# Patient Record
Sex: FEMALE | Race: WHITE | NOT HISPANIC OR LATINO | Employment: FULL TIME | URBAN - METROPOLITAN AREA
[De-identification: names, ages, dates, MRNs, and addresses within clinical notes are randomized per-mention and may not be internally consistent; named-entity substitution may affect disease eponyms.]

---

## 2017-01-10 ENCOUNTER — TRANSFERRED RECORDS (OUTPATIENT)
Dept: HEALTH INFORMATION MANAGEMENT | Facility: CLINIC | Age: 62
End: 2017-01-10

## 2017-01-10 LAB
CHOLEST SERPL-MCNC: 208 MG/DL (ref 0–199)
HDLC SERPL-MCNC: 39 MG/DL
LDLC SERPL CALC-MCNC: 108 MG/DL (ref 0–130)
TRIGL SERPL-MCNC: 377 MG/DL (ref 4–149)

## 2017-01-12 ENCOUNTER — TRANSFERRED RECORDS (OUTPATIENT)
Dept: HEALTH INFORMATION MANAGEMENT | Facility: CLINIC | Age: 62
End: 2017-01-12

## 2017-03-03 ENCOUNTER — TELEPHONE (OUTPATIENT)
Dept: FAMILY MEDICINE | Facility: CLINIC | Age: 62
End: 2017-03-03

## 2017-03-03 NOTE — TELEPHONE ENCOUNTER
..Reason for Call:  FYI    Detailed comments: pt had been seeing you but  due to insurance changes, was being seen elsewhere but now has bcbs again  and plans on coming in some time in April; very excited - has missed you;   *no need for call back    Phone Number Patient can be reached at: Home number on file [phone] (home)    Best Time: n/a    Can we leave a detailed message on this number? Not Applicable/but you may if nec    Call taken on 3/3/2017 at 1:30 PM by Padmini Crandall

## 2017-05-08 ENCOUNTER — OFFICE VISIT (OUTPATIENT)
Dept: FAMILY MEDICINE | Facility: CLINIC | Age: 62
End: 2017-05-08
Payer: COMMERCIAL

## 2017-05-08 VITALS
TEMPERATURE: 97.9 F | HEART RATE: 82 BPM | DIASTOLIC BLOOD PRESSURE: 73 MMHG | SYSTOLIC BLOOD PRESSURE: 152 MMHG | OXYGEN SATURATION: 98 % | WEIGHT: 192 LBS | HEIGHT: 64 IN | BODY MASS INDEX: 32.78 KG/M2

## 2017-05-08 DIAGNOSIS — B02.29 POST HERPETIC NEURALGIA: ICD-10-CM

## 2017-05-08 DIAGNOSIS — Z79.4 TYPE 2 DIABETES MELLITUS WITH DIABETIC NEPHROPATHY, WITH LONG-TERM CURRENT USE OF INSULIN (H): ICD-10-CM

## 2017-05-08 DIAGNOSIS — Z11.59 NEED FOR HEPATITIS C SCREENING TEST: ICD-10-CM

## 2017-05-08 DIAGNOSIS — F33.1 MAJOR DEPRESSIVE DISORDER, RECURRENT EPISODE, MODERATE (H): ICD-10-CM

## 2017-05-08 DIAGNOSIS — I10 HYPERTENSION GOAL BP (BLOOD PRESSURE) < 130/80: ICD-10-CM

## 2017-05-08 DIAGNOSIS — E78.5 HYPERLIPIDEMIA LDL GOAL <100: Primary | ICD-10-CM

## 2017-05-08 DIAGNOSIS — Z12.31 VISIT FOR SCREENING MAMMOGRAM: ICD-10-CM

## 2017-05-08 DIAGNOSIS — K22.70 BARRETT'S ESOPHAGUS WITHOUT DYSPLASIA: ICD-10-CM

## 2017-05-08 DIAGNOSIS — E11.21 TYPE 2 DIABETES MELLITUS WITH DIABETIC NEPHROPATHY, WITH LONG-TERM CURRENT USE OF INSULIN (H): ICD-10-CM

## 2017-05-08 LAB
ALBUMIN SERPL-MCNC: 3.7 G/DL (ref 3.4–5)
ALP SERPL-CCNC: 98 U/L (ref 40–150)
ALT SERPL W P-5'-P-CCNC: 37 U/L (ref 0–50)
ANION GAP SERPL CALCULATED.3IONS-SCNC: 11 MMOL/L (ref 3–14)
AST SERPL W P-5'-P-CCNC: 19 U/L (ref 0–45)
BILIRUB SERPL-MCNC: 0.4 MG/DL (ref 0.2–1.3)
BUN SERPL-MCNC: 13 MG/DL (ref 7–30)
CALCIUM SERPL-MCNC: 9.3 MG/DL (ref 8.5–10.1)
CHLORIDE SERPL-SCNC: 104 MMOL/L (ref 94–109)
CHOLEST SERPL-MCNC: 248 MG/DL
CO2 SERPL-SCNC: 24 MMOL/L (ref 20–32)
CREAT SERPL-MCNC: 0.57 MG/DL (ref 0.52–1.04)
GFR SERPL CREATININE-BSD FRML MDRD: ABNORMAL ML/MIN/1.7M2
GLUCOSE SERPL-MCNC: 275 MG/DL (ref 70–99)
HBA1C MFR BLD: 11.2 % (ref 4.3–6)
HDLC SERPL-MCNC: 45 MG/DL
LDLC SERPL CALC-MCNC: 136 MG/DL
NONHDLC SERPL-MCNC: 203 MG/DL
POTASSIUM SERPL-SCNC: 3.9 MMOL/L (ref 3.4–5.3)
PROT SERPL-MCNC: 7.1 G/DL (ref 6.8–8.8)
SODIUM SERPL-SCNC: 139 MMOL/L (ref 133–144)
TRIGL SERPL-MCNC: 336 MG/DL
TSH SERPL DL<=0.005 MIU/L-ACNC: 1.02 MU/L (ref 0.4–4)

## 2017-05-08 PROCEDURE — 83036 HEMOGLOBIN GLYCOSYLATED A1C: CPT | Performed by: PHYSICIAN ASSISTANT

## 2017-05-08 PROCEDURE — 36415 COLL VENOUS BLD VENIPUNCTURE: CPT | Performed by: PHYSICIAN ASSISTANT

## 2017-05-08 PROCEDURE — 80061 LIPID PANEL: CPT | Performed by: PHYSICIAN ASSISTANT

## 2017-05-08 PROCEDURE — 99207 C FOOT EXAM  NO CHARGE: CPT | Performed by: PHYSICIAN ASSISTANT

## 2017-05-08 PROCEDURE — 84443 ASSAY THYROID STIM HORMONE: CPT | Performed by: PHYSICIAN ASSISTANT

## 2017-05-08 PROCEDURE — 86803 HEPATITIS C AB TEST: CPT | Performed by: PHYSICIAN ASSISTANT

## 2017-05-08 PROCEDURE — 80053 COMPREHEN METABOLIC PANEL: CPT | Performed by: PHYSICIAN ASSISTANT

## 2017-05-08 PROCEDURE — 99214 OFFICE O/P EST MOD 30 MIN: CPT | Performed by: PHYSICIAN ASSISTANT

## 2017-05-08 RX ORDER — GABAPENTIN 100 MG/1
100 CAPSULE ORAL
COMMUNITY
Start: 2017-01-12 | End: 2017-05-08

## 2017-05-08 RX ORDER — METOPROLOL SUCCINATE 50 MG/1
50 TABLET, EXTENDED RELEASE ORAL DAILY
Qty: 90 TABLET | Refills: 1 | Status: SHIPPED | OUTPATIENT
Start: 2017-05-08 | End: 2017-09-17

## 2017-05-08 RX ORDER — CITALOPRAM HYDROBROMIDE 20 MG/1
20 TABLET ORAL DAILY
Qty: 90 TABLET | Refills: 1 | Status: SHIPPED | OUTPATIENT
Start: 2017-05-08 | End: 2017-09-22

## 2017-05-08 RX ORDER — INSULIN LISPRO 200 [IU]/ML
INJECTION, SOLUTION SUBCUTANEOUS
Refills: 4 | COMMUNITY
Start: 2017-03-23 | End: 2017-05-08

## 2017-05-08 RX ORDER — METOPROLOL SUCCINATE 50 MG/1
50 TABLET, EXTENDED RELEASE ORAL
COMMUNITY
Start: 2016-04-12 | End: 2017-05-08

## 2017-05-08 RX ORDER — INSULIN LISPRO 200 [IU]/ML
30 INJECTION, SOLUTION SUBCUTANEOUS
Qty: 12 ML | Refills: 0 | Status: SHIPPED | OUTPATIENT
Start: 2017-05-08 | End: 2017-06-19

## 2017-05-08 RX ORDER — LOSARTAN POTASSIUM AND HYDROCHLOROTHIAZIDE 12.5; 1 MG/1; MG/1
TABLET ORAL
COMMUNITY
Start: 2016-02-25 | End: 2017-05-08

## 2017-05-08 RX ORDER — METFORMIN HCL 500 MG
2000 TABLET, EXTENDED RELEASE 24 HR ORAL
COMMUNITY
Start: 2016-09-06 | End: 2017-05-08

## 2017-05-08 RX ORDER — CITALOPRAM HYDROBROMIDE 20 MG/1
20 TABLET ORAL
COMMUNITY
Start: 2017-01-10 | End: 2017-05-08

## 2017-05-08 RX ORDER — OMEPRAZOLE 40 MG/1
40 CAPSULE, DELAYED RELEASE ORAL DAILY
Qty: 30 CAPSULE | Refills: 1 | COMMUNITY
Start: 2017-05-08 | End: 2017-12-29

## 2017-05-08 RX ORDER — LOSARTAN POTASSIUM AND HYDROCHLOROTHIAZIDE 12.5; 1 MG/1; MG/1
1 TABLET ORAL DAILY
Qty: 90 TABLET | Refills: 1 | Status: SHIPPED | OUTPATIENT
Start: 2017-05-08 | End: 2017-12-29

## 2017-05-08 RX ORDER — AMLODIPINE BESYLATE 10 MG/1
10 TABLET ORAL DAILY
Qty: 90 TABLET | Refills: 1 | Status: SHIPPED | OUTPATIENT
Start: 2017-05-08 | End: 2017-12-29

## 2017-05-08 RX ORDER — GABAPENTIN 100 MG/1
200 CAPSULE ORAL 3 TIMES DAILY
Qty: 510 CAPSULE | Refills: 1 | Status: SHIPPED | OUTPATIENT
Start: 2017-05-08 | End: 2017-09-25 | Stop reason: DRUGHIGH

## 2017-05-08 RX ORDER — METFORMIN HCL 500 MG
2000 TABLET, EXTENDED RELEASE 24 HR ORAL
Qty: 360 TABLET | Refills: 1 | Status: SHIPPED | OUTPATIENT
Start: 2017-05-08 | End: 2017-12-29

## 2017-05-08 RX ORDER — SUCRALFATE ORAL 1 G/10ML
1000 SUSPENSION ORAL
COMMUNITY
Start: 2016-09-08 | End: 2017-09-22

## 2017-05-08 RX ORDER — OMEPRAZOLE 40 MG/1
40 CAPSULE, DELAYED RELEASE ORAL
COMMUNITY
Start: 2017-01-10 | End: 2017-05-08

## 2017-05-08 RX ORDER — ATORVASTATIN CALCIUM 40 MG/1
40 TABLET, FILM COATED ORAL DAILY
Qty: 90 TABLET | Refills: 1 | Status: SHIPPED | OUTPATIENT
Start: 2017-05-08 | End: 2017-12-29

## 2017-05-08 RX ORDER — ATORVASTATIN CALCIUM 40 MG/1
TABLET, FILM COATED ORAL
COMMUNITY
Start: 2016-10-18 | End: 2017-05-08

## 2017-05-08 RX ORDER — AMLODIPINE BESYLATE 10 MG/1
10 TABLET ORAL
COMMUNITY
Start: 2016-06-14 | End: 2017-05-08

## 2017-05-08 NOTE — MR AVS SNAPSHOT
After Visit Summary   5/8/2017    Ellen Hill    MRN: [id number]           Patient Information     Date Of Birth          1955        Visit Information        Provider Department      5/8/2017 3:40 PM Jenniffer Mackay PA-C [facility]        Today's Diagnoses     Hyperlipidemia LDL goal <100    -  1    Visit for screening mammogram        Need for hepatitis C screening test        Screening for diabetic peripheral neuropathy        Post herpetic neuralgia        Type 2 diabetes mellitus with diabetic nephropathy, with long-term current use of insulin (H)        Major depressive disorder, recurrent episode, moderate (H)        Hypertension goal BP (blood pressure) < 130/80           Follow-ups after your visit        Additional Services     DIABETES EDUCATOR REFERRAL       DIABETES SELF MANAGEMENT TRAINING (DSMT)      Your provider has referred you to Diabetes Education: FMG: Diabetes Education - All [facility] [phone]   [email or web address]    Type of training and number of hours: Previous Diagnosis: Follow-up DSMT - 2 hours.    Medicare covers: 10 hours of initial DSMT in 12 month period from the time of first visit, plus 2 hours of follow-up DSMT annually, and additional hours as requested for insulin training.    Diabetes Type: Type 2 - On Insulin             Diabetes Co-Morbidities: hypertension               A1C Goal:  <7.0       A1C is: Lab Results       Component                Value               Date                       A1C                      11.2                05/08/2017              If an urgent visit is needed or A1C is above 12, Care Team to call the Diabetes Education Team at [phone] or send an In Basket message to the Diabetes Education Pool (P DIAB ED-PATIENT CARE).    Diabetes Education Topics: Other: insulin management    Special Educational Needs Requiring Individual DSMT: None        MEDICAL NUTRITION THERAPY (MNT) for Diabetes    Medical Nutrition Therapy with a Registered Dietitian can be provided in coordination with Diabetes Self-Management Training to assist in achieving optimal diabetes management.    MNT Type and Hours: Previous diagnosis: Annual follow-up MNT - 2 hours                       Medicare will cover: 3 hours initial MNT in 12 month period after first visit, plus 2 hours of follow-up MNT annually    Please be aware that coverage of these services is subject to the terms and limitations of your health insurance plan.  Call member services at your health plan to determine Diabetes Self-Management Training benefits and ask which blood glucose monitor brands are covered by your plan.      Please bring the following with you to your appointment:    (1)  List of current medications   (2)  List of Blood Glucose Monitor brands that are covered by your insurance plan  (3)  Blood Glucose Monitor and log book  (4)   Food records for the 3 days prior to your visit    The Certified Diabetes Educator may make diabetes medication adjustments per the CDE Protocol and Collaborative Practice Agreement.                  Your next 10 appointments already scheduled     May 31, 2017  1:20 PM CDT   Telephone Visit with Jenniffer Mackay PA-C   [facility] ([facility]    [address] [place] [id number] [phone]           Note: this is not an onsite visit; there is no need to come to the facility.              Future tests that were ordered for you today     Open Future Orders        Priority Expected Expires Ordered    MA SCREENING DIGITAL BILAT - Future  (s+30) Routine  5/8/2018 5/8/2017            Who to contact     If you have questions or need follow up information about today's clinic visit or your schedule please contact [facility] directly at [phone].  Normal or non-critical lab and imaging  "results will be communicated to you by MyChart, letter or phone within 4 business days after the clinic has received the results. If you do not hear from us within 7 days, please contact the clinic through [employer] or phone. If you have a critical or abnormal lab result, we will notify you by phone as soon as possible.  Submit refill requests through [employer] or call your pharmacy and they will forward the refill request to us. Please allow 3 business days for your refill to be completed.          Additional Information About Your Visit        BoxTone Information     BoxTone lets you send messages to your doctor, view your test results, renew your prescriptions, schedule appointments and more. To sign up, go to [email or web address] . Click on \"Log in\" on the left side of the screen, which will take you to the Welcome page. Then click on \"Sign up Now\" on the right side of the page.     You will be asked to enter the access code listed below, as well as some personal information. Please follow the directions to create your username and password.     Your access code is: [email or web address]  Expires: 2017  4:37 PM     Your access code will  in 90 days. If you need help or a new code, please call your [place] clinic or [phone].        Care EveryWhere ID     This is your Care EveryWhere ID. This could be used by other organizations to access your [place] medical records  [id number]        Your Vitals Were     Pulse Temperature Height Pulse Oximetry Breastfeeding? BMI (Body Mass Index)    82 97.9  F (36.6  C) (Oral) 5' 4\" (1.626 m) 98% No 32.96 kg/m2       Blood Pressure from Last 3 Encounters:   17 152/73   16 162/80   02/11/15 130/76    Weight from Last 3 Encounters:   17 192 lb (87.1 kg)   16 193 lb (87.5 kg)   02/11/15 196 lb (88.9 kg)              We Performed the Following     Albumin Random Urine Quantitative     Comprehensive metabolic panel     DIABETES EDUCATOR REFERRAL     " FOOT EXAM  NO CHARGE [95187.114]     HEMOGLOBIN A1C     Hepatitis C Screen Reflex to HCV RNA Quant and Genotype     Lipid panel reflex to direct LDL     TSH WITH FREE T4 REFLEX          Today's Medication Changes          These changes are accurate as of: 5/8/17  4:37 PM.  If you have any questions, ask your nurse or doctor.               These medicines have changed or have updated prescriptions.        Dose/Directions    amLODIPine 10 MG tablet   Commonly known as:  NORVASC   This may have changed:  when to take this   Used for:  Hypertension goal BP (blood pressure) < 130/80   Changed by:  Jenniffer Mackay PA-C        Dose:  10 mg   Take 1 tablet (10 mg) by mouth daily   Quantity:  90 tablet   Refills:  1       atorvastatin 40 MG tablet   Commonly known as:  LIPITOR   This may have changed:  See the new instructions.   Used for:  Type 2 diabetes mellitus with diabetic nephropathy, with long-term current use of insulin (H)   Changed by:  Jenniffer Mackay PA-C        Dose:  40 mg   Take 1 tablet (40 mg) by mouth daily   Quantity:  90 tablet   Refills:  1       citalopram 20 MG tablet   Commonly known as:  celeXA   This may have changed:  when to take this   Used for:  Major depressive disorder, recurrent episode, moderate (H)   Changed by:  Jenniffer Mackay PA-C        Dose:  20 mg   Take 1 tablet (20 mg) by mouth daily   Quantity:  90 tablet   Refills:  1       gabapentin 100 MG capsule   Commonly known as:  NEURONTIN   This may have changed:    - how much to take  - when to take this   Used for:  Post herpetic neuralgia   Changed by:  Jenniffer Mackay PA-C        Dose:  200 mg   Take 2 capsules (200 mg) by mouth 3 times daily   Quantity:  510 capsule   Refills:  1       HUMALOG KWIKPEN 200 UNIT/ML soln   This may have changed:  See the new instructions.   Used for:  Type 2 diabetes mellitus with diabetic nephropathy, with long-term current use of insulin (H)   Generic drug:  Insulin Lispro    Changed by:  Jenniffer Mackay PA-C        Dose:  30 Units   Inject 30 Units Subcutaneous 3 times daily (before meals)   Quantity:  12 mL   Refills:  0       insulin glargine 100 UNIT/ML injection   Commonly known as:  LANTUS   This may have changed:    - how much to take  - when to take this   Used for:  Type 2 diabetes mellitus with diabetic nephropathy, with long-term current use of insulin (H)   Changed by:  Jenniffer Mackay PA-C        Dose:  45 Units   Inject 45 Units Subcutaneous 2 times daily   Quantity:  12 mL   Refills:  0       losartan-hydrochlorothiazide 100-12.5 MG per tablet   Commonly known as:  HYZAAR   This may have changed:    - how much to take  - when to take this   Used for:  Type 2 diabetes mellitus with diabetic nephropathy, with long-term current use of insulin (H)   Changed by:  Jenniffer Mackay PA-C        Dose:  1 tablet   Take 1 tablet by mouth daily   Quantity:  90 tablet   Refills:  1       metFORMIN 500 MG 24 hr tablet   Commonly known as:  GLUCOPHAGE-XR   This may have changed:  when to take this   Used for:  Type 2 diabetes mellitus with diabetic nephropathy, with long-term current use of insulin (H)   Changed by:  Jenniffer Mackay PA-C        Dose:  2000 mg   Take 4 tablets (2,000 mg) by mouth daily (with breakfast)   Quantity:  360 tablet   Refills:  1       metoprolol 50 MG 24 hr tablet   Commonly known as:  TOPROL-XL   This may have changed:  when to take this   Used for:  Hypertension goal BP (blood pressure) < 130/80   Changed by:  Jenniffer Mackay PA-C        Dose:  50 mg   Take 1 tablet (50 mg) by mouth daily   Quantity:  90 tablet   Refills:  1       omeprazole 40 MG capsule   Commonly known as:  priLOSEC   This may have changed:  when to take this   Changed by:  Jenniffer Mackay PA-C        Dose:  40 mg   Take 1 capsule (40 mg) by mouth daily   Quantity:  30 capsule   Refills:  1            Where to get your medicines      These medications were  sent to [facility]/pharmacy #[id number] - [facility], [place] - [address]  [address], [facility] [address]     Phone:  [phone]     amLODIPine 10 MG tablet    atorvastatin 40 MG tablet    citalopram 20 MG tablet    gabapentin 100 MG capsule    HUMALOG KWIKPEN 200 UNIT/ML soln    insulin glargine 100 UNIT/ML injection    losartan-hydrochlorothiazide 100-12.5 MG per tablet    metFORMIN 500 MG 24 hr tablet    metoprolol 50 MG 24 hr tablet                Primary Care Provider    M[facility] Other Clinic                Thank you!     Thank you for choosing [facility]  for your care. Our goal is always to provide you with excellent care. Hearing back from our patients is one way we can continue to improve our services. Please take a few minutes to complete the written survey that you may receive in the mail after your visit with us. Thank you!             Your Updated Medication List - Protect others around you: Learn how to safely use, store and throw away your medicines at www.disposemymeds.org.          This list is accurate as of: 5/8/17  4:37 PM.  Always use your most recent med list.                   Brand Name Dispense Instructions for use    amLODIPine 10 MG tablet    NORVASC    90 tablet    Take 1 tablet (10 mg) by mouth daily       atorvastatin 40 MG tablet    LIPITOR    90 tablet    Take 1 tablet (40 mg) by mouth daily       citalopram 20 MG tablet    celeXA    90 tablet    Take 1 tablet (20 mg) by mouth daily       gabapentin 100 MG capsule    NEURONTIN    510 capsule    Take 2 capsules (200 mg) by mouth 3 times daily       HUMALOG KWIKPEN 200 UNIT/ML soln   Generic drug:  Insulin Lispro     12 mL    Inject 30 Units Subcutaneous 3 times daily (before meals)       insulin glargine 100 UNIT/ML injection    LANTUS    12 mL    Inject 45 Units Subcutaneous 2 times daily       losartan-hydrochlorothiazide 100-12.5 MG per tablet    HYZAAR    90 tablet    Take 1 tablet by mouth daily        metFORMIN 500 MG 24 hr tablet    GLUCOPHAGE-XR    360 tablet    Take 4 tablets (2,000 mg) by mouth daily (with breakfast)       metoprolol 50 MG 24 hr tablet    TOPROL-XL    90 tablet    Take 1 tablet (50 mg) by mouth daily       omeprazole 40 MG capsule    priLOSEC    30 capsule    Take 1 capsule (40 mg) by mouth daily       oxyCODONE-acetaminophen 5-325 MG per tablet    PERCOCET     Reported on 5/8/2017       sucralfate 1 GM/10ML suspension    CARAFATE     Take 1,000 mg by mouth

## 2017-05-08 NOTE — NURSING NOTE
"Chief Complaint   Patient presents with     Hypertension     Diabetes     Lipids       Initial /73  Pulse 82  Temp 97.9  F (36.6  C) (Oral)  Ht 5' 4\" (1.626 m)  Wt 192 lb (87.1 kg)  SpO2 98%  Breastfeeding? No  BMI 32.96 kg/m2 Estimated body mass index is 32.96 kg/(m^2) as calculated from the following:    Height as of this encounter: 5' 4\" (1.626 m).    Weight as of this encounter: 192 lb (87.1 kg).  Medication Reconciliation: complete   Thi New CMA      "

## 2017-05-08 NOTE — PROGRESS NOTES
SUBJECTIVE:                                                    Ellen Hill is a 61 year old female who presents to clinic today for the following health issues:        Diabetes Follow-up      Patient is checking blood sugars: not at all    Diabetic concerns: None and blood sugar frequently over 200 - 280     Symptoms of hypoglycemia (low blood sugar): none     Paresthesias (numbness or burning in feet) or sores: No     Date of last diabetic eye exam: 5/2016    Has been taking Lantus 40 units BID  Humalog taking it 30 BID instead of TID  She admits, she is not consistent with taking her meds.     Has been eating a lot of carbs (spagetti)     Hyperlipidemia Follow-Up      Rate your low fat/cholesterol diet?: fair    Taking statin?  Yes, no muscle aches from statin    Other lipid medications/supplements?:  Fish oil/Omega      Hypertension Follow-up      Outpatient blood pressures are not being checked.    Low Salt Diet: no added salt    Has not been taking her medications.            Has not been seen by a medical provider for over 9 months, just seeing a pharmacist for her meds.     Had an EGD and found to have barretts esophagus.   Started on gabapentin for nerve pain (right rib pain/post herpetic neuralgia).  This has helped calm down that pain.           Problem list and histories reviewed & adjusted, as indicated.  Additional history: as documented    Patient Active Problem List   Diagnosis     Post herpetic neuralgia     Hypertriglyceridemia     Hyperlipidemia LDL goal <100     TYPE 2 DIABETES, HBA1C GOAL < 7%     Hypertension goal BP (blood pressure) < 130/80     Moderate major depression (H)     Vulvar pruritus     Adenomatous polyps     Alcoholic (H)     Advanced directives, counseling/discussion     Obesity, Class I, BMI 30-34.9     Microalbuminuria     Leblanc's esophagus without dysplasia     History reviewed. No pertinent surgical history.    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, 2013     Family History   Problem Relation Age of Onset     Alcohol/Drug Mother      HEART DISEASE Mother      Alcohol/Drug Father      DIABETES Brother          Current Outpatient Prescriptions   Medication Sig Dispense Refill     sucralfate (CARAFATE) 1 GM/10ML suspension Take 1,000 mg by mouth       gabapentin (NEURONTIN) 100 MG capsule Take 2 capsules (200 mg) by mouth 3 times daily 510 capsule 1     omeprazole (PRILOSEC) 40 MG capsule Take 1 capsule (40 mg) by mouth daily 30 capsule 1     atorvastatin (LIPITOR) 40 MG tablet Take 1 tablet (40 mg) by mouth daily 90 tablet 1     metFORMIN (GLUCOPHAGE-XR) 500 MG 24 hr tablet Take 4 tablets (2,000 mg) by mouth daily (with breakfast) 360 tablet 1     citalopram (CELEXA) 20 MG tablet Take 1 tablet (20 mg) by mouth daily 90 tablet 1     metoprolol (TOPROL-XL) 50 MG 24 hr tablet Take 1 tablet (50 mg) by mouth daily 90 tablet 1     amLODIPine (NORVASC) 10 MG tablet Take 1 tablet (10 mg) by mouth daily 90 tablet 1     losartan-hydrochlorothiazide (HYZAAR) 100-12.5 MG per tablet Take 1 tablet by mouth daily 90 tablet 1     HUMALOG KWIKPEN 200 UNIT/ML soln Inject 30 Units Subcutaneous 3 times daily (before meals) 12 mL 0     insulin glargine (LANTUS) 100 UNIT/ML injection Inject 45 Units Subcutaneous 2 times daily 15 mL 0     oxyCODONE-acetaminophen (PERCOCET) 5-325 MG per tablet Reported on 5/8/2017       BP Readings from Last 3 Encounters:   05/08/17 152/73   06/26/16 162/80   02/11/15 130/76    Wt Readings from Last 3 Encounters:   05/08/17 192 lb (87.1 kg)   06/26/16 193 lb (87.5 kg)   02/11/15 196 lb (88.9 kg)                    Reviewed and updated as needed this visit by clinical staff       Reviewed and updated as needed this  "visit by Provider         ROS:  Constitutional, HEENT, cardiovascular, pulmonary, GI, , musculoskeletal, neuro, skin, endocrine and psych systems are negative, except as otherwise noted.    OBJECTIVE:                                                    /73  Pulse 82  Temp 97.9  F (36.6  C) (Oral)  Ht 5' 4\" (1.626 m)  Wt 192 lb (87.1 kg)  SpO2 98%  Breastfeeding? No  BMI 32.96 kg/m2  Body mass index is 32.96 kg/(m^2).  GENERAL: healthy, alert and no distress  NECK: no adenopathy, no asymmetry, masses, or scars and thyroid normal to palpation  RESP: lungs clear to auscultation - no rales, rhonchi or wheezes  CV: regular rate and rhythm, normal S1 S2, no S3 or S4, no murmur, click or rub, no peripheral edema and peripheral pulses strong  ABDOMEN: soft, nontender, no hepatosplenomegaly, no masses and bowel sounds normal  MS: no gross musculoskeletal defects noted, no edema  Diabetic foot exam: normal DP and PT pulses, no trophic changes or ulcerative lesions and normal sensory exam    Diagnostic Test Results:  Results for orders placed or performed in visit on 05/08/17   Hepatitis C Screen Reflex to HCV RNA Quant and Genotype   Result Value Ref Range    Hepatitis C Antibody  NR     Nonreactive   Assay performance characteristics have not been established for newborns,   infants, and children     HEMOGLOBIN A1C   Result Value Ref Range    Hemoglobin A1C 11.2 (H) 4.3 - 6.0 %   Lipid panel reflex to direct LDL   Result Value Ref Range    Cholesterol 248 (H) <200 mg/dL    Triglycerides 336 (H) <150 mg/dL    HDL Cholesterol 45 (L) >49 mg/dL    LDL Cholesterol Calculated 136 (H) <100 mg/dL    Non HDL Cholesterol 203 (H) <130 mg/dL   TSH WITH FREE T4 REFLEX   Result Value Ref Range    TSH 1.02 0.40 - 4.00 mU/L   Comprehensive metabolic panel   Result Value Ref Range    Sodium 139 133 - 144 mmol/L    Potassium 3.9 3.4 - 5.3 mmol/L    Chloride 104 94 - 109 mmol/L    Carbon Dioxide 24 20 - 32 mmol/L    Anion Gap 11 " 3 - 14 mmol/L    Glucose 275 (H) 70 - 99 mg/dL    Urea Nitrogen 13 7 - 30 mg/dL    Creatinine 0.57 0.52 - 1.04 mg/dL    GFR Estimate >90  Non  GFR Calc   >60 mL/min/1.7m2    GFR Estimate If Black >90   GFR Calc   >60 mL/min/1.7m2    Calcium 9.3 8.5 - 10.1 mg/dL    Bilirubin Total 0.4 0.2 - 1.3 mg/dL    Albumin 3.7 3.4 - 5.0 g/dL    Protein Total 7.1 6.8 - 8.8 g/dL    Alkaline Phosphatase 98 40 - 150 U/L    ALT 37 0 - 50 U/L    AST 19 0 - 45 U/L        ASSESSMENT/PLAN:                                                          1. Type 2 diabetes mellitus with diabetic nephropathy, with long-term current use of insulin (H)  Diabetes not controlled.  Will increase humalog to TID dosing (to cover meds).  Increase lantus to 45 units BID as well.   F/u with diabetes ed for insulin adjustment, may need a more concentrated insulin at some point.  Monitor for compliance issues.   - atorvastatin (LIPITOR) 40 MG tablet; Take 1 tablet (40 mg) by mouth daily  Dispense: 90 tablet; Refill: 1  - metFORMIN (GLUCOPHAGE-XR) 500 MG 24 hr tablet; Take 4 tablets (2,000 mg) by mouth daily (with breakfast)  Dispense: 360 tablet; Refill: 1  - losartan-hydrochlorothiazide (HYZAAR) 100-12.5 MG per tablet; Take 1 tablet by mouth daily  Dispense: 90 tablet; Refill: 1  - HUMALOG KWIKPEN 200 UNIT/ML soln; Inject 30 Units Subcutaneous 3 times daily (before meals)  Dispense: 12 mL; Refill: 0  - DIABETES EDUCATOR REFERRAL    2. Hyperlipidemia LDL goal <100  Will check labs, on a statin.   - Lipid panel reflex to direct LDL  - TSH WITH FREE T4 REFLEX  - Comprehensive metabolic panel  - Albumin Random Urine Quantitative; Future    3. Post herpetic neuralgia  Continue with this as is.   - gabapentin (NEURONTIN) 100 MG capsule; Take 2 capsules (200 mg) by mouth 3 times daily  Dispense: 510 capsule; Refill: 1    4. Major depressive disorder, recurrent episode, moderate (H)  Will leave as is for now  - citalopram (CELEXA) 20  MG tablet; Take 1 tablet (20 mg) by mouth daily  Dispense: 90 tablet; Refill: 1    5. Hypertension goal BP (blood pressure) < 130/80  Will leave as is for now.   - metoprolol (TOPROL-XL) 50 MG 24 hr tablet; Take 1 tablet (50 mg) by mouth daily  Dispense: 90 tablet; Refill: 1  - amLODIPine (NORVASC) 10 MG tablet; Take 1 tablet (10 mg) by mouth daily  Dispense: 90 tablet; Refill: 1    6. Leblanc's esophagus without dysplasia  Continue with high dose PPI until she has alternative recommendations from GI (has a repeat EGD scheduled soon)  - omeprazole (PRILOSEC) 40 MG capsule; Take 1 capsule (40 mg) by mouth daily  Dispense: 30 capsule; Refill: 1    7. Visit for screening mammogram  due  - MA SCREENING DIGITAL BILAT - Future  (s+30); Future    8. Need for hepatitis C screening test  Due.   - Hepatitis C Screen Reflex to HCV RNA Quant and Genotype    FUTURE APPOINTMENTS:       - Follow-up visit in 1 month for a recheck and with diabetes ed.       Jenniffer Mackay PA-C  [facility]

## 2017-05-09 ENCOUNTER — TELEPHONE (OUTPATIENT)
Dept: FAMILY MEDICINE | Facility: CLINIC | Age: 62
End: 2017-05-09

## 2017-05-09 DIAGNOSIS — Z79.4 TYPE 2 DIABETES MELLITUS WITH DIABETIC NEPHROPATHY, WITH LONG-TERM CURRENT USE OF INSULIN (H): ICD-10-CM

## 2017-05-09 DIAGNOSIS — E11.21 TYPE 2 DIABETES MELLITUS WITH DIABETIC NEPHROPATHY, WITH LONG-TERM CURRENT USE OF INSULIN (H): ICD-10-CM

## 2017-05-09 LAB — HCV AB SERPL QL IA: NORMAL

## 2017-05-09 ASSESSMENT — PATIENT HEALTH QUESTIONNAIRE - PHQ9: SUM OF ALL RESPONSES TO PHQ QUESTIONS 1-9: 1

## 2017-05-09 NOTE — TELEPHONE ENCOUNTER
[facility] is faxing a request for a new rx for insulin glargine (LANTUS) 100 UNIT/ML injection.    Pharmacy states the only carry in 15ml form.  Rx is is to dispense at 12ml.    Please contact pharmacy or send new Rx        [facility]

## 2017-05-10 PROBLEM — K22.70 BARRETT'S ESOPHAGUS WITHOUT DYSPLASIA: Status: ACTIVE | Noted: 2017-05-10

## 2017-05-10 RX ORDER — INSULIN GLARGINE 100 [IU]/ML
45 INJECTION, SOLUTION SUBCUTANEOUS 2 TIMES DAILY
Qty: 15 ML | Refills: 1 | Status: SHIPPED | OUTPATIENT
Start: 2017-05-10 | End: 2017-10-04

## 2017-05-10 NOTE — TELEPHONE ENCOUNTER
Pt returned called, was given message below.  Pt stated that she has a card for the Lantus as well that would drop the price to $25 but will try the new Rx to compare pricing.    Pt will call back with any further questions.    Maranda Jones MA  4:04 PM 5/10/2017

## 2017-05-10 NOTE — TELEPHONE ENCOUNTER
Patient has been on this medication in the past. MA to start the PA process.    Kandy Knutson RN, [facility]

## 2017-05-10 NOTE — TELEPHONE ENCOUNTER
I called [facility] pharmacy and they stated the formulary is Basaglar otherwise a PA will need to be done. Jenniffer do you want to try formulary or PA. Diane, CMA

## 2017-05-10 NOTE — TELEPHONE ENCOUNTER
This writer attempted to contact Ellen on 05/10/17    Reason for call formulary change/coverage and left message to return call.    When patient calls back, please contact 2nd floor [facility]. routine priority.      Diane, CMA

## 2017-05-10 NOTE — TELEPHONE ENCOUNTER
Now pharmacy is faxing a PA request for this medication:    Plan does not cover insulin glargine (LANTUS) 100 UNIT/ML injection.  Please call [phone] to initiate Prior Auth or change med.      ID# [id number]    Not covered- use rashawn Velasquez tresibanon Jesica L[facility]

## 2017-06-14 DIAGNOSIS — E11.9 TYPE 2 DIABETES, HBA1C GOAL < 7% (H): Primary | ICD-10-CM

## 2017-06-14 RX ORDER — FLURBIPROFEN SODIUM 0.3 MG/ML
SOLUTION/ DROPS OPHTHALMIC
Qty: 180 EACH | Refills: 1 | Status: SHIPPED | OUTPATIENT
Start: 2017-06-14 | End: 2018-08-14

## 2017-06-14 NOTE — TELEPHONE ENCOUNTER
[facility], [facility], [place], [phone] called regarding Rx for Insulin [place].  Patient is out of needles and is at the Pharmacy waiting.      Thank you,    Evi Springer

## 2017-06-14 NOTE — TELEPHONE ENCOUNTER
Pharmacy states that there was no size on the pen needles.  In the past the patient has received the mini pen needles.  Per refill protocol RN gave verbal permission to refill pen needles with mini pen needles.    Claudia Florian RN

## 2017-06-14 NOTE — TELEPHONE ENCOUNTER
Prescription approved per [facility] Refill Protocol.    Kandy Knutson RN, [facility]

## 2017-06-19 ENCOUNTER — TELEPHONE (OUTPATIENT)
Dept: FAMILY MEDICINE | Facility: CLINIC | Age: 62
End: 2017-06-19

## 2017-06-19 DIAGNOSIS — Z79.4 TYPE 2 DIABETES MELLITUS WITH DIABETIC NEPHROPATHY, WITH LONG-TERM CURRENT USE OF INSULIN (H): Primary | ICD-10-CM

## 2017-06-19 DIAGNOSIS — E11.21 TYPE 2 DIABETES MELLITUS WITH DIABETIC NEPHROPATHY, WITH LONG-TERM CURRENT USE OF INSULIN (H): Primary | ICD-10-CM

## 2017-06-19 NOTE — TELEPHONE ENCOUNTER
They likely want novolog instead then, which converts unit for unit as humalog.  I'll send that to her pharmacy.  She needs to schedule a f/u visit with Diabetes ed soon for insulin adjustment so we can get her blood sugars into a safer range.     Jenniffer Mackay PA-C

## 2017-06-19 NOTE — TELEPHONE ENCOUNTER
Reason for Call:  Other call back    Detailed comments: patient states her new insurance, [employer] does not cover HUMALOG KWIKPEN 200 UNIT/ML soln. Patient would like to know what her options are. She's no longer with [facility]. Please call and advise. Thanks.     Patient uses [id number] - [place], [place] - [address]    Phone Number Patient can be reached at: Home number on file [phone] (home)    Best Time: Anytime     Can we leave a detailed message on this number? YES    Call taken on 6/19/2017 at 11:52 AM by Abel Tee

## 2017-07-16 ENCOUNTER — OFFICE VISIT (OUTPATIENT)
Dept: URGENT CARE | Facility: URGENT CARE | Age: 62
End: 2017-07-16
Payer: COMMERCIAL

## 2017-07-16 VITALS
TEMPERATURE: 98.1 F | OXYGEN SATURATION: 94 % | WEIGHT: 190.6 LBS | DIASTOLIC BLOOD PRESSURE: 90 MMHG | HEART RATE: 95 BPM | BODY MASS INDEX: 32.72 KG/M2 | SYSTOLIC BLOOD PRESSURE: 186 MMHG

## 2017-07-16 DIAGNOSIS — N30.01 ACUTE CYSTITIS WITH HEMATURIA: ICD-10-CM

## 2017-07-16 DIAGNOSIS — R30.0 DYSURIA: Primary | ICD-10-CM

## 2017-07-16 DIAGNOSIS — R82.90 NONSPECIFIC FINDING ON EXAMINATION OF URINE: ICD-10-CM

## 2017-07-16 LAB
ALBUMIN UR-MCNC: >=300 MG/DL
APPEARANCE UR: ABNORMAL
BACTERIA #/AREA URNS HPF: ABNORMAL /HPF
BILIRUB UR QL STRIP: ABNORMAL
COLOR UR AUTO: YELLOW
GLUCOSE UR STRIP-MCNC: 500 MG/DL
HGB UR QL STRIP: ABNORMAL
KETONES UR STRIP-MCNC: NEGATIVE MG/DL
LEUKOCYTE ESTERASE UR QL STRIP: NEGATIVE
NITRATE UR QL: NEGATIVE
NON-SQ EPI CELLS #/AREA URNS LPF: ABNORMAL /LPF
PH UR STRIP: 5.5 PH (ref 5–7)
RBC #/AREA URNS AUTO: >100 /HPF (ref 0–2)
SP GR UR STRIP: >1.03 (ref 1–1.03)
URN SPEC COLLECT METH UR: ABNORMAL
URNS CMNT MICRO: ABNORMAL
UROBILINOGEN UR STRIP-ACNC: 1 EU/DL (ref 0.2–1)
WBC #/AREA URNS AUTO: ABNORMAL /HPF (ref 0–2)

## 2017-07-16 PROCEDURE — 99213 OFFICE O/P EST LOW 20 MIN: CPT | Performed by: NURSE PRACTITIONER

## 2017-07-16 PROCEDURE — 81001 URINALYSIS AUTO W/SCOPE: CPT | Performed by: NURSE PRACTITIONER

## 2017-07-16 PROCEDURE — 87086 URINE CULTURE/COLONY COUNT: CPT | Performed by: NURSE PRACTITIONER

## 2017-07-16 RX ORDER — SULFAMETHOXAZOLE/TRIMETHOPRIM 800-160 MG
1 TABLET ORAL 2 TIMES DAILY
Qty: 10 TABLET | Refills: 0 | Status: SHIPPED | OUTPATIENT
Start: 2017-07-16 | End: 2017-07-21

## 2017-07-16 ASSESSMENT — PAIN SCALES - GENERAL: PAINLEVEL: SEVERE PAIN (7)

## 2017-07-16 NOTE — LETTER
[facility]  [address]  [place] [address]         July 18, 2017    Ellen Hill  [facility] [address]  [place] [address]              Dear Ellen,    The results of your recent tests were abnormal and likely contaminated. Please complete antibiotic as directed and follow-up with your primary care provider for a repeat urine test. Enclosed is a copy of the results.  It was a pleasure to see you at your last visit.      Results for orders placed or performed in visit on 07/16/17   *UA reflex to Microscopic and Culture ([place] and [facility] (except [place] and [place])   Result Value Ref Range    Color Urine Yellow     Appearance Urine Cloudy     Glucose Urine 500 (A) NEG mg/dL    Bilirubin Urine (A) NEG     Small  This is an unconfirmed screening test result. A positive result may be false.      Ketones Urine Negative NEG mg/dL    Specific Gravity Urine >1.030 1.003 - 1.035    Blood Urine Large (A) NEG    pH Urine 5.5 5.0 - 7.0 pH    Protein Albumin Urine >=300 (A) NEG mg/dL    Urobilinogen Urine 1.0 0.2 - 1.0 EU/dL    Nitrite Urine Negative NEG    Leukocyte Esterase Urine Negative NEG    Source Midstream Urine    Urine Microscopic   Result Value Ref Range    WBC Urine 10-25 (A) 0 - 2 /HPF    RBC Urine >100 (A) 0 - 2 /HPF    Squamous Epithelial /LPF Urine Few FEW /LPF    Bacteria Urine Moderate (A) NEG /HPF    Comment Urine Unconcentrated    Urine Culture Aerobic Bacterial   Result Value Ref Range    Specimen Description Midstream Urine     Culture Micro       50,000 to 100,000 colonies/mL mixed urogenital eriberto Susceptibility testing not   routinely done      Micro Report Status FINAL 07/17/2017          If you have any questions or concerns, please call myself or my nurse at [phone].      Sincerely,      Lori Wylie, LADONNA/ama

## 2017-07-16 NOTE — MR AVS SNAPSHOT
After Visit Summary   7/16/2017    Ellen Hill    MRN: [id number]           Patient Information     Date Of Birth          1955        Visit Information        Provider Department      7/16/2017 9:45 AM Lori Wylie NP [facility]        Today's Diagnoses     Dysuria    -  1    Nonspecific finding on examination of urine        Acute cystitis with hematuria          Care Instructions      Understanding Urinary Tract Infections (UTIs)  Most UTIs are caused by bacteria, although they may also be caused by viruses or fungi. Bacteria from the bowel are the most common source of infection. The infection may start because of any of the following:    Sexual activity. During sex, bacteria can travel from the penis, vagina, or rectum into the urethra.     Bacteria on the skin outside the rectum may travel into the urethra. This is more common in women since the rectum and urethra are closer to each other than in men. Wiping from front to back after using the toilet and keeping the area clean can help prevent germs from getting to the urethra.    Blockage of urine flow through the urinary tract. If urine sits too long, germs may start to grow out of control.      Parts of the urinary tract  The infection can occur in any part of the urinary tract.    The kidneys collect and store urine.    The ureters carry urine from the kidneys to the bladder.    The bladder holds urine until you are ready to let it out.    The urethra carries urine from the bladder out of the body. It is shorter in women, so bacteria can move through it more easily. The urethra is longer in men, so a UTI is less likely to reach the bladder or kidneys in men.  Date Last Reviewed: 1/1/2017 2000-2017 [employer]. [address] [place] [address], [place] [address]. All rights reserved. This information is not intended as a substitute for professional medical care. Always follow your healthcare professional's  "instructions.                Follow-ups after your visit        Who to contact     If you have questions or need follow up information about today's clinic visit or your schedule please contact [facility] [place] directly at [phone].  Normal or non-critical lab and imaging results will be communicated to you by MyChart, letter or phone within 4 business days after the clinic has received the results. If you do not hear from us within 7 days, please contact the clinic through MyChart or phone. If you have a critical or abnormal lab result, we will notify you by phone as soon as possible.  Submit refill requests through [employer] or call your pharmacy and they will forward the refill request to us. Please allow 3 business days for your refill to be completed.          Additional Information About Your Visit        [employer] Information     [employer] lets you send messages to your doctor, view your test results, renew your prescriptions, schedule appointments and more. To sign up, go to [email or web address] . Click on \"Log in\" on the left side of the screen, which will take you to the Welcome page. Then click on \"Sign up Now\" on the right side of the page.     You will be asked to enter the access code listed below, as well as some personal information. Please follow the directions to create your username and password.     Your access code is: [email or web address]  Expires: 2017  4:37 PM     Your access code will  in 90 days. If you need help or a new code, please call your [place] clinic or [phone].        Care EveryWhere ID     This is your Care EveryWhere ID. This could be used by other organizations to access your [facility] medical records  [id number]        Your Vitals Were     Pulse Temperature Pulse Oximetry BMI (Body Mass Index)          95 98.1  F (36.7  C) (Oral) 94% 32.72 kg/m2         Blood Pressure from Last 3 Encounters:   17 186/90   17 152/73   16 162/80    " Weight from Last 3 Encounters:   07/16/17 190 lb 9.6 oz (86.5 kg)   05/08/17 192 lb (87.1 kg)   06/26/16 193 lb (87.5 kg)              We Performed the Following     *UA reflex to Microscopic and Culture ([place] and [facility] (except [place] and [place])     Urine Culture Aerobic Bacterial     Urine Microscopic          Today's Medication Changes          These changes are accurate as of: 7/16/17 10:30 AM.  If you have any questions, ask your nurse or doctor.               Start taking these medicines.        Dose/Directions    sulfamethoxazole-trimethoprim 800-160 MG per tablet   Commonly known as:  BACTRIM DS/SEPTRA DS   Used for:  Acute cystitis with hematuria   Started by:  Lori Wylie NP        Dose:  1 tablet   Take 1 tablet by mouth 2 times daily for 5 days   Quantity:  10 tablet   Refills:  0            Where to get your medicines      These medications were sent to [id number] - [facility], [place] - [address]  [address] [address]     Phone:  [phone]     sulfamethoxazole-trimethoprim 800-160 MG per tablet                Primary Care Provider    Md Other Clinic                Equal Access to Services     CATHY WALL AH: Hadii francesca garcía hadasho Soyamil, waaxda luqadaha, qaybta kaalmada adeegyada, ethel velazco . So [facility] [phone].    ATENCIÓN: Si habla español, tiene a dyson disposición servicios gratuitos de asistencia lingüística. Llame al [phone].    We comply with applicable federal civil rights laws and [place] laws. We do not discriminate on the basis of race, color, national origin, age, disability sex, sexual orientation or gender identity.            Thank you!     Thank you for choosing [facility]  for your care. Our goal is always to provide you with excellent care. Hearing back from our patients is one way we can continue to improve our services. Please take a few minutes to complete the written  survey that you may receive in the mail after your visit with us. Thank you!             Your Updated Medication List - Protect others around you: Learn how to safely use, store and throw away your medicines at www.disposemymeds.org.          This list is accurate as of: 7/16/17 10:30 AM.  Always use your most recent med list.                   Brand Name Dispense Instructions for use Diagnosis    amLODIPine 10 MG tablet    NORVASC    90 tablet    Take 1 tablet (10 mg) by mouth daily    Hypertension goal BP (blood pressure) < 130/80       atorvastatin 40 MG tablet    LIPITOR    90 tablet    Take 1 tablet (40 mg) by mouth daily    Type 2 diabetes mellitus with diabetic nephropathy, with long-term current use of insulin (H)       B-D U/F 31G X 5 MM   Generic drug:  insulin pen needle     180 each    USE AS DIRECTED THREE TIMES DAILY BEFORE MEALS    Type 2 diabetes, HbA1c goal < 7% (H)       citalopram 20 MG tablet    celeXA    90 tablet    Take 1 tablet (20 mg) by mouth daily    Major depressive disorder, recurrent episode, moderate (H)       gabapentin 100 MG capsule    NEURONTIN    510 capsule    Take 2 capsules (200 mg) by mouth 3 times daily    Post herpetic neuralgia       insulin aspart 100 UNIT/ML injection    NovoLOG FLEXPEN    15 mL    30 units before breakfast, 30 units before lunch, 30 units before dinner    Type 2 diabetes mellitus with diabetic nephropathy, with long-term current use of insulin (H)       insulin glargine 100 UNIT/ML injection     15 mL    Inject 45 Units Subcutaneous 2 times daily    Type 2 diabetes mellitus with diabetic nephropathy, with long-term current use of insulin (H)       losartan-hydrochlorothiazide 100-12.5 MG per tablet    HYZAAR    90 tablet    Take 1 tablet by mouth daily    Type 2 diabetes mellitus with diabetic nephropathy, with long-term current use of insulin (H)       metFORMIN 500 MG 24 hr tablet    GLUCOPHAGE-XR    360 tablet    Take 4 tablets (2,000 mg) by mouth  daily (with breakfast)    Type 2 diabetes mellitus with diabetic nephropathy, with long-term current use of insulin (H)       metoprolol 50 MG 24 hr tablet    TOPROL-XL    90 tablet    Take 1 tablet (50 mg) by mouth daily    Hypertension goal BP (blood pressure) < 130/80       omeprazole 40 MG capsule    priLOSEC    30 capsule    Take 1 capsule (40 mg) by mouth daily    Leblanc's esophagus without dysplasia       oxyCODONE-acetaminophen 5-325 MG per tablet    PERCOCET     Reported on 5/8/2017        sucralfate 1 GM/10ML suspension    CARAFATE     Take 1,000 mg by mouth        sulfamethoxazole-trimethoprim 800-160 MG per tablet    BACTRIM DS/SEPTRA DS    10 tablet    Take 1 tablet by mouth 2 times daily for 5 days    Acute cystitis with hematuria

## 2017-07-16 NOTE — NURSING NOTE
"Chief Complaint   Patient presents with     UTI     vaginal bleeding, dark urine, fatigue       Initial /90 (BP Location: Left arm, Patient Position: Chair, Cuff Size: Adult Regular)  Pulse 95  Temp 98.1  F (36.7  C) (Oral)  Wt 190 lb 9.6 oz (86.5 kg)  SpO2 94%  BMI 32.72 kg/m2 Estimated body mass index is 32.72 kg/(m^2) as calculated from the following:    Height as of 5/8/17: 5' 4\" (1.626 m).    Weight as of this encounter: 190 lb 9.6 oz (86.5 kg).  Medication Reconciliation: complete         Maribeth Kendall    "

## 2017-07-16 NOTE — PROGRESS NOTES
"  SUBJECTIVE:                                                    Ellen Hill is a 61 year old female who presents to clinic today for the following health issues:      URINARY TRACT SYMPTOMS      Duration: 1 day    Description  frequency     Intensity:  7/10    Accompanying signs and symptoms:  Fever/chills: no   Flank pain no   Nausea and vomiting: no   Vaginal symptoms: abnormal vaginal bleeding, pain, dark urine  Abdominal/Pelvic Pain: YES    History  History of frequent UTI's: YES, 3  History of kidney stones: no   Sexually Active: no   Possibility of pregnancy: No    Precipitating or alleviating factors: urinating    Therapies tried and outcome: none             Allergies   Allergen Reactions     Victoza Hives     Alfredo Nordisk product     Enalapril Cough     Lisinopril Cough     Wellbutrin [Bupropion Hcl] Hives     hives       Past Medical History:   Diagnosis Date     Advanced directives, counseling/discussion 3/28/2013    Patient does not have an Advance/Health Care Directive (HCD), given \"What is Advance Care Planning?\" flyer.  Nola Lopez March 28, 2013      Hyperlipidemia LDL goal <100 10/27/2010     Hypertension goal BP (blood pressure) < 130/80 12/21/2010     Type 2 diabetes, HbA1c goal < 7% (H) 10/31/2010         Current Outpatient Prescriptions on File Prior to Visit:  insulin aspart (NOVOLOG FLEXPEN) 100 UNIT/ML injection 30 units before breakfast, 30 units before lunch, 30 units before dinner   B-D U/F insulin pen needle USE AS DIRECTED THREE TIMES DAILY BEFORE MEALS   insulin glargine (BASAGLAR KWIKPEN) 100 UNIT/ML injection Inject 45 Units Subcutaneous 2 times daily   sucralfate (CARAFATE) 1 GM/10ML suspension Take 1,000 mg by mouth   gabapentin (NEURONTIN) 100 MG capsule Take 2 capsules (200 mg) by mouth 3 times daily   omeprazole (PRILOSEC) 40 MG capsule Take 1 capsule (40 mg) by mouth daily   atorvastatin (LIPITOR) 40 MG tablet Take 1 tablet (40 mg) by mouth daily   metFORMIN " (GLUCOPHAGE-XR) 500 MG 24 hr tablet Take 4 tablets (2,000 mg) by mouth daily (with breakfast)   citalopram (CELEXA) 20 MG tablet Take 1 tablet (20 mg) by mouth daily   metoprolol (TOPROL-XL) 50 MG 24 hr tablet Take 1 tablet (50 mg) by mouth daily   amLODIPine (NORVASC) 10 MG tablet Take 1 tablet (10 mg) by mouth daily   losartan-hydrochlorothiazide (HYZAAR) 100-12.5 MG per tablet Take 1 tablet by mouth daily   oxyCODONE-acetaminophen (PERCOCET) 5-325 MG per tablet Reported on 5/8/2017     No current facility-administered medications on file prior to visit.     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  NAD.   Head: Normocephalic, atraumatic.  Eyes: Conjunctiva clear, non icteric.   ABD: soft, no tenderness to palpation , no rigidity, guarding or rebound . No CVAT  Neuro: Alert and oriented - normal speech.     ASSESSMENT:  Lower, urinary tract infection. Benign exam.      ICD-10-CM    1. Dysuria R30.0 *UA reflex to Microscopic and Culture ([place] and [facility] (except [place] and [place])     Urine Microscopic   2. Nonspecific finding on examination of urine R82.90 Urine Culture Aerobic Bacterial   3. Acute cystitis with hematuria N30.01 sulfamethoxazole-trimethoprim (BACTRIM DS/SEPTRA DS) 800-160 MG per tablet       PLAN:   Vitor is diabetic type 2, and she reports that she has been taking a lot of sugar since yesterday. I requested  to check her blood sugar but she declined. He urine is has high glucose. I have requested her to be seen in ER to control her sugar. Ellen has declined my advise.   As per ordered above.  Drink plenty of fluids.  Prevention and treatment of UTI's discussed. Follow up with primary care physician if not improving.  Advised about symptoms which might herald more serious problems.    Lori Wylie  [facility]-BC  Family Nurse Practitoner

## 2017-07-16 NOTE — PATIENT INSTRUCTIONS
Understanding Urinary Tract Infections (UTIs)  Most UTIs are caused by bacteria, although they may also be caused by viruses or fungi. Bacteria from the bowel are the most common source of infection. The infection may start because of any of the following:    Sexual activity. During sex, bacteria can travel from the penis, vagina, or rectum into the urethra.     Bacteria on the skin outside the rectum may travel into the urethra. This is more common in women since the rectum and urethra are closer to each other than in men. Wiping from front to back after using the toilet and keeping the area clean can help prevent germs from getting to the urethra.    Blockage of urine flow through the urinary tract. If urine sits too long, germs may start to grow out of control.      Parts of the urinary tract  The infection can occur in any part of the urinary tract.    The kidneys collect and store urine.    The ureters carry urine from the kidneys to the bladder.    The bladder holds urine until you are ready to let it out.    The urethra carries urine from the bladder out of the body. It is shorter in women, so bacteria can move through it more easily. The urethra is longer in men, so a UTI is less likely to reach the bladder or kidneys in men.  Date Last Reviewed: 1/1/2017 2000-2017 [employer]. [address] [place] [address], [place] [address]. All rights reserved. This information is not intended as a substitute for professional medical care. Always follow your healthcare professional's instructions.

## 2017-07-17 LAB
BACTERIA SPEC CULT: NORMAL
MICRO REPORT STATUS: NORMAL
SPECIMEN SOURCE: NORMAL

## 2017-08-01 ENCOUNTER — OFFICE VISIT (OUTPATIENT)
Dept: FAMILY MEDICINE | Facility: CLINIC | Age: 62
End: 2017-08-01
Payer: COMMERCIAL

## 2017-08-01 ENCOUNTER — TELEPHONE (OUTPATIENT)
Dept: FAMILY MEDICINE | Facility: CLINIC | Age: 62
End: 2017-08-01

## 2017-08-01 VITALS
SYSTOLIC BLOOD PRESSURE: 160 MMHG | TEMPERATURE: 97.6 F | RESPIRATION RATE: 16 BRPM | DIASTOLIC BLOOD PRESSURE: 80 MMHG | OXYGEN SATURATION: 98 % | HEART RATE: 82 BPM

## 2017-08-01 DIAGNOSIS — Z12.31 VISIT FOR SCREENING MAMMOGRAM: ICD-10-CM

## 2017-08-01 DIAGNOSIS — Z12.4 SCREENING FOR MALIGNANT NEOPLASM OF CERVIX: ICD-10-CM

## 2017-08-01 DIAGNOSIS — I10 HYPERTENSION GOAL BP (BLOOD PRESSURE) < 130/80: ICD-10-CM

## 2017-08-01 DIAGNOSIS — E11.21 TYPE 2 DIABETES MELLITUS WITH DIABETIC NEPHROPATHY, WITH LONG-TERM CURRENT USE OF INSULIN (H): ICD-10-CM

## 2017-08-01 DIAGNOSIS — Z79.4 TYPE 2 DIABETES MELLITUS WITH DIABETIC NEPHROPATHY, WITH LONG-TERM CURRENT USE OF INSULIN (H): ICD-10-CM

## 2017-08-01 DIAGNOSIS — S29.012A RHOMBOID MUSCLE STRAIN, INITIAL ENCOUNTER: Primary | ICD-10-CM

## 2017-08-01 PROCEDURE — 99214 OFFICE O/P EST MOD 30 MIN: CPT | Performed by: PHYSICIAN ASSISTANT

## 2017-08-01 RX ORDER — METHOCARBAMOL 750 MG/1
750 TABLET, FILM COATED ORAL 3 TIMES DAILY PRN
Qty: 45 TABLET | Refills: 1 | Status: SHIPPED | OUTPATIENT
Start: 2017-08-01 | End: 2017-09-22

## 2017-08-01 RX ORDER — HYDROCODONE BITARTRATE AND ACETAMINOPHEN 5; 325 MG/1; MG/1
1-2 TABLET ORAL EVERY 8 HOURS PRN
Qty: 30 TABLET | Refills: 0 | Status: SHIPPED | OUTPATIENT
Start: 2017-08-01 | End: 2017-09-18

## 2017-08-01 ASSESSMENT — PAIN SCALES - GENERAL: PAINLEVEL: SEVERE PAIN (7)

## 2017-08-01 NOTE — MR AVS SNAPSHOT
After Visit Summary   8/1/2017    Ellen Hill    MRN: [id number]           Patient Information     Date Of Birth          1955        Visit Information        Provider Department      8/1/2017 4:40 PM Henny Anderson PA-C [facility]        Today's Diagnoses     Rhomboid muscle strain, initial encounter    -  1    Visit for screening mammogram        Screening for malignant neoplasm of cervix        Type 2 diabetes mellitus with diabetic nephropathy, with long-term current use of insulin (H)          Care Instructions    Robaxin 750 mg three times a day as needed  Hydrocodone 1-2 tablets every 8 hours as needed   Apply heating pad    Please make appointment as soon as possible with endocrinology to address diabetes            Follow-ups after your visit        Additional Services     ENDOCRINOLOGY ADULT REFERRAL       Your provider has referred you to: [facility]: [facility] [phone]   [email or web address]      Please be aware that coverage of these services is subject to the terms and limitations of your health insurance plan.  Call member services at your health plan with any benefit or coverage questions.      Please bring the following to your appointment:    >>   Any x-rays, CTs or MRIs which have been performed.  Contact the facility where they were done to arrange for  prior to your scheduled appointment.    >>   List of current medications   >>   This referral request   >>   Any documents/labs given to you for this referral                  Your next 10 appointments already scheduled     Aug 07, 2017  9:20 AM CDT   [facility]    [address] [place] [id number] [phone]           Bring a current list of meds and any records pertaining to this  "visit. For Physicals, please bring immunization records and any forms needing to be filled out. Please arrive 10 minutes early to complete paperwork.              Future tests that were ordered for you today     Open Future Orders        Priority Expected Expires Ordered    MA SCREENING DIGITAL BILAT - Future  (s+30) Routine  2018            Who to contact     If you have questions or need follow up information about today's clinic visit or your schedule please contact [facility] directly at [phone].  Normal or non-critical lab and imaging results will be communicated to you by MyChart, letter or phone within 4 business days after the clinic has received the results. If you do not hear from us within 7 days, please contact the clinic through [employer] or phone. If you have a critical or abnormal lab result, we will notify you by phone as soon as possible.  Submit refill requests through [employer] or call your pharmacy and they will forward the refill request to us. Please allow 3 business days for your refill to be completed.          Additional Information About Your Visit        My[employer] Information     [employer] lets you send messages to your doctor, view your test results, renew your prescriptions, schedule appointments and more. To sign up, go to [email or web address] . Click on \"Log in\" on the left side of the screen, which will take you to the Welcome page. Then click on \"Sign up Now\" on the right side of the page.     You will be asked to enter the access code listed below, as well as some personal information. Please follow the directions to create your username and password.     Your access code is: [email or web address]  Expires: 2017  4:37 PM     Your access code will  in 90 days. If you need help or a new code, please call your [facility] or [phone].        Care EveryWhere ID     This is your Care EveryWhere ID. This could be used by other organizations to " access your [place] medical records  [id number]        Your Vitals Were     Pulse Temperature Respirations Pulse Oximetry          82 97.6  F (36.4  C) (Oral) 16 98%         Blood Pressure from Last 3 Encounters:   08/01/17 160/80   07/16/17 186/90   05/08/17 152/73    Weight from Last 3 Encounters:   07/16/17 86.5 kg (190 lb 9.6 oz)   05/08/17 87.1 kg (192 lb)   06/26/16 87.5 kg (193 lb)              We Performed the Following     DEPRESSION ACTION PLAN (DAP)     ENDOCRINOLOGY ADULT REFERRAL          Today's Medication Changes          These changes are accurate as of: 8/1/17  5:11 PM.  If you have any questions, ask your nurse or doctor.               Start taking these medicines.        Dose/Directions    HYDROcodone-acetaminophen 5-325 MG per tablet   Commonly known as:  NORCO   Used for:  Rhomboid muscle strain, initial encounter   Started by:  Henny Anderson PA-C        Dose:  1-2 tablet   Take 1-2 tablets by mouth every 8 hours as needed   Quantity:  30 tablet   Refills:  0       methocarbamol 750 MG tablet   Commonly known as:  ROBAXIN   Used for:  Rhomboid muscle strain, initial encounter   Started by:  Henny Anderson PA-C        Dose:  750 mg   Take 1 tablet (750 mg) by mouth 3 times daily as needed for muscle spasms   Quantity:  45 tablet   Refills:  1            Where to get your medicines      These medications were sent to [facility]/pharmacy #[id number] - [place], [place] - [address]  [address] [address]     Phone:  [phone]     methocarbamol 750 MG tablet         Some of these will need a paper prescription and others can be bought over the counter.  Ask your nurse if you have questions.     Bring a paper prescription for each of these medications     HYDROcodone-acetaminophen 5-325 MG per tablet                Primary Care Provider Office Phone # Fax #    Jenniffer Mackay PA-C [phone]       [place] [address]  [address]  [facility] [address]        Equal Access to Services     ANTONIO WALL : Hadii francesca ku hadkevino Soomaali, waaxda luqadaha, qaybta kaalmada ademakicolbydinorah, waxay idiin haychasaan maria binghammaiawerner gleason. So [facility] [phone].    ATENCIÓN: Si habla español, tiene a dyson disposición servicios gratuitos de asistencia lingüística. Llame al [phone].    We comply with applicable federal civil rights laws and [place] laws. We do not discriminate on the basis of race, color, national origin, age, disability sex, sexual orientation or gender identity.            Thank you!     Thank you for choosing [facility]  for your care. Our goal is always to provide you with excellent care. Hearing back from our patients is one way we can continue to improve our services. Please take a few minutes to complete the written survey that you may receive in the mail after your visit with us. Thank you!             Your Updated Medication List - Protect others around you: Learn how to safely use, store and throw away your medicines at www.disposemymeds.org.          This list is accurate as of: 8/1/17  5:11 PM.  Always use your most recent med list.                   Brand Name Dispense Instructions for use Diagnosis    amLODIPine 10 MG tablet    NORVASC    90 tablet    Take 1 tablet (10 mg) by mouth daily    Hypertension goal BP (blood pressure) < 130/80       atorvastatin 40 MG tablet    LIPITOR    90 tablet    Take 1 tablet (40 mg) by mouth daily    Type 2 diabetes mellitus with diabetic nephropathy, with long-term current use of insulin (H)       B-D U/F 31G X 5 MM   Generic drug:  insulin pen needle     180 each    USE AS DIRECTED THREE TIMES DAILY BEFORE MEALS    Type 2 diabetes, HbA1c goal < 7% (H)       citalopram 20 MG tablet    celeXA    90 tablet    Take 1 tablet (20 mg) by mouth daily    Major depressive disorder, recurrent episode, moderate (H)       gabapentin 100 MG capsule    NEURONTIN    510 capsule     Take 2 capsules (200 mg) by mouth 3 times daily    Post herpetic neuralgia       HYDROcodone-acetaminophen 5-325 MG per tablet    NORCO    30 tablet    Take 1-2 tablets by mouth every 8 hours as needed    Rhomboid muscle strain, initial encounter       insulin aspart 100 UNIT/ML injection    NovoLOG FLEXPEN    15 mL    30 units before breakfast, 30 units before lunch, 30 units before dinner    Type 2 diabetes mellitus with diabetic nephropathy, with long-term current use of insulin (H)       insulin glargine 100 UNIT/ML injection     15 mL    Inject 45 Units Subcutaneous 2 times daily    Type 2 diabetes mellitus with diabetic nephropathy, with long-term current use of insulin (H)       losartan-hydrochlorothiazide 100-12.5 MG per tablet    HYZAAR    90 tablet    Take 1 tablet by mouth daily    Type 2 diabetes mellitus with diabetic nephropathy, with long-term current use of insulin (H)       metFORMIN 500 MG 24 hr tablet    GLUCOPHAGE-XR    360 tablet    Take 4 tablets (2,000 mg) by mouth daily (with breakfast)    Type 2 diabetes mellitus with diabetic nephropathy, with long-term current use of insulin (H)       methocarbamol 750 MG tablet    ROBAXIN    45 tablet    Take 1 tablet (750 mg) by mouth 3 times daily as needed for muscle spasms    Rhomboid muscle strain, initial encounter       metoprolol 50 MG 24 hr tablet    TOPROL-XL    90 tablet    Take 1 tablet (50 mg) by mouth daily    Hypertension goal BP (blood pressure) < 130/80       omeprazole 40 MG capsule    priLOSEC    30 capsule    Take 1 capsule (40 mg) by mouth daily    Leblanc's esophagus without dysplasia       oxyCODONE-acetaminophen 5-325 MG per tablet    PERCOCET     Reported on 5/8/2017        sucralfate 1 GM/10ML suspension    CARAFATE     Take 1,000 mg by mouth

## 2017-08-01 NOTE — TELEPHONE ENCOUNTER
Called patient and complaining severe pain under right breast that goes under arm to back, no sores, painful to touch. Has been going on for a week.  DX with shingles a month ago and placed on gabapentin, not helping. Pain is 10/10, patient very uncomfortable, trying ice, NSAID but no help. Patient did have an appointment 8/7 and scheduled patient tonight to be seen.   Claudia Mahoney RN.

## 2017-08-01 NOTE — TELEPHONE ENCOUNTER
Reason for call:  Patient reporting a symptom    Symptom or request: Shoulder pain that she does not know what to do for it and wants to discuss. Pt does have an appointment with DEMETRIUS Mackay on Monday the 7th/    Duration (how long have symptoms been present):     Have you been treated for this before?     Additional comments:     Phone Number patient can be reached at:  Home number on file [phone] (home)    Best Time:  any    Can we leave a detailed message on this number:  YES    Call taken on 8/1/2017 at 3:58 PM by Vanessa High

## 2017-08-01 NOTE — PATIENT INSTRUCTIONS
Robaxin 750 mg three times a day as needed  Hydrocodone 1-2 tablets every 8 hours as needed   Apply heating pad    Please make appointment as soon as possible with endocrinology to address diabetes

## 2017-08-01 NOTE — PROGRESS NOTES
"  SUBJECTIVE:                                                    Ellen Hill is a 61 year old female who presents to clinic today for the following health issues:    Musculoskeletal problem/pain      Duration: 1 month intermittently    Description  Location: left shoulder blade    Intensity:  Severe. Patient feels that there is \" a rolling ball\" in the shoulder blade region that I very sore. She gets up at work and rubs her back on a door frame to relieve the pain.     Accompanying signs and symptoms: none    History  Previous similar problem: no   Previous evaluation:  none    Precipitating or alleviating factors:  Trauma or overuse: no   Aggravating factors include: direct pressure on the spot is unbearable    Therapies tried and outcome: NSAID -         Problem list and histories reviewed & adjusted, as indicated.  Additional history: as documented    Patient Active Problem List   Diagnosis     Post herpetic neuralgia     Hypertriglyceridemia     Hyperlipidemia LDL goal <100     TYPE 2 DIABETES, HBA1C GOAL < 7%     Hypertension goal BP (blood pressure) < 130/80     Moderate major depression (H)     Vulvar pruritus     Adenomatous polyps     Alcoholic (H)     Advanced directives, counseling/discussion     Obesity, Class I, BMI 30-34.9     Microalbuminuria     Leblanc's esophagus without dysplasia     Type 2 diabetes mellitus with diabetic nephropathy, with long-term current use of insulin (H)     History reviewed. No pertinent surgical history.    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, 2013     Family History   Problem Relation Age of Onset     Alcohol/Drug Mother      HEART DISEASE Mother      Alcohol/Drug Father      DIABETES Brother          Current " Outpatient Prescriptions   Medication Sig Dispense Refill     methocarbamol (ROBAXIN) 750 MG tablet Take 1 tablet (750 mg) by mouth 3 times daily as needed for muscle spasms 45 tablet 1     HYDROcodone-acetaminophen (NORCO) 5-325 MG per tablet Take 1-2 tablets by mouth every 8 hours as needed 30 tablet 0     insulin aspart (NOVOLOG FLEXPEN) 100 UNIT/ML injection 30 units before breakfast, 30 units before lunch, 30 units before dinner 15 mL 1     B-D U/F insulin pen needle USE AS DIRECTED THREE TIMES DAILY BEFORE MEALS 180 each 1     insulin glargine (BASAGLAR KWIKPEN) 100 UNIT/ML injection Inject 45 Units Subcutaneous 2 times daily 15 mL 1     sucralfate (CARAFATE) 1 GM/10ML suspension Take 1,000 mg by mouth       gabapentin (NEURONTIN) 100 MG capsule Take 2 capsules (200 mg) by mouth 3 times daily 510 capsule 1     omeprazole (PRILOSEC) 40 MG capsule Take 1 capsule (40 mg) by mouth daily 30 capsule 1     atorvastatin (LIPITOR) 40 MG tablet Take 1 tablet (40 mg) by mouth daily 90 tablet 1     metFORMIN (GLUCOPHAGE-XR) 500 MG 24 hr tablet Take 4 tablets (2,000 mg) by mouth daily (with breakfast) 360 tablet 1     citalopram (CELEXA) 20 MG tablet Take 1 tablet (20 mg) by mouth daily 90 tablet 1     metoprolol (TOPROL-XL) 50 MG 24 hr tablet Take 1 tablet (50 mg) by mouth daily 90 tablet 1     amLODIPine (NORVASC) 10 MG tablet Take 1 tablet (10 mg) by mouth daily 90 tablet 1     losartan-hydrochlorothiazide (HYZAAR) 100-12.5 MG per tablet Take 1 tablet by mouth daily 90 tablet 1     oxyCODONE-acetaminophen (PERCOCET) 5-325 MG per tablet Reported on 5/8/2017       Allergies   Allergen Reactions     Victoza Hives     Alfredo Nordisk product     Enalapril Cough     Lisinopril Cough     Wellbutrin [Bupropion Hcl] Hives     hives         Reviewed and updated as needed this visit by clinical staffTobacco  Allergies  Meds  Med Hx  Surg Hx  Fam Hx  Soc Hx      Reviewed and updated as needed this visit by Provider  Jose          ROS:  Constitutional, HEENT, cardiovascular, pulmonary, gi and gu systems are negative, except as otherwise noted.      OBJECTIVE:   /80  Pulse 82  Temp 97.6  F (36.4  C) (Oral)  Resp 16  SpO2 98%  There is no height or weight on file to calculate BMI.  GENERAL: healthy, alert and no distress  NECK: no adenopathy, no asymmetry, masses, or scars and thyroid normal to palpation  RESP: lungs clear to auscultation - no rales, rhonchi or wheezes  CV: regular rate and rhythm, normal S1 S2, no S3 or S4, no murmur, click or rub, no peripheral edema and peripheral pulses strong  ABDOMEN: soft, nontender, no hepatosplenomegaly, no masses and bowel sounds normal  MS: LUE exam shows normal strength and muscle mass, no deformities, no erythema, induration, or nodules, no evidence of joint effusion, ROM of all joints is normal and no evidence of joint instability  Severe tenderness of the rhomboid region on the left   Diagnostic Test Results:  none     ASSESSMENT/PLAN:       ICD-10-CM    1. Rhomboid muscle strain, initial encounter S29.012A methocarbamol (ROBAXIN) 750 MG tablet     HYDROcodone-acetaminophen (NORCO) 5-325 MG per tablet   2. Visit for screening mammogram Z12.31 MA SCREENING DIGITAL BILAT - Future  (s+30)   3. Hypertension goal BP (blood pressure) < 130/80 I10    4. Type 2 diabetes mellitus with diabetic nephropathy, with long-term current use of insulin (H) E11.21 ENDOCRINOLOGY ADULT REFERRAL    Z79.4    5. Screening for malignant neoplasm of cervix Z12.4      1. Robaxin 750 mg three times a day as needed  Hydrocodone 1-2 tablets every 8 hours as needed   Apply heating pad  2. Patient reports that she does not take her BP medications regularly. She has not taken them today.  Discussed the risks of untreated HTN   Patient reported that's he will start taking medications daily.   Follow up in 2 weeks    3. Patient reports that she can't take insulin because she does not want to do it. She also does  not take Metformin at all.  Patient reports that for the last year she is very noncompliant and her previous primary care provider could not get her A1C under control. Patient wants to know if there are other options to take daily insulin.   Patient was referred today to see endocrinology as soon as possible.   For now, patient will continue insulin until she sees endocrinology.  Patient declined lab work today.     Henny Anderson PA-C  [facility]

## 2017-08-01 NOTE — NURSING NOTE
"Chief Complaint   Patient presents with     Pain     nerve       Initial /80 (BP Location: Left arm, Patient Position: Left side, Cuff Size: Adult Regular)  Pulse 82  Temp 97.6  F (36.4  C) (Oral)  Resp 16  SpO2 98% Estimated body mass index is 32.72 kg/(m^2) as calculated from the following:    Height as of 5/8/17: 1.626 m (5' 4\").    Weight as of 7/16/17: 86.5 kg (190 lb 9.6 oz).  Medication Reconciliation: complete     Will Beka DAS      "

## 2017-08-04 ENCOUNTER — TRANSFERRED RECORDS (OUTPATIENT)
Dept: HEALTH INFORMATION MANAGEMENT | Facility: CLINIC | Age: 62
End: 2017-08-04

## 2017-08-04 LAB — PAP-ABSTRACT: NORMAL

## 2017-08-12 ENCOUNTER — RADIANT APPOINTMENT (OUTPATIENT)
Dept: GENERAL RADIOLOGY | Facility: CLINIC | Age: 62
End: 2017-08-12
Payer: COMMERCIAL

## 2017-08-12 ENCOUNTER — OFFICE VISIT (OUTPATIENT)
Dept: URGENT CARE | Facility: URGENT CARE | Age: 62
End: 2017-08-12
Payer: COMMERCIAL

## 2017-08-12 VITALS
OXYGEN SATURATION: 96 % | WEIGHT: 186 LBS | SYSTOLIC BLOOD PRESSURE: 182 MMHG | TEMPERATURE: 98.3 F | DIASTOLIC BLOOD PRESSURE: 83 MMHG | BODY MASS INDEX: 31.93 KG/M2 | HEART RATE: 94 BPM

## 2017-08-12 DIAGNOSIS — M54.6 CHRONIC LEFT-SIDED THORACIC BACK PAIN: ICD-10-CM

## 2017-08-12 DIAGNOSIS — M54.6 CHRONIC LEFT-SIDED THORACIC BACK PAIN: Primary | ICD-10-CM

## 2017-08-12 DIAGNOSIS — G89.29 CHRONIC LEFT-SIDED THORACIC BACK PAIN: Primary | ICD-10-CM

## 2017-08-12 DIAGNOSIS — G89.29 CHRONIC LEFT-SIDED THORACIC BACK PAIN: ICD-10-CM

## 2017-08-12 PROCEDURE — 99214 OFFICE O/P EST MOD 30 MIN: CPT | Performed by: PHYSICIAN ASSISTANT

## 2017-08-12 PROCEDURE — 72072 X-RAY EXAM THORAC SPINE 3VWS: CPT

## 2017-08-12 RX ORDER — GABAPENTIN 300 MG/1
CAPSULE ORAL
Qty: 90 CAPSULE | Refills: 0 | Status: SHIPPED | OUTPATIENT
Start: 2017-08-12 | End: 2017-09-22 | Stop reason: DRUGHIGH

## 2017-08-12 ASSESSMENT — PAIN SCALES - GENERAL: PAINLEVEL: WORST PAIN (10)

## 2017-08-12 NOTE — MR AVS SNAPSHOT
After Visit Summary   8/12/2017    Ellen Hill    MRN: [id number]           Patient Information     Date Of Birth          1955        Visit Information        Provider Department      8/12/2017 11:10 AM Chino Berman PA [facility]        Today's Diagnoses     Chronic left-sided thoracic back pain    -  1      Care Instructions       Pinched Nerve in the Neck  A pinched nerve in the neck (cervical radiculopathy) is caused when the nerve that goes from the spinal cord to the arm is irritated or has pressure on it. This may be caused by a bulging spinal disk. A spinal disk is the cushion between each spinal bone. Or it may be caused by a narrowing of the spinal joint because of arthritis.    A pinched nerve can cause numbness, tingling, deep aching, or electrical shooting pain from the side of the neck all the way down to the fingers on one side.  A pinched nerve may begin after a sudden turning or bending force (such as in a car accident) or after a simple awkward movement. In either case, muscle spasm is commonly present and adds to the pain.  Home care  Follow these guidelines when caring for yourself at home:    Rest and relax the muscles. Use a comfortable pillow that supports your head and keeps your spine in a natural (neutral) position. Your head shouldn t be tilted forward or backward. A rolled-up towel may help for a custom fit. When standing or sitting, keep your neck in line with your body. Keep your head up and shoulders down. Stay away from activities that require you to move your neck a lot.    You can use heat and massage to help ease the pain. Take a hot shower or bath, or use a heating pad. You can also use a cold pack for relief. You can make a cold pack by wrapping a plastic bag of crushed or cubed ice in a thin towel. Try both heat and cold, and use the method that feels best. Do this for 20 minutes several times a day.    You may use  acetaminophen or ibuprofen to control pain, unless another pain medicine was prescribed. If you have chronic liver or kidney disease, talk with your health care provider before using these medicines. Also talk with your provider if you ve had a stomach ulcer or GI bleeding.    Reduce stress. Stress can make it longer for your pain to go away.    Do any exercises or stretches that were given to you as part of your discharge plan.    Wear a soft collar, if prescribed.    You may need surgery for a more serious injury.  Follow-up care  Follow up with your health care provider, or as advised, if you don t start to get better after 1 week. You may need more tests. Tell your provider about any fever, chills, or weight loss.  If X-rays were taken, a radiologist will look at them. You will be told of any new findings that may affect your care.  When to seek medical advice  Call your health care provider right away if any of these occur:    Pain becomes worse even after taking prescribed pain medicine    Weakness in the arm    Numbness in the arm gets worse    Trouble breathing or swallowing     Date Last Reviewed: 2/17/2015 2000-2017 [employer]. [address] [place] [address]. All rights reserved. This information is not intended as a substitute for professional medical care. Always follow your healthcare professional's instructions.                Follow-ups after your visit        Additional Services     [facility] PT, HAND, AND CHIROPRACTIC REFERRAL       **This order will print in the [facility] Scheduling Office**    Physical Therapy, Hand Therapy and Chiropractic Care are available through:    *[facility]  *[facility]  *[facility]    Call one number to schedule at any of the above locations: [phone].    Your provider has referred you to: Physical Therapy at [facility] or [facility]    Indication/Reason for Referral: thoracic back  Onset of Illness: 2  "month  Therapy Orders: Evaluate and Treat  Special Programs: None  Special Request: None    Carrington Shultz      Additional Comments for the Therapist or Chiropractor: no    Please be aware that coverage of these services is subject to the terms and limitations of your health insurance plan.  Call member services at your health plan with any benefit or coverage questions.      Please bring the following to your appointment:    *Your personal calendar for scheduling future appointments  *Comfortable clothing                  Follow-up notes from your care team     Return if symptoms worsen or fail to improve.      Who to contact     If you have questions or need follow up information about today's clinic visit or your schedule please contact [facility] directly at [phone].  Normal or non-critical lab and imaging results will be communicated to you by MyChart, letter or phone within 4 business days after the clinic has received the results. If you do not hear from us within 7 days, please contact the clinic through MyChart or phone. If you have a critical or abnormal lab result, we will notify you by phone as soon as possible.  Submit refill requests through [employer] or call your pharmacy and they will forward the refill request to us. Please allow 3 business days for your refill to be completed.          Additional Information About Your Visit        MyChart Information     [facility] lets you send messages to your doctor, view your test results, renew your prescriptions, schedule appointments and more. To sign up, go to [email or web address] . Click on \"Log in\" on the left side of the screen, which will take you to the Welcome page. Then click on \"Sign up Now\" on the right side of the page.     You will be asked to enter the access code listed below, as well as some personal information. Please follow the directions to create your username and password.     Your access code is: " [email or web address]  Expires: 2017  1:41 PM     Your access code will  in 90 days. If you need help or a new code, please call your [facility] clinic or [phone].        Care EveryWhere ID     This is your Care EveryWhere ID. This could be used by other organizations to access your [facility] medical records  [id number]        Your Vitals Were     Pulse Temperature Pulse Oximetry Breastfeeding? BMI (Body Mass Index)       94 98.3  F (36.8  C) (Oral) 96% No 31.93 kg/m2        Blood Pressure from Last 3 Encounters:   17 182/83   17 160/80   17 186/90    Weight from Last 3 Encounters:   17 186 lb (84.4 kg)   17 190 lb 9.6 oz (86.5 kg)   17 192 lb (87.1 kg)              We Performed the Following     [facility] PT, HAND, AND CHIROPRACTIC REFERRAL          Today's Medication Changes          These changes are accurate as of: 17 12:06 PM.  If you have any questions, ask your nurse or doctor.               These medicines have changed or have updated prescriptions.        Dose/Directions    * gabapentin 100 MG capsule   Commonly known as:  NEURONTIN   This may have changed:  Another medication with the same name was added. Make sure you understand how and when to take each.   Used for:  Post herpetic neuralgia   Changed by:  Jenniffer Mackay PA-C        Dose:  200 mg   Take 2 capsules (200 mg) by mouth 3 times daily   Quantity:  510 capsule   Refills:  1       * gabapentin 300 MG capsule   Commonly known as:  NEURONTIN   This may have changed:  You were already taking a medication with the same name, and this prescription was added. Make sure you understand how and when to take each.   Used for:  Chronic left-sided thoracic back pain   Changed by:  Chino Berman PA        Take 1 tablet (300 mg) every night for 1-3 days, then 2  Tablet at bedtime  for 1-3 days, then 1 tablet in the am and two at bedtime   Quantity:  90 capsule   Refills:  0       * Notice:  This list  has 2 medication(s) that are the same as other medications prescribed for you. Read the directions carefully, and ask your doctor or other care provider to review them with you.         Where to get your medicines      These medications were sent to [id number] - [place], [place] - [address]  [address], [place] [address]     Phone:  [phone]     gabapentin 300 MG capsule                Primary Care Provider Office Phone # Fax #    Jenniffer Mackay PA-C [phone]       [address]  [place] [address]        Equal Access to Services     [facility]: Hadii aad ku hadasho Soomaali, waaxda luqadaha, qaybta kaalmada adeegyada, waxay celia velazco . So [facility] [phone].    ATENCIÓN: Si habla español, tiene a dyson disposición servicios gratuitos de asistencia lingüística. [facility] [phone].    We comply with applicable federal civil rights laws and [place] laws. We do not discriminate on the basis of race, color, national origin, age, disability sex, sexual orientation or gender identity.            Thank you!     Thank you for choosing [facility]  for your care. Our goal is always to provide you with excellent care. Hearing back from our patients is one way we can continue to improve our services. Please take a few minutes to complete the written survey that you may receive in the mail after your visit with us. Thank you!             Your Updated Medication List - Protect others around you: Learn how to safely use, store and throw away your medicines at www.disposemymeds.org.          This list is accurate as of: 8/12/17 12:06 PM.  Always use your most recent med list.                   Brand Name Dispense Instructions for use Diagnosis    amLODIPine 10 MG tablet    NORVASC    90 tablet    Take 1 tablet (10 mg) by mouth daily    Hypertension goal BP (blood pressure) < 130/80       atorvastatin 40 MG tablet     LIPITOR    90 tablet    Take 1 tablet (40 mg) by mouth daily    Type 2 diabetes mellitus with diabetic nephropathy, with long-term current use of insulin (H)       B-D U/F 31G X 5 MM   Generic drug:  insulin pen needle     180 each    USE AS DIRECTED THREE TIMES DAILY BEFORE MEALS    Type 2 diabetes, HbA1c goal < 7% (H)       citalopram 20 MG tablet    celeXA    90 tablet    Take 1 tablet (20 mg) by mouth daily    Major depressive disorder, recurrent episode, moderate (H)       * gabapentin 100 MG capsule    NEURONTIN    510 capsule    Take 2 capsules (200 mg) by mouth 3 times daily    Post herpetic neuralgia       * gabapentin 300 MG capsule    NEURONTIN    90 capsule    Take 1 tablet (300 mg) every night for 1-3 days, then 2  Tablet at bedtime  for 1-3 days, then 1 tablet in the am and two at bedtime    Chronic left-sided thoracic back pain       HYDROcodone-acetaminophen 5-325 MG per tablet    NORCO    30 tablet    Take 1-2 tablets by mouth every 8 hours as needed    Rhomboid muscle strain, initial encounter       insulin aspart 100 UNIT/ML injection    NovoLOG FLEXPEN    15 mL    30 units before breakfast, 30 units before lunch, 30 units before dinner    Type 2 diabetes mellitus with diabetic nephropathy, with long-term current use of insulin (H)       insulin glargine 100 UNIT/ML injection     15 mL    Inject 45 Units Subcutaneous 2 times daily    Type 2 diabetes mellitus with diabetic nephropathy, with long-term current use of insulin (H)       losartan-hydrochlorothiazide 100-12.5 MG per tablet    HYZAAR    90 tablet    Take 1 tablet by mouth daily    Type 2 diabetes mellitus with diabetic nephropathy, with long-term current use of insulin (H)       metFORMIN 500 MG 24 hr tablet    GLUCOPHAGE-XR    360 tablet    Take 4 tablets (2,000 mg) by mouth daily (with breakfast)    Type 2 diabetes mellitus with diabetic nephropathy, with long-term current use of insulin (H)       methocarbamol 750 MG tablet    ROBAXIN     45 tablet    Take 1 tablet (750 mg) by mouth 3 times daily as needed for muscle spasms    Rhomboid muscle strain, initial encounter       metoprolol 50 MG 24 hr tablet    TOPROL-XL    90 tablet    Take 1 tablet (50 mg) by mouth daily    Hypertension goal BP (blood pressure) < 130/80       omeprazole 40 MG capsule    priLOSEC    30 capsule    Take 1 capsule (40 mg) by mouth daily    Leblanc's esophagus without dysplasia       oxyCODONE-acetaminophen 5-325 MG per tablet    PERCOCET     Reported on 5/8/2017        sucralfate 1 GM/10ML suspension    CARAFATE     Take 1,000 mg by mouth        * Notice:  This list has 2 medication(s) that are the same as other medications prescribed for you. Read the directions carefully, and ask your doctor or other care provider to review them with you.

## 2017-08-12 NOTE — NURSING NOTE
"Chief Complaint   Patient presents with     Musculoskeletal Problem     Pt c/o left shoulder pain x 2 months.        Initial /83 (BP Location: Left arm, Patient Position: Chair, Cuff Size: Adult Regular)  Pulse 94  Temp 98.3  F (36.8  C) (Oral)  Wt 186 lb (84.4 kg)  SpO2 96%  Breastfeeding? No  BMI 31.93 kg/m2 Estimated body mass index is 31.93 kg/(m^2) as calculated from the following:    Height as of 5/8/17: 5' 4\" (1.626 m).    Weight as of this encounter: 186 lb (84.4 kg).  Medication Reconciliation: complete     Nichole Hill CMA (AAMA)      "

## 2017-08-12 NOTE — PROGRESS NOTES
"  SUBJECTIVE:                                                    Ellen Hill is a 61 year old female who presents to clinic today for the following health issues:       Musculoskeletal problem/pain and numbness      Duration: 2 months. Pt saw Mil[facility] 08/01/2017.    Description  Location: left shoulder blade    Intensity:  10/10    Accompanying signs and symptoms: none    History  Previous similar problem: YES  Previous evaluation:  none    Precipitating or alleviating factors:  Trauma or overuse: no   Aggravating factors include:     Therapies tried and outcome: rest/inactivity, robaxin, hydrocodone- no relief.  Does have some left over.        rec'd 30 vicodin 8/1, last opiate rx after that in January per . There is some tingling in the region.  Is not taking gabapentin anymore.  Had MRI in january which was normal.      Has restarted her bp meds.      Allergies   Allergen Reactions     Victoza Hives     Alfredo Nordisk product     Enalapril Cough     Lisinopril Cough     Wellbutrin [Bupropion Hcl] Hives     hives       Past Medical History:   Diagnosis Date     Advanced directives, counseling/discussion 3/28/2013    Patient does not have an Advance/Health Care Directive (HCD), given \"What is Advance Care Planning?\" flyer.  Nola Lopez March 28, 2013      Hyperlipidemia LDL goal <100 10/27/2010     Hypertension goal BP (blood pressure) < 130/80 12/21/2010     Type 2 diabetes, HbA1c goal < 7% (H) 10/31/2010         Current Outpatient Prescriptions:      gabapentin (NEURONTIN) 300 MG capsule, Take 1 tablet (300 mg) every night for 1-3 days, then 2  Tablet at bedtime  for 1-3 days, then 1 tablet in the am and two at bedtime, Disp: 90 capsule, Rfl: 0     methocarbamol (ROBAXIN) 750 MG tablet, Take 1 tablet (750 mg) by mouth 3 times daily as needed for muscle spasms, Disp: 45 tablet, Rfl: 1     HYDROcodone-acetaminophen (NORCO) 5-325 MG per tablet, Take 1-2 tablets by mouth every 8 hours as needed, Disp: 30 " tablet, Rfl: 0     insulin aspart (NOVOLOG FLEXPEN) 100 UNIT/ML injection, 30 units before breakfast, 30 units before lunch, 30 units before dinner, Disp: 15 mL, Rfl: 1     B-D U/F insulin pen needle, USE AS DIRECTED THREE TIMES DAILY BEFORE MEALS, Disp: 180 each, Rfl: 1     insulin glargine (BASAGLAR KWIKPEN) 100 UNIT/ML injection, Inject 45 Units Subcutaneous 2 times daily, Disp: 15 mL, Rfl: 1     sucralfate (CARAFATE) 1 GM/10ML suspension, Take 1,000 mg by mouth, Disp: , Rfl:      gabapentin (NEURONTIN) 100 MG capsule, Take 2 capsules (200 mg) by mouth 3 times daily, Disp: 510 capsule, Rfl: 1     omeprazole (PRILOSEC) 40 MG capsule, Take 1 capsule (40 mg) by mouth daily, Disp: 30 capsule, Rfl: 1     atorvastatin (LIPITOR) 40 MG tablet, Take 1 tablet (40 mg) by mouth daily, Disp: 90 tablet, Rfl: 1     metFORMIN (GLUCOPHAGE-XR) 500 MG 24 hr tablet, Take 4 tablets (2,000 mg) by mouth daily (with breakfast), Disp: 360 tablet, Rfl: 1     citalopram (CELEXA) 20 MG tablet, Take 1 tablet (20 mg) by mouth daily, Disp: 90 tablet, Rfl: 1     metoprolol (TOPROL-XL) 50 MG 24 hr tablet, Take 1 tablet (50 mg) by mouth daily, Disp: 90 tablet, Rfl: 1     amLODIPine (NORVASC) 10 MG tablet, Take 1 tablet (10 mg) by mouth daily, Disp: 90 tablet, Rfl: 1     losartan-hydrochlorothiazide (HYZAAR) 100-12.5 MG per tablet, Take 1 tablet by mouth daily, Disp: 90 tablet, Rfl: 1     oxyCODONE-acetaminophen (PERCOCET) 5-325 MG per tablet, Reported on 5/8/2017, Disp: , Rfl:     No past surgical history on file.    REVIEW OF SYSTEMS  General: negative for fever  Musculoskeletal: as above  Neurologic: negative for ataxia,  one sided weakness,       PHYSICAL EXAM::  Vitals: /83 (BP Location: Left arm, Patient Position: Chair, Cuff Size: Adult Regular)  Pulse 94  Temp 98.3  F (36.8  C) (Oral)  Wt 186 lb (84.4 kg)  SpO2 96%  Breastfeeding? No  BMI 31.93 kg/m2  BMI= Body mass index is 31.93 kg/(m^2).  Constitutional: healthy, alert and  no acute distress   NECK::  No midline tenderness to palpation,  strength intact and equal b/l upper extremities, MALCOLM  LUE MALCOLM, mid thoracic left back very sensitive to even light tough, no lesions or masses  GAIT: intact  NEURO:Cranial nerves intact grossly  PSYCH: mentation appears normal and affect normal/bright    XR mild dextroscoliosis    Impression: back pain, possibly radicular vs atypical shingles? Did have normal MRI earlier this year and patient reluctant to get another one.      ICD-10-CM    1. Chronic left-sided thoracic back pain M54.6 XR Thoracic Spine 3 Views    G89.29 [facility] PT, HAND, AND CHIROPRACTIC REFERRAL     gabapentin (NEURONTIN) 300 MG capsule     CANCELED: MR Thoracic Spine w/o & w Contrast         Plan: As ordered above. Instructions for neck care and return precautions provided.        Delta Berman PA-C

## 2017-08-12 NOTE — PATIENT INSTRUCTIONS
Pinched Nerve in the Neck  A pinched nerve in the neck (cervical radiculopathy) is caused when the nerve that goes from the spinal cord to the arm is irritated or has pressure on it. This may be caused by a bulging spinal disk. A spinal disk is the cushion between each spinal bone. Or it may be caused by a narrowing of the spinal joint because of arthritis.    A pinched nerve can cause numbness, tingling, deep aching, or electrical shooting pain from the side of the neck all the way down to the fingers on one side.  A pinched nerve may begin after a sudden turning or bending force (such as in a car accident) or after a simple awkward movement. In either case, muscle spasm is commonly present and adds to the pain.  Home care  Follow these guidelines when caring for yourself at home:    Rest and relax the muscles. Use a comfortable pillow that supports your head and keeps your spine in a natural (neutral) position. Your head shouldn t be tilted forward or backward. A rolled-up towel may help for a custom fit. When standing or sitting, keep your neck in line with your body. Keep your head up and shoulders down. Stay away from activities that require you to move your neck a lot.    You can use heat and massage to help ease the pain. Take a hot shower or bath, or use a heating pad. You can also use a cold pack for relief. You can make a cold pack by wrapping a plastic bag of crushed or cubed ice in a thin towel. Try both heat and cold, and use the method that feels best. Do this for 20 minutes several times a day.    You may use acetaminophen or ibuprofen to control pain, unless another pain medicine was prescribed. If you have chronic liver or kidney disease, talk with your health care provider before using these medicines. Also talk with your provider if you ve had a stomach ulcer or GI bleeding.    Reduce stress. Stress can make it longer for your pain to go away.    Do any exercises or stretches that were given to  you as part of your discharge plan.    Wear a soft collar, if prescribed.    You may need surgery for a more serious injury.  Follow-up care  Follow up with your health care provider, or as advised, if you don t start to get better after 1 week. You may need more tests. Tell your provider about any fever, chills, or weight loss.  If X-rays were taken, a radiologist will look at them. You will be told of any new findings that may affect your care.  When to seek medical advice  Call your health care provider right away if any of these occur:    Pain becomes worse even after taking prescribed pain medicine    Weakness in the arm    Numbness in the arm gets worse    Trouble breathing or swallowing     Date Last Reviewed: 2/17/2015 2000-2017 [employer]. [address] [place] [address], [place] [address]. All rights reserved. This information is not intended as a substitute for professional medical care. Always follow your healthcare professional's instructions.

## 2017-08-15 ENCOUNTER — TELEPHONE (OUTPATIENT)
Dept: FAMILY MEDICINE | Facility: CLINIC | Age: 62
End: 2017-08-15

## 2017-08-15 DIAGNOSIS — M54.6 THORACIC BACK PAIN, UNSPECIFIED BACK PAIN LATERALITY, UNSPECIFIED CHRONICITY: Primary | ICD-10-CM

## 2017-08-15 NOTE — TELEPHONE ENCOUNTER
Reason for Call: Request for an order or referral:    Order or referral being requested: Nerve doctor    Date needed: as soon as possible    Has the patient been seen by the PCP for this problem? YES    Additional comments: Patient saw [facility] and was given medication to treat muscle pain, came back to see Urgent Care and was told the pain is actually Nerve pain. The pt is still in a lot of pain and the medications are not helping. Requesting a referral to see a nerve doctor    Phone number Patient can be reached at:  Home number on file [phone] (home)    Best Time:  Anytime    Can we leave a detailed message on this number?  YES    Call taken on 8/15/2017 at 12:09 PM by Paulette Chaudhry

## 2017-08-22 NOTE — TELEPHONE ENCOUNTER
This writer attempted to contact Ellen on 08/22/17      Reason for call determine dose of Gabapentin and left message to return call.      When patient calls back, please contact clinic RN team. If no one available, document that pt called and route to care team. route - high priority.          Mehnaz Goodwin RN

## 2017-08-22 NOTE — TELEPHONE ENCOUNTER
Please ask patient what dose of the gabapentin she is currently taking. This med can take a little time to kick in. (it's bene ten days since I wrote the rx) Also, we can likely continue to incr it.  Has she started p/t?  I have incl referral to neuro.  Depending on how that goes, may also refer her to medical back specialist and/or pain management.      Delta Berman PA-C

## 2017-08-23 NOTE — TELEPHONE ENCOUNTER
This writer attempted to contact Ellen on 08/23/17      Reason for call provider message below and detailed message left on voice mail per provider documentation. [facility] clinic number provided for any additional questions/concerns.        When patient calls back, please contact clinic RN team. If no one available, document that pt called and route to care team. routine priority.      Nereida Ramirez RN

## 2017-08-25 NOTE — TELEPHONE ENCOUNTER
This writer attempted to contact Ellen on 08/25/17      Reason for call provider message below and left message to return call.      When patient calls back, please contact clinic RN team. If no one available, document that pt called and route to care team. routine priority.          Nereida Ramirez RN

## 2017-08-29 NOTE — TELEPHONE ENCOUNTER
Attempted to contact patient 3 times without success. Patient never returned call. Triage closing encounter.     Nereida Ramirez RN

## 2017-09-07 ENCOUNTER — TELEPHONE (OUTPATIENT)
Dept: FAMILY MEDICINE | Facility: CLINIC | Age: 62
End: 2017-09-07

## 2017-09-07 DIAGNOSIS — M79.18 RHOMBOID MUSCLE PAIN: Primary | ICD-10-CM

## 2017-09-07 RX ORDER — LIDOCAINE 50 MG/G
PATCH TOPICAL
Qty: 30 PATCH | Refills: 0 | Status: SHIPPED | OUTPATIENT
Start: 2017-09-07 | End: 2017-09-22

## 2017-09-07 NOTE — TELEPHONE ENCOUNTER
Reason for Call:  Medication or medication refill:    Do you use a [facility]?  Name of the pharmacy and phone number for the current request:  [id number] - [place], [place] - [address]    Name of the medication requested: patient came in on 8/1/17 for pain and the medication that only works is the lidocaine patches and asking if she could get another prescription?    Other request:     Can we leave a detailed message on this number? YES    Phone number patient can be reached at: Home number on file [phone] (home)    Best Time: any      Call taken on 9/7/2017 at 3:33 PM by Vanessa High

## 2017-09-08 NOTE — TELEPHONE ENCOUNTER
This writer attempted to contact Ellen on 09/08/17      Reason for call medication refill and left message to return call.      If patient calls back:   Patient contacted by 2nd floor [facility] Team (MA/TC). Inform patient that someone from the team will contact them, document that pt called and route to care team. .        Yanet Villavicencio CMA

## 2017-09-12 ENCOUNTER — TELEPHONE (OUTPATIENT)
Dept: NEUROLOGY | Facility: CLINIC | Age: 62
End: 2017-09-12

## 2017-09-12 NOTE — TELEPHONE ENCOUNTER
Reason for Call:  Same Day Appointment, Requested Provider:  Ezequiel    PCP: Jenniffer Mackay    Reason for visit: Patient is in severe pain - has an appointment set for 9/29, but is hoping to get in sooner. Patient is new to Dr Nieves    Duration of symptoms: All the pain on left shoulder, below shoulder bone - pain in so severe, numbness and stinging - pressure - sensitive to touch - trouble sleeping - itching - tired and hot    Medications are not helping - being treated with shingles medication - had shingles in 2009    Have you been treated for this in the past? Yes        Can we leave a detailed message on this number? YES    Phone number patient can be reached at: Home number on file [phone] (home)    Best Time: anytime    Call taken on 9/12/2017 at 4:02 PM by Paulette Chaudhry

## 2017-09-13 NOTE — TELEPHONE ENCOUNTER
Patient was scheduled for 9/22/2017 at 1pm to see Dr. Nieves, patient was told to arrive by 1240pm for check in  Zayda Jones MA

## 2017-09-17 ENCOUNTER — NURSE TRIAGE (OUTPATIENT)
Dept: NURSING | Facility: CLINIC | Age: 62
End: 2017-09-17

## 2017-09-17 ENCOUNTER — OFFICE VISIT (OUTPATIENT)
Dept: URGENT CARE | Facility: URGENT CARE | Age: 62
End: 2017-09-17
Payer: COMMERCIAL

## 2017-09-17 VITALS
OXYGEN SATURATION: 96 % | HEART RATE: 83 BPM | BODY MASS INDEX: 32.24 KG/M2 | DIASTOLIC BLOOD PRESSURE: 90 MMHG | WEIGHT: 187.8 LBS | SYSTOLIC BLOOD PRESSURE: 200 MMHG | TEMPERATURE: 98.5 F

## 2017-09-17 DIAGNOSIS — M54.6 CHRONIC LEFT-SIDED THORACIC BACK PAIN: Primary | ICD-10-CM

## 2017-09-17 DIAGNOSIS — I10 HYPERTENSION, UNCONTROLLED: ICD-10-CM

## 2017-09-17 DIAGNOSIS — G89.29 CHRONIC LEFT-SIDED THORACIC BACK PAIN: Primary | ICD-10-CM

## 2017-09-17 PROCEDURE — 99214 OFFICE O/P EST MOD 30 MIN: CPT | Performed by: FAMILY MEDICINE

## 2017-09-17 RX ORDER — METOPROLOL SUCCINATE 50 MG/1
100 TABLET, EXTENDED RELEASE ORAL DAILY
Qty: 90 TABLET | Refills: 1 | Status: SHIPPED | OUTPATIENT
Start: 2017-09-17 | End: 2017-12-29

## 2017-09-17 NOTE — TELEPHONE ENCOUNTER
On going for years. Neuro consult soon. Senses a bulge in her back. Pain moves to her hip.  Benita Solis RN-[facility]

## 2017-09-17 NOTE — PATIENT INSTRUCTIONS
Understanding Chronic Pain  Chronic means ongoing. Pain is called  chronic  when it lasts over a long period of time, at least 3 months. This includes pain that you feel regularly, even if it comes and goes. Chronic pain may be due to continuing injury or disease, or it may be due to problems with the body s pain-control system. An example of this is fibromyalgia. Read below to learn more.     Chronic stimulus  Chronic pain may be from chronic stimulation of the pain system. This means the cause of pain is not cured. The cause may be an untreated injury or health problem. Examples of these are joint degeneration (arthritis) and back injury. Other common causes are nervous system damage (neuropathic pain) and headaches. With this type of pain, both the pain and the condition that is causing it must be treated.  Chronic pain syndrome  Sometimes, no cause can be found for a person's chronic pain. Some people with chronic pain develop chronic pain syndrome. This includes anxiety and depression, anger, and changed lifestyle in addition to the pain. It is important to seek treatment for these pain-associated problems even when the chronic pain itself can't be cured.  Date Last Reviewed: 6/11/2015 2000-2017 [employer]. [address] [place], [place] [id number]. All rights reserved. This information is not intended as a substitute for professional medical care. Always follow your healthcare professional's instructions.        Uncontrolled High Blood Pressure (Established)    Your blood pressure was unusually high today. This can occur if you ve missed doses of your blood pressure medicine. Or it can happen if you are taking other medicines. These include some asthma inhalers, decongestants, diet pills, and street drugs like cocaine and amphetamine.  Other causes include:    Weight gain    More salt in your diet    Smoking    Caffeine  Your blood pressure can also rise if you are emotionally upset or in  intense pain. It may go back to normal after a period of rest.  A blood pressure reading is made up of 2 numbers. There is a top number over a bottom number. The top number is the systolic pressure. The bottom number is the diastolic pressure. A normal blood pressure is a systolic pressure of less than 120 over a diastolic pressure of less than 80. High blood pressure (hypertension) is when the top number is 140 or higher. Or it is when the bottom number is 90 or higher. You will see your blood pressure readings written together. For example, a person with a systolic pressure of 118 and a diastolic pressure of 78 will have 118/78 written in the medical record. To be high blood pressure, the numbers must be higher when tested over a period of time. The blood pressures between normal and hypertension are called prehypertension. Prehypertension is a warning sign. The information gives you a chance to make lifestyle changes (weight loss, more exercise) that can keep your blood pressure from going higher.  Home care  It s important to take steps to lower your blood pressure. If you are taking blood pressure medicine, the guidelines below may help you need less or no medicines in the future.    Begin a weight-loss program if you are overweight.    Cut back on the amount of salt in your diet:    Avoid high-salt foods like olives, pickles, smoked meats, and salted potato chips.    Don t add salt to your food at the table.    Use only small amounts of salt when cooking.    Begin an exercise program. Talk with your health care provider about what exercise program is best for you. It doesn t have to be difficult. Even brisk walking for 20 minutes 3 times a week is a good form of exercise.    Avoid medicines that stimulates the heart. This includes many over-the-counter cold and sinus decongestant pills and sprays, as well as diet pills. Check the warnings about hypertension on the label. Before purchasing any over-the-counter  medicines or supplements, always ask the pharmacist about the product's potential interaction with your high blood pressure and your medicines.    Stimulants such as amphetamine or cocaine could be lethal for someone with hypertension. Never take these.    Limit how much caffeine you drink. Or switch to noncaffeinated beverages.    Stop smoking. If you are a long-time smoker, this can be hard. Enroll in a stop-smoking program to make it more likely that you will succeed. Talk with your provider about ways to quit.    Learn how to handle stress better. This is an important part of any program to lower blood pressure. Learn ways to relax. These include meditation, yoga, and biofeedback.    If medicines were prescribed, take them exactly as directed. Missing doses may cause your blood pressure to get out of control.    If you miss a dose or doses of your medicines, check with your healthcare provider or pharmacist about what to do.    Consider buying an automatic blood pressure machine. Your provider may recommend a certain type. You can get one of these at most pharmacies. Measure your blood pressure twice a day, in the morning, and in the late afternoon. Keep a written record of your home blood pressure readings and take the record to your medical appointments.  Here are some additional guidelines on home blood pressure monitoring from the American [employer] Association.    Don't smoke or drink coffee for 30 minutes    Go to the bathroom before the test.    Relax for 5 minutes before taking the measurement.    Sit correctly. Be sure your back is supported. Don't sit on a couch or soft chair. Uncross your feet and place them flat on the floor. Place your arm on a solid, flat surface like a table with the upper arm at heart level. Make certain the middle of the cuff is directly above the eye of the elbow. Check the monitor's instruction manual for an illustration.    Take multiple readings. When you measure, take 2 or 3  readings one minute apart and record all of the results.    Take your blood pressure at the same time every day, or as your healthcare provider recommends.    Record the date, time, and blood pressure reading.    Take the record with you to your next appointment. If your blood pressure monitor has a built-in memory, simply take the monitor with you to your next appointment.    Call your provider if you have several high readings. Don't be frightened by a single high reading, but if you get several high readings, check in with your healthcare provider.    Note: When blood pressure reaches a systolic (top number) of 180 or higher or a diastolic (bottom number) of 110 or higher, emergency medical treatment is required. Call your healthcare provider immediately.  Follow-up care  Regular visits to your own healthcare provider for blood pressure and medicine checks are an important part of your care. Make a follow-up appointment as directed. Bring the record of your home blood pressure readings to the appointment.  When to seek medical advice  Call your healthcare provider right away if any of these occur:    Blood pressure reaches a systolic (top number) of 180 or higher or diastolic (bottom number) of 110 or higher, emergency medical treatment is required.    Chest, arm, shoulder, neck, or upper back pain    Shortness of breath    Severe headache    Throbbing or rushing sound in the ears    Nosebleed    Extreme drowsiness, confusion, or fainting    Dizziness or dizziness with spinning sensation (vertigo)    Weakness in an arm or leg or on one side of the face    Trouble speaking or seeing   Date Last Reviewed: 1/1/2017 2000-2017 [employer]. [address] [place] [address], [place] [id number]. All rights reserved. This information is not intended as a substitute for professional medical care. Always follow your healthcare professional's instructions.        Metoprolol tablets  What is this medicine?  METOPROLOL  (me TOE proe lole) is a beta-blocker. Beta-blockers reduce the workload on the heart and help it to beat more regularly. This medicine is used to treat high blood pressure and to prevent chest pain. It is also used to after a heart attack and to prevent an additional heart attack from occurring.  How should I use this medicine?  Take this medicine by mouth with a drink of water. Follow the directions on the prescription label. Take this medicine immediately after meals. Take your doses at regular intervals. Do not take more medicine than directed. Do not stop taking this medicine suddenly. This could lead to serious heart-related effects.  Talk to your pediatrician regarding the use of this medicine in children. Special care may be needed.  What side effects may I notice from receiving this medicine?  Side effects that you should report to your doctor or health care professional as soon as possible:    allergic reactions like skin rash, itching or hives    cold or numb hands or feet    depression    difficulty breathing    faint    fever with sore throat    irregular heartbeat, chest pain    rapid weight gain    swollen legs or ankles  Side effects that usually do not require medical attention (report to your doctor or health care professional if they continue or are bothersome):    anxiety or nervousness    change in sex drive or performance    dry skin    headache    nightmares or trouble sleeping    short term memory loss    stomach upset or diarrhea    unusually tired  What may interact with this medicine?  This medicine may interact with the following medications:    certain medicines for blood pressure, heart disease, irregular heart beat    certain medicines for depression like monoamine oxidase (MAO) inhibitors, fluoxetine, or paroxetine    clonidine    dobutamine    epinephrine    isoproterenol    reserpine  What if I miss a dose?  If you miss a dose, take it as soon as you can. If it is almost time for  your next dose, take only that dose. Do not take double or extra doses.  Where should I keep my medicine?  Keep out of the reach of children.  Store at room temperature between 15 and 30 degrees C (59 and 86 degrees F). Throw away any unused medicine after the expiration date.  What should I tell my health care provider before I take this medicine?  They need to know if you have any of these conditions:    diabetes    heart or vessel disease like slow heart rate, worsening heart failure, heart block, sick sinus syndrome or Raynaud's disease    kidney disease    liver disease    lung or breathing disease, like asthma or emphysema    pheochromocytoma    thyroid disease    an unusual or allergic reaction to metoprolol, other beta-blockers, medicines, foods, dyes, or preservatives    pregnant or trying to get pregnant    breast-feeding  What should I watch for while using this medicine?  Visit your doctor or health care professional for regular check ups. Contact your doctor right away if your symptoms worsen. Check your blood pressure and pulse rate regularly. Ask your health care professional what your blood pressure and pulse rate should be, and when you should contact them.  You may get drowsy or dizzy. Do not drive, use machinery, or do anything that needs mental alertness until you know how this medicine affects you. Do not sit or stand up quickly, especially if you are an older patient. This reduces the risk of dizzy or fainting spells. Contact your doctor if these symptoms continue. Alcohol may interfere with the effect of this medicine. Avoid alcoholic drinks.  NOTE:This sheet is a summary. It may not cover all possible information. If you have questions about this medicine, talk to your doctor, pharmacist, or health care provider. Copyright  2017 [employer]

## 2017-09-17 NOTE — NURSING NOTE
"Chief Complaint   Patient presents with     Pain     Patient complains of pain in left shoulder, hip and back       Initial BP (!) 210/88 (BP Location: Left arm, Patient Position: Chair, Cuff Size: Adult Regular)  Pulse 83  Temp 98.5  F (36.9  C) (Oral)  Wt 187 lb 12.8 oz (85.2 kg)  SpO2 96%  BMI 32.24 kg/m2 Estimated body mass index is 32.24 kg/(m^2) as calculated from the following:    Height as of 5/8/17: 5' 4\" (1.626 m).    Weight as of this encounter: 187 lb 12.8 oz (85.2 kg).  Medication Reconciliation: complete       Maribeth Kendall    "

## 2017-09-17 NOTE — MR AVS SNAPSHOT
After Visit Summary   9/17/2017    Ellen Hill    MRN: [id number]           Patient Information     Date Of Birth          1955        Visit Information        Provider Department      9/17/2017 10:30 AM Abner Galarza MD [facility]        Today's Diagnoses     Chronic left-sided thoracic back pain    -  1    Hypertension, uncontrolled        Hypertension goal BP (blood pressure) < 130/80          Care Instructions      Understanding Chronic Pain  Chronic means ongoing. Pain is called  chronic  when it lasts over a long period of time, at least 3 months. This includes pain that you feel regularly, even if it comes and goes. Chronic pain may be due to continuing injury or disease, or it may be due to problems with the body s pain-control system. An example of this is fibromyalgia. Read below to learn more.     Chronic stimulus  Chronic pain may be from chronic stimulation of the pain system. This means the cause of pain is not cured. The cause may be an untreated injury or health problem. Examples of these are joint degeneration (arthritis) and back injury. Other common causes are nervous system damage (neuropathic pain) and headaches. With this type of pain, both the pain and the condition that is causing it must be treated.  Chronic pain syndrome  Sometimes, no cause can be found for a person's chronic pain. Some people with chronic pain develop chronic pain syndrome. This includes anxiety and depression, anger, and changed lifestyle in addition to the pain. It is important to seek treatment for these pain-associated problems even when the chronic pain itself can't be cured.  Date Last Reviewed: 6/11/2015 2000-2017 [employer]. [address] [place] [address], [place] [address]. All rights reserved. This information is not intended as a substitute for professional medical care. Always follow your healthcare professional's instructions.        Uncontrolled High  Blood Pressure (Established)    Your blood pressure was unusually high today. This can occur if you ve missed doses of your blood pressure medicine. Or it can happen if you are taking other medicines. These include some asthma inhalers, decongestants, diet pills, and street drugs like cocaine and amphetamine.  Other causes include:    Weight gain    More salt in your diet    Smoking    Caffeine  Your blood pressure can also rise if you are emotionally upset or in intense pain. It may go back to normal after a period of rest.  A blood pressure reading is made up of 2 numbers. There is a top number over a bottom number. The top number is the systolic pressure. The bottom number is the diastolic pressure. A normal blood pressure is a systolic pressure of less than 120 over a diastolic pressure of less than 80. High blood pressure (hypertension) is when the top number is 140 or higher. Or it is when the bottom number is 90 or higher. You will see your blood pressure readings written together. For example, a person with a systolic pressure of 118 and a diastolic pressure of 78 will have 118/78 written in the medical record. To be high blood pressure, the numbers must be higher when tested over a period of time. The blood pressures between normal and hypertension are called prehypertension. Prehypertension is a warning sign. The information gives you a chance to make lifestyle changes (weight loss, more exercise) that can keep your blood pressure from going higher.  Home care  It s important to take steps to lower your blood pressure. If you are taking blood pressure medicine, the guidelines below may help you need less or no medicines in the future.    Begin a weight-loss program if you are overweight.    Cut back on the amount of salt in your diet:    Avoid high-salt foods like olives, pickles, smoked meats, and salted potato chips.    Don t add salt to your food at the table.    Use only small amounts of salt when  cooking.    Begin an exercise program. Talk with your health care provider about what exercise program is best for you. It doesn t have to be difficult. Even brisk walking for 20 minutes 3 times a week is a good form of exercise.    Avoid medicines that stimulates the heart. This includes many over-the-counter cold and sinus decongestant pills and sprays, as well as diet pills. Check the warnings about hypertension on the label. Before purchasing any over-the-counter medicines or supplements, always ask the pharmacist about the product's potential interaction with your high blood pressure and your medicines.    Stimulants such as amphetamine or cocaine could be lethal for someone with hypertension. Never take these.    Limit how much caffeine you drink. Or switch to noncaffeinated beverages.    Stop smoking. If you are a long-time smoker, this can be hard. Enroll in a stop-smoking program to make it more likely that you will succeed. Talk with your provider about ways to quit.    Learn how to handle stress better. This is an important part of any program to lower blood pressure. Learn ways to relax. These include meditation, yoga, and biofeedback.    If medicines were prescribed, take them exactly as directed. Missing doses may cause your blood pressure to get out of control.    If you miss a dose or doses of your medicines, check with your healthcare provider or pharmacist about what to do.    Consider buying an automatic blood pressure machine. Your provider may recommend a certain type. You can get one of these at most pharmacies. Measure your blood pressure twice a day, in the morning, and in the late afternoon. Keep a written record of your home blood pressure readings and take the record to your medical appointments.  Here are some additional guidelines on home blood pressure monitoring from the American Heart Association.    Don't smoke or drink coffee for 30 minutes    Go to the bathroom before the  test.    Relax for 5 minutes before taking the measurement.    Sit correctly. Be sure your back is supported. Don't sit on a couch or soft chair. Uncross your feet and place them flat on the floor. Place your arm on a solid, flat surface like a table with the upper arm at heart level. Make certain the middle of the cuff is directly above the eye of the elbow. Check the monitor's instruction manual for an illustration.    Take multiple readings. When you measure, take 2 or 3 readings one minute apart and record all of the results.    Take your blood pressure at the same time every day, or as your healthcare provider recommends.    Record the date, time, and blood pressure reading.    Take the record with you to your next appointment. If your blood pressure monitor has a built-in memory, simply take the monitor with you to your next appointment.    Call your provider if you have several high readings. Don't be frightened by a single high reading, but if you get several high readings, check in with your healthcare provider.    Note: When blood pressure reaches a systolic (top number) of 180 or higher or a diastolic (bottom number) of 110 or higher, emergency medical treatment is required. Call your healthcare provider immediately.  Follow-up care  Regular visits to your own healthcare provider for blood pressure and medicine checks are an important part of your care. Make a follow-up appointment as directed. Bring the record of your home blood pressure readings to the appointment.  When to seek medical advice  Call your healthcare provider right away if any of these occur:    Blood pressure reaches a systolic (top number) of 180 or higher or diastolic (bottom number) of 110 or higher, emergency medical treatment is required.    Chest, arm, shoulder, neck, or upper back pain    Shortness of breath    Severe headache    Throbbing or rushing sound in the ears    Nosebleed    Extreme drowsiness, confusion, or  fainting    Dizziness or dizziness with spinning sensation (vertigo)    Weakness in an arm or leg or on one side of the face    Trouble speaking or seeing   Date Last Reviewed: 1/1/2017 2000-2017 [employer]. [address] [place] [address], [place] [address]. All rights reserved. This information is not intended as a substitute for professional medical care. Always follow your healthcare professional's instructions.        Metoprolol tablets  What is this medicine?  METOPROLOL (me TOE proe lole) is a beta-blocker. Beta-blockers reduce the workload on the heart and help it to beat more regularly. This medicine is used to treat high blood pressure and to prevent chest pain. It is also used to after a heart attack and to prevent an additional heart attack from occurring.  How should I use this medicine?  Take this medicine by mouth with a drink of water. Follow the directions on the prescription label. Take this medicine immediately after meals. Take your doses at regular intervals. Do not take more medicine than directed. Do not stop taking this medicine suddenly. This could lead to serious heart-related effects.  Talk to your pediatrician regarding the use of this medicine in children. Special care may be needed.  What side effects may I notice from receiving this medicine?  Side effects that you should report to your doctor or health care professional as soon as possible:    allergic reactions like skin rash, itching or hives    cold or numb hands or feet    depression    difficulty breathing    faint    fever with sore throat    irregular heartbeat, chest pain    rapid weight gain    swollen legs or ankles  Side effects that usually do not require medical attention (report to your doctor or health care professional if they continue or are bothersome):    anxiety or nervousness    change in sex drive or performance    dry skin    headache    nightmares or trouble sleeping    short term memory  loss    stomach upset or diarrhea    unusually tired  What may interact with this medicine?  This medicine may interact with the following medications:    certain medicines for blood pressure, heart disease, irregular heart beat    certain medicines for depression like monoamine oxidase (MAO) inhibitors, fluoxetine, or paroxetine    clonidine    dobutamine    epinephrine    isoproterenol    reserpine  What if I miss a dose?  If you miss a dose, take it as soon as you can. If it is almost time for your next dose, take only that dose. Do not take double or extra doses.  Where should I keep my medicine?  Keep out of the reach of children.  Store at room temperature between 15 and 30 degrees C (59 and 86 degrees F). Throw away any unused medicine after the expiration date.  What should I tell my health care provider before I take this medicine?  They need to know if you have any of these conditions:    diabetes    heart or vessel disease like slow heart rate, worsening heart failure, heart block, sick sinus syndrome or Raynaud's disease    kidney disease    liver disease    lung or breathing disease, like asthma or emphysema    pheochromocytoma    thyroid disease    an unusual or allergic reaction to metoprolol, other beta-blockers, medicines, foods, dyes, or preservatives    pregnant or trying to get pregnant    breast-feeding  What should I watch for while using this medicine?  Visit your doctor or health care professional for regular check ups. Contact your doctor right away if your symptoms worsen. Check your blood pressure and pulse rate regularly. Ask your health care professional what your blood pressure and pulse rate should be, and when you should contact them.  You may get drowsy or dizzy. Do not drive, use machinery, or do anything that needs mental alertness until you know how this medicine affects you. Do not sit or stand up quickly, especially if you are an older patient. This reduces the risk of dizzy or  "fainting spells. Contact your doctor if these symptoms continue. Alcohol may interfere with the effect of this medicine. Avoid alcoholic drinks.  NOTE:This sheet is a summary. It may not cover all possible information. If you have questions about this medicine, talk to your doctor, pharmacist, or health care provider. Copyright  2017 [employer]                Follow-ups after your visit        Your next 10 appointments already scheduled     Sep 22, 2017  1:00 PM CDT   [facility]ieves MD   [facility]    [address] [id number] [phone]            Sep 29, 2017  3:20 PM CDT   New Visit with Lisa Nieves MD   [facility]    [address] [id number] [phone]              Who to contact     If you have questions or need follow up information about today's clinic visit or your schedule please contact [facility] directly at [phone].  Normal or non-critical lab and imaging results will be communicated to you by [employer], letter or phone within 4 business days after the clinic has received the results. If you do not hear from us within 7 days, please contact the clinic through [employer] or phone. If you have a critical or abnormal lab result, we will notify you by phone as soon as possible.  Submit refill requests through [employer] or call your pharmacy and they will forward the refill request to us. Please allow 3 business days for your refill to be completed.          Additional Information About Your Visit        [employer] Information     [employer] lets you send messages to your doctor, view your test results, renew your prescriptions, schedule appointments and more. To sign up, go to [email or web address] . Click on \"Log in\" on the left side of the screen, which will take you to the Welcome page. Then click " "on \"Sign up Now\" on the right side of the page.     You will be asked to enter the access code listed below, as well as some personal information. Please follow the directions to create your username and password.     Your access code is: [email or web address]  Expires: 2017  1:41 PM     Your access code will  in 90 days. If you need help or a new code, please call your [place] clinic or [phone].        Care EveryWhere ID     This is your Care EveryWhere ID. This could be used by other organizations to access your [place] medical records  [id number]        Your Vitals Were     Pulse Temperature Pulse Oximetry BMI (Body Mass Index)          83 98.5  F (36.9  C) (Oral) 96% 32.24 kg/m2         Blood Pressure from Last 3 Encounters:   17 200/90   17 182/83   17 160/80    Weight from Last 3 Encounters:   17 187 lb 12.8 oz (85.2 kg)   17 186 lb (84.4 kg)   17 190 lb 9.6 oz (86.5 kg)              Today, you had the following     No orders found for display         Today's Medication Changes          These changes are accurate as of: 17 10:58 AM.  If you have any questions, ask your nurse or doctor.               These medicines have changed or have updated prescriptions.        Dose/Directions    metoprolol 50 MG 24 hr tablet   Commonly known as:  TOPROL-XL   This may have changed:  how much to take   Used for:  Hypertension goal BP (blood pressure) < 130/80   Changed by:  Abner Galarza MD        Dose:  100 mg   Take 2 tablets (100 mg) by mouth daily   Quantity:  90 tablet   Refills:  1            Where to get your medicines      These medications were sent to [facility]/pharmacy #[id number] - [place] RUDOLPH - [address] [facility]  [address] [place] [address]     Phone:  [phone]     metoprolol 50 MG 24 hr tablet                Primary Care Provider Office Phone # Fax #    Jenniffer Mackay PA-C [phone]       [address] " [place] [address]        Equal Access to Services     [facility] : Hadii francesca garcía federico Hadley, waaxda luqadaha, qaybta kaalmada kira, ethel gleason. So [facility] [phone].    ATENCIÓN: Si habla español, tiene a dyson disposición servicios gratuitos de asistencia lingüística. Zaidaame al [phone].    We comply with applicable federal civil rights laws and [place] laws. We do not discriminate on the basis of race, color, national origin, age, disability sex, sexual orientation or gender identity.            Thank you!     Thank you for choosing [facility]  for your care. Our goal is always to provide you with excellent care. Hearing back from our patients is one way we can continue to improve our services. Please take a few minutes to complete the written survey that you may receive in the mail after your visit with us. Thank you!             Your Updated Medication List - Protect others around you: Learn how to safely use, store and throw away your medicines at www.disposemymeds.org.          This list is accurate as of: 9/17/17 10:58 AM.  Always use your most recent med list.                   Brand Name Dispense Instructions for use Diagnosis    amLODIPine 10 MG tablet    NORVASC    90 tablet    Take 1 tablet (10 mg) by mouth daily    Hypertension goal BP (blood pressure) < 130/80       atorvastatin 40 MG tablet    LIPITOR    90 tablet    Take 1 tablet (40 mg) by mouth daily    Type 2 diabetes mellitus with diabetic nephropathy, with long-term current use of insulin (H)       B-D U/F 31G X 5 MM   Generic drug:  insulin pen needle     180 each    USE AS DIRECTED THREE TIMES DAILY BEFORE MEALS    Type 2 diabetes, HbA1c goal < 7% (H)       citalopram 20 MG tablet    celeXA    90 tablet    Take 1 tablet (20 mg) by mouth daily    Major depressive disorder, recurrent episode, moderate (H)       * gabapentin 100 MG capsule    NEURONTIN    510 capsule    Take 2 capsules (200  mg) by mouth 3 times daily    Post herpetic neuralgia       * gabapentin 300 MG capsule    NEURONTIN    90 capsule    Take 1 tablet (300 mg) every night for 1-3 days, then 2  Tablet at bedtime  for 1-3 days, then 1 tablet in the am and two at bedtime    Chronic left-sided thoracic back pain       HYDROcodone-acetaminophen 5-325 MG per tablet    NORCO    30 tablet    Take 1-2 tablets by mouth every 8 hours as needed    Rhomboid muscle strain, initial encounter       insulin aspart 100 UNIT/ML injection    NovoLOG FLEXPEN    15 mL    30 units before breakfast, 30 units before lunch, 30 units before dinner    Type 2 diabetes mellitus with diabetic nephropathy, with long-term current use of insulin (H)       insulin glargine 100 UNIT/ML injection     15 mL    Inject 45 Units Subcutaneous 2 times daily    Type 2 diabetes mellitus with diabetic nephropathy, with long-term current use of insulin (H)       lidocaine 5 % Patch    LIDODERM    30 patch    Apply up to 3 patches to painful area at once for up to 12 h within a 24 h period.  Remove after 12 hours.    Rhomboid muscle pain       losartan-hydrochlorothiazide 100-12.5 MG per tablet    HYZAAR    90 tablet    Take 1 tablet by mouth daily    Type 2 diabetes mellitus with diabetic nephropathy, with long-term current use of insulin (H)       metFORMIN 500 MG 24 hr tablet    GLUCOPHAGE-XR    360 tablet    Take 4 tablets (2,000 mg) by mouth daily (with breakfast)    Type 2 diabetes mellitus with diabetic nephropathy, with long-term current use of insulin (H)       methocarbamol 750 MG tablet    ROBAXIN    45 tablet    Take 1 tablet (750 mg) by mouth 3 times daily as needed for muscle spasms    Rhomboid muscle strain, initial encounter       metoprolol 50 MG 24 hr tablet    TOPROL-XL    90 tablet    Take 2 tablets (100 mg) by mouth daily    Hypertension goal BP (blood pressure) < 130/80       omeprazole 40 MG capsule    priLOSEC    30 capsule    Take 1 capsule (40 mg) by  mouth daily    Leblanc's esophagus without dysplasia       oxyCODONE-acetaminophen 5-325 MG per tablet    PERCOCET     Reported on 5/8/2017        sucralfate 1 GM/10ML suspension    CARAFATE     Take 1,000 mg by mouth        * Notice:  This list has 2 medication(s) that are the same as other medications prescribed for you. Read the directions carefully, and ask your doctor or other care provider to review them with you.

## 2017-09-17 NOTE — TELEPHONE ENCOUNTER
Reason for Disposition    [1] SEVERE back pain (e.g., excruciating, unable to do any normal activities) AND [2] not improved 2 hours after pain medicine    Additional Information    Negative: Passed out (i.e., lost consciousness, collapsed and was not responding)    Negative: Shock suspected (e.g., cold/pale/clammy skin, too weak to stand, low BP, rapid pulse)    Negative: Sounds like a life-threatening emergency to the triager    Negative: Major injury to the back (e.g., MVA, fall > 10 feet or 3 meters, penetrating injury, etc.)    Negative: Followed a tailbone injury    Negative: [1] Pain in the upper back over the ribs (rib cage) AND [2] radiates (travels, goes) into chest    Negative: [1] Pain in the upper back over the ribs (rib cage) AND [2] worsened by coughing (or clearly increases with breathing)    Negative: Back pain during pregnancy    Negative: [1] SEVERE back pain (e.g., excruciating) AND [2] sudden onset AND [3] age > 60     Pain at 8 out of 10.    Negative: [1] Unable to urinate (or only a few drops) > 4 hours AND     [2] bladder feels very full (e.g., palpable bladder or strong urge to urinate)    Negative: [1] Urinary or bowel incontinence (i.e., loss of bladder or bowel control) AND [2] new onset    Negative: Numbness in groin or rectal area (i.e., loss of sensation)    Negative: [1] SEVERE abdominal pain AND [2] present > 1 hour    Negative: [1] Abdominal pain AND [2] age > 60    Negative: Weakness of a leg or foot (e.g., unable to bear weight, dragging foot)    Negative: Unable to walk    Negative: Patient sounds very sick or weak to the triager    Protocols used: BACK PAIN-ADULT-

## 2017-09-17 NOTE — PROGRESS NOTES
SUBJECTIVE:   Ellen Hill is a 61 year old female who presents to clinic today for the following health issues:      Pain      Duration: 4 months    Description (location/character/radiation): left shoulder, back, and hip    Intensity:  moderate    Accompanying signs and symptoms: pain    History (similar episodes/previous evaluation): yes    Precipitating or alleviating factors: None    Therapies tried and outcome: robaxin, norco, gabapentin     MRI thoracic spine and CT abdomen/pelvis earlier this year was unremarkable     Has an appointment with neurologist coming week          Problem list and histories reviewed & adjusted, as indicated.  Additional history: as documented    Patient Active Problem List   Diagnosis     Post herpetic neuralgia     Hypertriglyceridemia     Hyperlipidemia LDL goal <100     TYPE 2 DIABETES, HBA1C GOAL < 7%     Hypertension goal BP (blood pressure) < 130/80     Moderate major depression (H)     Vulvar pruritus     Adenomatous polyps     Alcoholic (H)     Advanced directives, counseling/discussion     Obesity, Class I, BMI 30-34.9     Microalbuminuria     Leblanc's esophagus without dysplasia     Type 2 diabetes mellitus with diabetic nephropathy, with long-term current use of insulin (H)     No past surgical history on file.    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  Remove after 12 hours. 30 patch 0     gabapentin (NEURONTIN) 300 MG capsule Take 1 tablet (300 mg) every night for 1-3 days, then 2  Tablet at bedtime  for 1-3 days, then 1 tablet in the am and two at bedtime 90 capsule 0     methocarbamol (ROBAXIN) 750 MG tablet Take 1 tablet (750 mg) by mouth 3 times daily as needed for muscle spasms 45 tablet 1     HYDROcodone-acetaminophen (NORCO) 5-325 MG per tablet Take 1-2 tablets by mouth every 8 hours as needed 30 tablet 0     insulin aspart (NOVOLOG FLEXPEN) 100 UNIT/ML injection 30 units before breakfast, 30 units before lunch, 30 units before dinner 15 mL 1     B-D U/F insulin pen needle USE AS DIRECTED THREE TIMES DAILY BEFORE MEALS 180 each 1     insulin glargine (BASAGLAR KWIKPEN) 100 UNIT/ML injection Inject 45 Units Subcutaneous 2 times daily 15 mL 1     sucralfate (CARAFATE) 1 GM/10ML suspension Take 1,000 mg by mouth       gabapentin (NEURONTIN) 100 MG capsule Take 2 capsules (200 mg) by mouth 3 times daily 510 capsule 1     omeprazole (PRILOSEC) 40 MG capsule Take 1 capsule (40 mg) by mouth daily 30 capsule 1     atorvastatin (LIPITOR) 40 MG tablet Take 1 tablet (40 mg) by mouth daily 90 tablet 1     metFORMIN (GLUCOPHAGE-XR) 500 MG 24 hr tablet Take 4 tablets (2,000 mg) by mouth daily (with breakfast) 360 tablet 1     citalopram (CELEXA) 20 MG tablet Take 1 tablet (20 mg) by mouth daily 90 tablet 1     amLODIPine (NORVASC) 10 MG tablet Take 1 tablet (10 mg) by mouth daily 90 tablet 1     losartan-hydrochlorothiazide (HYZAAR) 100-12.5 MG per tablet Take 1 tablet by mouth daily 90 tablet 1     [DISCONTINUED] metoprolol (TOPROL-XL) 50 MG 24 hr tablet Take 1 tablet (50 mg) by mouth daily 90 tablet 1     oxyCODONE-acetaminophen (PERCOCET) 5-325 MG per tablet Reported on 5/8/2017       Allergies   Allergen Reactions     Victoza Hives     Alfredo Nordisk product     Enalapril Cough      Lisinopril Cough     Wellbutrin [Bupropion Hcl] Hives     hives     Recent Labs   Lab Test  05/08/17   1541  02/11/15   1342  08/19/14   1831   02/04/14   0846  03/18/13   1036  07/12/12   1000   A1C  11.2*  10.5*  8.6*   < >  9.5*  10.0*  10.4*   LDL  136*   --    --    --   Cannot estimate LDL when triglyceride exceeds 400 mg/dL  85  Cannot estimate LDL when triglyceride exceeds 400 mg/dL  61  Cannot estimate LDL when triglyceride exceeds 400 mg/dL  54   HDL  45*   --    --    --   35*  38*  36*   TRIG  336*   --    --    --   409*  929*  514*   ALT  37   --   43   --   44  112*  102*   CR  0.57  0.51*  0.68   --   0.54  0.58  0.60   GFRESTIMATED  >90  Non  GFR Calc    >90  Non  GFR Calc    89   --   >90  >90  >90   GFRESTBLACK  >90   GFR Calc    >90   GFR Calc    >90   GFR Calc     --   >90  >90  >90   POTASSIUM  3.9  3.7  3.3*   --   3.7  3.8  4.0   TSH  1.02   --    --    --    --    --   1.35    < > = values in this interval not displayed.      BP Readings from Last 3 Encounters:   09/17/17 200/90   08/12/17 182/83   08/01/17 160/80    Wt Readings from Last 3 Encounters:   09/17/17 187 lb 12.8 oz (85.2 kg)   08/12/17 186 lb (84.4 kg)   07/16/17 190 lb 9.6 oz (86.5 kg)                  Labs reviewed in EPIC          Reviewed and updated as needed this visit by clinical staff     Reviewed and updated as needed this visit by Provider         ROS:   ROS: 10 point ROS neg other than the symptoms noted above in the HPI.      OBJECTIVE:   /90  Pulse 83  Temp 98.5  F (36.9  C) (Oral)  Wt 187 lb 12.8 oz (85.2 kg)  SpO2 96%  BMI 32.24 kg/m2  Body mass index is 32.24 kg/(m^2).  GENERAL: alert, no distress and obese  NECK: no adenopathy, no asymmetry, masses, or scars and thyroid normal to palpation  HEENT: normal exam   RESP: lungs clear to auscultation - no rales, rhonchi or wheezes  CV: regular rate and rhythm, normal S1 S2,  no S3 or S4, no murmur, click or rub, no peripheral edema and peripheral pulses strong  ABDOMEN: soft, nontender, no hepatosplenomegaly, no masses and bowel sounds normal  MS: tender left shoulder girdle area, no blistering, SLR negative, reflexes 2+, no pedal edema  NEURO: Normal strength and tone, mentation intact and speech normal        ASSESSMENT/PLAN:         ICD-10-CM    1. Chronic left-sided thoracic back pain M54.6     G89.29    2. Hypertension, uncontrolled I10 metoprolol (TOPROL-XL) 50 MG 24 hr tablet         61 year old female presents with left thoracic/shoulder girdle pain, ongoing for several months. Imaging/lab work up has been unremarkable. Offered ER transfer in view of significantly elevated blood pressure which she refused. Suggested to increase metoprolol 100 mg daily. Other medications reviewed and no changes made. I don't think she needs further evaluation in terms of back pain in UC today. Suggested to follow up with neurologist as scheduled. Patient voiced understanding and all questions answered.      MEDICATIONS:   Orders Placed This Encounter   Medications     metoprolol (TOPROL-XL) 50 MG 24 hr tablet     Sig: Take 2 tablets (100 mg) by mouth daily     Dispense:  90 tablet     Refill:  1     Patient Instructions       Understanding Chronic Pain  Chronic means ongoing. Pain is called  chronic  when it lasts over a long period of time, at least 3 months. This includes pain that you feel regularly, even if it comes and goes. Chronic pain may be due to continuing injury or disease, or it may be due to problems with the body s pain-control system. An example of this is fibromyalgia. Read below to learn more.     Chronic stimulus  Chronic pain may be from chronic stimulation of the pain system. This means the cause of pain is not cured. The cause may be an untreated injury or health problem. Examples of these are joint degeneration (arthritis) and back injury. Other common causes are nervous  system damage (neuropathic pain) and headaches. With this type of pain, both the pain and the condition that is causing it must be treated.  Chronic pain syndrome  Sometimes, no cause can be found for a person's chronic pain. Some people with chronic pain develop chronic pain syndrome. This includes anxiety and depression, anger, and changed lifestyle in addition to the pain. It is important to seek treatment for these pain-associated problems even when the chronic pain itself can't be cured.  Date Last Reviewed: 6/11/2015 2000-2017 [employer]. [address] [place] [address], [place] [address]. All rights reserved. This information is not intended as a substitute for professional medical care. Always follow your healthcare professional's instructions.        Uncontrolled High Blood Pressure (Established)    Your blood pressure was unusually high today. This can occur if you ve missed doses of your blood pressure medicine. Or it can happen if you are taking other medicines. These include some asthma inhalers, decongestants, diet pills, and street drugs like cocaine and amphetamine.  Other causes include:    Weight gain    More salt in your diet    Smoking    Caffeine  Your blood pressure can also rise if you are emotionally upset or in intense pain. It may go back to normal after a period of rest.  A blood pressure reading is made up of 2 numbers. There is a top number over a bottom number. The top number is the systolic pressure. The bottom number is the diastolic pressure. A normal blood pressure is a systolic pressure of less than 120 over a diastolic pressure of less than 80. High blood pressure (hypertension) is when the top number is 140 or higher. Or it is when the bottom number is 90 or higher. You will see your blood pressure readings written together. For example, a person with a systolic pressure of 118 and a diastolic pressure of 78 will have 118/78 written in the medical record. To be high  blood pressure, the numbers must be higher when tested over a period of time. The blood pressures between normal and hypertension are called prehypertension. Prehypertension is a warning sign. The information gives you a chance to make lifestyle changes (weight loss, more exercise) that can keep your blood pressure from going higher.  Home care  It s important to take steps to lower your blood pressure. If you are taking blood pressure medicine, the guidelines below may help you need less or no medicines in the future.    Begin a weight-loss program if you are overweight.    Cut back on the amount of salt in your diet:    Avoid high-salt foods like olives, pickles, smoked meats, and salted potato chips.    Don t add salt to your food at the table.    Use only small amounts of salt when cooking.    Begin an exercise program. Talk with your health care provider about what exercise program is best for you. It doesn t have to be difficult. Even brisk walking for 20 minutes 3 times a week is a good form of exercise.    Avoid medicines that stimulates the heart. This includes many over-the-counter cold and sinus decongestant pills and sprays, as well as diet pills. Check the warnings about hypertension on the label. Before purchasing any over-the-counter medicines or supplements, always ask the pharmacist about the product's potential interaction with your high blood pressure and your medicines.    Stimulants such as amphetamine or cocaine could be lethal for someone with hypertension. Never take these.    Limit how much caffeine you drink. Or switch to noncaffeinated beverages.    Stop smoking. If you are a long-time smoker, this can be hard. Enroll in a stop-smoking program to make it more likely that you will succeed. Talk with your provider about ways to quit.    Learn how to handle stress better. This is an important part of any program to lower blood pressure. Learn ways to relax. These include meditation, yoga, and  biofeedback.    If medicines were prescribed, take them exactly as directed. Missing doses may cause your blood pressure to get out of control.    If you miss a dose or doses of your medicines, check with your healthcare provider or pharmacist about what to do.    Consider buying an automatic blood pressure machine. Your provider may recommend a certain type. You can get one of these at most pharmacies. Measure your blood pressure twice a day, in the morning, and in the late afternoon. Keep a written record of your home blood pressure readings and take the record to your medical appointments.  Here are some additional guidelines on home blood pressure monitoring from the American [employer] Association.    Don't smoke or drink coffee for 30 minutes    Go to the bathroom before the test.    Relax for 5 minutes before taking the measurement.    Sit correctly. Be sure your back is supported. Don't sit on a couch or soft chair. Uncross your feet and place them flat on the floor. Place your arm on a solid, flat surface like a table with the upper arm at heart level. Make certain the middle of the cuff is directly above the eye of the elbow. Check the monitor's instruction manual for an illustration.    Take multiple readings. When you measure, take 2 or 3 readings one minute apart and record all of the results.    Take your blood pressure at the same time every day, or as your healthcare provider recommends.    Record the date, time, and blood pressure reading.    Take the record with you to your next appointment. If your blood pressure monitor has a built-in memory, simply take the monitor with you to your next appointment.    Call your provider if you have several high readings. Don't be frightened by a single high reading, but if you get several high readings, check in with your healthcare provider.    Note: When blood pressure reaches a systolic (top number) of 180 or higher or a diastolic (bottom number) of 110 or  higher, emergency medical treatment is required. Call your healthcare provider immediately.  Follow-up care  Regular visits to your own healthcare provider for blood pressure and medicine checks are an important part of your care. Make a follow-up appointment as directed. Bring the record of your home blood pressure readings to the appointment.  When to seek medical advice  Call your healthcare provider right away if any of these occur:    Blood pressure reaches a systolic (top number) of 180 or higher or diastolic (bottom number) of 110 or higher, emergency medical treatment is required.    Chest, arm, shoulder, neck, or upper back pain    Shortness of breath    Severe headache    Throbbing or rushing sound in the ears    Nosebleed    Extreme drowsiness, confusion, or fainting    Dizziness or dizziness with spinning sensation (vertigo)    Weakness in an arm or leg or on one side of the face    Trouble speaking or seeing   Date Last Reviewed: 1/1/2017 2000-2017 [employer]. [address] [place] [address]. All rights reserved. This information is not intended as a substitute for professional medical care. Always follow your healthcare professional's instructions.        Metoprolol tablets  What is this medicine?  METOPROLOL (me TOE proe lole) is a beta-blocker. Beta-blockers reduce the workload on the heart and help it to beat more regularly. This medicine is used to treat high blood pressure and to prevent chest pain. It is also used to after a heart attack and to prevent an additional heart attack from occurring.  How should I use this medicine?  Take this medicine by mouth with a drink of water. Follow the directions on the prescription label. Take this medicine immediately after meals. Take your doses at regular intervals. Do not take more medicine than directed. Do not stop taking this medicine suddenly. This could lead to serious heart-related effects.  Talk to your pediatrician  regarding the use of this medicine in children. Special care may be needed.  What side effects may I notice from receiving this medicine?  Side effects that you should report to your doctor or health care professional as soon as possible:    allergic reactions like skin rash, itching or hives    cold or numb hands or feet    depression    difficulty breathing    faint    fever with sore throat    irregular heartbeat, chest pain    rapid weight gain    swollen legs or ankles  Side effects that usually do not require medical attention (report to your doctor or health care professional if they continue or are bothersome):    anxiety or nervousness    change in sex drive or performance    dry skin    headache    nightmares or trouble sleeping    short term memory loss    stomach upset or diarrhea    unusually tired  What may interact with this medicine?  This medicine may interact with the following medications:    certain medicines for blood pressure, heart disease, irregular heart beat    certain medicines for depression like monoamine oxidase (MAO) inhibitors, fluoxetine, or paroxetine    clonidine    dobutamine    epinephrine    isoproterenol    reserpine  What if I miss a dose?  If you miss a dose, take it as soon as you can. If it is almost time for your next dose, take only that dose. Do not take double or extra doses.  Where should I keep my medicine?  Keep out of the reach of children.  Store at room temperature between 15 and 30 degrees C (59 and 86 degrees F). Throw away any unused medicine after the expiration date.  What should I tell my health care provider before I take this medicine?  They need to know if you have any of these conditions:    diabetes    heart or vessel disease like slow heart rate, worsening heart failure, heart block, sick sinus syndrome or Raynaud's disease    kidney disease    liver disease    lung or breathing disease, like asthma or emphysema    pheochromocytoma    thyroid  disease    an unusual or allergic reaction to metoprolol, other beta-blockers, medicines, foods, dyes, or preservatives    pregnant or trying to get pregnant    breast-feeding  What should I watch for while using this medicine?  Visit your doctor or health care professional for regular check ups. Contact your doctor right away if your symptoms worsen. Check your blood pressure and pulse rate regularly. Ask your health care professional what your blood pressure and pulse rate should be, and when you should contact them.  You may get drowsy or dizzy. Do not drive, use machinery, or do anything that needs mental alertness until you know how this medicine affects you. Do not sit or stand up quickly, especially if you are an older patient. This reduces the risk of dizzy or fainting spells. Contact your doctor if these symptoms continue. Alcohol may interfere with the effect of this medicine. Avoid alcoholic drinks.  NOTE:This sheet is a summary. It may not cover all possible information. If you have questions about this medicine, talk to your doctor, pharmacist, or health care provider. Copyright  2017 [employer]            Abner Galarza MD  [facility]

## 2017-09-18 ENCOUNTER — TELEPHONE (OUTPATIENT)
Dept: FAMILY MEDICINE | Facility: CLINIC | Age: 62
End: 2017-09-18

## 2017-09-18 DIAGNOSIS — S29.012A RHOMBOID MUSCLE STRAIN, INITIAL ENCOUNTER: ICD-10-CM

## 2017-09-18 NOTE — TELEPHONE ENCOUNTER
Reason for call:  Medication   If this is a refill request, has the caller requested the refill from the pharmacy already? N/A  Will the patient be using a [facility]? N/A  Name of the pharmacy and phone number for the current request: [facility]    Name of the medication requested: HYDROcodone-acetaminophen (NORCO) 5-325 MG per tablet    Other request: Pain is moving, bulge in back. Seeing Dr. Nieves on Friday. Would like enough to get through until she sees him. Can you contact the patient to discuss. In so much pain she is missing work. Please call with update or information.      Phone number to reach patient:  Home number on file [phone] (home)    Best Time:  ASAP    Can we leave a detailed message on this number?  YES

## 2017-09-18 NOTE — TELEPHONE ENCOUNTER
"Per chart review, pt saw UC provider yesterday for back pain reported below.  No further medications given - see UC notes.    Returned call to patient -   Pt states she had to miss work today due to L upper back pain. Pt feels there is a bulge in shoulder blade area and it feels numb (and painful).  Pt states she just put a lidocaine patch below this area but not directly on because it is too painful.  Reports she took 1 vicodin last night and has 1 tablet left.   Pt also confirms taking gabapentin 300 mg in the AM and 600 mg in PM as directed which she states is not helping.    When asked about Robaxin, yes she is still taking this. She took 2 last night (however Rx directions are 1 tab TID PRN) and \"was planning to take one this morning\".  Pt is requesting refill of Norco to last until she sees Neuro on Friday.    Pt also asks if she should take a \"nerve pain medication from [facility]?\" -    Nurse advised I am not aware of natural nerve pain supplements, would not recommend this. Would recommend moist heat appliations x 20 mins, light stretching, possible massage therapy if can tolerate.    Pt verbalized understanding. Pt states she is using heat, ice and stretching.  Has not tried any massage because it is too painful to touch.    Routing message to Milvia PAYTON to review and advise.    Sandeep Cassidy RN            "

## 2017-09-19 RX ORDER — HYDROCODONE BITARTRATE AND ACETAMINOPHEN 5; 325 MG/1; MG/1
1-2 TABLET ORAL EVERY 8 HOURS PRN
Qty: 10 TABLET | Refills: 0 | Status: SHIPPED | OUTPATIENT
Start: 2017-09-19 | End: 2017-09-22

## 2017-09-20 NOTE — TELEPHONE ENCOUNTER
Holding unsigned Rx until Milvia's return on Thursday for signature.  Ruth Ann Jarrell MA/  [facility] [employer]

## 2017-09-21 NOTE — TELEPHONE ENCOUNTER
Bringing signed Rx for the [place] to our Pharmacy  By 1:00 pm today.  Ruth Ann Jarrell MA/  [employer]

## 2017-09-22 ENCOUNTER — OFFICE VISIT (OUTPATIENT)
Dept: FAMILY MEDICINE | Facility: CLINIC | Age: 62
End: 2017-09-22
Payer: COMMERCIAL

## 2017-09-22 ENCOUNTER — RADIANT APPOINTMENT (OUTPATIENT)
Dept: GENERAL RADIOLOGY | Facility: CLINIC | Age: 62
End: 2017-09-22
Attending: FAMILY MEDICINE
Payer: COMMERCIAL

## 2017-09-22 ENCOUNTER — OFFICE VISIT (OUTPATIENT)
Dept: NEUROLOGY | Facility: CLINIC | Age: 62
End: 2017-09-22
Payer: COMMERCIAL

## 2017-09-22 VITALS
DIASTOLIC BLOOD PRESSURE: 62 MMHG | WEIGHT: 188 LBS | TEMPERATURE: 97.7 F | OXYGEN SATURATION: 97 % | BODY MASS INDEX: 32.1 KG/M2 | SYSTOLIC BLOOD PRESSURE: 160 MMHG | HEART RATE: 76 BPM | HEIGHT: 64 IN

## 2017-09-22 VITALS
DIASTOLIC BLOOD PRESSURE: 80 MMHG | HEIGHT: 64 IN | WEIGHT: 187.6 LBS | BODY MASS INDEX: 32.03 KG/M2 | HEART RATE: 83 BPM | SYSTOLIC BLOOD PRESSURE: 192 MMHG

## 2017-09-22 DIAGNOSIS — R10.9 LEFT FLANK PAIN: ICD-10-CM

## 2017-09-22 DIAGNOSIS — G89.29 CHRONIC LEFT SHOULDER PAIN: Primary | ICD-10-CM

## 2017-09-22 DIAGNOSIS — R10.9 LEFT FLANK PAIN: Primary | ICD-10-CM

## 2017-09-22 DIAGNOSIS — R52 PAIN: ICD-10-CM

## 2017-09-22 DIAGNOSIS — M25.512 CHRONIC LEFT SHOULDER PAIN: Primary | ICD-10-CM

## 2017-09-22 PROBLEM — R80.9 TYPE 2 DIABETES MELLITUS WITH MICROALBUMINURIA, WITH LONG-TERM CURRENT USE OF INSULIN (H): Status: ACTIVE | Noted: 2017-06-19

## 2017-09-22 PROBLEM — E11.29 TYPE 2 DIABETES MELLITUS WITH MICROALBUMINURIA, WITH LONG-TERM CURRENT USE OF INSULIN (H): Status: ACTIVE | Noted: 2017-06-19

## 2017-09-22 LAB
ALBUMIN UR-MCNC: 30 MG/DL
APPEARANCE UR: CLEAR
BACTERIA #/AREA URNS HPF: ABNORMAL /HPF
BILIRUB UR QL STRIP: NEGATIVE
COLOR UR AUTO: YELLOW
CREAT UR-MCNC: 52 MG/DL
GLUCOSE UR STRIP-MCNC: >=1000 MG/DL
HGB UR QL STRIP: ABNORMAL
KETONES UR STRIP-MCNC: NEGATIVE MG/DL
LEUKOCYTE ESTERASE UR QL STRIP: ABNORMAL
MICROALBUMIN UR-MCNC: 491 MG/L
MICROALBUMIN/CREAT UR: 944.23 MG/G CR (ref 0–25)
MUCOUS THREADS #/AREA URNS LPF: PRESENT /LPF
NITRATE UR QL: NEGATIVE
NON-SQ EPI CELLS #/AREA URNS LPF: ABNORMAL /LPF
PH UR STRIP: 5.5 PH (ref 5–7)
RBC #/AREA URNS AUTO: ABNORMAL /HPF
SOURCE: ABNORMAL
SP GR UR STRIP: 1.01 (ref 1–1.03)
UROBILINOGEN UR STRIP-ACNC: 0.2 EU/DL (ref 0.2–1)
WBC #/AREA URNS AUTO: ABNORMAL /HPF
YEAST #/AREA URNS HPF: ABNORMAL /HPF

## 2017-09-22 PROCEDURE — 87086 URINE CULTURE/COLONY COUNT: CPT | Performed by: FAMILY MEDICINE

## 2017-09-22 PROCEDURE — 71010 XR CHEST 1 VW: CPT

## 2017-09-22 PROCEDURE — 96372 THER/PROPH/DIAG INJ SC/IM: CPT | Performed by: FAMILY MEDICINE

## 2017-09-22 PROCEDURE — 81001 URINALYSIS AUTO W/SCOPE: CPT | Performed by: FAMILY MEDICINE

## 2017-09-22 PROCEDURE — 99205 OFFICE O/P NEW HI 60 MIN: CPT | Performed by: PSYCHIATRY & NEUROLOGY

## 2017-09-22 PROCEDURE — 99214 OFFICE O/P EST MOD 30 MIN: CPT | Mod: 25 | Performed by: FAMILY MEDICINE

## 2017-09-22 PROCEDURE — 82043 UR ALBUMIN QUANTITATIVE: CPT | Performed by: FAMILY MEDICINE

## 2017-09-22 PROCEDURE — 74010 XR KUB: CPT | Mod: 52

## 2017-09-22 RX ORDER — ETODOLAC 400 MG
400 TABLET ORAL 2 TIMES DAILY WITH MEALS
Qty: 60 TABLET | Refills: 0 | Status: SHIPPED | OUTPATIENT
Start: 2017-09-22 | End: 2018-03-12

## 2017-09-22 RX ORDER — GABAPENTIN 400 MG/1
400 CAPSULE ORAL 3 TIMES DAILY
Qty: 270 CAPSULE | Refills: 1 | Status: SHIPPED | OUTPATIENT
Start: 2017-09-22 | End: 2018-03-12

## 2017-09-22 RX ORDER — CYCLOBENZAPRINE HCL 10 MG
5-10 TABLET ORAL 3 TIMES DAILY PRN
Qty: 40 TABLET | Refills: 0 | Status: SHIPPED | OUTPATIENT
Start: 2017-09-22 | End: 2017-10-26

## 2017-09-22 RX ORDER — KETOROLAC TROMETHAMINE 30 MG/ML
30 INJECTION, SOLUTION INTRAMUSCULAR; INTRAVENOUS ONCE
Qty: 1 ML | Refills: 0 | OUTPATIENT
Start: 2017-09-22 | End: 2017-09-22

## 2017-09-22 ASSESSMENT — PAIN SCALES - GENERAL
PAINLEVEL: MODERATE PAIN (5)
PAINLEVEL: WORST PAIN (10)

## 2017-09-22 NOTE — NURSING NOTE
The following medication was given at 9:13am     MEDICATION: Ketorolac Tromethamine 30 mg/mL(Toradol)  ROUTE: IM  SITE: Arm - Left  DOSE: 1ml   LOT #: [id number]  :[employer]    EXPIRATION DATE:  March/2019  NDC#: 28239-954-04    The medication has been administered and the patient was instructed to wait 20 minutes before leaving the building in the event of an allergic reaction.    NALDO Asencio MA

## 2017-09-22 NOTE — LETTER
September 26, 2017      Ellen Hill  [address] [place] [address]        Dear MsCuongSergio,    We are writing to inform you of your test results.    Your urine culture was negative (only bacteria from nearby skin).  There is no need for antibiotics at this point.  If new, worsening or persistent symptoms,you should call or return for a recheck.     Please contact the clinic if you have additional questions.  Thank you.     Resulted Orders   Albumin Random Urine Quantitative with Creat Ratio   Result Value Ref Range    Creatinine Urine 52 mg/dL    Albumin Urine mg/L 491 mg/L    Albumin Urine mg/g Cr 944.23 (H) 0 - 25 mg/g Cr   *UA reflex to Microscopic and Culture ([place] and [facility] (except [place] and [place])   Result Value Ref Range    Color Urine Yellow     Appearance Urine Clear     Glucose Urine >=1000 (A) NEG^Negative mg/dL    Bilirubin Urine Negative NEG^Negative    Ketones Urine Negative NEG^Negative mg/dL    Specific Gravity Urine 1.015 1.003 - 1.035    Blood Urine Trace (A) NEG^Negative    pH Urine 5.5 5.0 - 7.0 pH    Protein Albumin Urine 30 (A) NEG^Negative mg/dL    Urobilinogen Urine 0.2 0.2 - 1.0 EU/dL    Nitrite Urine Negative NEG^Negative    Leukocyte Esterase Urine Trace (A) NEG^Negative    Source Midstream Urine    Urine Microscopic   Result Value Ref Range    WBC Urine O - 2 OTO2^O - 2 /HPF    RBC Urine O - 2 OTO2^O - 2 /HPF    Squamous Epithelial /LPF Urine Few FEW^Few /LPF    Bacteria Urine Few (A) NEG^Negative /HPF    Yeast Urine Few (A) NEG^Negative /HPF    Mucous Urine Present (A) NEG^Negative /LPF   Urine Culture Aerobic Bacterial   Result Value Ref Range    Specimen Description Midstream Urine     Culture Micro >100,000 colonies/mL  mixed urogenital eriberto          If you have any questions or concerns, please call the clinic at the number listed above.       Sincerely,        Luis Zambrano MD

## 2017-09-22 NOTE — PATIENT INSTRUCTIONS
AFTER VISIT SUMMARY (AVS)  Orders Placed This Encounter   Procedures     ORTHOPEDICS ADULT REFERRAL       Signed Prescriptions:                        Disp   Refills    gabapentin (NEURONTIN) 400 MG capsule      270 ca*1        Sig: Take 1 capsule (400 mg) by mouth 3 times daily  Authorizing Provider: PATRICK THACKER    Would suggest Dr. Zambrano to consider Pain Clinic referral if no orthopedics cause found.    Diagnostic possibilities reviewed and reasons for work-up explained    To call us for follow-up appointment after the work-up

## 2017-09-22 NOTE — MR AVS SNAPSHOT
After Visit Summary   9/22/2017    Ellen Hill    MRN: [id number]           Patient Information     Date Of Birth          1955        Visit Information        Provider Department      9/22/2017 1:00 PM Lisa Nieves MD [facility]        Today's Diagnoses     Chronic left shoulder pain    -  1    Pain-Left paraspinal area next to mid thoracic spine          Care Instructions    AFTER VISIT SUMMARY (AVS)  Orders Placed This Encounter   Procedures     ORTHOPEDICS ADULT REFERRAL       Signed Prescriptions:                        Disp   Refills    gabapentin (NEURONTIN) 400 MG capsule      270 ca*1        Sig: Take 1 capsule (400 mg) by mouth 3 times daily  Authorizing Provider: LISA NIEVES    Would suggest Dr. Zambrano to consider Pain Clinic referral if no orthopedics cause found.    Diagnostic possibilities reviewed and reasons for work-up explained    To call us for follow-up appointment after the work-up                      Follow-ups after your visit        Additional Services     ORTHOPEDICS ADULT REFERRAL       REASON: Request to evaluate for Left posterior shoulder pain including Left mid thoracic paraspinal area.     Your provider has referred you to: [facility]: [facility] [place] [phone]    [email or web address]    Please be aware that coverage of these services is subject to the terms and limitations of your health insurance plan.  Call member services at your health plan with any benefit or coverage questions.      Please bring the following to your appointment:    >>   Any x-rays, CTs or MRIs which have been performed.  Contact the facility where they were done to arrange for  prior to your scheduled appointment.    >>   List of current medications   >>   This referral request   >>   Any documents/labs given to you for this referral                  Follow-up notes from your care team     Return  "if symptoms worsen or fail to improve.      Your next 10 appointments already scheduled     Sep 29, 2017  3:20 PM CDT   New Visit with Lisa Nieves MD   [facility] ([facility])    [address] [place] [address] [phone]              Who to contact     If you have questions or need follow up information about today's clinic visit or your schedule please contact [facility] directly at [phone].  Normal or non-critical lab and imaging results will be communicated to you by Sosedihart, letter or phone within 4 business days after the clinic has received the results. If you do not hear from us within 7 days, please contact the clinic through Sosedihart or phone. If you have a critical or abnormal lab result, we will notify you by phone as soon as possible.  Submit refill requests through Madvenue or call your pharmacy and they will forward the refill request to us. Please allow 3 business days for your refill to be completed.          Additional Information About Your Visit        [employer] Information     Madvenue lets you send messages to your doctor, view your test results, renew your prescriptions, schedule appointments and more. To sign up, go to [email or web address] . Click on \"Log in\" on the left side of the screen, which will take you to the Welcome page. Then click on \"Sign up Now\" on the right side of the page.     You will be asked to enter the access code listed below, as well as some personal information. Please follow the directions to create your username and password.     Your access code is: [email or web address]  Expires: 2017  1:41 PM     Your access code will  in 90 days. If you need help or a new code, please call your [facility] or [phone].        Care EveryWhere ID     This is your Care EveryWhere ID. This could be used by other organizations to access your [facility] medical records  [id number]   " "     Your Vitals Were     Pulse Height BMI (Body Mass Index)             83 1.626 m (5' 4\") 32.2 kg/m2          Blood Pressure from Last 3 Encounters:   09/22/17 192/80   09/22/17 160/62   09/17/17 200/90    Weight from Last 3 Encounters:   09/22/17 85.1 kg (187 lb 9.6 oz)   09/22/17 85.3 kg (188 lb)   09/17/17 85.2 kg (187 lb 12.8 oz)              We Performed the Following     ORTHOPEDICS ADULT REFERRAL          Today's Medication Changes          These changes are accurate as of: 9/22/17  2:37 PM.  If you have any questions, ask your nurse or doctor.               Start taking these medicines.        Dose/Directions    cyclobenzaprine 10 MG tablet   Commonly known as:  FLEXERIL   Used for:  Left flank pain   Started by:  Luis Zambrano MD        Dose:  5-10 mg   Take 0.5-1 tablets (5-10 mg) by mouth 3 times daily as needed for muscle spasms (may cause dizziness or drowsiness)   Quantity:  40 tablet   Refills:  0       etodolac 400 MG tablet   Commonly known as:  LODINE   Used for:  Left flank pain   Started by:  Luis Zambrano MD        Dose:  400 mg   Take 1 tablet (400 mg) by mouth 2 times daily (with meals) as needed for back pain.   Quantity:  60 tablet   Refills:  0         These medicines have changed or have updated prescriptions.        Dose/Directions    * gabapentin 100 MG capsule   Commonly known as:  NEURONTIN   This may have changed:  Another medication with the same name was changed. Make sure you understand how and when to take each.   Used for:  Post herpetic neuralgia   Changed by:  Jenniffer Mackay PA-C        Dose:  200 mg   Take 2 capsules (200 mg) by mouth 3 times daily   Quantity:  510 capsule   Refills:  1       * gabapentin 400 MG capsule   Commonly known as:  NEURONTIN   This may have changed:    - medication strength  - how much to take  - how to take this  - when to take this  - additional instructions   Used for:  Chronic left shoulder pain, Pain   Changed by:  Ezequiel" Lisa Abdalla MD        Dose:  400 mg   Take 1 capsule (400 mg) by mouth 3 times daily   Quantity:  270 capsule   Refills:  1       * Notice:  This list has 2 medication(s) that are the same as other medications prescribed for you. Read the directions carefully, and ask your doctor or other care provider to review them with you.         Where to get your medicines      These [place] [facility] [place] [address]  [address], [facility] [address]     Phone:  [phone]     cyclobenzaprine 10 MG tablet    etodolac 400 MG tablet    gabapentin 400 MG capsule                Primary Care Provider Office Phone # Fax #    Jenniffer Mackay PA-C [phone]       [address]  [facility] [address]        Equal Access to Services     [facility] Hospital[facility]IL[facility]RE : Hadii aad ku hadasho Soomaali, waaxda luqadaha, qaybta kaalmada adeegyada, ethel lucasin hayjacques velazco . So [facility] [phone].    ATENCIÓN: Si habla español, tiene a dyson disposición servicios gratuitos de asistencia lingüística. Llame al [phone].    We comply with applicable federal civil rights laws and [place] laws. We do not discriminate on the basis of race, color, national origin, age, disability sex, sexual orientation or gender identity.            Thank you!     Thank you for choosing [facility]  for your care. Our goal is always to provide you with excellent care. Hearing back from our patients is one way we can continue to improve our services. Please take a few minutes to complete the written survey that you may receive in the mail after your visit with us. Thank you!             Your Updated Medication List - Protect others around you: Learn how to safely use, store and throw away your medicines at www.disposemymeds.org.          This list is accurate as of: 9/22/17  2:37 PM.  Always use your most recent med list.                   Brand Name  Dispense Instructions for use Diagnosis    amLODIPine 10 MG tablet    NORVASC    90 tablet    Take 1 tablet (10 mg) by mouth daily    Hypertension goal BP (blood pressure) < 130/80       atorvastatin 40 MG tablet    LIPITOR    90 tablet    Take 1 tablet (40 mg) by mouth daily    Type 2 diabetes mellitus with diabetic nephropathy, with long-term current use of insulin (H)       B-D U/F 31G X 5 MM   Generic drug:  insulin pen needle     180 each    USE AS DIRECTED THREE TIMES DAILY BEFORE MEALS    Type 2 diabetes, HbA1c goal < 7% (H)       BASAGLAR 100 UNIT/ML injection     15 mL    Inject 45 Units Subcutaneous 2 times daily    Type 2 diabetes mellitus with diabetic nephropathy, with long-term current use of insulin (H)       cyclobenzaprine 10 MG tablet    FLEXERIL    40 tablet    Take 0.5-1 tablets (5-10 mg) by mouth 3 times daily as needed for muscle spasms (may cause dizziness or drowsiness)    Left flank pain       etodolac 400 MG tablet    LODINE    60 tablet    Take 1 tablet (400 mg) by mouth 2 times daily (with meals) as needed for back pain.    Left flank pain       * gabapentin 100 MG capsule    NEURONTIN    510 capsule    Take 2 capsules (200 mg) by mouth 3 times daily    Post herpetic neuralgia       * gabapentin 400 MG capsule    NEURONTIN    270 capsule    Take 1 capsule (400 mg) by mouth 3 times daily    Chronic left shoulder pain, Pain       insulin aspart 100 UNIT/ML injection    NovoLOG FLEXPEN    15 mL    30 units before breakfast, 30 units before lunch, 30 units before dinner    Type 2 diabetes mellitus with diabetic nephropathy, with long-term current use of insulin (H)       losartan-hydrochlorothiazide 100-12.5 MG per tablet    HYZAAR    90 tablet    Take 1 tablet by mouth daily    Type 2 diabetes mellitus with diabetic nephropathy, with long-term current use of insulin (H)       metFORMIN 500 MG 24 hr tablet    GLUCOPHAGE-XR    360 tablet    Take 4 tablets (2,000 mg) by mouth daily (with  breakfast)    Type 2 diabetes mellitus with diabetic nephropathy, with long-term current use of insulin (H)       metoprolol 50 MG 24 hr tablet    TOPROL-XL    90 tablet    Take 2 tablets (100 mg) by mouth daily    Hypertension, uncontrolled       omeprazole 40 MG capsule    priLOSEC    30 capsule    Take 1 capsule (40 mg) by mouth daily    Leblanc's esophagus without dysplasia       * Notice:  This list has 2 medication(s) that are the same as other medications prescribed for you. Read the directions carefully, and ask your doctor or other care provider to review them with you.

## 2017-09-22 NOTE — MR AVS SNAPSHOT
After Visit Summary   9/22/2017    Ellen Hill    MRN: [id number]           Patient Information     Date Of Birth          1955        Visit Information        Provider Department      9/22/2017 8:00 AM Luis Zambrano MD [facility]        Today's Diagnoses     Left flank pain    -  1    Uncontrolled type 2 diabetes mellitus with microalbuminuria, with long-term current use of insulin (H)        Need for prophylactic vaccination and inoculation against influenza          Care Instructions    Please call the [facility]  [phone] to schedule your mammogram.           Follow-ups after your visit        Your next 10 appointments already scheduled     Sep 22, 2017  1:00 PM CDT   [facility]    [address] [facility] [address] [phone]            Sep 29, 2017  3:20 PM CDT   New Visit with [facility]ieves MD   [facility]    [address] [facility] [address] [phone]              Who to contact     If you have questions or need follow up information about today's clinic visit or your schedule please contact [facility] directly at [phone].  Normal or non-critical lab and imaging results will be communicated to you by MyChart, letter or phone within 4 business days after the clinic has received the results. If you do not hear from us within 7 days, please contact the clinic through MyChart or phone. If you have a critical or abnormal lab result, we will notify you by phone as soon as possible.  Submit refill requests through AuthorBee or call your pharmacy and they will forward the refill request to us. Please allow 3 business days for your refill to be completed.          Additional Information About Your Visit        [facility]  "Information     [employer] lets you send messages to your doctor, view your test results, renew your prescriptions, schedule appointments and more. To sign up, go to [email or web address] . Click on \"Log in\" on the left side of the screen, which will take you to the Welcome page. Then click on \"Sign up Now\" on the right side of the page.     You will be asked to enter the access code listed below, as well as some personal information. Please follow the directions to create your username and password.     Your access code is: [email or web address]  Expires: 2017  1:41 PM     Your access code will  in 90 days. If you need help or a new code, please call your [facility] clinic or [phone].        Care EveryWhere ID     This is your [facility] EveryWhere ID. This could be used by other organizations to access your [facility] medical records  [id number]        Your Vitals Were     Pulse Temperature Height Pulse Oximetry Breastfeeding? BMI (Body Mass Index)    76 97.7  F (36.5  C) (Oral) 1.626 m (5' 4\") 97% No 32.27 kg/m2       Blood Pressure from Last 3 Encounters:   17 160/62   17 200/90   17 182/83    Weight from Last 3 Encounters:   17 85.3 kg (188 lb)   17 85.2 kg (187 lb 12.8 oz)   17 84.4 kg (186 lb)              We Performed the Following          ADMIN VACCINE, FIRST [71721]     *UA reflex to Microscopic and Culture ([place] and [facility] (except [place] and [place])     Albumin Random Urine Quantitative with Creat Ratio     HC FLU VAC PRESRV FREE QUAD SPLIT VIR 3+YRS IM  [72713]     Urine Culture Aerobic Bacterial     Urine Microscopic          Today's Medication Changes          These changes are accurate as of: 17  9:41 AM.  If you have any questions, ask your nurse or doctor.               Start taking these medicines.        Dose/Directions    cyclobenzaprine 10 MG tablet   Commonly known as:  FLEXERIL   Used for:  Left flank pain   Started by:  Luis Zambrano " MD DEMETRIUS        Dose:  5-10 mg   Take 0.5-1 tablets (5-10 mg) by mouth 3 times daily as needed for muscle spasms (may cause dizziness or drowsiness)   Quantity:  40 tablet   Refills:  0       etodolac 400 MG tablet   Commonly known as:  LODINE   Used for:  Left flank pain   Started by:  Luis Zambrano MD        Dose:  400 mg   Take 1 tablet (400 mg) by mouth 2 times daily (with meals) as needed for back pain.   Quantity:  60 tablet   Refills:  0       ketorolac 30 MG/ML injection   Commonly known as:  TORADOL   Used for:  Left flank pain   Started by:  Luis Zambrano MD        Dose:  30 mg   Inject 1 mL (30 mg) into the muscle once for 1 dose   Quantity:  1 mL   Refills:  0            Where to get your medicines      These medications were sent to [facility] - [place], [place] - [address]  [address], [facility] [address]     Phone:  [phone]     cyclobenzaprine 10 MG tablet    etodolac 400 MG tablet         Some of these will need a paper prescription and others can be bought over the counter.  Ask your nurse if you have questions.     You don't need a prescription for these medications     ketorolac 30 MG/ML injection                Primary Care Provider Office Phone # Fax #    Jenniffer Mackay PA-C [phone]       [address]  [facility] [address]        Equal Access to Services     [facility] AH: Hadii aad ku hadasho Soomaali, waaxda luqadaha, qaybta kaalmada adeegyada, waxay celia gleason. So [facility] [phone].    ATENCIÓN: Si habla español, tiene a dyson disposición servicios gratuitos de asistencia lingüística. Llame al [phone].    We comply with applicable federal civil rights laws and [place] laws. We do not discriminate on the basis of race, color, national origin, age, disability sex, sexual orientation or gender identity.            Thank you!     Thank you for choosing [facility]  for  your care. Our goal is always to provide you with excellent care. Hearing back from our patients is one way we can continue to improve our services. Please take a few minutes to complete the written survey that you may receive in the mail after your visit with us. Thank you!             Your Updated Medication List - Protect others around you: Learn how to safely use, store and throw away your medicines at www.disposemymeds.org.          This list is accurate as of: 9/22/17  9:41 AM.  Always use your most recent med list.                   Brand Name Dispense Instructions for use Diagnosis    amLODIPine 10 MG tablet    NORVASC    90 tablet    Take 1 tablet (10 mg) by mouth daily    Hypertension goal BP (blood pressure) < 130/80       atorvastatin 40 MG tablet    LIPITOR    90 tablet    Take 1 tablet (40 mg) by mouth daily    Type 2 diabetes mellitus with diabetic nephropathy, with long-term current use of insulin (H)       B-D U/F 31G X 5 MM   Generic drug:  insulin pen needle     180 each    USE AS DIRECTED THREE TIMES DAILY BEFORE MEALS    Type 2 diabetes, HbA1c goal < 7% (H)       citalopram 20 MG tablet    celeXA    90 tablet    Take 1 tablet (20 mg) by mouth daily    Major depressive disorder, recurrent episode, moderate (H)       cyclobenzaprine 10 MG tablet    FLEXERIL    40 tablet    Take 0.5-1 tablets (5-10 mg) by mouth 3 times daily as needed for muscle spasms (may cause dizziness or drowsiness)    Left flank pain       etodolac 400 MG tablet    LODINE    60 tablet    Take 1 tablet (400 mg) by mouth 2 times daily (with meals) as needed for back pain.    Left flank pain       * gabapentin 100 MG capsule    NEURONTIN    510 capsule    Take 2 capsules (200 mg) by mouth 3 times daily    Post herpetic neuralgia       * gabapentin 300 MG capsule    NEURONTIN    90 capsule    Take 1 tablet (300 mg) every night for 1-3 days, then 2  Tablet at bedtime  for 1-3 days, then 1 tablet in the am and two at bedtime     Chronic left-sided thoracic back pain       HYDROcodone-acetaminophen 5-325 MG per tablet    NORCO    10 tablet    Take 1-2 tablets by mouth every 8 hours as needed    Rhomboid muscle strain, initial encounter       insulin aspart 100 UNIT/ML injection    NovoLOG FLEXPEN    15 mL    30 units before breakfast, 30 units before lunch, 30 units before dinner    Type 2 diabetes mellitus with diabetic nephropathy, with long-term current use of insulin (H)       insulin glargine 100 UNIT/ML injection     15 mL    Inject 45 Units Subcutaneous 2 times daily    Type 2 diabetes mellitus with diabetic nephropathy, with long-term current use of insulin (H)       ketorolac 30 MG/ML injection    TORADOL    1 mL    Inject 1 mL (30 mg) into the muscle once for 1 dose    Left flank pain       lidocaine 5 % Patch    LIDODERM    30 patch    Apply up to 3 patches to painful area at once for up to 12 h within a 24 h period.  Remove after 12 hours.    Rhomboid muscle pain       losartan-hydrochlorothiazide 100-12.5 MG per tablet    HYZAAR    90 tablet    Take 1 tablet by mouth daily    Type 2 diabetes mellitus with diabetic nephropathy, with long-term current use of insulin (H)       metFORMIN 500 MG 24 hr tablet    GLUCOPHAGE-XR    360 tablet    Take 4 tablets (2,000 mg) by mouth daily (with breakfast)    Type 2 diabetes mellitus with diabetic nephropathy, with long-term current use of insulin (H)       methocarbamol 750 MG tablet    ROBAXIN    45 tablet    Take 1 tablet (750 mg) by mouth 3 times daily as needed for muscle spasms    Rhomboid muscle strain, initial encounter       metoprolol 50 MG 24 hr tablet    TOPROL-XL    90 tablet    Take 2 tablets (100 mg) by mouth daily    Hypertension, uncontrolled       omeprazole 40 MG capsule    priLOSEC    30 capsule    Take 1 capsule (40 mg) by mouth daily    Leblanc's esophagus without dysplasia       oxyCODONE-acetaminophen 5-325 MG per tablet    PERCOCET     Reported on 5/8/2017         sucralfate 1 GM/10ML suspension    CARAFATE     Take 1,000 mg by mouth        * Notice:  This list has 2 medication(s) that are the same as other medications prescribed for you. Read the directions carefully, and ask your doctor or other care provider to review them with you.

## 2017-09-22 NOTE — PROGRESS NOTES
" INITIAL NEUROLOGY CONSULTATION    LOCATION: [facility]  DATE OF VISIT: 2017  MRN: [id number]  NAME: Ms. Ellen Hill  :  1955 (61 year old)    PRIMARY/REFERRING PROVIDER: Dr. Luis Zambrano    REASON FOR CONSULTATION: Left shoulder pain    HISTORY OF PRESENT ILLNESS ([place]): Ms. Hill is seen at the request of FABRIZIO Meeks.  61-year-old woman with the left shoulder pain for last 4 months. Sudden onset. She relates it is located over the left shoulder blade. Feels as there is a bump on that side. She adds that it cannot be touched. As that is difficult to massage over this area because of the pain. Apart from  pain symptoms she has numbness and tingling feeling over it also. There are times that she is woken up because of it. That time it feels to her as she had \"bee sting\". She keeps back scratch her by her side in the night. She cannot wear the the bra for this reason. If she takes off her bra, it still hurts her.    She relates that she has seen primary care provider previously and was given muscle relaxant with the description that it was muscular problem. It did not not work then she went to the ER and it is mentioned to her that she has a nerve  problem. Today she saw Dr. Zambrano and was given another muscle relaxant. Currently taking small dose of Gabapentin and it is not helping her. She has taking opioids for it.      She puts a lot of emphasis that she had left shoulder pain in . Apparently she saw 4-5 providers in the clinics and according to her \"none of them could diagnose except her chiropractor\". Apparently he noticed 2 spots on her back and diagnosed as shingles. She gives a very detailed account of her left shoulder pain in   Previous Relevant Diagnostic Studies:  Imaging:   MR SHOULDER LEFT WITHOUT CONTRAST  2013 9:10 AM     HISTORY:  Shoulder injury. Pain.     TECHNIQUE:  Coronal oblique T1, T2, and fat suppressed T2, sagittal  oblique T2, and " transverse proton density and T2 weighted images.     FINDINGS:   Osseous acromial outlet: There is a Type I subacromial configuration.  No apparent subacromial spur. There are mild degenerative changes at  the acromioclavicular joint which only minimally indent the  supraspinatus outlet.     Rotator cuff:  There appears to be mild to moderate subscapularis  tendinosis at the lesser tuberosity attachment. No daysi rotator cuff  tear. No rotator cuff muscle atrophy.     Biceps tendon and labrum:  The long head biceps tendon and glenoid  labrum appear within normal limits. No paralabral cysts.     Osseous structures:  Marrow signal about the shoulder appears within  normal limits.     Additional findings:  Small nonspecific glenohumeral joint effusion.  No abnormal bursal signal.     IMPRESSION  IMPRESSION: Subscapularis tendinosis at the lesser tuberosity  attachment. No evidence of daysi tendon tear or rupture.     WHITNEY POTTER MD    XR THORACIC SPINE 3 VW 8/12/2017 11:52 AM     HISTORY: Pain.     COMPARISON: None.         IMPRESSION: No evidence of acute fracture or malalignment.     NICOLE BATISTA MD       Review of Systems: All negative except as noted in the [place] and Identifying information.    Current Outpatient Prescriptions   Medication Sig     etodolac (LODINE) 400 MG tablet Take 1 tablet (400 mg) by mouth 2 times daily (with meals) as needed for back pain.     cyclobenzaprine (FLEXERIL) 10 MG tablet Take 0.5-1 tablets (5-10 mg) by mouth 3 times daily as needed for muscle spasms (may cause dizziness or drowsiness)     metoprolol (TOPROL-XL) 50 MG 24 hr tablet Take 2 tablets (100 mg) by mouth daily     gabapentin (NEURONTIN) 300 MG capsule Take 1 tablet (300 mg) every night for 1-3 days, then 2  Tablet at bedtime  for 1-3 days, then 1 tablet in the am and two at bedtime     insulin aspart (NOVOLOG FLEXPEN) 100 UNIT/ML injection 30 units before breakfast, 30 units before lunch, 30 units before dinner      "insulin glargine (BASAGLAR KWIKPEN) 100 UNIT/ML injection Inject 45 Units Subcutaneous 2 times daily     omeprazole (PRILOSEC) 40 MG capsule Take 1 capsule (40 mg) by mouth daily     atorvastatin (LIPITOR) 40 MG tablet Take 1 tablet (40 mg) by mouth daily     metFORMIN (GLUCOPHAGE-XR) 500 MG 24 hr tablet Take 4 tablets (2,000 mg) by mouth daily (with breakfast)     amLODIPine (NORVASC) 10 MG tablet Take 1 tablet (10 mg) by mouth daily     losartan-hydrochlorothiazide (HYZAAR) 100-12.5 MG per tablet Take 1 tablet by mouth daily     B-D U/F insulin pen needle USE AS DIRECTED THREE TIMES DAILY BEFORE MEALS     gabapentin (NEURONTIN) 100 MG capsule Take 2 capsules (200 mg) by mouth 3 times daily (Patient not taking: Reported on 9/22/2017)     No current facility-administered medications for this visit.      Allergies   Allergen Reactions     Victoza Hives     Alfredo Nordisk product     Enalapril Cough     Lisinopril Cough     Wellbutrin [Bupropion Hcl] Hives     hives     Past Medical History:   Diagnosis Date     Advanced directives, counseling/discussion 3/28/2013    Patient does not have an Advance/Health Care Directive (HCD), given \"What is Advance Care Planning?\" flyer.  Nola Lopez March 28, 2013      Hyperlipidemia LDL goal <100 10/27/2010     Hypertension goal BP (blood pressure) < 130/80 12/21/2010     Microalbuminuria 2/12/2015     Shingles 2009    Right posterior shoulder as per patient     Type 2 diabetes, HbA1c goal < 7% (H) 10/31/2010     Past Surgical History:   Procedure Laterality Date     BIOPSY Right 1989    Breast-Benign lesion as per patient       Current Outpatient Prescriptions on File Prior to Visit:  metoprolol (TOPROL-XL) 50 MG 24 hr tablet Take 2 tablets (100 mg) by mouth daily   gabapentin (NEURONTIN) 300 MG capsule Take 1 tablet (300 mg) every night for 1-3 days, then 2  Tablet at bedtime  for 1-3 days, then 1 tablet in the am and two at bedtime   insulin aspart (NOVOLOG FLEXPEN) 100 " "UNIT/ML injection 30 units before breakfast, 30 units before lunch, 30 units before dinner   insulin glargine (BASAGLAR KWIKPEN) 100 UNIT/ML injection Inject 45 Units Subcutaneous 2 times daily   omeprazole (PRILOSEC) 40 MG capsule Take 1 capsule (40 mg) by mouth daily   atorvastatin (LIPITOR) 40 MG tablet Take 1 tablet (40 mg) by mouth daily   metFORMIN (GLUCOPHAGE-XR) 500 MG 24 hr tablet Take 4 tablets (2,000 mg) by mouth daily (with breakfast)   amLODIPine (NORVASC) 10 MG tablet Take 1 tablet (10 mg) by mouth daily   losartan-hydrochlorothiazide (HYZAAR) 100-12.5 MG per tablet Take 1 tablet by mouth daily   B-D U/F insulin pen needle USE AS DIRECTED THREE TIMES DAILY BEFORE MEALS   gabapentin (NEURONTIN) 100 MG capsule Take 2 capsules (200 mg) by mouth 3 times daily (Patient not taking: Reported on 9/22/2017)     No current facility-administered medications on file prior to visit.   Social History   Substance Use Topics     Smoking status: Former Smoker     Packs/day: 0.10     Years: 10.00     Types: Cigarettes     Quit date: 9/10/2013     Smokeless tobacco: Never Used      Comment: patient trying to quit on 3-4 cig/week     Alcohol use No      Comment: Sober since March 20, 2013       GENERAL EXAMINATION:    General appearance: Pleasant female sitting in a chair. Emotional at times.  /80 (BP Location: Left arm, Patient Position: Chair, Cuff Size: Adult Regular)  Pulse 83  Ht 1.626 m (5' 4\")  Wt 85.1 kg (187 lb 9.6 oz)  BMI 32.2 kg/m2    NEUROLOGICAL EXAMINATION:    Head & Neck:  Neck supple  No carotid bruit  Mental Status:    Alert and oriented to time, place and person    Recent and remote memory intact    Attention span and concentration normal    Adequate fund of knowledge  Speech: Normal  Cranial Nerves:  Cranial Nerve 2:    Visual acuity normal to finger counting    Pupils equal and reacting to light    No field defect by confrontation    Fundus reveals normal disc margins  Cranial Nerves 3, 4 " and 6:    Eye movements normal in all directions of gaze  Cranial Nerve 5:     Normal facial sensory and motor functions  Cranial Nerve 7:     Symmetrical face without motor weakness   Cranial Nerve 8:    Normal hearing to whispered sounds  Cranial Nerves 9, 10:    Normal palate and uvula movements  Cranial Nerve 11:    Shoulder shrug symmetrical  Cranial Nerve 12:    Tongue midline with normal movements  Motor:    Tone and bulk: Normal in both upper and lower limbs. No winging of scapula. No shingles rash seen over posterior trunk and shoulders. No definite restriction of shoulder movements.    Power: No drift of the outstretched arms          Normal strength in all muscle groups of both upper and lower limbs   Coordination:    Finger nose test and rapid alternate movements normal bilaterally    Heel-shin test normal bilaterally  Deep Tendon Reflexes:    Upper limbs: Equal and symmetrical    Lower limbs: Equal and symmetrical with trace ankle jerks and down going plantars  Sensations:    Touch/Pin prick: Normal at both and upper and lower limbs    Vibration (128 Hz): Diminished at both ankles    Position sense: Normal at both big toes  Gait:    Walks with a normal stride length and a normal arm swing    Can stand on heels and toes    Can walk on heels and toes    Minimally unsteady with tandem walking  Romberg Sign: Negative    IMPRESSION:     ICD-10-CM    1. Chronic left shoulder pain M25.512 gabapentin (NEURONTIN) 400 MG capsule    G89.29 ORTHOPEDICS ADULT REFERRAL   2. Pain-Left paraspinal area next to mid thoracic spine R52 gabapentin (NEURONTIN) 400 MG capsule     COMMENTS: Discussed all the differential diagnoses regarding her left posterior shoulder pain.  She has been describing pain below the left scapular spine area . Also considered the possibility if she has specific problem with her midthoracic spine contributing to these symptoms.  She has been reluctant with the MRI thoracic spine study.  She  reluctantly agreed  to orthopedic consultation for her this shoulder pain.  I'm not certain if there is any definite structural cause for her this particular symptom.  She may have some arthritis.  There is no suggestion of conditions like brachial plexopathy giving rise to winging of the scapula.  No suggestion of for having herpes zoster.  PLANS:   Patient Instructions     AFTER VISIT SUMMARY (AVS)  Orders Placed This Encounter   Procedures     ORTHOPEDICS ADULT REFERRAL       Signed Prescriptions:                        Disp   Refills    gabapentin (NEURONTIN) 400 MG capsule      270 ca*1        Sig: Take 1 capsule (400 mg) by mouth 3 times daily  Authorizing Provider: LISA THACKER    Would suggest Dr. Zambrano to consider Pain Clinic referral if no orthopedics cause found.    Diagnostic possibilities reviewed and reasons for work-up explained    To call us for follow-up appointment after the work-up      Thanks to Chino Berman PA-C and Dr. Luis Zambrano for allowing me to participate in Ms. Hill's care. Please feel free to call me with any questions or concerns.     Time with patient 60 minutes, greater than 50% of which was counseling and coordination of care.    *Chart documentation was completed in part with Dragon voice-recognition software. Even though reviewed, some grammatical, spelling, and word errors may remain.     Addendum: Our Medical assistant CHENCHO Jones gave her after visit summary after having me explained all the options before it. She wanted me to call to her at home today as she has additional questions.  I called her home number, cell phone number and her daughter's telephone number.  Left the message at each place that she could call our office on Tuesday.  Lisa Thacker MD, [facility]  Neurologist    cc: ESCOBAR Meeks Dr.

## 2017-09-22 NOTE — NURSING NOTE
"Chief Complaint   Patient presents with     Kidney Problem     Left lower back pain       Initial /62 (BP Location: Left arm, Patient Position: Chair, Cuff Size: Adult Large)  Pulse 76  Temp 97.7  F (36.5  C) (Oral)  Ht 5' 4\" (1.626 m)  Wt 188 lb (85.3 kg)  SpO2 97%  Breastfeeding? No  BMI 32.27 kg/m2 Estimated body mass index is 32.27 kg/(m^2) as calculated from the following:    Height as of this encounter: 5' 4\" (1.626 m).    Weight as of this encounter: 188 lb (85.3 kg).  Medication Reconciliation: complete     Eleazar Pizarro CMA    "

## 2017-09-22 NOTE — PROGRESS NOTES
"  SUBJECTIVE:   Ellen Hill is a 61 year old female who presents to clinic today for the following health issues:    URINARY TRACT SYMPTOMS   Rule out Kidney infection  Onset: 4 days ago    Description:   Painful urination (Dysuria): no   Blood in urine (Hematuria): no   Delay in urine (Hesitency): no     Intensity: severe back pain, 10/10    Progression of Symptoms:  worsening    Accompanying Signs & Symptoms:  Fever/chills: no   Flank pain no   Nausea and vomiting: YES- nausea from pain  Any vaginal symptoms: vaginal itching  Abdominal/Pelvic Pain: no   Back Pain: YES left lower back (first symptom)  - Numbness: YES    History:   History of frequent UTI's: no   History of kidney stones: no   History of kidney infection: YES - \"a long time ago\"  Sexually Active: no   Possibility of pregnancy: No    Precipitating factors:   None    Therapies Tried and outcome: Motrin, relaxants for muscle spasm, Ibuprofen - no relief      Past medical, family, and social histories, medications, and allergies are reviewed and updated in Epic.     ROS:  C: NEGATIVE for fever, chills, change in weight  E/M: NEGATIVE for ear, mouth and throat problems  R: NEGATIVE for significant cough or SOB  CV: NEGATIVE for chest pain, palpitations or peripheral edema  ROS otherwise negative    Any history above obtained by the Medical Assistant was reviewed by Dr. Luis Zambrano MD, and edited when necessary.    This document serves as a record of the services and decisions personally performed and made by Dr. Zambrano. It was created on his behalf by Ladonna Baker, a trained medical scribe. The creation of this document is based the provider's statements to the medical scribe.  Ladonna Baker September 22, 2017 8:36 AM     OBJECTIVE:                                                    /62 (BP Location: Left arm, Patient Position: Chair, Cuff Size: Adult Large)  Pulse 76  Temp 97.7  F (36.5  C) (Oral)  Ht 1.626 m (5' 4\")  Wt 85.3 " kg (188 lb)  SpO2 97%  Breastfeeding? No  BMI 32.27 kg/m2   Body mass index is 32.27 kg/(m^2).     GENERAL: healthy, alert and no distress  EYES: Eyes grossly normal to inspection, PERRL, EOMI, sclerae white and conjunctivae normal  RESP: lungs clear to auscultation - no crackles or wheezes, no areas of dullness, no tachypnea  CV: Heart regular rate and rhythm without murmur, click or rub. No peripheral edema and peripheral pulses strong  MS: no gross musculoskeletal defects noted, no edema  SKIN: no suspicious lesions or rashes  NEURO: Normal strength and tone, sensory exam grossly normal, mentation intact, oriented times 3 and cranial nerves 2-12 intact  PSYCH: mentation appears normal, affect normal/bright     Diagnostic Test Results:  Results for orders placed or performed in visit on 09/22/17 (from the past 24 hour(s))   *UA reflex to Microscopic and Culture ([place] and [facility] (except [place] and [place])   Result Value Ref Range    Color Urine Yellow     Appearance Urine Clear     Glucose Urine >=1000 (A) NEG^Negative mg/dL    Bilirubin Urine Negative NEG^Negative    Ketones Urine Negative NEG^Negative mg/dL    Specific Gravity Urine 1.015 1.003 - 1.035    Blood Urine Trace (A) NEG^Negative    pH Urine 5.5 5.0 - 7.0 pH    Protein Albumin Urine 30 (A) NEG^Negative mg/dL    Urobilinogen Urine 0.2 0.2 - 1.0 EU/dL    Nitrite Urine Negative NEG^Negative    Leukocyte Esterase Urine Trace (A) NEG^Negative    Source Midstream Urine    Urine Microscopic   Result Value Ref Range    WBC Urine O - 2 OTO2^O - 2 /HPF    RBC Urine O - 2 OTO2^O - 2 /HPF    Squamous Epithelial /LPF Urine Few FEW^Few /LPF    Bacteria Urine Few (A) NEG^Negative /HPF    Yeast Urine Few (A) NEG^Negative /HPF    Mucous Urine Present (A) NEG^Negative /LPF     A Chest X-Ray was ordered. My reading of this film is negative. (Comparison films available: pending review by Radiologist.)     A KUB X-Ray was ordered. My reading of this film  is negative. (No comparison films available: pending review by Radiologist.)      ASSESSMENT/PLAN:                                                      (R10.9) Left flank pain  (primary encounter diagnosis)  Comment: UA is not very abnormal for pyelonephritis, so I think this is more likely musculoskeletal in origin. She reports mild improvement with the toradol injection, so I am encouraging her to try this NSAID.  Plan: *UA reflex to Microscopic and Culture ([place]         and [facility] (except [place] and         [place]), Urine Microscopic, XR Chest 1 View,         XR KUB, ketorolac (TORADOL) 30 MG/ML injection,        Urine Culture Aerobic Bacterial, etodolac         (LODINE) 400 MG tablet, cyclobenzaprine         (FLEXERIL) 10 MG tablet        Follow up as needed.     (E11.29,  E11.65,  R80.9,  Z79.4) Uncontrolled type 2 diabetes mellitus with microalbuminuria, with long-term current use of insulin (H)  Comment: Lab update  Plan: Albumin Random Urine Quantitative with Creat         Ratio            (Z23) Need for prophylactic vaccination and inoculation against influenza  Comment: Influenza vaccine offered and accepted by patient. She has received it before without problems.   Plan:      ADMIN VACCINE, FIRST [06008], HC FLU VAC         PRESRV FREE QUAD SPLIT VIR 3+YRS IM  [67396]          I reminded the patient to schedule her mammogram.     The information in this document, created by the medical scribe for me, accurately reflects the services I personally performed and the decisions made by me. I have reviewed and approved this document for accuracy prior to leaving the patient care area. September 22, 2017 8:36 AM   Luis Zambrano MD

## 2017-09-22 NOTE — PATIENT INSTRUCTIONS
Please call the [facility]  [phone] to schedule your mammogram.

## 2017-09-22 NOTE — NURSING NOTE
"Chief Complaint   Patient presents with     Neurologic Problem     Severe left shoulder pain for 4 months       Initial /80 (BP Location: Left arm, Patient Position: Chair, Cuff Size: Adult Regular)  Pulse 83  Ht 1.626 m (5' 4\")  Wt 85.1 kg (187 lb 9.6 oz)  BMI 32.2 kg/m2 Estimated body mass index is 32.2 kg/(m^2) as calculated from the following:    Height as of this encounter: 1.626 m (5' 4\").    Weight as of this encounter: 85.1 kg (187 lb 9.6 oz).  Medication Reconciliation: complete   Zayda Jones MA      "

## 2017-09-23 LAB
BACTERIA SPEC CULT: NORMAL
SPECIMEN SOURCE: NORMAL

## 2017-09-24 ENCOUNTER — HEALTH MAINTENANCE LETTER (OUTPATIENT)
Age: 62
End: 2017-09-24

## 2017-09-26 NOTE — PROGRESS NOTES
Letter sent to patients home address with results.  Gonzalez Arredondo,  [facility]

## 2017-10-04 DIAGNOSIS — E11.21 TYPE 2 DIABETES MELLITUS WITH DIABETIC NEPHROPATHY, WITH LONG-TERM CURRENT USE OF INSULIN (H): ICD-10-CM

## 2017-10-04 DIAGNOSIS — Z79.4 TYPE 2 DIABETES MELLITUS WITH DIABETIC NEPHROPATHY, WITH LONG-TERM CURRENT USE OF INSULIN (H): ICD-10-CM

## 2017-10-04 NOTE — TELEPHONE ENCOUNTER
BASAGLAR          Last Written Prescription Date: 05/10/17  Last Fill Quantity: 15ml, # refills: 1  Last Office Visit with [facility], P or  Health prescribing provider:  09/22/17 Juan Manuel        BP Readings from Last 3 Encounters:   09/22/17 192/80   09/22/17 160/62   09/17/17 200/90     Lab Results   Component Value Date    MICROL 491 09/22/2017     Lab Results   Component Value Date    UMALCR 944.23 09/22/2017     Creatinine   Date Value Ref Range Status   05/08/2017 0.57 0.52 - 1.04 mg/dL Final   ]  GFR Estimate   Date Value Ref Range Status   05/08/2017 >90  Non  GFR Calc   >60 mL/min/1.7m2 Final   02/11/2015 >90  Non  GFR Calc   >60 mL/min/1.7m2 Final   08/19/2014 89 >60 mL/min/1.7m2 Final     Comment:     Non  GFR Calc     GFR Estimate If Black   Date Value Ref Range Status   05/08/2017 >90   GFR Calc   >60 mL/min/1.7m2 Final   02/11/2015 >90   GFR Calc   >60 mL/min/1.7m2 Final   08/19/2014 >90   GFR Calc   >60 mL/min/1.7m2 Final     Lab Results   Component Value Date    CHOL 248 05/08/2017     Lab Results   Component Value Date    HDL 45 05/08/2017     Lab Results   Component Value Date     05/08/2017     Lab Results   Component Value Date    TRIG 336 05/08/2017     Lab Results   Component Value Date    CHOLHDLRATIO 5.2 02/04/2014     Lab Results   Component Value Date    AST 19 05/08/2017     Lab Results   Component Value Date    ALT 37 05/08/2017     Lab Results   Component Value Date    A1C 11.2 05/08/2017    A1C 10.5 02/11/2015    A1C 8.6 08/19/2014    A1C 9.5 05/09/2014    A1C 9.5 02/04/2014     Potassium   Date Value Ref Range Status   05/08/2017 3.9 3.4 - 5.3 mmol/L Final

## 2017-10-10 RX ORDER — INSULIN GLARGINE 100 [IU]/ML
INJECTION, SOLUTION SUBCUTANEOUS
Qty: 15 ML | Refills: 0 | Status: SHIPPED | OUTPATIENT
Start: 2017-10-10 | End: 2017-12-29

## 2017-10-26 ENCOUNTER — RADIANT APPOINTMENT (OUTPATIENT)
Dept: ULTRASOUND IMAGING | Facility: CLINIC | Age: 62
End: 2017-10-26
Attending: NURSE PRACTITIONER
Payer: COMMERCIAL

## 2017-10-26 ENCOUNTER — OFFICE VISIT (OUTPATIENT)
Dept: FAMILY MEDICINE | Facility: CLINIC | Age: 62
End: 2017-10-26
Payer: COMMERCIAL

## 2017-10-26 DIAGNOSIS — R22.2 LUMP OF SKIN OF BACK: ICD-10-CM

## 2017-10-26 DIAGNOSIS — R20.2 PINS AND NEEDLES SENSATION: Primary | ICD-10-CM

## 2017-10-26 DIAGNOSIS — N64.4 BREAST PAIN, LEFT: ICD-10-CM

## 2017-10-26 LAB
ALBUMIN SERPL-MCNC: 3.9 G/DL (ref 3.4–5)
ANION GAP SERPL CALCULATED.3IONS-SCNC: 8 MMOL/L (ref 3–14)
BUN SERPL-MCNC: 13 MG/DL (ref 7–30)
CALCIUM SERPL-MCNC: 9.4 MG/DL (ref 8.5–10.1)
CHLORIDE SERPL-SCNC: 103 MMOL/L (ref 94–109)
CO2 SERPL-SCNC: 25 MMOL/L (ref 20–32)
CREAT SERPL-MCNC: 0.55 MG/DL (ref 0.52–1.04)
GFR SERPL CREATININE-BSD FRML MDRD: >90 ML/MIN/1.7M2
GLUCOSE SERPL-MCNC: 269 MG/DL (ref 70–99)
PHOSPHATE SERPL-MCNC: 3.9 MG/DL (ref 2.5–4.5)
POTASSIUM SERPL-SCNC: 3.8 MMOL/L (ref 3.4–5.3)
SODIUM SERPL-SCNC: 136 MMOL/L (ref 133–144)

## 2017-10-26 PROCEDURE — 76882 US LMTD JT/FCL EVL NVASC XTR: CPT | Mod: LT

## 2017-10-26 PROCEDURE — 99214 OFFICE O/P EST MOD 30 MIN: CPT | Performed by: NURSE PRACTITIONER

## 2017-10-26 PROCEDURE — 36415 COLL VENOUS BLD VENIPUNCTURE: CPT | Performed by: NURSE PRACTITIONER

## 2017-10-26 PROCEDURE — 80069 RENAL FUNCTION PANEL: CPT | Performed by: NURSE PRACTITIONER

## 2017-10-26 NOTE — LETTER
October 30, 2017      Ellen Hill  [address] [place] [address]        Dear Ellen,    We are writing to inform you of your test results.    Ultrasound showed probably lipoma, referral to general surgery placed for removal.    Resulted Orders   US Extremity Non Vascular Left    Narrative    ULTRASOUND  EXTREMITY NON VASCULAR LEFT   10/26/2017 4:45 PM     HISTORY: Left scapular area (posterior shoulder) muscle now versus  cyst. Localized swelling, mass and lump, trunk.    COMPARISON: None    FINDINGS: Limited scanning in the area of palpable finding  demonstrates a 1.1 x 0.7 x 0.4 cm hypoechoic area that matches the  echogenicity of the adjacent subcutaneous fat.      Impression    IMPRESSION: Probable lipoma.    PIA CHARLES MD       If you have any questions or concerns, please call the clinic at the number listed above.       Sincerely,    DREW Flynn/alondra

## 2017-10-26 NOTE — NURSING NOTE
"Chief Complaint   Patient presents with     Numbness       Initial /79  Pulse 88  Temp 98.6  F (37  C) (Oral)  Wt 187 lb 6.4 oz (85 kg)  SpO2 97%  BMI 32.17 kg/m2 Estimated body mass index is 32.17 kg/(m^2) as calculated from the following:    Height as of 9/22/17: 5' 4\" (1.626 m).    Weight as of this encounter: 187 lb 6.4 oz (85 kg).  Medication Reconciliation: complete     Aspen Gentile MA  "

## 2017-10-26 NOTE — PROGRESS NOTES
SUBJECTIVE:   Ellen Hill is a 61 year old female who presents to clinic today for the following health issues:      Concern - left sided numbness- has had MRI brain and spine- told nerve pain, was given gabapentin- no help, muscle relaxers- no help  Onset: 1 month    Description:     Left sided hip tingling and back of L shoulder (lump) and breast/armpit area , twitch in left eye    Intensity: severe    Progression of Symptoms:  worsening    Accompanying Signs & Symptoms:  none    Previous history of similar problem:   Yes-has been told it is a nerve issue or due to diabetes    Precipitating factors:   Worsened by: none    Alleviating factors:  Improved by: none    Therapies Tried and outcome: none      PROBLEMS TO ADD ON... See above    Problem list and histories reviewed & adjusted, as indicated.  Additional history: as documented    Patient Active Problem List   Diagnosis     Post herpetic neuralgia     Hyperlipidemia LDL goal <100     Essential hypertension with goal blood pressure less than 130/80     Moderate major depression (H)     Vulvar pruritus     History of adenomatous polyp of colon     Alcoholic (H)     Advanced directives, counseling/discussion     Obesity, Class I, BMI 30-34.9     Leblanc's esophagus without dysplasia     Uncontrolled type 2 diabetes mellitus with microalbuminuria, with long-term current use of insulin (H)     Past Surgical History:   Procedure Laterality Date     BIOPSY Right 1989    Breast-Benign lesion as per patient       Social History   Substance Use Topics     Smoking status: Former Smoker     Packs/day: 0.10     Years: 10.00     Types: Cigarettes     Quit date: 9/10/2013     Smokeless tobacco: Never Used      Comment: patient trying to quit on 3-4 cig/week     Alcohol use No      Comment: Sober since March 20, 2013     Family History   Problem Relation Age of Onset     Alcohol/Drug Mother      HEART DISEASE Mother      Alcohol/Drug Father      DIABETES Brother           Current Outpatient Prescriptions   Medication Sig Dispense Refill     BASAGLAR 100 UNIT/ML injection INJECT 45 UNITS SUBCUTANEOUS 2 TIMES DAILY 15 mL 0     etodolac (LODINE) 400 MG tablet Take 1 tablet (400 mg) by mouth 2 times daily (with meals) as needed for back pain. 60 tablet 0     gabapentin (NEURONTIN) 400 MG capsule Take 1 capsule (400 mg) by mouth 3 times daily 270 capsule 1     metoprolol (TOPROL-XL) 50 MG 24 hr tablet Take 2 tablets (100 mg) by mouth daily 90 tablet 1     insulin aspart (NOVOLOG FLEXPEN) 100 UNIT/ML injection 30 units before breakfast, 30 units before lunch, 30 units before dinner 15 mL 1     B-D U/F insulin pen needle USE AS DIRECTED THREE TIMES DAILY BEFORE MEALS 180 each 1     omeprazole (PRILOSEC) 40 MG capsule Take 1 capsule (40 mg) by mouth daily 30 capsule 1     atorvastatin (LIPITOR) 40 MG tablet Take 1 tablet (40 mg) by mouth daily 90 tablet 1     metFORMIN (GLUCOPHAGE-XR) 500 MG 24 hr tablet Take 4 tablets (2,000 mg) by mouth daily (with breakfast) 360 tablet 1     amLODIPine (NORVASC) 10 MG tablet Take 1 tablet (10 mg) by mouth daily 90 tablet 1     losartan-hydrochlorothiazide (HYZAAR) 100-12.5 MG per tablet Take 1 tablet by mouth daily 90 tablet 1     Allergies   Allergen Reactions     Victoza Hives     Alfredo NordAceris 3D Inspection product     Enalapril Cough     Lisinopril Cough     Wellbutrin [Bupropion Hcl] Hives     hives     BP Readings from Last 3 Encounters:   10/26/17 168/80   09/22/17 192/80   09/22/17 160/62    Wt Readings from Last 3 Encounters:   10/26/17 187 lb 6.4 oz (85 kg)   09/22/17 187 lb 9.6 oz (85.1 kg)   09/22/17 188 lb (85.3 kg)                  Labs reviewed in EPIC        Reviewed and updated as needed this visit by clinical staffTobacco  Allergies  Meds  Med Hx  Surg Hx  Fam Hx  Soc Hx      Reviewed and updated as needed this visit by Provider         ROS:  Constitutional, HEENT, cardiovascular, pulmonary, GI, , musculoskeletal, neuro, skin, endocrine and  psych systems are negative, except as otherwise noted.      OBJECTIVE:   /80  Pulse 88  Temp 98.6  F (37  C) (Oral)  Wt 187 lb 6.4 oz (85 kg)  SpO2 97%  BMI 32.17 kg/m2  Body mass index is 32.17 kg/(m^2).  GENERAL: healthy, alert and no distress  EYES: Eyes grossly normal to inspection, PERRL and conjunctivae and sclerae normal  NECK: no adenopathy, no asymmetry, masses, or scars and thyroid normal to palpation  RESP: lungs clear to auscultation - no rales, rhonchi or wheezes  BREAST: normal without masses, tenderness or nipple discharge and no palpable axillary masses or adenopathy POSITIVE for Left breast tenderness, no abnormalities felt.   CV: regular rate and rhythm, normal S1 S2, no S3 or S4, no murmur, click or rub, no peripheral edema and peripheral pulses strong  MS: no gross musculoskeletal defects noted, no edema. No pain with applied pressure to hips.  No pain with palpation of spine. No pain with dependent leg raise bilat.  POSITIVE for small lump to L posterior shoulder  SKIN: no suspicious lesions or rashes  NEURO: A&O, Normal strength and tone, mentation intact and speech normal, cranial nerves intact.     Diagnostic Test Results:  See orderes    ASSESSMENT/PLAN:     Hypertension; slightly worsened   Associated with the following complications:    Diabetes   Plan:  No changes in the patient's current treatment plan          ICD-10-CM    1. Pins and needles sensation R20.2 MR Brain w/o Contrast     Renal panel (Alb, BUN, Ca, Cl, CO2, Creat, Gluc, Phos, K, Na)   2. Lump of skin of back R22.2 US Extremity Non Vascular Left     CANCELED: US Upper Extremity Venous Duplex Left    left upper scapular area   3. Breast pain, left N64.4 MA Diagnostic Digital Bilateral     US Breast Left Complete 4 Quadrants       See Patient Instructions: I will let you know all imaging results, and we will go from there with treatment depending on what we find. Follow up if symptoms worsen.       Kandy Ferris,  [facility]AL[facility]

## 2017-10-26 NOTE — MR AVS SNAPSHOT
"              After Visit Summary   10/26/2017    Ellen Hill    MRN: [id number]           Patient Information     Date Of Birth          1955        Visit Information        Provider Department      10/26/2017 3:40 PM Kandy Ferris NP [facility]        Today's Diagnoses     Pins and needles sensation    -  1    Lump of skin of back        Breast pain, left          Care Instructions    I will let you know all imaging results, and we will go from there with treatment depending on what we find. Follow up if symptoms worsen.       Paraesthesias  Paraesthesia is a burning or prickling sensation that is sometimes felt in the hands, arms, legs or feet. It can also occur in other parts of the body. It can also feel like tingling or numbness, skin crawling, or itching. The feeling is not comfortable, but it is not painful. (The \"pins and needles\" feeling that happens when a foot or hand \"falls asleep\" is a temporary paraesthesia.)  Paraesthesias that last or come and go may be caused by medical issues that need to be treated. These include stroke, a bulging disk pressing on a nerve, a trapped nerve, vitamin deficiencies, or even certain medicines.  Tests are often done. These tests may include blood tests, X-ray, CT (computerized tomography) scan, or a muscle test (electromyography). Depending on the cause, treatment may include physical therapy.  Home care    Tell the healthcare provider about all medicines you take. This includes prescription and over-the-counter medicines, vitamins, and herbs. Ask if any of the medicines may be causing your problems. Do not make any changes to prescription medicines without talking to your healthcare provider first.    You may be prescribed medicines to help relieve the tingling feeling or for pain. Take all medicines as directed.    A numb hand or foot may be more prone to injury. To help protect it:    Always use oven mitts.    Test water with an unaffected " hand or foot.    Use caution when trimming nails. File sharp areas.    Wear shoes that fit well to avoid pressure points, blisters, and ulcers.    Inspect your hands and feet carefully (including the soles of your feet and between your toes) at least once a week. If you see red areas, sores, or other problems, tell your healthcare provider.  Follow-up care  Follow up with your doctor or as advised by our staff. You may need further testing or evaluation.  When to seek medical advice  Call your healthcare provider right away if any of the following occur:    Numbness or weakness of the face, one arm, or one leg    Slurred speech, confusion, trouble speaking, walking, or seeing    Severe headache, fainting spell, dizziness, or seizure    Chest, arm, neck, or upper back pain    Loss of bladder or bowel control    Open wound with redness, swelling, or pus  Date Last Reviewed: 9/25/2015 2000-2017 [employer]. [address] [place] [address]. All rights reserved. This information is not intended as a substitute for professional medical care. Always follow your healthcare professional's instructions.                Follow-ups after your visit        Follow-up notes from your care team     Return if symptoms worsen or fail to improve.      Your next 10 appointments already scheduled     Oct 26, 2017  4:40 PM CDT   US VENOUS with BEUS1   [facility] ([facility])    [address] [id number] [phone]           Please bring a list of your medicines (including vitamins, minerals and over-the-counter drugs). Also, tell your doctor about any allergies you may have. Wear comfortable clothes and leave your valuables at home.  You do not need to do anything special to prepare for your exam.  Please call the Imaging Department at your exam site with any questions.              Future tests that were ordered for you today     Open Future Orders         Priority Expected Expires Ordered    MA Diagnostic Digital Bilateral Routine  10/26/2018 10/26/2017    US Breast Left Complete 4 Quadrants Routine  10/26/2018 10/26/2017    US Upper Extremity Venous Duplex Left Routine  10/26/2018 10/26/2017    MR Brain w/o Contrast Routine  10/26/2018 10/26/2017            Who to contact     Normal or non-critical lab and imaging results will be communicated to you by MyChart, letter or phone within 4 business days after the clinic has received the results. If you do not hear from us within 7 days, please contact the clinic through MyChart or phone. If you have a critical or abnormal lab result, we will notify you by phone as soon as possible.  Submit refill requests through [employer] or call your pharmacy and they will forward the refill request to us. Please allow 3 business days for your refill to be completed.          If you need to speak with a  for additional information , please call: [phone]             Additional Information About Your Visit        Care EveryWhere ID     This is your Care EveryWhere ID. This could be used by other organizations to access your [place] medical records  [id number]        Your Vitals Were     Pulse Temperature Pulse Oximetry BMI (Body Mass Index)          88 98.6  F (37  C) (Oral) 97% 32.17 kg/m2         Blood Pressure from Last 3 Encounters:   10/26/17 180/79   09/22/17 192/80   09/22/17 160/62    Weight from Last 3 Encounters:   10/26/17 187 lb 6.4 oz (85 kg)   09/22/17 187 lb 9.6 oz (85.1 kg)   09/22/17 188 lb (85.3 kg)              We Performed the Following     Renal panel (Alb, BUN, Ca, Cl, CO2, Creat, Gluc, Phos, K, Na)        Primary Care Provider Office Phone # Fax #    Jenniffer Mackay PA-C [phone] [address]  [place] [address]        Equal Access to Services     ANTONIO WALL : Hadii francesca caballero Soyamil, waaxda luqadaha, qaybta kaaljay malone, ethel bruner  dimaondmaki otiliaherlinda laFloriaan ah. So [facility] [phone].    ATENCIÓN: Si vishnula kp, tiene a dyson disposición servicios gratuitos de asistencia lingüística. Terri al [phone].    We comply with applicable federal civil rights laws and [place] laws. We do not discriminate on the basis of race, color, national origin, age, disability, sex, sexual orientation, or gender identity.            Thank you!     Thank you for choosing [facility]  for your care. Our goal is always to provide you with excellent care. Hearing back from our patients is one way we can continue to improve our services. Please take a few minutes to complete the written survey that you may receive in the mail after your visit with us. Thank you!             Your Updated Medication List - Protect others around you: Learn how to safely use, store and throw away your medicines at www.disposemymeds.org.          This list is accurate as of: 10/26/17  4:07 PM.  Always use your most recent med list.                   Brand Name Dispense Instructions for use Diagnosis    amLODIPine 10 MG tablet    NORVASC    90 tablet    Take 1 tablet (10 mg) by mouth daily    Hypertension goal BP (blood pressure) < 130/80       atorvastatin 40 MG tablet    LIPITOR    90 tablet    Take 1 tablet (40 mg) by mouth daily    Type 2 diabetes mellitus with diabetic nephropathy, with long-term current use of insulin (H)       B-D U/F 31G X 5 MM   Generic drug:  insulin pen needle     180 each    USE AS DIRECTED THREE TIMES DAILY BEFORE MEALS    Type 2 diabetes, HbA1c goal < 7% (H)       BASAGLAR 100 UNIT/ML injection     15 mL    INJECT 45 UNITS SUBCUTANEOUS 2 TIMES DAILY    Type 2 diabetes mellitus with diabetic nephropathy, with long-term current use of insulin (H)       etodolac 400 MG tablet    LODINE    60 tablet    Take 1 tablet (400 mg) by mouth 2 times daily (with meals) as needed for back pain.    Left flank pain       gabapentin 400 MG capsule    NEURONTIN    270  capsule    Take 1 capsule (400 mg) by mouth 3 times daily    Chronic left shoulder pain, Pain       insulin aspart 100 UNIT/ML injection    NovoLOG FLEXPEN    15 mL    30 units before breakfast, 30 units before lunch, 30 units before dinner    Type 2 diabetes mellitus with diabetic nephropathy, with long-term current use of insulin (H)       losartan-hydrochlorothiazide 100-12.5 MG per tablet    HYZAAR    90 tablet    Take 1 tablet by mouth daily    Type 2 diabetes mellitus with diabetic nephropathy, with long-term current use of insulin (H)       metFORMIN 500 MG 24 hr tablet    GLUCOPHAGE-XR    360 tablet    Take 4 tablets (2,000 mg) by mouth daily (with breakfast)    Type 2 diabetes mellitus with diabetic nephropathy, with long-term current use of insulin (H)       metoprolol 50 MG 24 hr tablet    TOPROL-XL    90 tablet    Take 2 tablets (100 mg) by mouth daily    Hypertension, uncontrolled       omeprazole 40 MG capsule    priLOSEC    30 capsule    Take 1 capsule (40 mg) by mouth daily    Leblanc's esophagus without dysplasia

## 2017-10-26 NOTE — PATIENT INSTRUCTIONS
"I will let you know all imaging results, and we will go from there with treatment depending on what we find. Follow up if symptoms worsen.       Paraesthesias  Paraesthesia is a burning or prickling sensation that is sometimes felt in the hands, arms, legs or feet. It can also occur in other parts of the body. It can also feel like tingling or numbness, skin crawling, or itching. The feeling is not comfortable, but it is not painful. (The \"pins and needles\" feeling that happens when a foot or hand \"falls asleep\" is a temporary paraesthesia.)  Paraesthesias that last or come and go may be caused by medical issues that need to be treated. These include stroke, a bulging disk pressing on a nerve, a trapped nerve, vitamin deficiencies, or even certain medicines.  Tests are often done. These tests may include blood tests, X-ray, CT (computerized tomography) scan, or a muscle test (electromyography). Depending on the cause, treatment may include physical therapy.  Home care    Tell the healthcare provider about all medicines you take. This includes prescription and over-the-counter medicines, vitamins, and herbs. Ask if any of the medicines may be causing your problems. Do not make any changes to prescription medicines without talking to your healthcare provider first.    You may be prescribed medicines to help relieve the tingling feeling or for pain. Take all medicines as directed.    A numb hand or foot may be more prone to injury. To help protect it:    Always use oven mitts.    Test water with an unaffected hand or foot.    Use caution when trimming nails. File sharp areas.    Wear shoes that fit well to avoid pressure points, blisters, and ulcers.    Inspect your hands and feet carefully (including the soles of your feet and between your toes) at least once a week. If you see red areas, sores, or other problems, tell your healthcare provider.  Follow-up care  Follow up with your doctor or as advised by our staff. " You may need further testing or evaluation.  When to seek medical advice  Call your healthcare provider right away if any of the following occur:    Numbness or weakness of the face, one arm, or one leg    Slurred speech, confusion, trouble speaking, walking, or seeing    Severe headache, fainting spell, dizziness, or seizure    Chest, arm, neck, or upper back pain    Loss of bladder or bowel control    Open wound with redness, swelling, or pus  Date Last Reviewed: 9/25/2015 2000-2017 [employer]. [address] [place], [place] [id number]. All rights reserved. This information is not intended as a substitute for professional medical care. Always follow your healthcare professional's instructions.

## 2017-10-27 ENCOUNTER — TELEPHONE (OUTPATIENT)
Dept: FAMILY MEDICINE | Facility: CLINIC | Age: 62
End: 2017-10-27

## 2017-10-27 DIAGNOSIS — D17.30 LIPOMA OF SKIN AND SUBCUTANEOUS TISSUE: Primary | ICD-10-CM

## 2017-10-27 NOTE — TELEPHONE ENCOUNTER
Spoke with pt and gave result information from Dr. Cyr. She said she would like to get it removed because it is painful and itchy.   Please advise on how she should go about the removal process.

## 2017-10-27 NOTE — TELEPHONE ENCOUNTER
Reason for Call:  Request for results:    Name of test or procedure: US Extremity Non Vascular Left, US Breast Left Complete 4 Quadrants, MA Diagnostic Digital Bilateral, MR Brain w/o Contrast.    Date of test of procedure: 10/26/17    Location of the test or procedure: [facility]    OK to leave the result message on voice mail or with a family member? YES    Phone number Patient can be reached at:  Home number on file [phone] (home)    Additional comments: Pt calling for she came in for an US on yesterday and would like a call back to discuss results.    Call taken on 10/27/2017 at 8:27 AM by Brendon Ann

## 2017-10-27 NOTE — TELEPHONE ENCOUNTER
Reviewed in Kandy's absence.  Ultrasound showed a lipoma, a benign fatty tissue.   Can be excised for cosmetic reasons or if causing problems. Otherwise may leave alone.

## 2017-10-27 NOTE — TELEPHONE ENCOUNTER
Left message on voice mail for patient to call clinic. [phone]    Only results are from the US extremity, non vascular, the other tests are ordered  Not done.  Please review and advise,   Kelly Bustamante RN

## 2017-10-30 NOTE — PROGRESS NOTES
Please notify pt, US showed probably lipoma, referral to general surgery placed for removal.     ANDREW FlynnP

## 2017-11-07 VITALS
DIASTOLIC BLOOD PRESSURE: 80 MMHG | WEIGHT: 187.4 LBS | SYSTOLIC BLOOD PRESSURE: 168 MMHG | OXYGEN SATURATION: 97 % | HEART RATE: 88 BPM | BODY MASS INDEX: 32.17 KG/M2 | TEMPERATURE: 98.6 F

## 2017-11-13 ENCOUNTER — OFFICE VISIT (OUTPATIENT)
Dept: SURGERY | Facility: CLINIC | Age: 62
End: 2017-11-13
Payer: COMMERCIAL

## 2017-11-13 VITALS
SYSTOLIC BLOOD PRESSURE: 151 MMHG | WEIGHT: 185 LBS | DIASTOLIC BLOOD PRESSURE: 71 MMHG | HEART RATE: 67 BPM | HEIGHT: 63 IN | BODY MASS INDEX: 32.78 KG/M2

## 2017-11-13 DIAGNOSIS — D17.1 LIPOMA OF BACK: Primary | ICD-10-CM

## 2017-11-13 PROCEDURE — 99243 OFF/OP CNSLTJ NEW/EST LOW 30: CPT | Performed by: SURGERY

## 2017-11-13 NOTE — MR AVS SNAPSHOT
"              After Visit Summary   11/13/2017    Ellen Hill    MRN: [id number]           Patient Information     Date Of Birth          1955        Visit Information        Provider Department      11/13/2017 3:00 PM Kelvin Hutchison MD [facility]        Today's Diagnoses     Lipoma of back    -  1       Follow-ups after your visit        Your next 10 appointments already scheduled     Nov 13, 2017  3:00 PM CST   New Visit with Kelvin Hutchison MD   [facility] ([facility])    [address] [place] [id number] [phone]              Who to contact     If you have questions or need follow up information about today's clinic visit or your schedule please contact [facility] directly at [phone].  Normal or non-critical lab and imaging results will be communicated to you by MyChart, letter or phone within 4 business days after the clinic has received the results. If you do not hear from us within 7 days, please contact the clinic through MyChart or phone. If you have a critical or abnormal lab result, we will notify you by phone as soon as possible.  Submit refill requests through [employer] or call your pharmacy and they will forward the refill request to us. Please allow 3 business days for your refill to be completed.          Additional Information About Your Visit        Care EveryWhere ID     This is your Care EveryWhere ID. This could be used by other organizations to access your [place] medical records  [id number]        Your Vitals Were     Pulse Height BMI (Body Mass Index)             67 1.6 m (5' 3\") 32.77 kg/m2          Blood Pressure from Last 3 Encounters:   11/13/17 151/71   10/26/17 168/80   09/22/17 192/80    Weight from Last 3 Encounters:   11/13/17 83.9 kg (185 lb)   10/26/17 85 kg (187 lb 6.4 oz)   09/22/17 85.1 kg (187 lb 9.6 oz)              Today, you had the " following     No orders found for display       Primary Care Provider Office Phone # Fax #    Jenniffer Tricia Mackay PA-C [phone]       [address]  [facility] [address]        Equal Access to Services     ANTONIO WALL : Hadii aad ku hadasho Soomaali, waaxda luqadaha, qaybta kaalmada adeegyada, waxay idiin hayaan adeeg khmaiash lausha . So [facility] [phone].    ATENCIÓN: Si habla español, tiene a dyson disposición servicios gratuitos de asistencia lingüística. Llame al [phone].    We comply with applicable federal civil rights laws and [place] laws. We do not discriminate on the basis of race, color, national origin, age, disability, sex, sexual orientation, or gender identity.            Thank you!     Thank you for choosing [facility]  for your care. Our goal is always to provide you with excellent care. Hearing back from our patients is one way we can continue to improve our services. Please take a few minutes to complete the written survey that you may receive in the mail after your visit with us. Thank you!             Your Updated Medication List - Protect others around you: Learn how to safely use, store and throw away your medicines at www.disposemymeds.org.          This list is accurate as of: 11/13/17  2:55 PM.  Always use your most recent med list.                   Brand Name Dispense Instructions for use Diagnosis    amLODIPine 10 MG tablet    NORVASC    90 tablet    Take 1 tablet (10 mg) by mouth daily    Hypertension goal BP (blood pressure) < 130/80       atorvastatin 40 MG tablet    LIPITOR    90 tablet    Take 1 tablet (40 mg) by mouth daily    Type 2 diabetes mellitus with diabetic nephropathy, with long-term current use of insulin (H)       B-D U/F 31G X 5 MM   Generic drug:  insulin pen needle     180 each    USE AS DIRECTED THREE TIMES DAILY BEFORE MEALS    Type 2 diabetes, HbA1c goal < 7% (H)       BASAGLAR 100 UNIT/ML injection     15 mL     INJECT 45 UNITS SUBCUTANEOUS 2 TIMES DAILY    Type 2 diabetes mellitus with diabetic nephropathy, with long-term current use of insulin (H)       etodolac 400 MG tablet    LODINE    60 tablet    Take 1 tablet (400 mg) by mouth 2 times daily (with meals) as needed for back pain.    Left flank pain       gabapentin 400 MG capsule    NEURONTIN    270 capsule    Take 1 capsule (400 mg) by mouth 3 times daily    Chronic left shoulder pain, Pain       insulin aspart 100 UNIT/ML injection    NovoLOG FLEXPEN    15 mL    30 units before breakfast, 30 units before lunch, 30 units before dinner    Type 2 diabetes mellitus with diabetic nephropathy, with long-term current use of insulin (H)       losartan-hydrochlorothiazide 100-12.5 MG per tablet    HYZAAR    90 tablet    Take 1 tablet by mouth daily    Type 2 diabetes mellitus with diabetic nephropathy, with long-term current use of insulin (H)       metFORMIN 500 MG 24 hr tablet    GLUCOPHAGE-XR    360 tablet    Take 4 tablets (2,000 mg) by mouth daily (with breakfast)    Type 2 diabetes mellitus with diabetic nephropathy, with long-term current use of insulin (H)       metoprolol 50 MG 24 hr tablet    TOPROL-XL    90 tablet    Take 2 tablets (100 mg) by mouth daily    Hypertension, uncontrolled       omeprazole 40 MG capsule    priLOSEC    30 capsule    Take 1 capsule (40 mg) by mouth daily    Leblanc's esophagus without dysplasia

## 2017-11-13 NOTE — PROGRESS NOTES
"Patient seen in consultation for back lipoma by Kandy Ferris    HPI:  Patient is a 61 year old female  with complaints of painful lump on back  The patient noticed the symptoms about 1 year ago.    Gets \"flared up\" at times and painful  Has not noticed it open up or drain anything  Had US to look at this recently  ice makes the episode better.  Patient has not family history of similar problems    Review Of Systems    Skin: as above  Ears/Nose/Throat: negative  Respiratory: No shortness of breath, dyspnea on exertion, cough, or hemoptysis  Cardiovascular: negative  Gastrointestinal: negative  Genitourinary: negative  Musculoskeletal: negative  Neurologic: negative  Hematologic/Lymphatic/Immunologic: negative  Endocrine: diabetes      Past Medical History:   Diagnosis Date     Advanced directives, counseling/discussion 3/28/2013    Patient does not have an Advance/Health Care Directive (HCD), given \"What is Advance Care Planning?\" flyer.  Nola Lopez March 28, 2013      Hyperlipidemia LDL goal <100 10/27/2010     Hypertension goal BP (blood pressure) < 130/80 12/21/2010     Microalbuminuria 2/12/2015     Shingles 2009    Right posterior shoulder as per patient     Type 2 diabetes, HbA1c goal < 7% (H) 10/31/2010       Past Surgical History:   Procedure Laterality Date     BIOPSY Right 1989    Breast-Benign lesion as per patient       Social History     Social History     Marital status:      Spouse name: N/A     Number of children: 1     Years of education: 13     Occupational History     [employer]     3 years     Social History Main Topics     Smoking status: Former Smoker     Packs/day: 0.10     Years: 10.00     Types: Cigarettes     Quit date: 9/10/2013     Smokeless tobacco: Never Used      Comment: patient trying to quit on 3-4 cig/week     Alcohol use No      Comment: Sober since March 20, 2013     Drug use: No     Sexual activity: Yes     Partners: Male     Birth control/ protection: " "None     Other Topics Concern     Not on file     Social History Narrative       Current Outpatient Prescriptions   Medication Sig Dispense Refill     BASAGLAR 100 UNIT/ML injection INJECT 45 UNITS SUBCUTANEOUS 2 TIMES DAILY 15 mL 0     etodolac (LODINE) 400 MG tablet Take 1 tablet (400 mg) by mouth 2 times daily (with meals) as needed for back pain. 60 tablet 0     gabapentin (NEURONTIN) 400 MG capsule Take 1 capsule (400 mg) by mouth 3 times daily 270 capsule 1     metoprolol (TOPROL-XL) 50 MG 24 hr tablet Take 2 tablets (100 mg) by mouth daily 90 tablet 1     insulin aspart (NOVOLOG FLEXPEN) 100 UNIT/ML injection 30 units before breakfast, 30 units before lunch, 30 units before dinner 15 mL 1     B-D U/F insulin pen needle USE AS DIRECTED THREE TIMES DAILY BEFORE MEALS 180 each 1     omeprazole (PRILOSEC) 40 MG capsule Take 1 capsule (40 mg) by mouth daily 30 capsule 1     atorvastatin (LIPITOR) 40 MG tablet Take 1 tablet (40 mg) by mouth daily 90 tablet 1     metFORMIN (GLUCOPHAGE-XR) 500 MG 24 hr tablet Take 4 tablets (2,000 mg) by mouth daily (with breakfast) 360 tablet 1     amLODIPine (NORVASC) 10 MG tablet Take 1 tablet (10 mg) by mouth daily 90 tablet 1     losartan-hydrochlorothiazide (HYZAAR) 100-12.5 MG per tablet Take 1 tablet by mouth daily 90 tablet 1       Medications and history reviewed    Physical exam:  Vitals: /71  Pulse 67  Ht 1.6 m (5' 3\")  Wt 83.9 kg (185 lb)  BMI 32.77 kg/m2  BMI= Body mass index is 32.77 kg/(m^2).    Constitutional: healthy, alert and no distress  Head: Normocephalic. No masses, lesions, tenderness or abnormalities  Cardiovascular: negative, PMI normal. No lifts, heaves, or thrills. RRR. No murmurs, clicks gallops or rub  Respiratory: negative, Percussion normal. Good diaphragmatic excursion. Lungs clear  Gastrointestinal: Abdomen soft, non-tender. BS normal. No masses, organomegaly  : Deferred  Musculoskeletal: extremities normal- no gross deformities noted, " gait normal and normal muscle tone  Skin: left upper back with about a 1 x 3 cm ovoid soft slightly mobile subcutaneous mass. Feels as lipoma. Is tender to palpation  Psychiatric: mentation appears normal and affect normal/bright  Hematologic/Lymphatic/Immunologic: Normal cervical lymph nodes  Patient able to get up on table without difficulty.      Imaging shows:  ULTRASOUND  EXTREMITY NON VASCULAR LEFT   10/26/2017 4:45 PM      HISTORY: Left scapular area (posterior shoulder) muscle now versus  cyst. Localized swelling, mass and lump, trunk.     COMPARISON: None     FINDINGS: Limited scanning in the area of palpable finding  demonstrates a 1.1 x 0.7 x 0.4 cm hypoechoic area that matches the  echogenicity of the adjacent subcutaneous fat.    Assessment:     ICD-10-CM    1. Lipoma of back D17.1      Plan: As is symptomatic will plan for excision. This will allow for pathology as well. Suspect lipoma but is abnormal in that it is as painful as it is. Can do in clinic under local. Will schedule.    Kelvin Hutchison MD

## 2017-11-13 NOTE — NURSING NOTE
"Chief Complaint   Patient presents with     Consult     mass on back       Initial /71  Pulse 67  Ht 5' 3\" (1.6 m)  Wt 185 lb (83.9 kg)  BMI 32.77 kg/m2 Estimated body mass index is 32.77 kg/(m^2) as calculated from the following:    Height as of this encounter: 5' 3\" (1.6 m).    Weight as of this encounter: 185 lb (83.9 kg).  Medication Reconciliation: complete   Ruth Ann Kendall Cma      "

## 2017-11-17 ENCOUNTER — OFFICE VISIT (OUTPATIENT)
Dept: SURGERY | Facility: CLINIC | Age: 62
End: 2017-11-17
Payer: COMMERCIAL

## 2017-11-17 VITALS
HEIGHT: 63 IN | HEART RATE: 96 BPM | SYSTOLIC BLOOD PRESSURE: 215 MMHG | BODY MASS INDEX: 32.78 KG/M2 | WEIGHT: 185 LBS | DIASTOLIC BLOOD PRESSURE: 91 MMHG

## 2017-11-17 DIAGNOSIS — D17.1 LIPOMA OF BACK: ICD-10-CM

## 2017-11-17 DIAGNOSIS — D17.9 INTRAMUSCULAR LIPOMA: Primary | ICD-10-CM

## 2017-11-17 PROCEDURE — 88304 TISSUE EXAM BY PATHOLOGIST: CPT | Performed by: SURGERY

## 2017-11-17 PROCEDURE — 21932 EXC BACK TUM DEEP < 5 CM: CPT | Performed by: SURGERY

## 2017-11-17 PROCEDURE — 21931 EXC BACK LES SC 3 CM/>: CPT | Mod: 59 | Performed by: SURGERY

## 2017-11-17 RX ORDER — HYDROCODONE BITARTRATE AND ACETAMINOPHEN 5; 325 MG/1; MG/1
1-2 TABLET ORAL EVERY 4 HOURS PRN
Qty: 10 TABLET | Refills: 0 | Status: SHIPPED | OUTPATIENT
Start: 2017-11-17 | End: 2018-03-12

## 2017-11-17 NOTE — LETTER
[address] [place] [address]           Tel [phone]  Ellen Hill  [facility] [place] [address]      November 21, 2017    Dear Ellen,  This letter is to inform you of the results of your pathology report on your lipoma.    Your pathology report was:  FINAL DIAGNOSIS:   Lipoma from left upper back:   - Lipoma.  Nothing concerning or unexpected there. Hope it has helped your pain  If you do have further questions please don t hesitate to call my assistant at [phone].  We do not have someone answering the phone all the time at my assistants number so if leave a message may take a day or so to get back to you.  So if more urgent then call the below number.    To make an appointment call .   Sincerely,     Kelvin Hutchison M.D.

## 2017-11-17 NOTE — MR AVS SNAPSHOT
After Visit Summary   11/17/2017    Ellen Hill    MRN: [id number]           Patient Information     Date Of Birth          1955        Visit Information        Provider Department      11/17/2017 7:45 AM Kelvin Hutchison MD [facility]        Today's Diagnoses     Intramuscular lipoma    -  1      Care Instructions    Use tylenol or ibuprofen for pain  The glue over the incision (if used) will fall off on its own and may take a few weeks  If stitches used they will need to be removed at an appointment in 10-14 days  You may shower/bathe normally however do not submerge the incision for at least 3 days  Call the clinic at [phone] if you have ongoing bleeding, drainage or other wound concerns and we can make an appointment to have it looked at  Any pathology results generally take a few days and I will send a letter through the mail with the results          Follow-ups after your visit        Who to contact     If you have questions or need follow up information about today's clinic visit or your schedule please contact [facility] directly at [phone].  Normal or non-critical lab and imaging results will be communicated to you by MyChart, letter or phone within 4 business days after the clinic has received the results. If you do not hear from us within 7 days, please contact the clinic through MyChart or phone. If you have a critical or abnormal lab result, we will notify you by phone as soon as possible.  Submit refill requests through [employer] or call your pharmacy and they will forward the refill request to us. Please allow 3 business days for your refill to be completed.          Additional Information About Your Visit        Care EveryWhere ID     This is your Care EveryWhere ID. This could be used by other organizations to access your [place] medical records  [id number]        Your Vitals Were     Pulse Height BMI (Body Mass Index)              "96 1.6 m (5' 3\") 32.77 kg/m2          Blood Pressure from Last 3 Encounters:   11/17/17 (!) 215/91   11/13/17 151/71   10/26/17 168/80    Weight from Last 3 Encounters:   11/17/17 83.9 kg (185 lb)   11/13/17 83.9 kg (185 lb)   10/26/17 85 kg (187 lb 6.4 oz)              Today, you had the following     No orders found for display         Today's Medication Changes          These changes are accurate as of: 11/17/17  8:52 AM.  If you have any questions, ask your nurse or doctor.               Start taking these medicines.        Dose/Directions    HYDROcodone-acetaminophen 5-325 MG per tablet   Commonly known as:  NORCO   Used for:  Intramuscular lipoma   Started by:  Kelvin Hutchison MD        Dose:  1-2 tablet   Take 1-2 tablets by mouth every 4 hours as needed for moderate to severe pain   Quantity:  10 tablet   Refills:  0            Where to get your medicines      Some of these will need a paper prescription and others can be bought over the counter.  Ask your nurse if you have questions.     Bring a paper prescription for each of these medications     HYDROcodone-acetaminophen 5-325 MG per tablet                Primary Care Provider Office Phone # Fax #    Jenniffer Mackay PA-C [phone]       [address]  [facility] [address]        Equal Access to Services     ANTONIO WALL AH: Hadii francesca ku hadasho Soomaali, waaxda luqadaha, qaybta kaalmada adeegyada, ethel gleason. So [facility] [phone].    ATENCIÓN: Si habla español, tiene a dyson disposición servicios gratuitos de asistencia lingüística. Terri al [phone].    We comply with applicable federal civil rights laws and [place] laws. We do not discriminate on the basis of race, color, national origin, age, disability, sex, sexual orientation, or gender identity.            Thank you!     Thank you for choosing [facility]  for your care. Our goal is always to provide you with excellent " care. Hearing back from our patients is one way we can continue to improve our services. Please take a few minutes to complete the written survey that you may receive in the mail after your visit with us. Thank you!             Your Updated Medication List - Protect others around you: Learn how to safely use, store and throw away your medicines at www.disposemymeds.org.          This list is accurate as of: 11/17/17  8:52 AM.  Always use your most recent med list.                   Brand Name Dispense Instructions for use Diagnosis    amLODIPine 10 MG tablet    NORVASC    90 tablet    Take 1 tablet (10 mg) by mouth daily    Hypertension goal BP (blood pressure) < 130/80       atorvastatin 40 MG tablet    LIPITOR    90 tablet    Take 1 tablet (40 mg) by mouth daily    Type 2 diabetes mellitus with diabetic nephropathy, with long-term current use of insulin (H)       B-D U/F 31G X 5 MM   Generic drug:  insulin pen needle     180 each    USE AS DIRECTED THREE TIMES DAILY BEFORE MEALS    Type 2 diabetes, HbA1c goal < 7% (H)       BASAGLAR 100 UNIT/ML injection     15 mL    INJECT 45 UNITS SUBCUTANEOUS 2 TIMES DAILY    Type 2 diabetes mellitus with diabetic nephropathy, with long-term current use of insulin (H)       etodolac 400 MG tablet    LODINE    60 tablet    Take 1 tablet (400 mg) by mouth 2 times daily (with meals) as needed for back pain.    Left flank pain       gabapentin 400 MG capsule    NEURONTIN    270 capsule    Take 1 capsule (400 mg) by mouth 3 times daily    Chronic left shoulder pain, Pain       HYDROcodone-acetaminophen 5-325 MG per tablet    NORCO    10 tablet    Take 1-2 tablets by mouth every 4 hours as needed for moderate to severe pain    Intramuscular lipoma       insulin aspart 100 UNIT/ML injection    NovoLOG FLEXPEN    15 mL    30 units before breakfast, 30 units before lunch, 30 units before dinner    Type 2 diabetes mellitus with diabetic nephropathy, with long-term current use of insulin  (H)       losartan-hydrochlorothiazide 100-12.5 MG per tablet    HYZAAR    90 tablet    Take 1 tablet by mouth daily    Type 2 diabetes mellitus with diabetic nephropathy, with long-term current use of insulin (H)       metFORMIN 500 MG 24 hr tablet    GLUCOPHAGE-XR    360 tablet    Take 4 tablets (2,000 mg) by mouth daily (with breakfast)    Type 2 diabetes mellitus with diabetic nephropathy, with long-term current use of insulin (H)       metoprolol 50 MG 24 hr tablet    TOPROL-XL    90 tablet    Take 2 tablets (100 mg) by mouth daily    Hypertension, uncontrolled       omeprazole 40 MG capsule    priLOSEC    30 capsule    Take 1 capsule (40 mg) by mouth daily    Leblanc's esophagus without dysplasia

## 2017-11-17 NOTE — PATIENT INSTRUCTIONS
Use tylenol or ibuprofen for pain  The glue over the incision (if used) will fall off on its own and may take a few weeks  If stitches used they will need to be removed at an appointment in 10-14 days  You may shower/bathe normally however do not submerge the incision for at least 3 days  Call the clinic at [phone] if you have ongoing bleeding, drainage or other wound concerns and we can make an appointment to have it looked at  Any pathology results generally take a few days and I will send a letter through the mail with the results

## 2017-11-17 NOTE — PROGRESS NOTES
PROCEDURE:   Written consent was obtained  The left upper back area was prepped and appropriately anesthetized with 1% lidocaine with epinephrine. I made a 4cm incision over the palpable mass and dissected into the subcutaneous tissue. I encountered a fatty mass consistent with lipoma and used sharp dissection with iris scissor until this was freed. This ended up being removed in 2 parts and measured about 5 x 3 cm together.  There was still a palpable lump and patient said she could still feel something in the area so I looked deeper spreading the muscle fibers and did find an intramuscular lipoma there. This was freed with scissors and was more difficult as was more adherent to the muscle there. This portion measured about 2 cm when freed. There was some bleeding deeper here which was controlled with pressure.  Closure was accomplished with interrupted 3-0 vicryl for the muscle and deep subcutaneous layers and running subcuticular 4-0 vicryl for the skin.  Incision was covered with dermabond.  The procedure was well tolerated and without complications. Specimen was sent to Pathology.      Kelvin Hutchison MD

## 2017-11-20 LAB — COPATH REPORT: NORMAL

## 2017-11-30 NOTE — TELEPHONE ENCOUNTER
CARDIOLOGY NOTE      11/30/2017    RE: Marita Willis  (1950)                               TO:  Dr. Hector Randolph MD      Thank you for involving me in taking care of your  patient Marita Willis, who is a  74y.o. year old      female with past medical  history of  CAD, HTN, hyperlipidimea  is  seen today after stress, images were normal, but EKG had changes. Pt has mild exertional CP with mild SOB for a few weeks, no radiation. Vitals:    11/30/17 1347   BP: 138/80   Pulse: 84       Current Outpatient Prescriptions   Medication Sig Dispense Refill    nitroGLYCERIN (NITROLINGUAL) 0.4 MG/SPRAY 0.4 mg spray Place 1 spray under the tongue every 5 minutes as needed for Chest pain 1 Bottle 3    umeclidinium-vilanterol (ANORO ELLIPTA) 62.5-25 MCG/INH AEPB inhaler Inhale 1 puff into the lungs daily 3 each 3    rOPINIRole (REQUIP) 0.5 MG tablet Take 1 tablet by mouth 2 times daily 180 tablet 3    busPIRone (BUSPAR) 10 MG tablet Take 1 tablet by mouth 3 times daily 90 tablet 3    sertraline (ZOLOFT) 100 MG tablet Take 1 tablet by mouth daily 90 tablet 1    ibuprofen (ADVIL;MOTRIN) 600 MG tablet Take 1 tablet by mouth every 8 hours as needed for Pain 90 tablet 1    losartan (COZAAR) 25 MG tablet Take 1 tablet by mouth 2 times daily 180 tablet 3    guaiFENesin (MUCINEX) 600 MG extended release tablet Take 1 tablet by mouth 2 times daily 60 tablet 5    aspirin 81 MG EC tablet Take 81 mg by mouth daily. No current facility-administered medications for this visit. Allergies: Codeine; Elavil [amitriptyline]; Gabapentin; Morphine; Other; Percocet [oxycodone-acetaminophen]; and Trazodone and nefazodone  Past Medical History:   Diagnosis Date    CAD (coronary artery disease)     CHF (congestive heart failure) ([facility] [address]     H/O bilateral oophorectomy ~2006    H/O cardiovascular stress test 04/21/2014    EF 70%, no ischemia, normal LV systolic function, normal perfusion pattern.     H/O Left message for patient to return call.     Vicki Ding

## 2017-12-11 ENCOUNTER — TELEPHONE (OUTPATIENT)
Dept: FAMILY MEDICINE | Facility: CLINIC | Age: 62
End: 2017-12-11

## 2017-12-11 NOTE — TELEPHONE ENCOUNTER
Panel Management Review      Patient has the following on her problem list:     Depression / Dysthymia review    Measure:  Needs PHQ-9 score of 4 or less during index window.  Administer PHQ-9 and if score is 5 or more, send encounter to provider for next steps.    5   7 month window range: In health maintenance not on roster.     PHQ-9 SCORE 5/16/2014 2/11/2015 5/8/2017   Total Score 0 1 -   Total Score - - 1       If PHQ-9 recheck is 5 or more, route to provider for next steps.    Patient is due for:  PHQ 9    Diabetes    ASA: Failed    Last A1C  Lab Results   Component Value Date    A1C 11.2 05/08/2017    A1C 10.5 02/11/2015    A1C 8.6 08/19/2014    A1C 9.5 05/09/2014    A1C 9.5 02/04/2014     A1C tested: FAILED    Last LDL:    Lab Results   Component Value Date    CHOL 248 05/08/2017     Lab Results   Component Value Date    HDL 45 05/08/2017     Lab Results   Component Value Date     05/08/2017     Lab Results   Component Value Date    TRIG 336 05/08/2017     Lab Results   Component Value Date    CHOLHDLRATIO 5.2 02/04/2014     Lab Results   Component Value Date    NHDL 203 05/08/2017       Is the patient on a Statin? YES             Is the patient on Aspirin? NO    Medications     HMG CoA Reductase Inhibitors    atorvastatin (LIPITOR) 40 MG tablet          Last three blood pressure readings:  BP Readings from Last 3 Encounters:   11/17/17 (!) 215/91 11/13/17 151/71   10/26/17 168/80       Date of last diabetes office visit: 5/8/2017     Tobacco History:     History   Smoking Status     Former Smoker     Packs/day: 0.10     Years: 10.00     Types: Cigarettes     Quit date: 9/10/2013   Smokeless Tobacco     Never Used     Comment: patient trying to quit on 3-4 cig/week         Hypertension   Last three blood pressure readings:  BP Readings from Last 3 Encounters:   11/17/17 (!) 215/91 11/13/17 151/71   10/26/17 168/80     Blood pressure: FAILED    HTN Guidelines:  Age 18-59 BP range:  Less than  140/90  Age 60-85 with Diabetes:  Less than 140/90  Age 60-85 without Diabetes:  less than 150/90            Composite cancer screening  Chart review shows that this patient is due/due soon for the following Colonoscopy  Summary:    Patient is due/failing the following:   A1C, FOLLOW UP and PHQ9    Action needed:   Patient needs office visit for Diabetes Follow up.    Type of outreach:    Sent letter.    Questions for provider review:    None                                                                                                                                    Bianca Resendez MA          No

## 2017-12-11 NOTE — LETTER
Dear Ellen,    In order to ensure we are providing the best quality care, Jenniffer Mackay PA-C and I have reviewed your chart and see that you are due for a Diabetes follow up, including lab work. Jenniffer has moved from the [facility] location and is now at the [facility]. You can make an appointment with [facility] or follow up with [facility].    Please call to set up an office visit at [phone]. The address at this clinic is [address] [place] [address].    We greatly appreciate the opportunity to serve you. Thank you for trusting us with your health care.    Sincerely,      Jenniffer Mackay PA-C/ Bianca OLIVEROS CMA

## 2017-12-29 ENCOUNTER — OFFICE VISIT (OUTPATIENT)
Dept: FAMILY MEDICINE | Facility: CLINIC | Age: 62
End: 2017-12-29
Payer: COMMERCIAL

## 2017-12-29 VITALS
BODY MASS INDEX: 32.7 KG/M2 | DIASTOLIC BLOOD PRESSURE: 72 MMHG | HEART RATE: 91 BPM | WEIGHT: 184.6 LBS | SYSTOLIC BLOOD PRESSURE: 152 MMHG | TEMPERATURE: 97.6 F | OXYGEN SATURATION: 97 %

## 2017-12-29 DIAGNOSIS — Z91.148 NONCOMPLIANCE WITH MEDICATION REGIMEN: ICD-10-CM

## 2017-12-29 DIAGNOSIS — F33.1 MODERATE EPISODE OF RECURRENT MAJOR DEPRESSIVE DISORDER (H): ICD-10-CM

## 2017-12-29 DIAGNOSIS — Z79.4 TYPE 2 DIABETES MELLITUS WITH DIABETIC NEPHROPATHY, WITH LONG-TERM CURRENT USE OF INSULIN (H): ICD-10-CM

## 2017-12-29 DIAGNOSIS — E11.21 TYPE 2 DIABETES MELLITUS WITH DIABETIC NEPHROPATHY, WITH LONG-TERM CURRENT USE OF INSULIN (H): ICD-10-CM

## 2017-12-29 DIAGNOSIS — I10 HYPERTENSION GOAL BP (BLOOD PRESSURE) < 130/80: ICD-10-CM

## 2017-12-29 LAB
ALBUMIN SERPL-MCNC: 3.8 G/DL (ref 3.4–5)
ALP SERPL-CCNC: 113 U/L (ref 40–150)
ALT SERPL W P-5'-P-CCNC: 38 U/L (ref 0–50)
ANION GAP SERPL CALCULATED.3IONS-SCNC: 11 MMOL/L (ref 3–14)
AST SERPL W P-5'-P-CCNC: 17 U/L (ref 0–45)
BILIRUB SERPL-MCNC: 0.5 MG/DL (ref 0.2–1.3)
BUN SERPL-MCNC: 15 MG/DL (ref 7–30)
CALCIUM SERPL-MCNC: 9 MG/DL (ref 8.5–10.1)
CHLORIDE SERPL-SCNC: 105 MMOL/L (ref 94–109)
CO2 SERPL-SCNC: 21 MMOL/L (ref 20–32)
CREAT SERPL-MCNC: 0.6 MG/DL (ref 0.52–1.04)
CREAT UR-MCNC: 113 MG/DL
GFR SERPL CREATININE-BSD FRML MDRD: >90 ML/MIN/1.7M2
GLUCOSE SERPL-MCNC: 211 MG/DL (ref 70–99)
HBA1C MFR BLD: 12.4 % (ref 4.3–6)
MICROALBUMIN UR-MCNC: 883 MG/L
MICROALBUMIN/CREAT UR: 781.42 MG/G CR (ref 0–25)
POTASSIUM SERPL-SCNC: 3.8 MMOL/L (ref 3.4–5.3)
PROT SERPL-MCNC: 7 G/DL (ref 6.8–8.8)
SODIUM SERPL-SCNC: 137 MMOL/L (ref 133–144)

## 2017-12-29 PROCEDURE — 82306 VITAMIN D 25 HYDROXY: CPT | Performed by: NURSE PRACTITIONER

## 2017-12-29 PROCEDURE — 83036 HEMOGLOBIN GLYCOSYLATED A1C: CPT | Performed by: NURSE PRACTITIONER

## 2017-12-29 PROCEDURE — 80053 COMPREHEN METABOLIC PANEL: CPT | Performed by: NURSE PRACTITIONER

## 2017-12-29 PROCEDURE — 99214 OFFICE O/P EST MOD 30 MIN: CPT | Performed by: NURSE PRACTITIONER

## 2017-12-29 PROCEDURE — 82043 UR ALBUMIN QUANTITATIVE: CPT | Performed by: NURSE PRACTITIONER

## 2017-12-29 PROCEDURE — 36415 COLL VENOUS BLD VENIPUNCTURE: CPT | Performed by: NURSE PRACTITIONER

## 2017-12-29 RX ORDER — INSULIN GLARGINE 100 [IU]/ML
INJECTION, SOLUTION SUBCUTANEOUS
Qty: 15 ML | Refills: 0 | Status: SHIPPED | OUTPATIENT
Start: 2017-12-29 | End: 2018-03-12

## 2017-12-29 RX ORDER — LOSARTAN POTASSIUM AND HYDROCHLOROTHIAZIDE 12.5; 1 MG/1; MG/1
1 TABLET ORAL DAILY
Qty: 90 TABLET | Refills: 1 | Status: SHIPPED | OUTPATIENT
Start: 2017-12-29 | End: 2019-04-10

## 2017-12-29 RX ORDER — ATORVASTATIN CALCIUM 40 MG/1
40 TABLET, FILM COATED ORAL DAILY
Qty: 90 TABLET | Refills: 1 | Status: SHIPPED | OUTPATIENT
Start: 2017-12-29 | End: 2019-04-09

## 2017-12-29 RX ORDER — ESCITALOPRAM OXALATE 10 MG/1
10 TABLET ORAL DAILY
Qty: 90 TABLET | Refills: 1 | Status: SHIPPED | OUTPATIENT
Start: 2017-12-29 | End: 2018-02-07

## 2017-12-29 RX ORDER — AMLODIPINE BESYLATE 10 MG/1
10 TABLET ORAL DAILY
Qty: 90 TABLET | Refills: 1 | Status: SHIPPED | OUTPATIENT
Start: 2017-12-29 | End: 2019-04-09

## 2017-12-29 ASSESSMENT — PATIENT HEALTH QUESTIONNAIRE - PHQ9: SUM OF ALL RESPONSES TO PHQ QUESTIONS 1-9: 6

## 2017-12-29 NOTE — NURSING NOTE
"Chief Complaint   Patient presents with     Diabetes       Initial /85 (BP Location: Left arm, Patient Position: Sitting, Cuff Size: Adult Regular)  Pulse 91  Temp 97.6  F (36.4  C) (Tympanic)  Wt 184 lb 9.6 oz (83.7 kg)  SpO2 97%  BMI 32.7 kg/m2 Estimated body mass index is 32.7 kg/(m^2) as calculated from the following:    Height as of 11/17/17: 5' 3\" (1.6 m).    Weight as of this encounter: 184 lb 9.6 oz (83.7 kg).  Medication Reconciliation: complete    "

## 2017-12-29 NOTE — PATIENT INSTRUCTIONS
Restart medications as prescribed.    For blood pressure:  -amlodipine and losartan/HCTZ    For diabetes:  -novolog three times a day with meals  -basaglar twice a day   -metformin twice a day by mouth    Follow up with diabetes educator as soon as possible.  Record your blood sugars daily in the morning until you see them.    Follow up with me 1 month after seeing diabetic educator.    At [facility], we strive to deliver an exceptional experience to you, every time we see you.  If you receive a survey in the mail, please send us back your thoughts. We really do value your feedback.    Based on your medical history, these are the current health maintenance/preventive care services that you are due for (some may have been done at this visit.)  Health Maintenance Due   Topic Date Due     EYE EXAM Q1 YEAR  05/07/2011     INFLUENZA VACCINE (SYSTEM ASSIGNED)  09/01/2017     A1C Q6 MO  11/08/2017     PHQ-9 Q6 MONTHS  11/08/2017     COLONOSCOPY Q5 YR  11/19/2017         Suggested websites for health information:  [email or web address] : Up to date and easily searchable information on multiple topics.  Www.medlineplus.gov : medication info, interactive tutorials, watch real surgeries online  Www.familydoctor.org : good info from the Academy of Family Physicians  Www.cdc.gov : public health info, travel advisories, epidemics (H1N1)  Www.aap.org : children's health info, normal development, vaccinations  Www.[email or web address] : [place] dept of health, public health issues in [place], N1N1    Your care team:                            Family Medicine Internal Medicine   MD Jorge Alberto Ribeiro MD Shantel Branch-Fleming, MD Katya Georgiev PA-C Nam Ho, MD Pediatrics   ESCOBAR Christie, MD Jigna Driver CNP, MD Deborah Mielke, MD Kim Thein, APRN CNP      Clinic hours: Monday - Thursday 7 am-7 pm; Fridays 7 am-5 pm.   Urgent care: Monday -  Friday 11 am-9 pm; Saturday and Sunday 9 am-5 pm.  Pharmacy : Monday -Thursday 8 am-8 pm; Friday 8 am-6 pm; Saturday and Sunday 9 am-5 pm.     Clinic: [phone]   Pharmacy: [phone]

## 2017-12-29 NOTE — PROGRESS NOTES
SUBJECTIVE:   Ellen Hill is a 62 year old female who presents to clinic today for the following health issues:        Diabetes Follow-up      Patient is checking blood sugars: not at all    Diabetic concerns: None     Symptoms of hypoglycemia (low blood sugar): none     Paresthesias (numbness or burning in feet) or sores: No     Date of last diabetic eye exam: don't know    45 units twice a day of basaglar; misses about 5-6 doses per week  Novolog: not been taking for a few months  Not on metformin and not on atorvastatin for about 6 months.  Has not been taking blood pressure medication either.  She is unsure why she has not been compliant.  She states she just hasn't wanted to take her medications.  Denies depression and states lexapro has helped significantly with that.  She has always struggled with compliance but states now that she is 62 years old, she wants to start anew and get control of her diabetes.    BP Readings from Last 2 Encounters:   12/29/17 152/72   11/17/17 (!) 215/91     Hemoglobin A1C (%)   Date Value   12/29/2017 12.4 (H)   05/08/2017 11.2 (H)     LDL Cholesterol Calculated (mg/dL)   Date Value   05/08/2017 136 (H)   02/04/2014     Cannot estimate LDL when triglyceride exceeds 400 mg/dL     LDL Cholesterol Direct (mg/dL)   Date Value   01/10/2017 108   02/04/2014 85         Amount of exercise or physical activity: None    Problems taking medications regularly: No    Medication side effects: none    Diet: regular (no restrictions)    Problem list and histories reviewed & adjusted, as indicated.  Additional history: as documented    Patient Active Problem List   Diagnosis     Post herpetic neuralgia     Hyperlipidemia LDL goal <100     Essential hypertension with goal blood pressure less than 130/80     Moderate major depression (H)     Vulvar pruritus     History of adenomatous polyp of colon     Alcoholic (H)     Advanced directives, counseling/discussion     Obesity, Class I, BMI  30-34.9     Leblanc's esophagus without dysplasia     Uncontrolled type 2 diabetes mellitus with microalbuminuria, with long-term current use of insulin (H)     Past Surgical History:   Procedure Laterality Date     BIOPSY Right 1989    Breast-Benign lesion as per patient       Social History   Substance Use Topics     Smoking status: Former Smoker     Packs/day: 0.10     Years: 10.00     Types: Cigarettes     Quit date: 9/10/2013     Smokeless tobacco: Never Used      Comment: patient trying to quit on 3-4 cig/week     Alcohol use No      Comment: Sober since March 20, 2013     Family History   Problem Relation Age of Onset     Alcohol/Drug Mother      HEART DISEASE Mother      Alcohol/Drug Father      DIABETES Brother          Current Outpatient Prescriptions   Medication Sig Dispense Refill     escitalopram (LEXAPRO) 10 MG tablet Take 1 tablet (10 mg) by mouth daily 90 tablet 1     insulin aspart (NOVOLOG FLEXPEN) 100 UNIT/ML injection 30 units before breakfast, 30 units before lunch, 30 units before dinner 15 mL 1     BASAGLAR 100 UNIT/ML injection INJECT 45 UNITS SUBCUTANEOUS 2 TIMES DAILY 15 mL 0     atorvastatin (LIPITOR) 40 MG tablet Take 1 tablet (40 mg) by mouth daily 90 tablet 1     amLODIPine (NORVASC) 10 MG tablet Take 1 tablet (10 mg) by mouth daily 90 tablet 1     losartan-hydrochlorothiazide (HYZAAR) 100-12.5 MG per tablet Take 1 tablet by mouth daily 90 tablet 1     metFORMIN (GLUCOPHAGE) 1000 MG tablet Take 1 tablet (1,000 mg) by mouth 2 times daily (with meals) 180 tablet 3     HYDROcodone-acetaminophen (NORCO) 5-325 MG per tablet Take 1-2 tablets by mouth every 4 hours as needed for moderate to severe pain 10 tablet 0     etodolac (LODINE) 400 MG tablet Take 1 tablet (400 mg) by mouth 2 times daily (with meals) as needed for back pain. 60 tablet 0     gabapentin (NEURONTIN) 400 MG capsule Take 1 capsule (400 mg) by mouth 3 times daily 270 capsule 1     B-D U/F insulin pen needle USE AS DIRECTED  THREE TIMES DAILY BEFORE MEALS 180 each 1     [DISCONTINUED] BASAGLAR 100 UNIT/ML injection INJECT 45 UNITS SUBCUTANEOUS 2 TIMES DAILY 15 mL 0     [DISCONTINUED] insulin aspart (NOVOLOG FLEXPEN) 100 UNIT/ML injection 30 units before breakfast, 30 units before lunch, 30 units before dinner 15 mL 1     [DISCONTINUED] atorvastatin (LIPITOR) 40 MG tablet Take 1 tablet (40 mg) by mouth daily 90 tablet 1     [DISCONTINUED] metFORMIN (GLUCOPHAGE-XR) 500 MG 24 hr tablet Take 4 tablets (2,000 mg) by mouth daily (with breakfast) 360 tablet 1     [DISCONTINUED] amLODIPine (NORVASC) 10 MG tablet Take 1 tablet (10 mg) by mouth daily 90 tablet 1     [DISCONTINUED] losartan-hydrochlorothiazide (HYZAAR) 100-12.5 MG per tablet Take 1 tablet by mouth daily 90 tablet 1     Allergies   Allergen Reactions     Victoza Hives     Knome product     Enalapril Cough     Lisinopril Cough     Wellbutrin [Bupropion Hcl] Hives     hives     BP Readings from Last 3 Encounters:   12/29/17 152/72   11/17/17 (!) 215/91   11/13/17 151/71    Wt Readings from Last 3 Encounters:   12/29/17 184 lb 9.6 oz (83.7 kg)   11/17/17 185 lb (83.9 kg)   11/13/17 185 lb (83.9 kg)              Reviewed and updated as needed this visit by clinical staff     Reviewed and updated as needed this visit by Provider         ROS:  C: NEGATIVE for fever, chills, change in weight  INTEGUMENTARY/SKIN: NEGATIVE for worrisome rashes, moles or lesions  E/M: NEGATIVE for ear, mouth and throat problems  R: NEGATIVE for significant cough or SOB  CV: NEGATIVE for chest pain, palpitations or peripheral edema  GI: NEGATIVE for nausea, abdominal pain, heartburn, or change in bowel habits  : no complaints  ENDOCRINE: NEGATIVE for temperature intolerance, skin/hair changes  PSYCHIATRIC: NEGATIVE for changes in mood or affect    OBJECTIVE:     /72  Pulse 91  Temp 97.6  F (36.4  C) (Tympanic)  Wt 184 lb 9.6 oz (83.7 kg)  SpO2 97%  BMI 32.7 kg/m2  Body mass index is 32.7  kg/(m^2).  GENERAL: healthy, alert and no distress  MS: no gross musculoskeletal defects noted, no edema  NEURO: Normal strength and tone, mentation intact and speech normal  PSYCH: mentation appears normal, affect normal/bright and anxious    Diagnostic Test Results:  Results for orders placed or performed in visit on 12/29/17 (from the past 24 hour(s))   Hemoglobin A1c   Result Value Ref Range    Hemoglobin A1C 12.4 (H) 4.3 - 6.0 %       ASSESSMENT/PLAN:           1. Uncontrolled type 2 diabetes mellitus with microalbuminuria, with long-term current use of insulin (H)  a1c 12.4.  Patient noncompliant and has been for several years. States she is now motivated to improved glucose control.  Refilled medications, sending to diabetic education.  Patient states depression is under control but suspect otherwise.  Will continue to address this at future visits.  - Albumin Random Urine Quantitative with Creat Ratio  - Hemoglobin A1c  - Comprehensive metabolic panel  - insulin aspart (NOVOLOG FLEXPEN) 100 UNIT/ML injection; 30 units before breakfast, 30 units before lunch, 30 units before dinner  Dispense: 15 mL; Refill: 1  - BASAGLAR 100 UNIT/ML injection; INJECT 45 UNITS SUBCUTANEOUS 2 TIMES DAILY  Dispense: 15 mL; Refill: 0  - atorvastatin (LIPITOR) 40 MG tablet; Take 1 tablet (40 mg) by mouth daily  Dispense: 90 tablet; Refill: 1  - metFORMIN (GLUCOPHAGE) 1000 MG tablet; Take 1 tablet (1,000 mg) by mouth 2 times daily (with meals)  Dispense: 180 tablet; Refill: 3  - DIABETES EDUCATOR REFERRAL    2. Type 2 diabetes mellitus with diabetic nephropathy, with long-term current use of insulin (H)  As above.  - insulin aspart (NOVOLOG FLEXPEN) 100 UNIT/ML injection; 30 units before breakfast, 30 units before lunch, 30 units before dinner  Dispense: 15 mL; Refill: 1  - BASAGLAR 100 UNIT/ML injection; INJECT 45 UNITS SUBCUTANEOUS 2 TIMES DAILY  Dispense: 15 mL; Refill: 0  - atorvastatin (LIPITOR) 40 MG tablet; Take 1 tablet (40  mg) by mouth daily  Dispense: 90 tablet; Refill: 1  - losartan-hydrochlorothiazide (HYZAAR) 100-12.5 MG per tablet; Take 1 tablet by mouth daily  Dispense: 90 tablet; Refill: 1  - DIABETES EDUCATOR REFERRAL    3. Hypertension goal BP (blood pressure) < 130/80  Was not taking medications. Refilled meds.    - amLODIPine (NORVASC) 10 MG tablet; Take 1 tablet (10 mg) by mouth daily  Dispense: 90 tablet; Refill: 1    4. Noncompliance with medication regimen      5. Moderate episode of recurrent major depressive disorder (H)  Refilled, per patient is working well, PHQ normal.  - escitalopram (LEXAPRO) 10 MG tablet; Take 1 tablet (10 mg) by mouth daily  Dispense: 90 tablet; Refill: 1    Patient Instructions     Restart medications as prescribed.    For blood pressure:  -amlodipine and losartan/HCTZ    For diabetes:  -novolog three times a day with meals  -basaglar twice a day   -metformin twice a day by mouth    Follow up with diabetes educator as soon as possible.  Record your blood sugars daily in the morning until you see them.    Follow up with me 1 month after seeing diabetic educator.    At [facility], we strive to deliver an exceptional experience to you, every time we see you.  If you receive a survey in the mail, please send us back your thoughts. We really do value your feedback.    Based on your medical history, these are the current health maintenance/preventive care services that you are due for (some may have been done at this visit.)  Health Maintenance Due   Topic Date Due     EYE EXAM Q1 YEAR  05/07/2011     INFLUENZA VACCINE (SYSTEM ASSIGNED)  09/01/2017     A1C Q6 MO  11/08/2017     PHQ-9 Q6 MONTHS  11/08/2017     COLONOSCOPY Q5 YR  11/19/2017         Suggested websites for health information:  Www.[place].org : Up to date and easily searchable information on multiple topics.  Www.medlineplus.gov : medication info, interactive tutorials, watch real surgeries  online  Www.familydoctor.org : good info from the Academy of Family Physicians  Www.cdc.gov : public health info, travel advisories, epidemics (H1N1)  Www.aap.org : children's health info, normal development, vaccinations  Www.health.state.[place]us : [place] dept of health, public health issues in [place], N1N1    Your care team:                            Family Medicine Internal Medicine   MD Jorge Alberto Ribeiro MD Shantel Branch-Fleming, MD Katya Georgiev PA-C Nam Ho, MD Pediatrics   ESCOBAR Christie CNP Amelia Massimini APRN CNP Shaista Malik, MD Bethany Templen, MD Deborah Mielke, MD Kim Thein, APRN CNP      Clinic hours: Monday - Thursday 7 am-7 pm; Fridays 7 am-5 pm.   Urgent care: Monday - Friday 11 am-9 pm; Saturday and Sunday 9 am-5 pm.  Pharmacy : Monday -Thursday 8 am-8 pm; Friday 8 am-6 pm; Saturday and Sunday 9 am-5 pm.     Clinic: [phone]   Pharmacy: [phone]          YVON Tate CNP  [facility]

## 2017-12-29 NOTE — MR AVS SNAPSHOT
After Visit Summary   12/29/2017    Ellen Hill    MRN: [id number]           Patient Information     Date Of Birth          1955        Visit Information        Provider Department      12/29/2017 9:20 AM Marcella Teixeira APRN CNP [facility]        Today's Diagnoses     Uncontrolled type 2 diabetes mellitus with microalbuminuria, with long-term current use of insulin (H)    -  1    Obesity, Class I, BMI 30-34.9        Type 2 diabetes mellitus with diabetic nephropathy, with long-term current use of insulin (H)        Hypertension goal BP (blood pressure) < 130/80        Essential hypertension        Moderate episode of recurrent major depressive disorder (H)          Care Instructions    Restart medications as prescribed.    For blood pressure:  -amlodipine and losartan/HCTZ    For diabetes:  -novolog three times a day with meals  -basaglar twice a day   -metformin twice a day by mouth    Follow up with diabetes educator as soon as possible.  Record your blood sugars daily in the morning until you see them.    Follow up with me 1 month after seeing diabetic educator.    At [facility], we strive to deliver an exceptional experience to you, every time we see you.  If you receive a survey in the mail, please send us back your thoughts. We really do value your feedback.    Based on your medical history, these are the current health maintenance/preventive care services that you are due for (some may have been done at this visit.)  Health Maintenance Due   Topic Date Due     EYE EXAM Q1 YEAR  05/07/2011     INFLUENZA VACCINE (SYSTEM ASSIGNED)  09/01/2017     A1C Q6 MO  11/08/2017     PHQ-9 Q6 MONTHS  11/08/2017     COLONOSCOPY Q5 YR  11/19/2017         Suggested websites for health information:  Www.Fliplingo.org : Up to date and easily searchable information on multiple topics.  Www.Aductions.gov : medication info, interactive tutorials, watch  real surgeries online  Www.familydoctor.org : good info from the Academy of Family Physicians  Www.cdc.gov : public health info, travel advisories, epidemics (H1N1)  Www.aap.org : children's health info, normal development, vaccinations  Www.health.state.[place].us : [place] dept of health, public health issues in [place], N1N1    Your care team:                            Family Medicine Internal Medicine   MD Jorge Alberto Ribeiro MD Shantel Branch-Fleming, MD Katya Georgiev PA-C Nam Ho, MD Pediatrics   ECSOBAR Christie, LADONNA Hernandez APRN CNP   MD Jigna Temple MD Deborah Mielke, MD Kim Thein, APRN CNP      Clinic hours: Monday - Thursday 7 am-7 pm; Fridays 7 am-5 pm.   Urgent care: Monday - Friday 11 am-9 pm; Saturday and Sunday 9 am-5 pm.  Pharmacy : Monday -Thursday 8 am-8 pm; Friday 8 am-6 pm; Saturday and Sunday 9 am-5 pm.     Clinic: [phone]   Pharmacy: [phone]              Follow-ups after your visit        Additional Services     DIABETES EDUCATOR REFERRAL       DIABETES SELF MANAGEMENT TRAINING (DSMT)      Your provider has referred you to Diabetes Education: FMG: Diabetes Education - All [facility] [phone]   [email or web address]     If an urgent visit is needed or A1C is above 12, Care Team to call the Diabetes  Education Team at [phone] or send an In Basket message to the Diabetes Education Pool (P DIAB ED-PATIENT CARE).    A  will call you to make your appointment. If it has been more than 3 business days since your referral was placed, please call the above phone number to schedule.    Type of training and number of hours: Previous Diagnosis: Follow-up DSMT - 2 hours.    Medicare covers: 10 hours of initial DSMT in 12 month period from the time of first visit, plus 2 hours of follow-up DSMT annually, and additional hours as requested for insulin training.    Diabetes  Type: Type 2 - On Insulin             Diabetes Co-Morbidities: dyslipidemia, hypertension, kidney disease and mental/affective disorder               A1C Goal:  <7.5       A1C is: Lab Results       Component                Value               Date                       A1C                      12.4                12/29/2017              Diabetes Education Topics: Comprehensive Knowledge Assessment and Instruction    Special Educational Needs Requiring Individual DSMT: Additional Insulin Training       MEDICAL NUTRITION THERAPY (MNT) for Diabetes    Medical Nutrition Therapy with a Registered Dietitian can be provided in coordination with Diabetes Self-Management Training to assist in achieving optimal diabetes management.    MNT Type and Hours: Previous diagnosis: Annual follow-up MNT - 2 hours                       Medicare will cover: 3 hours initial MNT in 12 month period after first visit, plus 2 hours of follow-up MNT annually    Please be aware that coverage of these services is subject to the terms and limitations of your health insurance plan.  Call member services at your health plan to determine Diabetes Self-Management Training (Codes  &amp; ) and Medical Nutrition Therapy (Codes 39800 & 64575) benefits and ask which blood glucose monitor brands are covered by your plan.  Please bring the following with you to your appointment:    (1)  List of current medications   (2)  List of Blood Glucose Monitor brands that are covered by your insurance plan  (3)  Blood Glucose Monitor and log book  (4)   Food records for the 3 days prior to your visit    The Certified Diabetes Educator may make diabetes medication adjustments per the CDE Protocol and Collaborative Practice Agreement.                  Who to contact     If you have questions or need follow up information about today's clinic visit or your schedule please contact [facility] directly at [phone].  Normal or non-critical  lab and imaging results will be communicated to you by MyChart, letter or phone within 4 business days after the clinic has received the results. If you do not hear from us within 7 days, please contact the clinic through MyChart or phone. If you have a critical or abnormal lab result, we will notify you by phone as soon as possible.  Submit refill requests through [employer] or call your pharmacy and they will forward the refill request to us. Please allow 3 business days for your refill to be completed.          Additional Information About Your Visit        Care EveryWhere ID     This is your Care EveryWhere ID. This could be used by other organizations to access your [place] medical records  [id number]        Your Vitals Were     Pulse Temperature Pulse Oximetry BMI (Body Mass Index)          91 97.6  F (36.4  C) (Tympanic) 97% 32.7 kg/m2         Blood Pressure from Last 3 Encounters:   12/29/17 152/72   11/17/17 (!) 215/91   11/13/17 151/71    Weight from Last 3 Encounters:   12/29/17 184 lb 9.6 oz (83.7 kg)   11/17/17 185 lb (83.9 kg)   11/13/17 185 lb (83.9 kg)              We Performed the Following     Albumin Random Urine Quantitative with Creat Ratio     Comprehensive metabolic panel     DIABETES EDUCATOR REFERRAL     Hemoglobin A1c     Vitamin D Deficiency          Today's Medication Changes          These changes are accurate as of: 12/29/17 10:00 AM.  If you have any questions, ask your nurse or doctor.               Start taking these medicines.        Dose/Directions    escitalopram 10 MG tablet   Commonly known as:  LEXAPRO   Used for:  Moderate episode of recurrent major depressive disorder (H)   Started by:  Marcella Teixeira APRN CNP        Dose:  10 mg   Take 1 tablet (10 mg) by mouth daily   Quantity:  90 tablet   Refills:  1         These medicines have changed or have updated prescriptions.        Dose/Directions    BASAGLAR 100 UNIT/ML injection   This may have changed:  See the new  instructions.   Used for:  Type 2 diabetes mellitus with diabetic nephropathy, with long-term current use of insulin (H), Uncontrolled type 2 diabetes mellitus with microalbuminuria, with long-term current use of insulin (H)   Changed by:  Marcella Teixeira APRN CNP        INJECT 45 UNITS SUBCUTANEOUS 2 TIMES DAILY   Quantity:  15 mL   Refills:  0       * metFORMIN 500 MG 24 hr tablet   Commonly known as:  GLUCOPHAGE-XR   This may have changed:  Another medication with the same name was added. Make sure you understand how and when to take each.   Used for:  Type 2 diabetes mellitus with diabetic nephropathy, with long-term current use of insulin (H)   Changed by:  Jenniffer Mackay PA-C        Dose:  2000 mg   Take 4 tablets (2,000 mg) by mouth daily (with breakfast)   Quantity:  360 tablet   Refills:  1       * metFORMIN 1000 MG tablet   Commonly known as:  GLUCOPHAGE   This may have changed:  You were already taking a medication with the same name, and this prescription was added. Make sure you understand how and when to take each.   Used for:  Uncontrolled type 2 diabetes mellitus with microalbuminuria, with long-term current use of insulin (H)   Changed by:  Marcella Teixeira APRN CNP        Dose:  1000 mg   Take 1 tablet (1,000 mg) by mouth 2 times daily (with meals)   Quantity:  180 tablet   Refills:  3       * Notice:  This list has 2 medication(s) that are the same as other medications prescribed for you. Read the directions carefully, and ask your doctor or other care provider to review them with you.      Stop taking these medicines if you haven't already. Please contact your care team if you have questions.     metoprolol 50 MG 24 hr tablet   Commonly known as:  TOPROL-XL   Stopped by:  Marcella Teixeira APRN CNP                Where to get your medicines      These medications were sent to [facility]/pharmacy #[id number] - [place], [place] - [address]  [address],  [place] [facility] [address]     Phone:  [phone]     amLODIPine 10 MG tablet    atorvastatin 40 MG tablet    BASAGLAR 100 UNIT/ML injection    escitalopram 10 MG tablet    insulin aspart 100 UNIT/ML injection    losartan-hydrochlorothiazide 100-12.5 MG per tablet    metFORMIN 1000 MG tablet                Primary Care Provider Office Phone # Fax #    Jenniffer Tricia Mackay PA-C [phone] [address] [place] [address]        Equal Access to Services     [facility]RE : Hadii aad ku hadasho Soomaali, waaxda luqadaha, qaybta kaalmada adeegyada, waxay idiin hayaan adeeg kharash la'jacques gleason. So [facility] [phone].    ATENCIÓN: Si habla español, tiene a dyson disposición servicios gratuitos de asistencia lingüística. [facility] [phone].    We comply with applicable federal civil rights laws and [place] laws. We do not discriminate on the basis of race, color, national origin, age, disability, sex, sexual orientation, or gender identity.            Thank you!     Thank you for choosing [facility]  for your care. Our goal is always to provide you with excellent care. Hearing back from our patients is one way we can continue to improve our services. Please take a few minutes to complete the written survey that you may receive in the mail after your visit with us. Thank you!             Your Updated Medication List - Protect others around you: Learn how to safely use, store and throw away your medicines at www.disposemymeds.org.          This list is accurate as of: 12/29/17 10:00 AM.  Always use your most recent med list.                   Brand Name Dispense Instructions for use Diagnosis    amLODIPine 10 MG tablet    NORVASC    90 tablet    Take 1 tablet (10 mg) by mouth daily    Hypertension goal BP (blood pressure) < 130/80, Essential hypertension       atorvastatin 40 MG tablet    LIPITOR    90 tablet    Take 1 tablet (40 mg) by mouth daily    Type 2 diabetes mellitus with  diabetic nephropathy, with long-term current use of insulin (H), Uncontrolled type 2 diabetes mellitus with microalbuminuria, with long-term current use of insulin (H)       B-D U/F 31G X 5 MM   Generic drug:  insulin pen needle     180 each    USE AS DIRECTED THREE TIMES DAILY BEFORE MEALS    Type 2 diabetes, HbA1c goal < 7% (H)       BASAGLAR 100 UNIT/ML injection     15 mL    INJECT 45 UNITS SUBCUTANEOUS 2 TIMES DAILY    Type 2 diabetes mellitus with diabetic nephropathy, with long-term current use of insulin (H), Uncontrolled type 2 diabetes mellitus with microalbuminuria, with long-term current use of insulin (H)       escitalopram 10 MG tablet    LEXAPRO    90 tablet    Take 1 tablet (10 mg) by mouth daily    Moderate episode of recurrent major depressive disorder (H)       etodolac 400 MG tablet    LODINE    60 tablet    Take 1 tablet (400 mg) by mouth 2 times daily (with meals) as needed for back pain.    Left flank pain       gabapentin 400 MG capsule    NEURONTIN    270 capsule    Take 1 capsule (400 mg) by mouth 3 times daily    Chronic left shoulder pain, Pain       HYDROcodone-acetaminophen 5-325 MG per tablet    NORCO    10 tablet    Take 1-2 tablets by mouth every 4 hours as needed for moderate to severe pain    Intramuscular lipoma       insulin aspart 100 UNIT/ML injection    NovoLOG FLEXPEN    15 mL    30 units before breakfast, 30 units before lunch, 30 units before dinner    Type 2 diabetes mellitus with diabetic nephropathy, with long-term current use of insulin (H), Uncontrolled type 2 diabetes mellitus with microalbuminuria, with long-term current use of insulin (H)       losartan-hydrochlorothiazide 100-12.5 MG per tablet    HYZAAR    90 tablet    Take 1 tablet by mouth daily    Type 2 diabetes mellitus with diabetic nephropathy, with long-term current use of insulin (H), Essential hypertension       * metFORMIN 500 MG 24 hr tablet    GLUCOPHAGE-XR    360 tablet    Take 4 tablets (2,000 mg)  by mouth daily (with breakfast)    Type 2 diabetes mellitus with diabetic nephropathy, with long-term current use of insulin (H)       * metFORMIN 1000 MG tablet    GLUCOPHAGE    180 tablet    Take 1 tablet (1,000 mg) by mouth 2 times daily (with meals)    Uncontrolled type 2 diabetes mellitus with microalbuminuria, with long-term current use of insulin (H)       omeprazole 40 MG capsule    priLOSEC    30 capsule    Take 1 capsule (40 mg) by mouth daily    Leblanc's esophagus without dysplasia       * Notice:  This list has 2 medication(s) that are the same as other medications prescribed for you. Read the directions carefully, and ask your doctor or other care provider to review them with you.

## 2018-01-02 DIAGNOSIS — E55.9 VITAMIN D DEFICIENCY: Primary | ICD-10-CM

## 2018-01-02 LAB — DEPRECATED CALCIDIOL+CALCIFEROL SERPL-MC: 12 UG/L (ref 20–75)

## 2018-01-21 ENCOUNTER — HEALTH MAINTENANCE LETTER (OUTPATIENT)
Age: 63
End: 2018-01-21

## 2018-02-07 ENCOUNTER — OFFICE VISIT (OUTPATIENT)
Dept: FAMILY MEDICINE | Facility: CLINIC | Age: 63
End: 2018-02-07
Payer: COMMERCIAL

## 2018-02-07 VITALS
SYSTOLIC BLOOD PRESSURE: 136 MMHG | RESPIRATION RATE: 14 BRPM | HEART RATE: 76 BPM | BODY MASS INDEX: 32.03 KG/M2 | OXYGEN SATURATION: 98 % | DIASTOLIC BLOOD PRESSURE: 72 MMHG | TEMPERATURE: 97 F | WEIGHT: 180.8 LBS

## 2018-02-07 DIAGNOSIS — M26.609 TMJ (TEMPOROMANDIBULAR JOINT SYNDROME): Primary | ICD-10-CM

## 2018-02-07 PROCEDURE — 99213 OFFICE O/P EST LOW 20 MIN: CPT | Performed by: PHYSICIAN ASSISTANT

## 2018-02-07 RX ORDER — CITALOPRAM HYDROBROMIDE 20 MG/1
20 TABLET ORAL
COMMUNITY
Start: 2017-01-10 | End: 2018-03-12

## 2018-02-07 RX ORDER — OMEPRAZOLE 40 MG/1
40 CAPSULE, DELAYED RELEASE ORAL
COMMUNITY
Start: 2017-01-10 | End: 2018-03-12

## 2018-02-07 RX ORDER — PREDNISONE 10 MG/1
TABLET ORAL
Qty: 20 TABLET | Refills: 0 | Status: SHIPPED | OUTPATIENT
Start: 2018-02-07 | End: 2018-03-12

## 2018-02-07 ASSESSMENT — PAIN SCALES - GENERAL: PAINLEVEL: SEVERE PAIN (7)

## 2018-02-07 NOTE — PATIENT INSTRUCTIONS
TMJ Syndrome  The temporomandibular joint (TMJ) is the joint that connects your lower jaw to your head. You can feel it in front of your ears when you open and close your mouth. TMJ disorders involve chronic or recurrent pain in the joint. When treated, symptoms of TMJ disorders usually go away within a few months.  Causes  There is no widely agreed-on cause of TMJ disorders. They have been linked to injury, arthritis, chronic fatigue syndrome, and fibromyalgia. A definite connection has not been shown, though.  Symptoms    Pain in the face, jaw, or neck    Pain with jaw movement or chewing    Locking or catching sensation of the jaw    Clicking, popping, or grinding sounds with movement of the TMJ    Headache    Ear pain  Home care  Modest, nonsurgical treatments are a good first step toward relieving symptoms. Try the approaches described below.    Rest the jaw by avoiding crunchy or hard-to-chew foods. Do not eat hard or sticky candies. Soft foods and liquids are easier on the jaw.    Protect your jaw while yawning. If you need to yawn, put your fist under your chin to prevent your mouth from opening up too wide.    To help relieve pain, try applying hot or cold packs to the painful area. Try both hot and cold to find out which works best for you. If you use hot packs (small towels soaked in hot water), be careful not to burn yourself.    You may take acetaminophen or ibuprofen for pain, unless you were given a different pain medicine. (Note: If you have chronic liver or kidney disease or have ever had a stomach ulcer or gastrointestinal bleeding, talk with your healthcare provider before using these medicines. Also talk to your provider if you are taking medicine to prevent blood clots.) Aspirin should never be given to anyone younger than 18 years of age who is ill with a viral infection or fever. It may cause severe liver or brain damage.  Reducing stress  If stress seems to be contributing to your symptoms,  try to identify the sources of stress in your life. These aren t always obvious. Common stressors include:    Everyday hassles (which can add up), such as traffic jams, missed appointments, or car trouble    Major life changes, both good (such as a new baby or job promotion) and bad (loss of job or loss of a loved one)    Overload: The feeling that you have too many responsibilities and can't take care of everything at once    Helplessness: Feeling like your problems are more than you can solve  When possible, do something about your sources of stress. See if you can avoid hassles, limit the amount of change in your life at one time, and take breaks when you feel overloaded.  Unfortunately, many stressful situations cannot be avoided. So learning how to manage stress better is very important. Getting regular exercise, eating nutritious, balanced meals, and getting adequate rest all help to make everyday stress more manageable. Certain techniques are also helpful: relaxation and breathing exercises, visualization, biofeedback, meditation, or simply taking some time out to clear your mind. For more information, talk with your healthcare provider.  Follow-up care  Follow up with your healthcare provider, or as advised. Further testing and additional treatment may be required. If changes to your lifestyle do not improve your symptoms, talk with your healthcare provider about other available therapies. These include bite guards for help with teeth grinding, stress management techniques, and more. If stress is an important factor and does not respond to the above simple measures, talk to your doctor about a referral for stress management.    If X-rays were done, they will be reviewed by a specialist. You will be notified of the results, especially if they affect treatment.  Call 911  Call emergency services right away if any of these occur:    Trouble breathing or swallowing, wheezing    Confusion    Extreme drowsiness or  trouble awakening    Fainting or loss of consciousness    Rapid heart rate  When to seek medical advice  Call your healthcare provider right away if any of these occur:    Your face becomes swollen or red.    Your pain worsens.    You have increasing neck, mouth, tooth, or throat pain.    You develop a fever of 100.4F (38 C) or higher  Date Last Reviewed: 7/30/2015 2000-2017 [employer]. [address] [place] [address]. All rights reserved. This information is not intended as a substitute for professional medical care. Always follow your healthcare professional's instructions.

## 2018-02-07 NOTE — MR AVS SNAPSHOT
After Visit Summary   2/7/2018    Ellen Hill    MRN: [id number]           Patient Information     Date Of Birth          1955        Visit Information        Provider Department      2/7/2018 3:40 PM Chino Berman PA [facility]        Today's Diagnoses     TMJ (temporomandibular joint syndrome)    -  1    Uncontrolled type 2 diabetes mellitus with microalbuminuria, with long-term current use of insulin (H)          Care Instructions      TMJ Syndrome  The temporomandibular joint (TMJ) is the joint that connects your lower jaw to your head. You can feel it in front of your ears when you open and close your mouth. TMJ disorders involve chronic or recurrent pain in the joint. When treated, symptoms of TMJ disorders usually go away within a few months.  Causes  There is no widely agreed-on cause of TMJ disorders. They have been linked to injury, arthritis, chronic fatigue syndrome, and fibromyalgia. A definite connection has not been shown, though.  Symptoms    Pain in the face, jaw, or neck    Pain with jaw movement or chewing    Locking or catching sensation of the jaw    Clicking, popping, or grinding sounds with movement of the TMJ    Headache    Ear pain  Home care  Modest, nonsurgical treatments are a good first step toward relieving symptoms. Try the approaches described below.    Rest the jaw by avoiding crunchy or hard-to-chew foods. Do not eat hard or sticky candies. Soft foods and liquids are easier on the jaw.    Protect your jaw while yawning. If you need to yawn, put your fist under your chin to prevent your mouth from opening up too wide.    To help relieve pain, try applying hot or cold packs to the painful area. Try both hot and cold to find out which works best for you. If you use hot packs (small towels soaked in hot water), be careful not to burn yourself.    You may take acetaminophen or ibuprofen for pain, unless you were given a different  pain medicine. (Note: If you have chronic liver or kidney disease or have ever had a stomach ulcer or gastrointestinal bleeding, talk with your healthcare provider before using these medicines. Also talk to your provider if you are taking medicine to prevent blood clots.) Aspirin should never be given to anyone younger than 18 years of age who is ill with a viral infection or fever. It may cause severe liver or brain damage.  Reducing stress  If stress seems to be contributing to your symptoms, try to identify the sources of stress in your life. These aren t always obvious. Common stressors include:    Everyday hassles (which can add up), such as traffic jams, missed appointments, or car trouble    Major life changes, both good (such as a new baby or job promotion) and bad (loss of job or loss of a loved one)    Overload: The feeling that you have too many responsibilities and can't take care of everything at once    Helplessness: Feeling like your problems are more than you can solve  When possible, do something about your sources of stress. See if you can avoid hassles, limit the amount of change in your life at one time, and take breaks when you feel overloaded.  Unfortunately, many stressful situations cannot be avoided. So learning how to manage stress better is very important. Getting regular exercise, eating nutritious, balanced meals, and getting adequate rest all help to make everyday stress more manageable. Certain techniques are also helpful: relaxation and breathing exercises, visualization, biofeedback, meditation, or simply taking some time out to clear your mind. For more information, talk with your healthcare provider.  Follow-up care  Follow up with your healthcare provider, or as advised. Further testing and additional treatment may be required. If changes to your lifestyle do not improve your symptoms, talk with your healthcare provider about other available therapies. These include bite guards  for help with teeth grinding, stress management techniques, and more. If stress is an important factor and does not respond to the above simple measures, talk to your doctor about a referral for stress management.    If X-rays were done, they will be reviewed by a specialist. You will be notified of the results, especially if they affect treatment.  Call 911  Call emergency services right away if any of these occur:    Trouble breathing or swallowing, wheezing    Confusion    Extreme drowsiness or trouble awakening    Fainting or loss of consciousness    Rapid heart rate  When to seek medical advice  Call your healthcare provider right away if any of these occur:    Your face becomes swollen or red.    Your pain worsens.    You have increasing neck, mouth, tooth, or throat pain.    You develop a fever of 100.4F (38 C) or higher  Date Last Reviewed: 7/30/2015 2000-2017 [employer]. [address] [place] [address]. All rights reserved. This information is not intended as a substitute for professional medical care. Always follow your healthcare professional's instructions.                Follow-ups after your visit        Follow-up notes from your care team     Return if symptoms worsen or fail to improve.      Your next 10 appointments already scheduled     Feb 26, 2018  4:30 PM CST   Diabetic Education with  DIABETIC ED RESOURCE   [facility] ([facility])    [address] [place] [id number] [phone]              Who to contact     If you have questions or need follow up information about today's clinic visit or your schedule please contact [facility] directly at [phone].  Normal or non-critical lab and imaging results will be communicated to you by MyChart, letter or phone within 4 business days after the clinic has received the results. If you do not hear from us within 7 days, please  "contact the clinic through [facility] or phone. If you have a critical or abnormal lab result, we will notify you by phone as soon as possible.  Submit refill requests through [facility] or call your pharmacy and they will forward the refill request to us. Please allow 3 business days for your refill to be completed.          Additional Information About Your Visit        [facility] Information     [facility] lets you send messages to your doctor, view your test results, renew your prescriptions, schedule appointments and more. To sign up, go to [email or web address] . Click on \"Log in\" on the left side of the screen, which will take you to the Welcome page. Then click on \"Sign up Now\" on the right side of the page.     You will be asked to enter the access code listed below, as well as some personal information. Please follow the directions to create your username and password.     Your access code is: [email or web address]  Expires: 2018  4:17 PM     Your access code will  in 90 days. If you need help or a new code, please call your [place] clinic or [phone].        Care EveryWhere ID     This is your Care EveryWhere ID. This could be used by other organizations to access your [place] medical records  [id number]        Your Vitals Were     Pulse Temperature Respirations Pulse Oximetry BMI (Body Mass Index)       76 97  F (36.1  C) (Oral) 14 98% 32.03 kg/m2        Blood Pressure from Last 3 Encounters:   18 136/72   17 152/72   17 (!) 215/91    Weight from Last 3 Encounters:   18 180 lb 12.8 oz (82 kg)   17 184 lb 9.6 oz (83.7 kg)   17 185 lb (83.9 kg)              Today, you had the following     No orders found for display         Today's Medication Changes          These changes are accurate as of 18  4:17 PM.  If you have any questions, ask your nurse or doctor.               Start taking these medicines.        Dose/Directions    predniSONE 10 MG tablet   Commonly known " as:  DELTASONE   Used for:  TMJ (temporomandibular joint syndrome)   Started by:  Chino Berman PA        4 tabs PO QD x 1 days then 3 tabs PO QD x 2 days then 2 tabs PO QD x 2 days then 1 tab PO QD x 2 days   Quantity:  20 tablet   Refills:  0            Where to get your medicines      These medications were sent to [facility] [place] - [address]  [address], [place] [address]     Phone:  [phone]     predniSONE 10 MG tablet                Primary Care Provider Office Phone # Fax #    Jenniffer Tricia Mackay PA-C [phone] [address] [place] [address]        Equal Access to Services     ANTONIO WALL AH: Hadii aad ku hadasho Soomaali, waaxda luqadaha, qaybta kaalmada adeegyada, waxay idiin hayaaana maria garza khherlinda velazco . So [facility] [phone].    ATENCIÓN: Si habla español, tiene a dyson disposición servicios gratuitos de asistencia lingüística. Llame al [phone].    We comply with applicable federal civil rights laws and [place] laws. We do not discriminate on the basis of race, color, national origin, age, disability, sex, sexual orientation, or gender identity.            Thank you!     Thank you for choosing [facility]  for your care. Our goal is always to provide you with excellent care. Hearing back from our patients is one way we can continue to improve our services. Please take a few minutes to complete the written survey that you may receive in the mail after your visit with us. Thank you!             Your Updated Medication List - Protect others around you: Learn how to safely use, store and throw away your medicines at www.disposemymeds.org.          This list is accurate as of 2/7/18  4:17 PM.  Always use your most recent med list.                   Brand Name Dispense Instructions for use Diagnosis    amLODIPine 10 MG tablet    NORVASC    90 tablet    Take 1 tablet (10 mg) by mouth daily     Hypertension goal BP (blood pressure) < 130/80       atorvastatin 40 MG tablet    LIPITOR    90 tablet    Take 1 tablet (40 mg) by mouth daily    Type 2 diabetes mellitus with diabetic nephropathy, with long-term current use of insulin (H), Uncontrolled type 2 diabetes mellitus with microalbuminuria, with long-term current use of insulin (H)       B-D U/F 31G X 5 MM   Generic drug:  insulin pen needle     180 each    USE AS DIRECTED THREE TIMES DAILY BEFORE MEALS    Type 2 diabetes, HbA1c goal < 7% (H)       BASAGLAR 100 UNIT/ML injection     15 mL    INJECT 45 UNITS SUBCUTANEOUS 2 TIMES DAILY    Type 2 diabetes mellitus with diabetic nephropathy, with long-term current use of insulin (H), Uncontrolled type 2 diabetes mellitus with microalbuminuria, with long-term current use of insulin (H)       cholecalciferol 05301 UNITS capsule    VITAMIN D3    4 capsule    Take 1 capsule (50,000 Units) by mouth once a week for 12 doses    Vitamin D deficiency       citalopram 20 MG tablet    celeXA     Take 20 mg by mouth        etodolac 400 MG tablet    LODINE    60 tablet    Take 1 tablet (400 mg) by mouth 2 times daily (with meals) as needed for back pain.    Left flank pain       gabapentin 400 MG capsule    NEURONTIN    270 capsule    Take 1 capsule (400 mg) by mouth 3 times daily    Chronic left shoulder pain, Pain       HYDROcodone-acetaminophen 5-325 MG per tablet    NORCO    10 tablet    Take 1-2 tablets by mouth every 4 hours as needed for moderate to severe pain    Intramuscular lipoma       insulin aspart 100 UNIT/ML injection    NovoLOG FLEXPEN    15 mL    30 units before breakfast, 30 units before lunch, 30 units before dinner    Type 2 diabetes mellitus with diabetic nephropathy, with long-term current use of insulin (H), Uncontrolled type 2 diabetes mellitus with microalbuminuria, with long-term current use of insulin (H)       losartan-hydrochlorothiazide 100-12.5 MG per tablet    HYZAAR    90 tablet    Take 1  tablet by mouth daily    Type 2 diabetes mellitus with diabetic nephropathy, with long-term current use of insulin (H)       metFORMIN 1000 MG tablet    GLUCOPHAGE    180 tablet    Take 1 tablet (1,000 mg) by mouth 2 times daily (with meals)    Uncontrolled type 2 diabetes mellitus with microalbuminuria, with long-term current use of insulin (H)       omeprazole 40 MG capsule    priLOSEC     Take 40 mg by mouth        predniSONE 10 MG tablet    DELTASONE    20 tablet    4 tabs PO QD x 1 days then 3 tabs PO QD x 2 days then 2 tabs PO QD x 2 days then 1 tab PO QD x 2 days    TMJ (temporomandibular joint syndrome)

## 2018-02-07 NOTE — PROGRESS NOTES
"  SUBJECTIVE:   Ellen Hill is a 62 year old female who presents to clinic today for the following health issues:    Concern - Right Jaw Problem  Onset: 2 weeks -= started about a week after eating a lot of licorice.      Description:   Swelling and pain of the right side jaw    Intensity: moderate, 7/10    Progression of Symptoms:  worsening and constant    Accompanying Signs & Symptoms:  Difficulties chewing and talking.    Previous history of similar problem:   Yes TMJ    Precipitating factors:   Worsened by: talking and chewing     Alleviating factors:  Improved by: inactivity     Therapies Tried and outcome: NSAIDS a little helpful;    Saw dentist recently who thought TMJ.  Was possibly on steroids in the past for this.  Hasn't been checking sugars         Past Medical History:   Diagnosis Date     Advanced directives, counseling/discussion 3/28/2013    Patient does not have an Advance/Health Care Directive (HCD), given \"What is Advance Care Planning?\" flyer.  Nola Lopez March 28, 2013      Hyperlipidemia LDL goal <100 10/27/2010     Hypertension goal BP (blood pressure) < 130/80 12/21/2010     Microalbuminuria 2/12/2015     Shingles 2009    Right posterior shoulder as per patient     Type 2 diabetes, HbA1c goal < 7% (H) 10/31/2010     Patient Active Problem List   Diagnosis     Post herpetic neuralgia     Hyperlipidemia LDL goal <100     Essential hypertension with goal blood pressure less than 130/80     Moderate major depression (H)     Vulvar pruritus     History of adenomatous polyp of colon     Alcoholic (H)     Advanced directives, counseling/discussion     Obesity, Class I, BMI 30-34.9     Leblanc's esophagus without dysplasia     Uncontrolled type 2 diabetes mellitus with microalbuminuria, with long-term current use of insulin (H)     Alcoholic liver disease (H)     Noncompliance with medication regimen     Allergies   Allergen Reactions     Victoza Hives     Alfredo Nordisk product     " Enalapril Cough     Lisinopril Cough     Wellbutrin [Bupropion Hcl] Hives     hives       Current Outpatient Prescriptions on File Prior to Visit:  cholecalciferol (VITAMIN D3) 84449 UNITS capsule Take 1 capsule (50,000 Units) by mouth once a week for 12 doses   insulin aspart (NOVOLOG FLEXPEN) 100 UNIT/ML injection 30 units before breakfast, 30 units before lunch, 30 units before dinner   BASAGLAR 100 UNIT/ML injection INJECT 45 UNITS SUBCUTANEOUS 2 TIMES DAILY   atorvastatin (LIPITOR) 40 MG tablet Take 1 tablet (40 mg) by mouth daily   amLODIPine (NORVASC) 10 MG tablet Take 1 tablet (10 mg) by mouth daily   losartan-hydrochlorothiazide (HYZAAR) 100-12.5 MG per tablet Take 1 tablet by mouth daily   metFORMIN (GLUCOPHAGE) 1000 MG tablet Take 1 tablet (1,000 mg) by mouth 2 times daily (with meals)   HYDROcodone-acetaminophen (NORCO) 5-325 MG per tablet Take 1-2 tablets by mouth every 4 hours as needed for moderate to severe pain   etodolac (LODINE) 400 MG tablet Take 1 tablet (400 mg) by mouth 2 times daily (with meals) as needed for back pain.   gabapentin (NEURONTIN) 400 MG capsule Take 1 capsule (400 mg) by mouth 3 times daily   B-D U/F insulin pen needle USE AS DIRECTED THREE TIMES DAILY BEFORE MEALS     No current facility-administered medications on file prior to visit.     ROS:  Constitutional: NO fevers  ENT: as above  OPTHAL no vision problems    OBJECTIVE:   Blood pressure 136/72, pulse 76, temperature 97  F (36.1  C), temperature source Oral, resp. rate 14, weight 180 lb 12.8 oz (82 kg), SpO2 98 %, not currently breastfeeding.  Eye exam : conjunctiva clear.  Mouth:  Airway intact.  RT TMJ, mild decrease ROM, nonttp, minimal swelling, no erythema, Rt Tm intact and clear, no acute dental lesions  NECK:  The neck is supple and free of adenopathy or masses,     ASSESSMENT:      ICD-10-CM    1. TMJ (temporomandibular joint syndrome) M26.609 predniSONE (DELTASONE) 10 MG tablet   2. Uncontrolled type 2 diabetes  mellitus with microalbuminuria, with long-term current use of insulin (H) E11.29     E11.65     R80.9     Z79.4          PLAN:start checking your sugars at least fasting daily, Nsaids. Will call to sched colonoscopy.

## 2018-02-27 ENCOUNTER — TELEPHONE (OUTPATIENT)
Dept: FAMILY MEDICINE | Facility: CLINIC | Age: 63
End: 2018-02-27

## 2018-02-27 NOTE — LETTER
March 5, 2018          Ellen Hill  [address] [place] [address]                Dear Ellen Hill,      At [facility] we care about your health and are committed to providing quality patient care. Regular appointments are a vital part of the care and management of your health and can help prevent many of the complications that can occur.      It has come to our attention that you are due for diabetes follow up appointment.  Please call [facility] at [phone] soon to schedule your follow up appointment.    If you have transferred care to another clinic please call to inform us so that we do not continue to send you reminder letters.      Sincerely,      [facility] Care Team

## 2018-02-27 NOTE — TELEPHONE ENCOUNTER
Please call patient to schedule follow up with me concerning her diabetes.  If she does not answer after your normal amt of attempts, it is ok to send letter.  Thanks!  Marcella

## 2018-03-01 NOTE — TELEPHONE ENCOUNTER
This writer attempted to contact patient on 03/01/18      Reason for call DM check and left detailed message.      If patient calls back:  Schedule Office Visit appointment within 1 week with primary care, document that pt called and close encounter         Shivani Peterson MA

## 2018-03-05 NOTE — TELEPHONE ENCOUNTER
This writer attempted to contact Pt on 03/05/18      Reason for call need for DM follow up and left detailed message.  Letter also sent due to 3 unanswered attempts to notify.      If patient calls back:   Schedule Office Visit appointment within 1 week with PCP, document that pt called and close encounter         Maranda Jones CMA

## 2018-03-12 ENCOUNTER — OFFICE VISIT (OUTPATIENT)
Dept: FAMILY MEDICINE | Facility: CLINIC | Age: 63
End: 2018-03-12
Payer: COMMERCIAL

## 2018-03-12 VITALS
SYSTOLIC BLOOD PRESSURE: 142 MMHG | WEIGHT: 179 LBS | RESPIRATION RATE: 13 BRPM | BODY MASS INDEX: 31.71 KG/M2 | TEMPERATURE: 97.9 F | OXYGEN SATURATION: 98 % | HEIGHT: 63 IN | DIASTOLIC BLOOD PRESSURE: 82 MMHG | HEART RATE: 91 BPM

## 2018-03-12 DIAGNOSIS — M26.609 TEMPOROMANDIBULAR JOINT DISORDER: Primary | ICD-10-CM

## 2018-03-12 DIAGNOSIS — E11.21 TYPE 2 DIABETES MELLITUS WITH DIABETIC NEPHROPATHY, WITH LONG-TERM CURRENT USE OF INSULIN (H): ICD-10-CM

## 2018-03-12 DIAGNOSIS — Z79.4 TYPE 2 DIABETES MELLITUS WITH DIABETIC NEPHROPATHY, WITH LONG-TERM CURRENT USE OF INSULIN (H): ICD-10-CM

## 2018-03-12 DIAGNOSIS — I10 ESSENTIAL HYPERTENSION WITH GOAL BLOOD PRESSURE LESS THAN 130/80: ICD-10-CM

## 2018-03-12 LAB — HBA1C MFR BLD: 12.8 % (ref 4.3–6)

## 2018-03-12 PROCEDURE — 99213 OFFICE O/P EST LOW 20 MIN: CPT | Performed by: NURSE PRACTITIONER

## 2018-03-12 PROCEDURE — 36415 COLL VENOUS BLD VENIPUNCTURE: CPT | Performed by: NURSE PRACTITIONER

## 2018-03-12 PROCEDURE — 83036 HEMOGLOBIN GLYCOSYLATED A1C: CPT | Performed by: NURSE PRACTITIONER

## 2018-03-12 RX ORDER — NAPROXEN 500 MG/1
500 TABLET ORAL 2 TIMES DAILY PRN
Qty: 60 TABLET | Refills: 0 | Status: SHIPPED | OUTPATIENT
Start: 2018-03-12 | End: 2018-08-13

## 2018-03-12 RX ORDER — ESCITALOPRAM OXALATE 10 MG/1
10 TABLET ORAL DAILY
COMMUNITY
End: 2018-08-13

## 2018-03-12 RX ORDER — INSULIN GLARGINE 100 [IU]/ML
INJECTION, SOLUTION SUBCUTANEOUS
Qty: 15 ML | Refills: 0 | Status: SHIPPED | OUTPATIENT
Start: 2018-03-12 | End: 2018-08-13

## 2018-03-12 RX ORDER — CYCLOBENZAPRINE HCL 5 MG
5 TABLET ORAL AT BEDTIME
Qty: 10 TABLET | Refills: 0 | Status: SHIPPED | OUTPATIENT
Start: 2018-03-12 | End: 2018-08-13

## 2018-03-12 NOTE — PROGRESS NOTES
"  SUBJECTIVE:   Ellen Hill is a 62 year old female who presents to clinic today for the following health issues:      Concern - jaw pain  Onset: 1 month    Description:   Right side of face    Intensity: severe    Progression of Symptoms:  worsening    Accompanying Signs & Symptoms:  Cant not chew    Previous history of similar problem:   Long time again    Precipitating factors:   Worsened by: chew    Alleviating factors:  Improved by:     Therapies Tried and outcome:     Pain with eating so severe she cannot eat.  Naproxen yesterday helped. Prednisone did not help from 2/8/18.  Saw dentist on 2/5- no infection sent to specialist for \"lock jaw\" but patient cannot afford this.  Patient states every time she eats she has severe pain that makes her want to pass out.  Denies ear symptoms.  Denies fevers or chills.  She states she has lost weight but weight same since last visit  Gotten worse over last 2-3 days.    Diabetes:  Has not followed up due to very high deductible.  On a payment plan to pay for last visit and labs. She is taking:  Basaglar 45 units twice a day  Novolog once or twice a day 30 units each time.  She is not taking three times a day because she states she only get two breaks at work.  She does not eat breakfast at home.    She is not watching her carb intake.  She often goes to [facility].      Problem list and histories reviewed & adjusted, as indicated.  Additional history: as documented    Patient Active Problem List   Diagnosis     Post herpetic neuralgia     Hyperlipidemia LDL goal <100     Essential hypertension with goal blood pressure less than 130/80     Moderate major depression (H)     Vulvar pruritus     History of adenomatous polyp of colon     Alcoholic (H)     Advanced directives, counseling/discussion     Obesity, Class I, BMI 30-34.9     Leblanc's esophagus without dysplasia     Uncontrolled type 2 diabetes mellitus with microalbuminuria, with long-term current use of insulin " (H)     Alcoholic liver disease (H)     Noncompliance with medication regimen     Past Surgical History:   Procedure Laterality Date     BIOPSY Right 1989    Breast-Benign lesion as per patient       Social History   Substance Use Topics     Smoking status: Former Smoker     Packs/day: 0.10     Years: 10.00     Types: Cigarettes     Quit date: 9/10/2013     Smokeless tobacco: Never Used      Comment: patient trying to quit on 3-4 cig/week     Alcohol use No      Comment: Sober since March 20, 2013     Family History   Problem Relation Age of Onset     Alcohol/Drug Mother      HEART DISEASE Mother      Alcohol/Drug Father      DIABETES Brother          Current Outpatient Prescriptions   Medication Sig Dispense Refill     escitalopram (LEXAPRO) 10 MG tablet Take 10 mg by mouth daily       naproxen (NAPROSYN) 500 MG tablet Take 1 tablet (500 mg) by mouth 2 times daily as needed for moderate pain 60 tablet 0     cyclobenzaprine (FLEXERIL) 5 MG tablet Take 1 tablet (5 mg) by mouth At Bedtime 10 tablet 0     BASAGLAR 100 UNIT/ML injection INJECT 45 UNITS SUBCUTANEOUS 2 TIMES DAILY 15 mL 0     insulin aspart (NOVOLOG FLEXPEN) 100 UNIT/ML injection 30 units before breakfast, 30 units before lunch, 30 units before dinner 15 mL 1     atorvastatin (LIPITOR) 40 MG tablet Take 1 tablet (40 mg) by mouth daily 90 tablet 1     amLODIPine (NORVASC) 10 MG tablet Take 1 tablet (10 mg) by mouth daily 90 tablet 1     losartan-hydrochlorothiazide (HYZAAR) 100-12.5 MG per tablet Take 1 tablet by mouth daily 90 tablet 1     metFORMIN (GLUCOPHAGE) 1000 MG tablet Take 1 tablet (1,000 mg) by mouth 2 times daily (with meals) 180 tablet 3     B-D U/F insulin pen needle USE AS DIRECTED THREE TIMES DAILY BEFORE MEALS 180 each 1     [DISCONTINUED] BASAGLAR 100 UNIT/ML injection INJECT 45 UNITS SUBCUTANEOUS 2 TIMES DAILY 15 mL 0     Allergies   Allergen Reactions     Victoza Hives     Alfredo Boedo product     Enalapril Cough     Lisinopril Cough  "    Wellbutrin [Bupropion Hcl] Hives     hives     Recent Labs   Lab Test  12/29/17   0930  10/26/17   1610  05/08/17   1541 01/10/17  02/11/15   1342  08/19/14   1831   02/04/14   0846   07/12/12   1000   A1C  12.4*   --   11.2*   --   10.5*  8.6*   < >  9.5*   < >  10.4*   LDL   --    --   136*  108   --    --    --   Cannot estimate LDL when triglyceride exceeds 400 mg/dL  85   < >  Cannot estimate LDL when triglyceride exceeds 400 mg/dL  54   HDL   --    --   45*  39*   --    --    --   35*   < >  36*   TRIG   --    --   336*  377*   --    --    --   409*   < >  514*   ALT  38   --   37   --    --   43   --   44   < >  102*   CR  0.60  0.55  0.57   --   0.51*  0.68   --   0.54   < >  0.60   GFRESTIMATED  >90  >90  >90  Non African American GFR Calc     --   >90  Non  GFR Calc    89   --   >90   < >  >90   GFRESTBLACK  >90  >90  >90  African American GFR Calc     --   >90   GFR Calc    >90   GFR Calc     --   >90   < >  >90   POTASSIUM  3.8  3.8  3.9   --   3.7  3.3*   --   3.7   < >  4.0   TSH   --    --   1.02   --    --    --    --    --    --   1.35    < > = values in this interval not displayed.      BP Readings from Last 3 Encounters:   03/12/18 142/82   02/07/18 136/72   12/29/17 152/72    Wt Readings from Last 3 Encounters:   03/12/18 179 lb (81.2 kg)   02/07/18 180 lb 12.8 oz (82 kg)   12/29/17 184 lb 9.6 oz (83.7 kg)          Reviewed and updated as needed this visit by clinical staff  Tobacco  Allergies  Meds       Reviewed and updated as needed this visit by Provider  Allergies  Meds  Problems         ROS:  Constitutional, HEENT, cardiovascular, pulmonary, gi and gu systems are negative, except as otherwise noted.    OBJECTIVE:     /82  Pulse 91  Temp 97.9  F (36.6  C)  Resp 13  Ht 5' 3\" (1.6 m)  Wt 179 lb (81.2 kg)  SpO2 98%  BMI 31.71 kg/m2  Body mass index is 31.71 kg/(m^2).  GENERAL: healthy, alert and no distress  EYES: Eyes " grossly normal to inspection, PERRL and conjunctivae and sclerae normal  HENT: ear canals and TM's normal, nose and mouth without ulcers or lesions  NECK: no adenopathy, no asymmetry, masses, or scars and thyroid normal to palpation  RESP: lungs clear to auscultation - no rales, rhonchi or wheezes  CV: regular rate and rhythm, normal S1 S2, no S3 or S4, no murmur, click or rub, no peripheral edema and peripheral pulses strong  ABDOMEN: soft, nontender, no hepatosplenomegaly, no masses and bowel sounds normal  MS: tenderness over TMJ, no swelling or redness noted.  Pain with opening of jaw, no cracking or crepitus noted.  SKIN: no suspicious lesions or rashes  NEURO: Normal strength and tone, mentation intact and speech normal  PSYCH: anxious    Diagnostic Test Results:  No results found for this or any previous visit (from the past 24 hour(s)).    ASSESSMENT/PLAN:     1. Temporomandibular joint disorder  Treatment as below.  Patient cannot afford dental guard or physical therapy right now.  Will follow up if not improving.    - naproxen (NAPROSYN) 500 MG tablet; Take 1 tablet (500 mg) by mouth 2 times daily as needed for moderate pain  Dispense: 60 tablet; Refill: 0  - cyclobenzaprine (FLEXERIL) 5 MG tablet; Take 1 tablet (5 mg) by mouth At Bedtime  Dispense: 10 tablet; Refill: 0    2. Essential hypertension with goal blood pressure less than 130/80  Did not take blood pressure medication today.  Slightly above goal.    3. Uncontrolled type 2 diabetes mellitus with microalbuminuria, with long-term current use of insulin (H)  Patient has not followed up for financial reasons.  She is not entirely compliant with mealtime insulin.  She does take basaglar as prescribed.  Refilled.  Will follow up over phone based on today's A1c.   - Hemoglobin A1c  - BASAGLAR 100 UNIT/ML injection; INJECT 45 UNITS SUBCUTANEOUS 2 TIMES DAILY  Dispense: 15 mL; Refill: 0    4. Type 2 diabetes mellitus with diabetic nephropathy, with  long-term current use of insulin (H)  Refilled.  - BASAGLAR 100 UNIT/ML injection; INJECT 45 UNITS SUBCUTANEOUS 2 TIMES DAILY  Dispense: 15 mL; Refill: 0    Patient Instructions   For the TMJ:  -naproxen twice a day for the next week or so to decrease pain and inflammation.  -flexeril at night for the muscle spasm (will make you drowsy)  -if you develop a fever, please come back right away    Helping Your Temporomandibular Joint (TMJ) Heal  The temporomandibular joint (TMJ) is a ball-and-socket joint located where the upper and lower jaws meet. When the TMJ and related muscles are injured, they need time to heal. Self-care is very important. You can take steps to reduce pressure on the TMJ and speed healing.    Eating with care  Chewing strains the TMJ. When symptoms are bad, you may not be able to chew at all. To get you through times when your symptoms are at their worst, try these tips:    Choose soft foods, like scrambled eggs, oatmeal, yogurt, quiche, tofu, soup, smoothies, pasta, fish, mashed potatoes, milkshakes, bananas, applesauce, gelatin, or ice cream.    Avoid biting into hard foods, like whole apples, carrots, and corn on the cob. Instead, cut foods into bite-sized pieces.    Grind or finely chop meats and other tough foods. Try hamburger meat instead of steak.  Using ice and heat  Your health care provider may suggest using ice and heat. Ice helps reduce swelling and pain. Heat helps relax muscles, increasing blood flow.    Use a gel pack or ice wrapped in a towel for severe pain. Apply for 10 to 20 minutes. Repeat as needed.    Use moist heat for mild to moderate muscle pain. Apply a moist, warm towel to the muscles for 10 to 20 minutes. Repeat as needed.  Avoiding triggers  Certain activities (called triggers) strain the TMJ, making symptoms worse. The tips below can help you avoid common triggers and limit strain.    Avoid hard or chewy foods, like nuts, pretzels, popcorn, chips, gum, caramel, gummy  candies, carrots, whole apples, hard breads, and even ice.    Reschedule routine dental visits, like cleanings, if your jaw aches. If you have severe pain, call your health care provider.    Support your jaw when yawning. When you feel a yawn coming on, put a fist under your jaw. Apply gentle pressure. This helps prevent wide, painful yawns.    Avoid any activity that hurts, like nail biting, yelling, and singing.  Maintaining good posture  Work at improving your posture during the day and when you sleep. Good posture can help your body heal. Try these tips:    Use a headset when on the telephone. Don t cradle the phone with your shoulder.    Keep ergonomics in mind. This includes making sure your workstation fits your body. Support your lower back. Take frequent breaks to stretch and rest. If you use a computer, keep the monitor at eye level.    Keep your head in a neutral position, with your ears in line with your shoulders. Don t slouch or crane your head forward.    Use an orthopaedic pillow to support your head and neck during sleep.  Date Last Reviewed: 7/13/2015 2000-2017 [employer]. [address] [place] [address], [place] [address]. All rights reserved. This information is not intended as a substitute for professional medical care. Always follow your healthcare professional's instructions.            YVON Tate [facility]

## 2018-03-12 NOTE — PATIENT INSTRUCTIONS
For the TMJ:  -naproxen twice a day for the next week or so to decrease pain and inflammation.  -flexeril at night for the muscle spasm (will make you drowsy)  -if you develop a fever, please come back right away    Helping Your Temporomandibular Joint (TMJ) Heal  The temporomandibular joint (TMJ) is a ball-and-socket joint located where the upper and lower jaws meet. When the TMJ and related muscles are injured, they need time to heal. Self-care is very important. You can take steps to reduce pressure on the TMJ and speed healing.    Eating with care  Chewing strains the TMJ. When symptoms are bad, you may not be able to chew at all. To get you through times when your symptoms are at their worst, try these tips:    Choose soft foods, like scrambled eggs, oatmeal, yogurt, quiche, tofu, soup, smoothies, pasta, fish, mashed potatoes, milkshakes, bananas, applesauce, gelatin, or ice cream.    Avoid biting into hard foods, like whole apples, carrots, and corn on the cob. Instead, cut foods into bite-sized pieces.    Grind or finely chop meats and other tough foods. Try hamburger meat instead of steak.  Using ice and heat  Your health care provider may suggest using ice and heat. Ice helps reduce swelling and pain. Heat helps relax muscles, increasing blood flow.    Use a gel pack or ice wrapped in a towel for severe pain. Apply for 10 to 20 minutes. Repeat as needed.    Use moist heat for mild to moderate muscle pain. Apply a moist, warm towel to the muscles for 10 to 20 minutes. Repeat as needed.  Avoiding triggers  Certain activities (called triggers) strain the TMJ, making symptoms worse. The tips below can help you avoid common triggers and limit strain.    Avoid hard or chewy foods, like nuts, pretzels, popcorn, chips, gum, caramel, gummy candies, carrots, whole apples, hard breads, and even ice.    Reschedule routine dental visits, like cleanings, if your jaw aches. If you have severe pain, call your health care  provider.    Support your jaw when yawning. When you feel a yawn coming on, put a fist under your jaw. Apply gentle pressure. This helps prevent wide, painful yawns.    Avoid any activity that hurts, like nail biting, yelling, and singing.  Maintaining good posture  Work at improving your posture during the day and when you sleep. Good posture can help your body heal. Try these tips:    Use a headset when on the telephone. Don t cradle the phone with your shoulder.    Keep ergonomics in mind. This includes making sure your workstation fits your body. Support your lower back. Take frequent breaks to stretch and rest. If you use a computer, keep the monitor at eye level.    Keep your head in a neutral position, with your ears in line with your shoulders. Don t slouch or crane your head forward.    Use an orthopaedic pillow to support your head and neck during sleep.  Date Last Reviewed: 7/13/2015 2000-2017 [employer]. [address] [place] [address], [place] [address]. All rights reserved. This information is not intended as a substitute for professional medical care. Always follow your healthcare professional's instructions.

## 2018-03-12 NOTE — MR AVS SNAPSHOT
After Visit Summary   3/12/2018    Ellen Hill    MRN: [id number]           Patient Information     Date Of Birth          1955        Visit Information        Provider Department      3/12/2018 12:20 PM Marcella Teixeira APRN [facility]        Today's Diagnoses     Temporomandibular joint disorder    -  1    Essential hypertension with goal blood pressure less than 130/80        Uncontrolled type 2 diabetes mellitus with microalbuminuria, with long-term current use of insulin (H)        Type 2 diabetes mellitus with diabetic nephropathy, with long-term current use of insulin (H)          Care Instructions    For the TMJ:  -naproxen twice a day for the next week or so to decrease pain and inflammation.  -flexeril at night for the muscle spasm (will make you drowsy)  -if you develop a fever, please come back right away    Helping Your Temporomandibular Joint (TMJ) Heal  The temporomandibular joint (TMJ) is a ball-and-socket joint located where the upper and lower jaws meet. When the TMJ and related muscles are injured, they need time to heal. Self-care is very important. You can take steps to reduce pressure on the TMJ and speed healing.    Eating with care  Chewing strains the TMJ. When symptoms are bad, you may not be able to chew at all. To get you through times when your symptoms are at their worst, try these tips:    Choose soft foods, like scrambled eggs, oatmeal, yogurt, quiche, tofu, soup, smoothies, pasta, fish, mashed potatoes, milkshakes, bananas, applesauce, gelatin, or ice cream.    Avoid biting into hard foods, like whole apples, carrots, and corn on the cob. Instead, cut foods into bite-sized pieces.    Grind or finely chop meats and other tough foods. Try hamburger meat instead of steak.  Using ice and heat  Your health care provider may suggest using ice and heat. Ice helps reduce swelling and pain. Heat helps relax muscles, increasing blood  flow.    Use a gel pack or ice wrapped in a towel for severe pain. Apply for 10 to 20 minutes. Repeat as needed.    Use moist heat for mild to moderate muscle pain. Apply a moist, warm towel to the muscles for 10 to 20 minutes. Repeat as needed.  Avoiding triggers  Certain activities (called triggers) strain the TMJ, making symptoms worse. The tips below can help you avoid common triggers and limit strain.    Avoid hard or chewy foods, like nuts, pretzels, popcorn, chips, gum, caramel, gummy candies, carrots, whole apples, hard breads, and even ice.    Reschedule routine dental visits, like cleanings, if your jaw aches. If you have severe pain, call your health care provider.    Support your jaw when yawning. When you feel a yawn coming on, put a fist under your jaw. Apply gentle pressure. This helps prevent wide, painful yawns.    Avoid any activity that hurts, like nail biting, yelling, and singing.  Maintaining good posture  Work at improving your posture during the day and when you sleep. Good posture can help your body heal. Try these tips:    Use a headset when on the telephone. Don t cradle the phone with your shoulder.    Keep ergonomics in mind. This includes making sure your workstation fits your body. Support your lower back. Take frequent breaks to stretch and rest. If you use a computer, keep the monitor at eye level.    Keep your head in a neutral position, with your ears in line with your shoulders. Don t slouch or crane your head forward.    Use an orthopaedic pillow to support your head and neck during sleep.  Date Last Reviewed: 7/13/2015 2000-2017 [employer]. [address] [place] [address]. All rights reserved. This information is not intended as a substitute for professional medical care. Always follow your healthcare professional's instructions.                Follow-ups after your visit        Follow-up notes from your care team     Return in about 1 week (around  "3/19/2018), or if symptoms worsen or fail to improve.      Who to contact     If you have questions or need follow up information about today's clinic visit or your schedule please contact [facility] JUAN QUINTERO directly at [phone].  Normal or non-critical lab and imaging results will be communicated to you by MyChart, letter or phone within 4 business days after the clinic has received the results. If you do not hear from us within 7 days, please contact the clinic through MyChart or phone. If you have a critical or abnormal lab result, we will notify you by phone as soon as possible.  Submit refill requests through DrAvailable or call your pharmacy and they will forward the refill request to us. Please allow 3 business days for your refill to be completed.          Additional Information About Your Visit        MyChart Information     DrAvailable lets you send messages to your doctor, view your test results, renew your prescriptions, schedule appointments and more. To sign up, go to [email or web address] . Click on \"Log in\" on the left side of the screen, which will take you to the Welcome page. Then click on \"Sign up Now\" on the right side of the page.     You will be asked to enter the access code listed below, as well as some personal information. Please follow the directions to create your username and password.     Your access code is: [email or web address]  Expires: 2018  5:17 PM     Your access code will  in 90 days. If you need help or a new code, please call your [facility] clinic or [phone].        Care EveryWhere ID     This is your Care EveryWhere ID. This could be used by other organizations to access your [facility] medical records  [id number]        Your Vitals Were     Pulse Temperature Respirations Height Pulse Oximetry BMI (Body Mass Index)    91 97.9  F (36.6  C) 13 5' 3\" (1.6 m) 98% 31.71 kg/m2       Blood Pressure from Last 3 Encounters:   18 142/82   18 136/72 "   12/29/17 152/72    Weight from Last 3 Encounters:   03/12/18 179 lb (81.2 kg)   02/07/18 180 lb 12.8 oz (82 kg)   12/29/17 184 lb 9.6 oz (83.7 kg)              We Performed the Following     Hemoglobin A1c          Today's Medication Changes          These changes are accurate as of 3/12/18 12:57 PM.  If you have any questions, ask your nurse or doctor.               Start taking these medicines.        Dose/Directions    cyclobenzaprine 5 MG tablet   Commonly known as:  FLEXERIL   Used for:  Temporomandibular joint disorder   Started by:  Marcella Teixeira APRN CNP        Dose:  5 mg   Take 1 tablet (5 mg) by mouth At Bedtime   Quantity:  10 tablet   Refills:  0       naproxen 500 MG tablet   Commonly known as:  NAPROSYN   Used for:  Temporomandibular joint disorder   Started by:  Marcella Teixeira APRN CNP        Dose:  500 mg   Take 1 tablet (500 mg) by mouth 2 times daily as needed for moderate pain   Quantity:  60 tablet   Refills:  0            Where to get your medicines      These medications were sent to [facility]/pharmacy #[id number] - [place], [place] - [address]  [address] [address]     Phone:  [phone]     BASAGLAR 100 UNIT/ML injection         These medications were sent to [place], [place] - [address]  [address] [facility] [address]     Phone:  [phone]     cyclobenzaprine 5 MG tablet    naproxen 500 MG tablet                Primary Care Provider Office Phone # Fax #    YVON Navarro [phone]       [address]  [facility] [address]        Equal Access to Services     CATHY WALL AH: Chuck Hadley, wasoumya álvarez, qaybta kaalmada ademakiyadinorah, ethel Barrett [facility] [phone].    ATENCIÓN: Si habla español, tiene a dyson disposición servicios gratuitos de asistencia lingüística. Terri marlow [phone].    We comply with  applicable federal civil rights laws and [place] laws. We do not discriminate on the basis of race, color, national origin, age, disability, sex, sexual orientation, or gender identity.            Thank you!     Thank you for choosing [facility]  for your care. Our goal is always to provide you with excellent care. Hearing back from our patients is one way we can continue to improve our services. Please take a few minutes to complete the written survey that you may receive in the mail after your visit with us. Thank you!             Your Updated Medication List - Protect others around you: Learn how to safely use, store and throw away your medicines at www.disposemymeds.org.          This list is accurate as of 3/12/18 12:57 PM.  Always use your most recent med list.                   Brand Name Dispense Instructions for use Diagnosis    amLODIPine 10 MG tablet    NORVASC    90 tablet    Take 1 tablet (10 mg) by mouth daily    Hypertension goal BP (blood pressure) < 130/80       atorvastatin 40 MG tablet    LIPITOR    90 tablet    Take 1 tablet (40 mg) by mouth daily    Type 2 diabetes mellitus with diabetic nephropathy, with long-term current use of insulin (H), Uncontrolled type 2 diabetes mellitus with microalbuminuria, with long-term current use of insulin (H)       B-D U/F 31G X 5 MM   Generic drug:  insulin pen needle     180 each    USE AS DIRECTED THREE TIMES DAILY BEFORE MEALS    Type 2 diabetes, HbA1c goal < 7% (H)       BASAGLAR 100 UNIT/ML injection     15 mL    INJECT 45 UNITS SUBCUTANEOUS 2 TIMES DAILY    Type 2 diabetes mellitus with diabetic nephropathy, with long-term current use of insulin (H), Uncontrolled type 2 diabetes mellitus with microalbuminuria, with long-term current use of insulin (H)       cyclobenzaprine 5 MG tablet    FLEXERIL    10 tablet    Take 1 tablet (5 mg) by mouth At Bedtime    Temporomandibular joint disorder       escitalopram 10 MG tablet    LEXAPRO      Take 10 mg by mouth daily        insulin aspart 100 UNIT/ML injection    NovoLOG FLEXPEN    15 mL    30 units before breakfast, 30 units before lunch, 30 units before dinner    Type 2 diabetes mellitus with diabetic nephropathy, with long-term current use of insulin (H), Uncontrolled type 2 diabetes mellitus with microalbuminuria, with long-term current use of insulin (H)       losartan-hydrochlorothiazide 100-12.5 MG per tablet    HYZAAR    90 tablet    Take 1 tablet by mouth daily    Type 2 diabetes mellitus with diabetic nephropathy, with long-term current use of insulin (H)       metFORMIN 1000 MG tablet    GLUCOPHAGE    180 tablet    Take 1 tablet (1,000 mg) by mouth 2 times daily (with meals)    Uncontrolled type 2 diabetes mellitus with microalbuminuria, with long-term current use of insulin (H)       naproxen 500 MG tablet    NAPROSYN    60 tablet    Take 1 tablet (500 mg) by mouth 2 times daily as needed for moderate pain    Temporomandibular joint disorder

## 2018-08-13 ENCOUNTER — OFFICE VISIT (OUTPATIENT)
Dept: FAMILY MEDICINE | Facility: CLINIC | Age: 63
End: 2018-08-13
Payer: COMMERCIAL

## 2018-08-13 VITALS
SYSTOLIC BLOOD PRESSURE: 162 MMHG | DIASTOLIC BLOOD PRESSURE: 80 MMHG | HEART RATE: 95 BPM | TEMPERATURE: 98.4 F | BODY MASS INDEX: 30.65 KG/M2 | RESPIRATION RATE: 20 BRPM | OXYGEN SATURATION: 97 % | WEIGHT: 173 LBS

## 2018-08-13 DIAGNOSIS — F10.20 ALCOHOLIC (H): ICD-10-CM

## 2018-08-13 DIAGNOSIS — Z91.148 NONCOMPLIANCE WITH MEDICATION REGIMEN: ICD-10-CM

## 2018-08-13 DIAGNOSIS — F17.200 SMOKER: ICD-10-CM

## 2018-08-13 DIAGNOSIS — I10 ESSENTIAL HYPERTENSION WITH GOAL BLOOD PRESSURE LESS THAN 130/80: Primary | ICD-10-CM

## 2018-08-13 DIAGNOSIS — F32.1 MODERATE MAJOR DEPRESSION (H): ICD-10-CM

## 2018-08-13 DIAGNOSIS — R63.4 WEIGHT LOSS: ICD-10-CM

## 2018-08-13 LAB
CHOLEST SERPL-MCNC: 210 MG/DL
CREAT UR-MCNC: 56 MG/DL
GLUCOSE SERPL-MCNC: 383 MG/DL (ref 70–99)
HBA1C MFR BLD: 12.9 % (ref 0–5.6)
HDLC SERPL-MCNC: 32 MG/DL
LDLC SERPL CALC-MCNC: ABNORMAL MG/DL
LDLC SERPL DIRECT ASSAY-MCNC: 85 MG/DL
MICROALBUMIN UR-MCNC: 286 MG/L
MICROALBUMIN/CREAT UR: 506.2 MG/G CR (ref 0–25)
NONHDLC SERPL-MCNC: 178 MG/DL
TRIGL SERPL-MCNC: 573 MG/DL

## 2018-08-13 PROCEDURE — 83036 HEMOGLOBIN GLYCOSYLATED A1C: CPT | Performed by: NURSE PRACTITIONER

## 2018-08-13 PROCEDURE — 82043 UR ALBUMIN QUANTITATIVE: CPT | Performed by: NURSE PRACTITIONER

## 2018-08-13 PROCEDURE — 80061 LIPID PANEL: CPT | Performed by: NURSE PRACTITIONER

## 2018-08-13 PROCEDURE — 99214 OFFICE O/P EST MOD 30 MIN: CPT | Performed by: NURSE PRACTITIONER

## 2018-08-13 PROCEDURE — 83721 ASSAY OF BLOOD LIPOPROTEIN: CPT | Mod: 59 | Performed by: NURSE PRACTITIONER

## 2018-08-13 PROCEDURE — 36415 COLL VENOUS BLD VENIPUNCTURE: CPT | Performed by: NURSE PRACTITIONER

## 2018-08-13 PROCEDURE — 82947 ASSAY GLUCOSE BLOOD QUANT: CPT | Performed by: NURSE PRACTITIONER

## 2018-08-13 RX ORDER — INSULIN GLARGINE 100 [IU]/ML
45 INJECTION, SOLUTION SUBCUTANEOUS 2 TIMES DAILY
Qty: 15 ML | Refills: 1 | Status: SHIPPED | OUTPATIENT
Start: 2018-08-13 | End: 2018-11-26

## 2018-08-13 ASSESSMENT — PAIN SCALES - GENERAL: PAINLEVEL: NO PAIN (0)

## 2018-08-13 NOTE — MR AVS SNAPSHOT
After Visit Summary   8/13/2018    Ellen Hill    MRN: [id number]           Patient Information     Date Of Birth          1955        Visit Information        Provider Department      8/13/2018 10:00 AM Marcella Teixeira APRN CNP [facility]        Today's Diagnoses     Essential hypertension with goal blood pressure less than 130/80    -  1    Uncontrolled type 2 diabetes mellitus with microalbuminuria, with long-term current use of insulin (H)        Type 2 diabetes mellitus with diabetic nephropathy, with long-term current use of insulin (H)        Alcoholic (H)          Care Instructions    Restart medications:  -take Basaglar twice a day 45 units  -take Novolog twice a day, 10 units with breakfast, 30 units with dinner    FOLLOW UP 6 weeks for next A1c    At [facility], we strive to deliver an exceptional experience to you, every time we see you.  If you receive a survey in the mail, please send us back your thoughts. We really do value your feedback.    Based on your medical history, these are the current health maintenance/preventive care services that you are due for (some may have been done at this visit.)  Health Maintenance Due   Topic Date Due     HIV SCREEN (SYSTEM ASSIGNED)  11/29/1975     EYE EXAM Q1 YEAR  05/07/2011     COLONOSCOPY Q5 YR  11/19/2017     ADVANCE DIRECTIVE PLANNING Q5 YRS  03/28/2018     FOOT EXAM Q1 YEAR  05/08/2018     LIPID MONITORING Q1 YEAR  05/08/2018     PHQ-9 Q6 MONTHS  06/29/2018     MAMMO SCREEN Q2 YR (SYSTEM ASSIGNED)  07/12/2018     DEPRESSION ACTION PLAN Q1 YR  08/01/2018     A1C Q6 MO  09/12/2018       Suggested websites for health information:  Www.[employer].org : Up to date and easily searchable information on multiple topics.  Www.medlineplus.gov : medication info, interactive tutorials, watch real surgeries online  Www.familydoctor.org : good info from the Academy of Family  "Physicians  Www.cdc.gov : public health info, travel advisories, epidemics (H1N1)  Www.aap.org : children's health info, normal development, vaccinations  Www.health.[facility].[place].us : [place] dept of health, public health issues in [place], N1N1    Your care team:                            Family Medicine Internal Medicine   MD Jorge Alberto Ribeiro MD Shantel Branch-Fleming, MD Katya Georgiev PA-C Megan Hill, APRN CNP    John Juares MD Pediatrics   ESCOBAR Christie, MD Catherine Murray APRN CNP   MD Jigna Temple MD Deborah Mielke, MD Kim Thein, APRN [facility]      Clinic hours: Monday - Thursday 7 am-7 pm; Fridays 7 am-5 pm.   Urgent care: Monday - Friday 11 am-9 pm; Saturday and Sunday 9 am-5 pm.  Pharmacy : Monday -Thursday 8 am-8 pm; Friday 8 am-6 pm; Saturday and Sunday 9 am-5 pm.     Clinic: [phone]   Pharmacy: [phone]              Follow-ups after your visit        Who to contact     If you have questions or need follow up information about today's clinic visit or your schedule please contact [facility] directly at [phone].  Normal or non-critical lab and imaging results will be communicated to you by MyChart, letter or phone within 4 business days after the clinic has received the results. If you do not hear from us within 7 days, please contact the clinic through [employer] or phone. If you have a critical or abnormal lab result, we will notify you by phone as soon as possible.  Submit refill requests through [employer] or call your pharmacy and they will forward the refill request to us. Please allow 3 business days for your refill to be completed.          Additional Information About Your Visit        [employer] Information     [employer] lets you send messages to your doctor, view your test results, renew your prescriptions, schedule appointments and more. To sign up, go to [email or web address] . Click on \"Log in\" on the " "left side of the screen, which will take you to the Welcome page. Then click on \"Sign up Now\" on the right side of the page.     You will be asked to enter the access code listed below, as well as some personal information. Please follow the directions to create your username and password.     Your access code is: [email or web address]  Expires: 2018 10:31 AM     Your access code will  in 90 days. If you need help or a new code, please call your [facility] clinic or [phone].        Care EveryWhere ID     This is your Care EveryWhere ID. This could be used by other organizations to access your [facility] medical records  [id number]        Your Vitals Were     Pulse Temperature Respirations Pulse Oximetry BMI (Body Mass Index)       95 98.4  F (36.9  C) (Oral) 20 97% 30.65 kg/m2        Blood Pressure from Last 3 Encounters:   18 162/80   18 142/82   18 136/72    Weight from Last 3 Encounters:   18 173 lb (78.5 kg)   18 179 lb (81.2 kg)   18 180 lb 12.8 oz (82 kg)              We Performed the Following     Albumin Random Urine Quantitative with Creat Ratio     Glucose     Hemoglobin A1c     Lipid panel reflex to direct LDL Fasting          Today's Medication Changes          These changes are accurate as of 18 10:31 AM.  If you have any questions, ask your nurse or doctor.               These medicines have changed or have updated prescriptions.        Dose/Directions    BASAGLAR 100 UNIT/ML injection   This may have changed:    - how much to take  - how to take this  - when to take this  - additional instructions   Used for:  Type 2 diabetes mellitus with diabetic nephropathy, with long-term current use of insulin (H), Uncontrolled type 2 diabetes mellitus with microalbuminuria, with long-term current use of insulin (H)   Changed by:  Marcella Teixeira APRN CNP        Dose:  45 Units   Inject 45 Units Subcutaneous 2 times daily   Quantity:  15 mL   Refills:  1    "    insulin aspart 100 UNIT/ML injection   Commonly known as:  NovoLOG FLEXPEN   This may have changed:  additional instructions   Used for:  Type 2 diabetes mellitus with diabetic nephropathy, with long-term current use of insulin (H), Uncontrolled type 2 diabetes mellitus with microalbuminuria, with long-term current use of insulin (H)   Changed by:  Marcella Teixeira APRN CNP        10 units before breakfast, 30 units before dinner   Quantity:  15 mL   Refills:  1            Where to get your medicines      These medications were sent to [facility]/pharmacy #[id number] - [facility], [place] - [address]  [address] [facility] [address]     Phone:  [phone]     BASAGLAR 100 UNIT/ML injection         Some of these will need a paper prescription and others can be bought over the counter.  Ask your nurse if you have questions.     You don't need a prescription for these medications     insulin aspart 100 UNIT/ML injection                Primary Care Provider Office Phone # Fax #    YVON Navarro [phone]       [address]  [facility] [address]        Equal Access to Services     CATHY WALL : Hadii francesca ku hadasho Soomaali, waaxda luqadaha, qaybta kaalmada adeegyada, ethel velazco . So [facility] [phone].    ATENCIÓN: Si habla español, tiene a dyson disposición servicios gratuitos de asistencia lingüística. Llame al [phone].    We comply with applicable federal civil rights laws and [place] laws. We do not discriminate on the basis of race, color, national origin, age, disability, sex, sexual orientation, or gender identity.            Thank you!     Thank you for choosing [facility]  for your care. Our goal is always to provide you with excellent care. Hearing back from our patients is one way we can continue to improve our services. Please take a few minutes to complete the written survey that you may  receive in the mail after your visit with us. Thank you!             Your Updated Medication List - Protect others around you: Learn how to safely use, store and throw away your medicines at www.disposemymeds.org.          This list is accurate as of 8/13/18 10:31 AM.  Always use your most recent med list.                   Brand Name Dispense Instructions for use Diagnosis    amLODIPine 10 MG tablet    NORVASC    90 tablet    Take 1 tablet (10 mg) by mouth daily    Hypertension goal BP (blood pressure) < 130/80       atorvastatin 40 MG tablet    LIPITOR    90 tablet    Take 1 tablet (40 mg) by mouth daily    Type 2 diabetes mellitus with diabetic nephropathy, with long-term current use of insulin (H), Uncontrolled type 2 diabetes mellitus with microalbuminuria, with long-term current use of insulin (H)       B-D U/F 31G X 5 MM   Generic drug:  insulin pen needle     180 each    USE AS DIRECTED THREE TIMES DAILY BEFORE MEALS    Type 2 diabetes, HbA1c goal < 7% (H)       BASAGLAR 100 UNIT/ML injection     15 mL    Inject 45 Units Subcutaneous 2 times daily    Type 2 diabetes mellitus with diabetic nephropathy, with long-term current use of insulin (H), Uncontrolled type 2 diabetes mellitus with microalbuminuria, with long-term current use of insulin (H)       escitalopram 10 MG tablet    LEXAPRO     Take 10 mg by mouth daily        insulin aspart 100 UNIT/ML injection    NovoLOG FLEXPEN    15 mL    10 units before breakfast, 30 units before dinner    Type 2 diabetes mellitus with diabetic nephropathy, with long-term current use of insulin (H), Uncontrolled type 2 diabetes mellitus with microalbuminuria, with long-term current use of insulin (H)       losartan-hydrochlorothiazide 100-12.5 MG per tablet    HYZAAR    90 tablet    Take 1 tablet by mouth daily    Type 2 diabetes mellitus with diabetic nephropathy, with long-term current use of insulin (H)       metFORMIN 1000 MG tablet    GLUCOPHAGE    180 tablet    Take  1 tablet (1,000 mg) by mouth 2 times daily (with meals)    Uncontrolled type 2 diabetes mellitus with microalbuminuria, with long-term current use of insulin (H)

## 2018-08-13 NOTE — PATIENT INSTRUCTIONS
Restart medications:  -take Basaglar twice a day 45 units  -take Novolog twice a day, 10 units with breakfast, 30 units with dinner    FOLLOW UP 6 weeks for next A1c    At [facility], we strive to deliver an exceptional experience to you, every time we see you.  If you receive a survey in the mail, please send us back your thoughts. We really do value your feedback.    Based on your medical history, these are the current health maintenance/preventive care services that you are due for (some may have been done at this visit.)  Health Maintenance Due   Topic Date Due     HIV SCREEN (SYSTEM ASSIGNED)  11/29/1975     EYE EXAM Q1 YEAR  05/07/2011     COLONOSCOPY Q5 YR  11/19/2017     ADVANCE DIRECTIVE PLANNING Q5 YRS  03/28/2018     FOOT EXAM Q1 YEAR  05/08/2018     LIPID MONITORING Q1 YEAR  05/08/2018     PHQ-9 Q6 MONTHS  06/29/2018     MAMMO SCREEN Q2 YR (SYSTEM ASSIGNED)  07/12/2018     DEPRESSION ACTION PLAN Q1 YR  08/01/2018     A1C Q6 MO  09/12/2018       Suggested websites for health information:  Www.[place].org : Up to date and easily searchable information on multiple topics.  Www.medlineplus.gov : medication info, interactive tutorials, watch real surgeries online  Www.familydoctor.org : good info from the Academy of Family Physicians  Www.cdc.gov : public health info, travel advisories, epidemics (H1N1)  Www.aap.org : children's health info, normal development, vaccinations  Www.health.state.[place].us : [place] dept of health, public health issues in [place], N1N1    Your care team:                            Family Medicine Internal Medicine   MD Jorge Alberto Ribeiro MD Shantel Branch-Fleming, MD Katya Georgiev PA-C Megan Hill, APRN LADONNA Juares MD Pediatrics   Delta Berman PAAYSHA Teixeira, MD Catherine Murray APRN CNP   MD Jigna Temple MD Deborah Mielke, MD Kim Thein, APRN CNP      Clinic hours: Monday - Thursday 7 am-7 pm; Fridays 7  am-5 pm.   Urgent care: Monday - Friday 11 am-9 pm; Saturday and Sunday 9 am-5 pm.  Pharmacy : Monday -Thursday 8 am-8 pm; Friday 8 am-6 pm; Saturday and Sunday 9 am-5 pm.     Clinic: [phone]   Pharmacy: [phone]

## 2018-08-13 NOTE — LETTER
August 13, 2018      Ellen Hill  [address] [place] [address]            Rizwan Hughes,    I am happy to report that your labs have come back.  Your glucose and cholesterol are elevated as expected.   Your kidney function continues to be lower than normal due to your diabetes and hypertension.  Please continue the plan of care as discussed in your visit and follow up in 6 weeks.    Feel free to contact me with any questions or concerns.  Thank you for allowing me to participate in your care.    Marcella Teixeira, APRN CNP/ag    Results for orders placed or performed in visit on 08/13/18   Hemoglobin A1c   Result Value Ref Range    Hemoglobin A1C 12.9 (H) 0 - 5.6 %   Albumin Random Urine Quantitative with Creat Ratio   Result Value Ref Range    Creatinine Urine 56 mg/dL    Albumin Urine mg/L 286 mg/L    Albumin Urine mg/g Cr 506.20 (H) 0 - 25 mg/g Cr   Glucose   Result Value Ref Range    Glucose 383 (H) 70 - 99 mg/dL   Lipid panel reflex to direct LDL Fasting   Result Value Ref Range    Cholesterol 210 (H) <200 mg/dL    Triglycerides 573 (H) <150 mg/dL    HDL Cholesterol 32 (L) >49 mg/dL    LDL Cholesterol Calculated  <100 mg/dL     Cannot estimate LDL when triglyceride exceeds 400 mg/dL    Non HDL Cholesterol 178 (H) <130 mg/dL   LDL cholesterol direct   Result Value Ref Range    LDL Cholesterol Direct 85 <100 mg/dL

## 2018-08-13 NOTE — PROGRESS NOTES
SUBJECTIVE:   Ellen Hill is a 62 year old female who presents to clinic today for the following health issues:    Diabetes Follow-up      Patient is checking blood sugars: not at all    Diabetic concerns: None     Symptoms of hypoglycemia (low blood sugar): none     Paresthesias (numbness or burning in feet) or sores: No     Date of last diabetic eye exam: 06.2018    Diabetes Management Resources    Hyperlipidemia Follow-Up      Rate your low fat/cholesterol diet?: good    Taking statin?  Yes, no muscle aches from statin    Other lipid medications/supplements?:  none    Hypertension Follow-up      Outpatient blood pressures are not being checked.    Low Salt Diet: not monitoring salt    BP Readings from Last 2 Encounters:   08/13/18 162/80   03/12/18 142/82     Hemoglobin A1C (%)   Date Value   08/13/2018 12.9 (H)   03/12/2018 12.8 (H)     LDL Cholesterol Calculated (mg/dL)   Date Value   05/08/2017 136 (H)   02/04/2014     Cannot estimate LDL when triglyceride exceeds 400 mg/dL     LDL Cholesterol Direct (mg/dL)   Date Value   01/10/2017 108   02/04/2014 85       Amount of exercise or physical activity: 4-5 days/week for an average of 30-45 minutes    Problems taking medications regularly: Yes,  problems remembering to take    Medication side effects: none    Diet: regular (no restrictions)    Out of basaglar x 2 weeks, did not call for refills.  Admits that prior to this she was only taking 1-2 doses a week.    Novolog not taking at all.  She hasn't for several months.  She states it's just too hard to do it with meals.    Not taking metformin due to concerns about dementia.  Not taking any blood pressure medications either. She doesn't know why. States her depression and anxiety are under control.  She just has not felt motivated to take care of her diabetes.    Initially said not smoking or drinking but then admits she smokes whenever she drinks which is about 2-3 times a month.  Only drinks one drink at  a time.  Of note, she does smell of cigarette smoke today.  States did not smoke today.    She is urinating often and is thirsty.  She denies dizziness, chest pain, vomiting, nausea, or diarrhea.        Problem list and histories reviewed & adjusted, as indicated.  Additional history: as documented    Patient Active Problem List   Diagnosis     Post herpetic neuralgia     Hyperlipidemia LDL goal <100     Essential hypertension with goal blood pressure less than 130/80     Moderate major depression (H)     Vulvar pruritus     History of adenomatous polyp of colon     Alcoholic (H)     Advanced directives, counseling/discussion     Obesity, Class I, BMI 30-34.9     Leblanc's esophagus without dysplasia     Uncontrolled type 2 diabetes mellitus with microalbuminuria, with long-term current use of insulin (H)     Alcoholic liver disease (H)     Noncompliance with medication regimen     Smoker     Past Surgical History:   Procedure Laterality Date     BIOPSY Right 1989    Breast-Benign lesion as per patient       Social History   Substance Use Topics     Smoking status: Current Some Day Smoker     Packs/day: 0.10     Years: 10.00     Types: Cigarettes     Last attempt to quit: 9/10/2013     Smokeless tobacco: Never Used      Comment: patient trying to quit on 3-4 cig/week     Alcohol use Yes      Comment: drinks 1-2 times a month     Family History   Problem Relation Age of Onset     Alcohol/Drug Mother      HEART DISEASE Mother      Alcohol/Drug Father      Diabetes Brother          Current Outpatient Prescriptions   Medication Sig Dispense Refill     amLODIPine (NORVASC) 10 MG tablet Take 1 tablet (10 mg) by mouth daily 90 tablet 1     atorvastatin (LIPITOR) 40 MG tablet Take 1 tablet (40 mg) by mouth daily 90 tablet 1     B-D U/F insulin pen needle USE AS DIRECTED THREE TIMES DAILY BEFORE MEALS 180 each 1     BASAGLAR 100 UNIT/ML injection Inject 45 Units Subcutaneous 2 times daily 15 mL 1     escitalopram (LEXAPRO)  10 MG tablet Take 10 mg by mouth daily       insulin aspart (NOVOLOG FLEXPEN) 100 UNIT/ML injection 10 units before breakfast, 30 units before dinner 15 mL 1     losartan-hydrochlorothiazide (HYZAAR) 100-12.5 MG per tablet Take 1 tablet by mouth daily 90 tablet 1     metFORMIN (GLUCOPHAGE) 1000 MG tablet Take 1 tablet (1,000 mg) by mouth 2 times daily (with meals) 180 tablet 3     [DISCONTINUED] BASAGLAR 100 UNIT/ML injection INJECT 45 UNITS SUBCUTANEOUS 2 TIMES DAILY (Patient not taking: Reported on 8/13/2018) 15 mL 0     [DISCONTINUED] insulin aspart (NOVOLOG FLEXPEN) 100 UNIT/ML injection 30 units before breakfast, 30 units before lunch, 30 units before dinner 15 mL 1     Allergies   Allergen Reactions     Victoza Hives     [employer] product     Enalapril Cough     Lisinopril Cough     Wellbutrin [Bupropion Hcl] Hives     hives     Recent Labs   Lab Test  08/13/18   0935  03/12/18   1247  12/29/17   0930  10/26/17   1610  05/08/17   1541 01/10/17   08/19/14   1831   02/04/14   0846   07/12/12   1000   A1C  12.9*  12.8*  12.4*   --   11.2*   --    < >  8.6*   < >  9.5*   < >  10.4*   LDL   --    --    --    --   136*  108   --    --    --   Cannot estimate LDL when triglyceride exceeds 400 mg/dL  85   < >  Cannot estimate LDL when triglyceride exceeds 400 mg/dL  54   HDL   --    --    --    --   45*  39*   --    --    --   35*   < >  36*   TRIG   --    --    --    --   336*  377*   --    --    --   409*   < >  514*   ALT   --    --   38   --   37   --    --   43   --   44   < >  102*   CR   --    --   0.60  0.55  0.57   --    < >  0.68   --   0.54   < >  0.60   GFRESTIMATED   --    --   >90  >90  >90  Non African American GFR Calc     --    < >  89   --   >90   < >  >90   GFRESTBLACK   --    --   >90  >90  >90  African American GFR Calc     --    < >  >90   GFR Calc     --   >90   < >  >90   POTASSIUM   --    --   3.8  3.8  3.9   --    < >  3.3*   --   3.7   < >  4.0   TSH   --    --    --     --   1.02   --    --    --    --    --    --   1.35    < > = values in this interval not displayed.      BP Readings from Last 3 Encounters:   08/13/18 162/80   03/12/18 142/82   02/07/18 136/72    Wt Readings from Last 3 Encounters:   08/13/18 173 lb (78.5 kg)   03/12/18 179 lb (81.2 kg)   02/07/18 180 lb 12.8 oz (82 kg)                    Reviewed and updated as needed this visit by clinical staff  Tobacco  Allergies  Meds  Problems       Reviewed and updated as needed this visit by Provider  Allergies  Meds  Problems         ROS:  Constitutional, HEENT, cardiovascular, pulmonary, gi and gu systems are negative, except as otherwise noted.    OBJECTIVE:     /80 (BP Location: Right arm, Patient Position: Chair, Cuff Size: Adult Regular)  Pulse 95  Temp 98.4  F (36.9  C) (Oral)  Resp 20  Wt 173 lb (78.5 kg)  SpO2 97%  BMI 30.65 kg/m2  Body mass index is 30.65 kg/(m^2).  GENERAL: healthy, alert and no distress  EYES: Eyes grossly normal to inspection, PERRL and conjunctivae and sclerae normal  HENT: ear canals and TM's normal, nose and mouth without ulcers or lesions  NECK: no adenopathy, no asymmetry, masses, or scars and thyroid normal to palpation  RESP: lungs clear to auscultation - no rales, rhonchi or wheezes  CV: regular rate and rhythm, normal S1 S2, no S3 or S4, no murmur, click or rub, no peripheral edema and peripheral pulses strong  ABDOMEN: soft, nontender, no hepatosplenomegaly, no masses and bowel sounds normal  MS: no gross musculoskeletal defects noted, no edema  NEURO: Normal strength and tone, mentation intact and speech normal  PSYCH: tangential and affect normal/bright    Diagnostic Test Results:  Results for orders placed or performed in visit on 08/13/18 (from the past 24 hour(s))   Hemoglobin A1c   Result Value Ref Range    Hemoglobin A1C 12.9 (H) 0 - 5.6 %       ASSESSMENT/PLAN:     1. Essential hypertension with goal blood pressure less than 130/80  Did not take medication  today.  She is noncompliant.  States she will restart medication.    - Albumin Random Urine Quantitative with Creat Ratio    2. Uncontrolled type 2 diabetes mellitus with microalbuminuria, with long-term current use of insulin (H)  Discussed at length patient's noncompliance today.  States she will restart her medication regimen.  Agrees to try the Novolog twice a day though ideally she would take it three times a day.  However, we don't really know how her sugars respond to the Basaglar as she hasn't been taking it regularly for many months now.  Of note, she eats a lot of sweets and a lot of carbs.  - Hemoglobin A1c  - Glucose  - Lipid panel reflex to direct LDL Fasting  - BASAGLAR 100 UNIT/ML injection; Inject 45 Units Subcutaneous 2 times daily  Dispense: 15 mL; Refill: 1  - insulin aspart (NOVOLOG FLEXPEN) 100 UNIT/ML injection; 10 units before breakfast, 30 units before dinner  Dispense: 15 mL; Refill: 1    3. Alcoholic (H)  I suspect she is drinking more than she admits as when I questioned her today, she continued to admit to drinking more the more I asked.  Follow closely.    4. Weight loss  Patient was excited about this but I explained the cause is uncontrolled sugars.  Explained she will likely regain when she controls her blood sugars and this is ok as her weight loss was not done in a healthy way.    5. Smoker  precontemplative.    6. Noncompliance with medication regimen  See above.     7. Moderate major depression (H)  States controlled.  Has refills on lexapro.      Patient Instructions     Restart medications:  -take Basaglar twice a day 45 units  -take Novolog twice a day, 10 units with breakfast, 30 units with dinner    FOLLOW UP 6 weeks for next A1c    At [facility], we strive to deliver an exceptional experience to you, every time we see you.  If you receive a survey in the mail, please send us back your thoughts. We really do value your feedback.    Based on your medical  history, these are the current health maintenance/preventive care services that you are due for (some may have been done at this visit.)  Health Maintenance Due   Topic Date Due     HIV SCREEN (SYSTEM ASSIGNED)  11/29/1975     EYE EXAM Q1 YEAR  05/07/2011     COLONOSCOPY Q5 YR  11/19/2017     ADVANCE DIRECTIVE PLANNING Q5 YRS  03/28/2018     FOOT EXAM Q1 YEAR  05/08/2018     LIPID MONITORING Q1 YEAR  05/08/2018     PHQ-9 Q6 MONTHS  06/29/2018     MAMMO SCREEN Q2 YR (SYSTEM ASSIGNED)  07/12/2018     DEPRESSION ACTION PLAN Q1 YR  08/01/2018     A1C Q6 MO  09/12/2018       Suggested websites for health information:  Www.Share0.org : Up to date and easily searchable information on multiple topics.  Www.medlineplus.gov : medication info, interactive tutorials, watch real surgeries online  Www.familydoctor.org : good info from the Academy of Family Physicians  Www.cdc.gov : public health info, travel advisories, epidemics (H1N1)  Www.aap.org : children's health info, normal development, vaccinations  Www.health.[facility].[place].us : [place] dept of health, public health issues in [place], N1N1    Your care team:                            Family Medicine Internal Medicine   MD Jorge Alberto Ribeiro MD Shantel Branch-Fleming, MD Katya Georgiev PA-C Megan Hill, APRN CNP Nam Ho, MD Pediatrics   ESCOBAR Christie, MD Catherine Murray CNP, MD Bethany Templen, MD Deborah Mielke, MD Kim Thein, [facility]      Clinic hours: Monday - Thursday 7 am-7 pm; Fridays 7 am-5 pm.   Urgent care: Monday - Friday 11 am-9 pm; Saturday and Sunday 9 am-5 pm.  Pharmacy : Monday -Thursday 8 am-8 pm; Friday 8 am-6 pm; Saturday and Sunday 9 am-5 pm.     Clinic: [phone]   Pharmacy: [phone]          Marcella Teixeira, YVON [place]  [facility]

## 2018-09-06 ENCOUNTER — TELEPHONE (OUTPATIENT)
Dept: FAMILY MEDICINE | Facility: CLINIC | Age: 63
End: 2018-09-06

## 2018-09-06 NOTE — TELEPHONE ENCOUNTER
Called and spoke with patient. Reports having tailbone pain ongoing for the last 2 weeks. Unsure of how occurred. No fall or remembered injury. Only thing patient can think of is she was lifting an electric scooter around time this started. Patient is ok when standing, doesn't hurt or bother. Is very painful with sitting and when going from sitting or laying position up to standing. Has been using ice to area and taking Aleve for pain. Treatments seem to help somewhat. Is not improving and very painful with sitting. Does not have any support to sit on, like a donut.   RN recommended OV with provider for further evaluation, especially since still so painful and ongoing for 2 weeks now. Patient agreed. RN assisted in scheduling patient for tomorrow, 9/7/18, with Marcella Teixeira for 2:20 pm. In mean time, RN recommended to continue with ice and Aleve for pain, advised if could find something soft to sit on or something to give support until can be seen. Patient understanding. No further questions at this time.    Erin Gray RN  [facility]

## 2018-09-06 NOTE — TELEPHONE ENCOUNTER
Reason for Call:  Other     Detailed comments: patient hurt her tailbone and it only hurts when she stands up, but it is very painful and wants to discuss    Phone Number Patient can be reached at: Home number on file [phone] (home)    Best Time: any    Can we leave a detailed message on this number? YES    Call taken on 9/6/2018 at 4:05 PM by Vanessa High

## 2018-10-30 ENCOUNTER — OFFICE VISIT (OUTPATIENT)
Dept: FAMILY MEDICINE | Facility: CLINIC | Age: 63
End: 2018-10-30
Payer: COMMERCIAL

## 2018-10-30 VITALS
TEMPERATURE: 99.1 F | BODY MASS INDEX: 31.2 KG/M2 | OXYGEN SATURATION: 95 % | HEART RATE: 89 BPM | DIASTOLIC BLOOD PRESSURE: 72 MMHG | WEIGHT: 176.1 LBS | RESPIRATION RATE: 16 BRPM | SYSTOLIC BLOOD PRESSURE: 157 MMHG | HEIGHT: 63 IN

## 2018-10-30 DIAGNOSIS — Z13.89 SCREENING FOR DIABETIC PERIPHERAL NEUROPATHY: ICD-10-CM

## 2018-10-30 DIAGNOSIS — I10 ESSENTIAL HYPERTENSION WITH GOAL BLOOD PRESSURE LESS THAN 130/80: ICD-10-CM

## 2018-10-30 DIAGNOSIS — Z12.11 SCREEN FOR COLON CANCER: ICD-10-CM

## 2018-10-30 DIAGNOSIS — N30.01 ACUTE CYSTITIS WITH HEMATURIA: Primary | ICD-10-CM

## 2018-10-30 DIAGNOSIS — Z11.4 SCREENING FOR HIV (HUMAN IMMUNODEFICIENCY VIRUS): ICD-10-CM

## 2018-10-30 DIAGNOSIS — R82.90 NONSPECIFIC FINDING ON EXAMINATION OF URINE: ICD-10-CM

## 2018-10-30 DIAGNOSIS — Z12.31 VISIT FOR SCREENING MAMMOGRAM: ICD-10-CM

## 2018-10-30 LAB
ALBUMIN UR-MCNC: 30 MG/DL
APPEARANCE UR: ABNORMAL
BACTERIA #/AREA URNS HPF: ABNORMAL /HPF
BILIRUB UR QL STRIP: NEGATIVE
COLOR UR AUTO: YELLOW
GLUCOSE UR STRIP-MCNC: >=1000 MG/DL
HGB UR QL STRIP: ABNORMAL
KETONES UR STRIP-MCNC: ABNORMAL MG/DL
LEUKOCYTE ESTERASE UR QL STRIP: ABNORMAL
NITRATE UR QL: POSITIVE
NON-SQ EPI CELLS #/AREA URNS LPF: ABNORMAL /LPF
PH UR STRIP: 5.5 PH (ref 5–7)
RBC #/AREA URNS AUTO: ABNORMAL /HPF
SOURCE: ABNORMAL
SP GR UR STRIP: 1.02 (ref 1–1.03)
UROBILINOGEN UR STRIP-ACNC: 0.2 EU/DL (ref 0.2–1)
WBC #/AREA URNS AUTO: >100 /HPF

## 2018-10-30 PROCEDURE — 87186 SC STD MICRODIL/AGAR DIL: CPT | Performed by: PHYSICIAN ASSISTANT

## 2018-10-30 PROCEDURE — 87088 URINE BACTERIA CULTURE: CPT | Performed by: PHYSICIAN ASSISTANT

## 2018-10-30 PROCEDURE — 99214 OFFICE O/P EST MOD 30 MIN: CPT | Performed by: PHYSICIAN ASSISTANT

## 2018-10-30 PROCEDURE — 81001 URINALYSIS AUTO W/SCOPE: CPT | Performed by: PHYSICIAN ASSISTANT

## 2018-10-30 PROCEDURE — 87086 URINE CULTURE/COLONY COUNT: CPT | Performed by: PHYSICIAN ASSISTANT

## 2018-10-30 RX ORDER — NITROFURANTOIN 25; 75 MG/1; MG/1
100 CAPSULE ORAL 2 TIMES DAILY
Qty: 14 CAPSULE | Refills: 0 | Status: SHIPPED | OUTPATIENT
Start: 2018-10-30 | End: 2019-04-05

## 2018-10-30 ASSESSMENT — PATIENT HEALTH QUESTIONNAIRE - PHQ9: SUM OF ALL RESPONSES TO PHQ QUESTIONS 1-9: 3

## 2018-10-30 ASSESSMENT — PAIN SCALES - GENERAL: PAINLEVEL: EXTREME PAIN (9)

## 2018-10-30 NOTE — PATIENT INSTRUCTIONS
Please take Nitrofurantoin 100 mg twice a day  with large amount of water for 7 days  Increase fluids  Follow up or call in 3 days if not better  Urine culture is pending  For prevention wipe front to back, urinate and wash after sex.       Take BP medications and Insulin  Make appointment with diabetes educator

## 2018-10-30 NOTE — PROGRESS NOTES
SUBJECTIVE:   Ellen Hill is a 62 year old female who presents to clinic today for the following health issues:        URINARY TRACT SYMPTOMS      Duration: 1 week    Description  dysuria, frequency, urgency and odor    Intensity:  moderate    Accompanying signs and symptoms:  Fever/chills: no   Flank pain no   Nausea and vomiting: no   Vaginal symptoms: none  Abdominal/Pelvic Pain: YES    History  History of frequent UTI's: YES  History of kidney stones: no   Sexually Active: no   Possibility of pregnancy: Yes    Precipitating or alleviating factors: None    Therapies tried and outcome: none   Outcome:       HTN:   BP is high. Patient reports that she does not take BP medication  daily.     Problem list and histories reviewed & adjusted, as indicated.  Additional history: as documented    Patient Active Problem List   Diagnosis     Post herpetic neuralgia     Hyperlipidemia LDL goal <100     Essential hypertension with goal blood pressure less than 130/80     Moderate major depression (H)     Vulvar pruritus     History of adenomatous polyp of colon     Alcoholic (H)     Advanced directives, counseling/discussion     Obesity, Class I, BMI 30-34.9     Leblanc's esophagus without dysplasia     Uncontrolled type 2 diabetes mellitus with microalbuminuria, with long-term current use of insulin (H)     Alcoholic liver disease (H)     Noncompliance with medication regimen     Smoker     Past Surgical History:   Procedure Laterality Date     BIOPSY Right 1989    Breast-Benign lesion as per patient       Social History   Substance Use Topics     Smoking status: Current Some Day Smoker     Packs/day: 0.10     Years: 10.00     Types: Cigarettes     Last attempt to quit: 9/10/2013     Smokeless tobacco: Never Used      Comment: patient trying to quit on 3-4 cig/week     Alcohol use Yes      Comment: drinks 1-2 times a month     Family History   Problem Relation Age of Onset     Alcohol/Drug Mother      HEART DISEASE  "Mother      Alcohol/Drug Father      Diabetes Brother          Current Outpatient Prescriptions   Medication Sig Dispense Refill     amLODIPine (NORVASC) 10 MG tablet Take 1 tablet (10 mg) by mouth daily 90 tablet 1     atorvastatin (LIPITOR) 40 MG tablet Take 1 tablet (40 mg) by mouth daily 90 tablet 1     BASAGLAR 100 UNIT/ML injection Inject 45 Units Subcutaneous 2 times daily 15 mL 1     escitalopram (LEXAPRO) 10 MG tablet Take 1 tablet (10 mg) by mouth daily 90 tablet 0     insulin aspart (NOVOLOG FLEXPEN) 100 UNIT/ML injection 10 units before breakfast, 30 units before dinner 15 mL 1     insulin pen needle (B-D U/F) 31G X 5 MM Use 3 pen needles daily or as directed. 180 each 1     losartan-hydrochlorothiazide (HYZAAR) 100-12.5 MG per tablet Take 1 tablet by mouth daily 90 tablet 1     metFORMIN (GLUCOPHAGE) 1000 MG tablet Take 1 tablet (1,000 mg) by mouth 2 times daily (with meals) 180 tablet 3     nitroFURantoin, macrocrystal-monohydrate, (MACROBID) 100 MG capsule Take 1 capsule (100 mg) by mouth 2 times daily for 7 days 14 capsule 0     Allergies   Allergen Reactions     Victoza Hives     [employer] product     Enalapril Cough     Lisinopril Cough     Wellbutrin [Bupropion Hcl] Hives     hives       Reviewed and updated as needed this visit by clinical staff  Tobacco  Allergies  Meds  Problems  Med Hx  Surg Hx  Fam Hx  Soc Hx        Reviewed and updated as needed this visit by Provider  Allergies  Meds  Problems         ROS:  Constitutional, HEENT, cardiovascular, pulmonary, GI, , musculoskeletal, neuro, skin, endocrine and psych systems are negative, except as otherwise noted.    OBJECTIVE:     /72  Pulse 89  Temp 99.1  F (37.3  C) (Oral)  Resp 16  Ht 5' 3\" (1.6 m)  Wt 176 lb 1.6 oz (79.9 kg)  SpO2 95%  BMI 31.19 kg/m2  Body mass index is 31.19 kg/(m^2).  GENERAL: healthy, alert and no distress  NECK: no adenopathy, no asymmetry, masses, or scars and thyroid normal to " palpation  RESP: lungs clear to auscultation - no rales, rhonchi or wheezes  CV: regular rate and rhythm, normal S1 S2, no S3 or S4, no murmur, click or rub, no peripheral edema and peripheral pulses strong  ABDOMEN: soft, nontender, no hepatosplenomegaly, no masses and bowel sounds normal  MS: no gross musculoskeletal defects noted, no edema  BACK: no CVA tenderness, no paralumbar tenderness    Diagnostic Test Results:  Results for orders placed or performed in visit on 10/30/18   *UA reflex to Microscopic and Culture ([facility] (except [place] and [place])   Result Value Ref Range    Color Urine Yellow     Appearance Urine Slightly Cloudy     Glucose Urine >=1000 (A) NEG^Negative mg/dL    Bilirubin Urine Negative NEG^Negative    Ketones Urine Trace (A) NEG^Negative mg/dL    Specific Gravity Urine 1.020 1.003 - 1.035    Blood Urine Large (A) NEG^Negative    pH Urine 5.5 5.0 - 7.0 pH    Protein Albumin Urine 30 (A) NEG^Negative mg/dL    Urobilinogen Urine 0.2 0.2 - 1.0 EU/dL    Nitrite Urine Positive (A) NEG^Negative    Leukocyte Esterase Urine Trace (A) NEG^Negative    Source Midstream Urine    Urine Microscopic   Result Value Ref Range    WBC Urine >100 (A) OTO5^0 - 5 /HPF    RBC Urine 25-50 (A) OTO2^O - 2 /HPF    Squamous Epithelial /LPF Urine Few FEW^Few /LPF    Bacteria Urine Many (A) NEG^Negative /HPF   Urine Culture Aerobic Bacterial   Result Value Ref Range    Specimen Description Midstream Urine     Culture Micro >100,000 colonies/mL  Escherichia coli   (A)        Susceptibility    Escherichia coli - GOMEZ     AMPICILLIN <=2 Sensitive ug/mL     CEFAZOLIN* <=4 Sensitive ug/mL      * Cefazolin GOMEZ breakpoints are for the treatment of uncomplicated urinary tract infections.  For the treatment of systemic infections, please contact the laboratory for additional testing.     CEFOXITIN <=4 Sensitive ug/mL     CEFTAZIDIME <=1 Sensitive ug/mL     CEFTRIAXONE <=1 Sensitive ug/mL     CIPROFLOXACIN  <=0.25 Sensitive ug/mL     GENTAMICIN <=1 Sensitive ug/mL     LEVOFLOXACIN <=0.12 Sensitive ug/mL     NITROFURANTOIN <=16 Sensitive ug/mL     TOBRAMYCIN <=1 Sensitive ug/mL     Trimethoprim/Sulfa <=1/19 Sensitive ug/mL     AMPICILLIN/SULBACTAM <=2 Sensitive ug/mL     Piperacillin/Tazo <=4 Sensitive ug/mL     CEFEPIME <=1 Sensitive ug/mL         ASSESSMENT/PLAN:       ICD-10-CM    1. Acute cystitis with hematuria N30.01 nitroFURantoin, macrocrystal-monohydrate, (MACROBID) 100 MG capsule   2. Uncontrolled type 2 diabetes mellitus with microalbuminuria, with long-term current use of insulin (H) E11.29 DIABETES EDUCATOR REFERRAL    E11.65     R80.9     Z79.4    3. Essential hypertension with goal blood pressure less than 130/80 I10    4. Screen for colon cancer Z12.11    5. Visit for screening mammogram Z12.31 MA SCREENING DIGITAL BILAT - Future  (s+30)   6. Screening for HIV (human immunodeficiency virus) Z11.4    7. Screening for diabetic peripheral neuropathy Z13.89    8. Nonspecific finding on examination of urine R82.90 Urine Culture Aerobic Bacterial       1.Please take Nitrofurantoin 100 mg twice a day  with large amount of water for 7 days  Increase fluids  Follow up or call in 3 days if not better  Urine culture is pending  For prevention wipe front to back, urinate and wash after sex.     2. Patient misses doses of insulin, she non complaint.today patient declined all blood work to monitor A1C.   Discussed the dangers of high sugar and high BP.  Make appointment with diabetes educator     3. Patient does not take her BP medication.   restart current medications   Follow up in 2 weeks         Henny Anderson PA-C  [facility]

## 2018-10-30 NOTE — MR AVS SNAPSHOT
After Visit Summary   10/30/2018    Ellen Hill    MRN: [id number]           Patient Information     Date Of Birth          1955        Visit Information        Provider Department      10/30/2018 4:00 PM Henny Anderson PA-C [facility]        Today's Diagnoses     Acute cystitis with hematuria    -  1    Screen for colon cancer        Visit for screening mammogram        Screening for HIV (human immunodeficiency virus)        Screening for diabetic peripheral neuropathy        Uncontrolled type 2 diabetes mellitus with microalbuminuria, with long-term current use of insulin (H)          Care Instructions    Please take Nitrofurantoin 100 mg twice a day  with large amount of water for 7 days  Increase fluids  Follow up or call in 3 days if not better  Urine culture is pending  For prevention wipe front to back, urinate and wash after sex.       Take BP medications and Insulin  Make appointment with diabetes educator           Follow-ups after your visit        Additional Services     DIABETES EDUCATOR REFERRAL       DIABETES SELF MANAGEMENT TRAINING (DSMT)      Your provider has referred you to Diabetes Education: [facility]: Diabetes Education - All [facility] [phone]   [email or web address]     If an urgent visit is needed or A1C is above 12, Care Team to call the Diabetes  Education Team at [phone] or send an In Basket message to the Diabetes Education Pool (P DIAB ED-PATIENT CARE).    A  will call you to make your appointment. If it has been more than 3 business days since your referral was placed, please call the above phone number to schedule.    Type of training and number of hours: Preoperative Intensive Glucose Management     Diabetes Type: Type 2 - On Insulin   Medicare covers: 10 hours of initial DSMT in 12 month period from the time of first visit, plus 2 hours of follow-up DSMT  annually, and additional hours as requested for insulin training.         Diabetes Co-Morbidities: dyslipidemia and hypertension               A1C Goal:  <7.0       A1C is: Lab Results       Component                Value               Date                       A1C                      12.9                08/13/2018              MEDICAL NUTRITION THERAPY (MNT) for Diabetes    Medical Nutrition Therapy with a Registered Dietitian can be provided in coordination with Diabetes Self-Management Training to assist in achieving optimal diabetes management.    MNT Type and Hours: Previous diagnosis: Annual follow-up MNT - 2 hours                       Medicare will cover: 3 hours initial MNT in 12 month period after first visit, plus 2 hours of follow-up MNT annually        Diabetes Education Topics: Comprehensive Knowledge Assessment and Instruction, Knowledge: Healthy Eating, Being Active, Monitoring Blood Sugar and Taking Medication, Diagnostic Continuous Glucose Monitoring  and Insulin Pump Therapy: Pre-Pump Start Education    Special Educational Needs Requiring Individual DSMT: Additional Insulin Training      Please be aware that coverage of these services is subject to the terms and limitations of your health insurance plan.  Call member services at your health plan to determine Diabetes Self-Management Training (Codes  and ) and Medical Nutrition Therapy (Codes 80445 and 87403) benefits and ask which blood glucose monitor brands are covered by your plan.  Please bring the following with you to your appointment:    (1)  List of current medications   (2)  List of Blood Glucose Monitor brands that are covered by your insurance plan  (3)  Blood Glucose Monitor and log book  (4)   Food records for the 3 days prior to your visit    The Certified Diabetes Educator may make diabetes medication adjustments per the CDE Protocol and Collaborative Practice Agreement.                  Future tests that were ordered  "for you today     Open Future Orders        Priority Expected Expires Ordered    MA SCREENING DIGITAL BILAT - Future  (s+30) Routine  10/30/2019 10/30/2018            Who to contact     If you have questions or need follow up information about today's clinic visit or your schedule please contact [facility] directly at [phone].  Normal or non-critical lab and imaging results will be communicated to you by MyChart, letter or phone within 4 business days after the clinic has received the results. If you do not hear from us within 7 days, please contact the clinic through MyChart or phone. If you have a critical or abnormal lab result, we will notify you by phone as soon as possible.  Submit refill requests through [employer] or call your pharmacy and they will forward the refill request to us. Please allow 3 business days for your refill to be completed.          Additional Information About Your Visit        Care EveryWhere ID     This is your Care EveryWhere ID. This could be used by other organizations to access your [place] medical records  [id number]        Your Vitals Were     Pulse Temperature Respirations Height Pulse Oximetry BMI (Body Mass Index)    89 99.1  F (37.3  C) (Oral) 16 5' 3\" (1.6 m) 95% 31.19 kg/m2       Blood Pressure from Last 3 Encounters:   10/30/18 157/72   08/13/18 162/80   03/12/18 142/82    Weight from Last 3 Encounters:   10/30/18 176 lb 1.6 oz (79.9 kg)   08/13/18 173 lb (78.5 kg)   03/12/18 179 lb (81.2 kg)              We Performed the Following     *UA reflex to Microscopic and Culture ([place] and [facility] (except [place] and [place])     DIABETES EDUCATOR REFERRAL     Urine Microscopic     Wet prep          Today's Medication Changes          These changes are accurate as of 10/30/18  4:35 PM.  If you have any questions, ask your nurse or doctor.               Start taking these medicines.        Dose/Directions    nitroFURantoin " (macrocrystal-monohydrate) 100 MG capsule   Commonly known as:  MACROBID   Used for:  Acute cystitis with hematuria   Started by:  Henny Anderson PA-C        Dose:  100 mg   Take 1 capsule (100 mg) by mouth 2 times daily for 7 days   Quantity:  14 capsule   Refills:  0            Where to get your medicines      These medications were [place] [facility] [place] - [address]  [address], [facility] [address]     Phone:  [phone]     nitroFURantoin (macrocrystal-monohydrate) 100 MG capsule                Primary Care Provider Office Phone # Fax #    Marcella Paulette Teixeira, APRN [phone]       [address]  [facility] [address]        Equal Access to Services     [facility]: Hadii aad ku hadasho Soomaali, waaxda luqadaha, qaybta kaalmada adeegyada, waxay idiin hayaaana maria velazco . So [facility] [phone].    ATENCIÓN: Si habla español, tiene a dyson disposición servicios gratuitos de asistencia lingüística. Llame al [phone].    We comply with applicable federal civil rights laws and [place] laws. We do not discriminate on the basis of race, color, national origin, age, disability, sex, sexual orientation, or gender identity.            Thank you!     Thank you for choosing [facility]  for your care. Our goal is always to provide you with excellent care. Hearing back from our patients is one way we can continue to improve our services. Please take a few minutes to complete the written survey that you may receive in the mail after your visit with us. Thank you!             Your Updated Medication List - Protect others around you: Learn how to safely use, store and throw away your medicines at www.disposemymeds.org.          This list is accurate as of 10/30/18  4:35 PM.  Always use your most recent med list.                   Brand Name Dispense Instructions for use Diagnosis    amLODIPine 10  MG tablet    NORVASC    90 tablet    Take 1 tablet (10 mg) by mouth daily    Hypertension goal BP (blood pressure) < 130/80       atorvastatin 40 MG tablet    LIPITOR    90 tablet    Take 1 tablet (40 mg) by mouth daily    Type 2 diabetes mellitus with diabetic nephropathy, with long-term current use of insulin (H), Uncontrolled type 2 diabetes mellitus with microalbuminuria, with long-term current use of insulin (H)       BASAGLAR 100 UNIT/ML injection     15 mL    Inject 45 Units Subcutaneous 2 times daily    Uncontrolled type 2 diabetes mellitus with microalbuminuria, with long-term current use of insulin (H)       escitalopram 10 MG tablet    LEXAPRO    90 tablet    Take 1 tablet (10 mg) by mouth daily    Moderate episode of recurrent major depressive disorder (H)       insulin aspart 100 UNIT/ML injection    NovoLOG FLEXPEN    15 mL    10 units before breakfast, 30 units before dinner    Uncontrolled type 2 diabetes mellitus with microalbuminuria, with long-term current use of insulin (H)       insulin pen needle 31G X 5 MM    B-D U/F    180 each    Use 3 pen needles daily or as directed.    Uncontrolled type 2 diabetes mellitus with microalbuminuria, with long-term current use of insulin (H)       losartan-hydrochlorothiazide 100-12.5 MG per tablet    HYZAAR    90 tablet    Take 1 tablet by mouth daily    Type 2 diabetes mellitus with diabetic nephropathy, with long-term current use of insulin (H)       metFORMIN 1000 MG tablet    GLUCOPHAGE    180 tablet    Take 1 tablet (1,000 mg) by mouth 2 times daily (with meals)    Uncontrolled type 2 diabetes mellitus with microalbuminuria, with long-term current use of insulin (H)       nitroFURantoin (macrocrystal-monohydrate) 100 MG capsule    MACROBID    14 capsule    Take 1 capsule (100 mg) by mouth 2 times daily for 7 days    Acute cystitis with hematuria

## 2018-10-30 NOTE — LETTER
November 2, 2018      Ellen Hill  [address] [place] [address]        Dear Ellen Hill    Urine culture is positive for infection please finish antibody.   Follow up soon for diabetes management.      Enclosed is a copy of the results.  It was a pleasure to see you at your last appointment.    If you have any questions or concerns, please call myself or my nurse at [phone].      Sincerely,      SHAYNA Oliveros/ADIN Mark MA      Results for orders placed or performed in visit on 10/30/18   *UA reflex to Microscopic and Culture ([place] [facility] (except [place] and [place])   Result Value Ref Range    Color Urine Yellow     Appearance Urine Slightly Cloudy     Glucose Urine >=1000 (A) NEG^Negative mg/dL    Bilirubin Urine Negative NEG^Negative    Ketones Urine Trace (A) NEG^Negative mg/dL    Specific Gravity Urine 1.020 1.003 - 1.035    Blood Urine Large (A) NEG^Negative    pH Urine 5.5 5.0 - 7.0 pH    Protein Albumin Urine 30 (A) NEG^Negative mg/dL    Urobilinogen Urine 0.2 0.2 - 1.0 EU/dL    Nitrite Urine Positive (A) NEG^Negative    Leukocyte Esterase Urine Trace (A) NEG^Negative    Source Midstream Urine    Urine Microscopic   Result Value Ref Range    WBC Urine >100 (A) OTO5^0 - 5 /HPF    RBC Urine 25-50 (A) OTO2^O - 2 /HPF    Squamous Epithelial /LPF Urine Few FEW^Few /LPF    Bacteria Urine Many (A) NEG^Negative /HPF   Urine Culture Aerobic Bacterial   Result Value Ref Range    Specimen Description Midstream Urine     Culture Micro >100,000 colonies/mL  Escherichia coli   (A)        Susceptibility    Escherichia coli - GOMEZ     AMPICILLIN <=2 Sensitive ug/mL     CEFAZOLIN* <=4 Sensitive ug/mL      * Cefazolin GOMEZ breakpoints are for the treatment of uncomplicated urinary tract infections.  For the treatment of systemic infections, please contact the laboratory for additional testing.     CEFOXITIN <=4 Sensitive ug/mL     CEFTAZIDIME <=1 Sensitive ug/mL      CEFTRIAXONE <=1 Sensitive ug/mL     CIPROFLOXACIN <=0.25 Sensitive ug/mL     GENTAMICIN <=1 Sensitive ug/mL     LEVOFLOXACIN <=0.12 Sensitive ug/mL     NITROFURANTOIN <=16 Sensitive ug/mL     TOBRAMYCIN <=1 Sensitive ug/mL     Trimethoprim/Sulfa <=1/19 Sensitive ug/mL     AMPICILLIN/SULBACTAM <=2 Sensitive ug/mL     Piperacillin/Tazo <=4 Sensitive ug/mL     CEFEPIME <=1 Sensitive ug/mL

## 2018-11-01 LAB
BACTERIA SPEC CULT: ABNORMAL
SPECIMEN SOURCE: ABNORMAL

## 2018-11-19 ENCOUNTER — TELEPHONE (OUTPATIENT)
Dept: FAMILY MEDICINE | Facility: CLINIC | Age: 63
End: 2018-11-19

## 2018-11-19 DIAGNOSIS — F33.1 MODERATE EPISODE OF RECURRENT MAJOR DEPRESSIVE DISORDER (H): ICD-10-CM

## 2018-11-19 NOTE — TELEPHONE ENCOUNTER
"Requested Prescriptions   Pending Prescriptions Disp Refills     escitalopram (LEXAPRO) 10 MG tablet  Last Written Prescription Date:  08/15/18  Last Fill Quantity: 90,  # refills: 0   Last Office Visit with G, P or [facility] prescribing provider:  10/30/18-Monica   Future Office Visit:    90 tablet 0     Sig: Take 1 tablet (10 mg) by mouth daily    SSRIs Protocol Passed    11/19/2018  7:44 AM       Passed - PHQ-9 score less than 5 in past 6 months    Please review last PHQ-9 score.          Passed - Patient is age 18 or older       Passed - No active pregnancy on record       Passed - No positive pregnancy test in last 12 months       Passed - Recent (6 mo) or future (30 days) visit within the authorizing provider's specialty    Patient had office visit in the last 6 months or has a visit in the next 30 days with authorizing provider or within the authorizing provider's specialty.  See \"Patient Info\" tab in inbasket, or \"Choose Columns\" in Meds & Orders section of the refill encounter.              "

## 2018-11-19 NOTE — TELEPHONE ENCOUNTER
Received form. Marcella gone until Friday. Tried calling patient to see if this can wait for Marcella's return. No voicemail to leave a message. Will try back later.  Ruth Ann Jarrell MA/  For Teams Samantha

## 2018-11-19 NOTE — TELEPHONE ENCOUNTER
..What type of form? 's form  What day did you drop off your forms? 11/19/18  Is there a due date? Asap(11/20/18) (7-10 business day to compete forms)   How would you like to receive these forms? Patient will  at the clinic when completed  Which clinic [facility]    What is the best number to contact you? Cell [phone]  What time works best to contact you with in 4 hrs? anytime  Is it okay to leave a message? Yes    Babs Mercado

## 2018-11-20 NOTE — TELEPHONE ENCOUNTER
Tried reaching patient at both #s to see if the form can wait for Marcella on Friday. Unable to reach patient. Placed forms in Marcella's office.  Ruth Ann Jarrell MA/  For Teams Samantha

## 2018-11-21 RX ORDER — ESCITALOPRAM OXALATE 10 MG/1
10 TABLET ORAL DAILY
Qty: 90 TABLET | Refills: 0 | Status: SHIPPED | OUTPATIENT
Start: 2018-11-21 | End: 2019-04-05

## 2018-11-21 NOTE — TELEPHONE ENCOUNTER
Prescription approved per [facility] Refill Protocol.    Negrita Doyle RN

## 2018-11-23 NOTE — TELEPHONE ENCOUNTER
Paperwork in TC basket.  In addition to seeing diabetic educator, she needs to follow up for labs to check a1c and glucose.  Please have her make appointment for labs on same day as diabetic educator.  Thanks!  Marcella

## 2018-11-26 NOTE — LETTER
Ellen Hill  [address] [place] [address] [address] [address]          12/03/18      Dear Ellen Hill        At [facility] we care about your health and are committed to providing quality patient care. Regular appointments are a vital part of the care and management of your health and can help prevent many of the complications that can occur.      It has come to our attention that you are due for an office visit with your primary provider sometime after you Diabetic Ed appt. Please call [facility] at [phone] soon to schedule your appointment.      Sincerely,      [facility] Care Team

## 2018-11-26 NOTE — TELEPHONE ENCOUNTER
Bringing form to the  by 1:00 pm today. Copy to TC and abstracting. Left voicemail message regarding form  and to scheduled a lab only appt for 11/30/18 as well per Marcella.  Ruth Ann Jarrell MA/  For Teams Spirit and [place]

## 2018-11-28 NOTE — TELEPHONE ENCOUNTER
Routing refill request to provider for review/approval because:  Labs out of range:  A1C and the BP    Kandy Knutson RN, [facility]

## 2018-11-29 RX ORDER — INSULIN GLARGINE 100 [IU]/ML
45 INJECTION, SOLUTION SUBCUTANEOUS 2 TIMES DAILY
Qty: 15 ML | Refills: 0 | Status: SHIPPED | OUTPATIENT
Start: 2018-11-29 | End: 2019-01-16

## 2018-11-29 NOTE — TELEPHONE ENCOUNTER
This writer attempted to contact Patient on 11/29/18      Reason for call Patient needs an office visit with primary sometime after Diabetic Ed appointment and left a voicemail message.      If patient calls back:   Schedule Office Visit appointment About mid-December with PCP, postponing message.        Ruth Ann Jarrell MA

## 2018-11-29 NOTE — TELEPHONE ENCOUNTER
One month refill given.  Patient due for follow up mid-December after her diabetic education visit.  Please notify and assist in scheduling.   YVON Tate CNP

## 2018-12-07 ENCOUNTER — ALLIED HEALTH/NURSE VISIT (OUTPATIENT)
Dept: EDUCATION SERVICES | Facility: CLINIC | Age: 63
End: 2018-12-07
Payer: COMMERCIAL

## 2018-12-07 VITALS — BODY MASS INDEX: 31 KG/M2 | WEIGHT: 175 LBS

## 2018-12-07 PROCEDURE — G0108 DIAB MANAGE TRN  PER INDIV: HCPCS

## 2018-12-07 NOTE — PROGRESS NOTES
"Diabetes Self-Management Education & Support    Diabetes Education Self Management & Training    SUBJECTIVE/OBJECTIVE:  Presents for: Individual review  Accompanied by: Self  Diabetes education in the past 24mo: No  Focus of Visit:  (I would just like to get my blood sugars down)  Diabetes type: Type 2  Disease course: Worsening  Diabetes management related comments/concerns: Ellen admits that she is not compliant with her insulin regimen,   Cultural Influences/Ethnic Background:      Diabetes Symptoms & Complications  Blurred vision: Yes (I just got new glasses, I have cataracts and astigmitism)  Fatigue: Yes  Neuropathy: No  Foot ulcerations: No  Polydipsia: Yes  Polyphagia: No  Polyuria: Yes  Visual change: Yes  Weakness: No  Weight loss: No  Slow healing wounds: No  Recent Infection(s): Yes (She recently had a bladder infection)  Weight trend: Stable  Autonomic neuropathy: No  CVA: No  Heart disease: No  Nephropathy: No  Peripheral neuropathy: No  Peripheral Vascular Disease: No  Retinopathy: No  Sexual dysfunction: No    Patient Problem List and Family Medical History reviewed for relevant medical history, current medical status, and diabetes risk factors.    Vitals:  Wt 79.4 kg (175 lb)  BMI 31 kg/m2  Estimated body mass index is 31 kg/(m^2) as calculated from the following:    Height as of 10/30/18: 1.6 m (5' 3\").    Weight as of this encounter: 79.4 kg (175 lb).   Last 3 BP:   BP Readings from Last 3 Encounters:   10/30/18 157/72   08/13/18 162/80   03/12/18 142/82       History   Smoking Status     Current Some Day Smoker     Packs/day: 0.10     Years: 10.00     Types: Cigarettes     Last attempt to quit: 9/10/2013   Smokeless Tobacco     Never Used     Comment: patient trying to quit on 3-4 cig/week       Labs:  Lab Results   Component Value Date    A1C 12.9 08/13/2018     Lab Results   Component Value Date     08/13/2018     Lab Results   Component Value Date    LDL  08/13/2018     Cannot " estimate LDL when triglyceride exceeds 400 mg/dL    LDL 85 08/13/2018     HDL Cholesterol   Date Value Ref Range Status   08/13/2018 32 (L) >49 mg/dL Final   ]  GFR Estimate   Date Value Ref Range Status   12/29/2017 >90 >60 mL/min/1.7m2 Final     Comment:     Non  GFR Calc     GFR Estimate If Black   Date Value Ref Range Status   12/29/2017 >90 >60 mL/min/1.7m2 Final     Comment:      GFR Calc     Lab Results   Component Value Date    CR 0.60 12/29/2017     No results found for: MICROALBUMIN    Healthy Eating  Healthy Eating Assessed Today: Yes  Cultural/Druze diet restrictions?: Yes (popcorn and nuts, I have colon problem)  Patient on a regular basis: Eats 3 meals a day  Breakfast: 5:00 am: crackers with cheese 8:00: donuts OR croissant sandwich with sausage and eggs  Lunch: 11:45 am: Noodles with cream of mushroom soup OR pizza OR double cheese burger OR mcdonalds mushroom and swiss burger with french fries and coke  Dinner: 4:30 pm: peaunt butter sandwich OR ribs OR chicken - breaded, with potato salad  Snacks: candy bar OR chips before bed  Beverages: Water (I was drinking pop, then my blood sugar spiked up, I was having a 20 ounce pepsi every day for lunch)  Has patient met with a dietitian in the past?: Yes    Being Active  Being Active Assessed Today: Yes  Exercise:: Yes  Days per week of moderate to strenuous exercise (like a brisk walk): 5  On average, minutes per day of exercise at this level: 10  How intense was your typical exercise? : Light (like stretching or slow walking)  Exercise Minutes per Week: 50    Monitoring  Monitoring Assessed Today: Yes  Did patient bring glucose meter to appointment? : Yes  Blood Glucose Meter: Reli-On  Home Glucose (Sugar) Monitoring:  (rarely)  Blood glucose trend: No change    Ellen didn't have a blood sugar reading in her meter since 11/5. However she did test during our appointment today: 288  11/5: 322  11/3: 227  11/2: 226  11/1:  307, 290  10/31: 276  8/13: 363    Taking Medications  Diabetes Medication(s)     Biguanides Sig    metFORMIN (GLUCOPHAGE) 1000 MG tablet Take 1 tablet (1,000 mg) by mouth 2 times daily (with meals)    Insulin Sig    insulin aspart (NOVOLOG FLEXPEN) 100 UNIT/ML injection 10 units before breakfast, 30 units before dinner    insulin glargine (BASAGLAR KWIKPEN) 100 UNIT/ML pen Inject 45 Units Subcutaneous 2 times daily        Ellen is not taking Novolog or Metformin.  She reports diarrhea from the Metformin which is why she no longer takes it, and she doesn't know why she doesn't take the Novolog, she states that she doesn't quite understand what it does.     Taking Medication Assessed Today: Yes  Current Treatments: Insulin Injections, Oral Agent (monotherapy)  Given by: Patient  Injection/Infusion sites: Abdomen  Problems taking diabetes medications regularly?: Yes  Diabetes medication side effects?: Yes (diarrhea from Metformin, she is not taking this anymore)  Treatment Compliance: Some of the time    Problem Solving  Problem Solving Assessed Today: Yes     Reducing Risks     Healthy Coping     Patient Activation Measure Survey Score:  YENNIFER Score (Last Two) 8/18/2010   YENNIFER Raw Score 52   Activation Score 100   YENNIFER Level 4       ASSESSMENT:  Ellen has been off track with her diabetes for quite a while now.  She isn't quite sure what the medications that she is taking do for her, and she has not been taking the Novolog for a couple months. Her blood sugars and A1C are quite high. Her diet is very high in carbohydrates and saturdated fat    Goals Addressed as of 12/7/2018 at 1:56 PM       Taking Medication     Added 12/7/18 by Carrie Castro, RAFFY           My Goal: I will take my insulin 75% of the time    What I need to meet my goal: Bring your insulin to work with you to take at 8:00 am    I plan to meet my goal by this date: Next diabetes education appointment               Patient's most recent   Lab Results    Component Value Date    A1C 12.9 08/13/2018    is not meeting goal of <7.0    INTERVENTION:   Discussion:  Today we discussed the barriers to Ellen's diabetes management. Ellen mentions that she doesn't like poking herself so much.  She also mentions that she doesn't have a lot of time during her lunch breaks at work, which makes it hard to eat her meal and give her insulin injection.  Writer discussed the importance and action of Basaglar and Novolog.  We discussed why it is important to take both of these insulins.  We discussed the importance of good blood sugar control and the risks of complications with diabetes.  We discussed testing blood sugars, Ellen agrees to testing 2 times/day.  We discussed when to test and reviewed blood sugar goals.      We discussed follow-up visits, Ellen is not able to afford to schedule a follow-up appointment with diabetes education or her doctor.  She has a high deductible plan and hasn't met her deductible and has a large bill to pay.  Writer encouraged patient to call to schedule an appointment when she is able.      Diabetes knowledge and skills assessment:     Patient is knowledgeable in diabetes management concepts related to: none at this time    Patient needs further education on the following diabetes management concepts: Healthy Eating, Being Active, Monitoring, Taking Medication, Problem Solving, Reducing Risks and Healthy Coping    Based on learning assessment above, most appropriate setting for further diabetes education would be: Individual setting.    Education provided today on:  AADE Self-Care Behaviors:  Monitoring: purpose, log and interpret results, individual blood glucose targets and frequency of monitoring  Taking Medication: action of prescribed medication, proper site selection and rotation for injections and when to take medications    Opportunities for ongoing education and support in diabetes-self management were discussed.    Pt verbalized  understanding of concepts discussed and recommendations provided today.       Education Materials Provided:  [facility] Taking Charge of Your Diabetes Book    PLAN:  See Patient Instructions for co-developed, patient-stated behavior change goals.  AVS printed and provided to patient today. See Follow-Up section for recommended follow-up.    Take 45 units of Basaglar and 10 units of Novolog right before your 8:00 am meal  Take 45 units of Basaglar and 30 units of Novolog right before your dinner    Test your blood sugars 2 times/day- first thing in the morning and right before dinner OR 2 hours after dinner.      Carrie Castro RD, LD, CDE  Time Spent: 60 minutes  Encounter Type: Individual    Any diabetes medication dose changes were made via the CDE Protocol and Collaborative Practice Agreement with the patient's referring provider. A copy of this encounter was shared with the provider.

## 2018-12-07 NOTE — MR AVS SNAPSHOT
After Visit Summary   12/7/2018    Ellen Hill    MRN: [id number]           Patient Information     Date Of Birth          1955        Visit Information        Provider Department      12/7/2018 1:00 PM [facility]        Today's Diagnoses     Uncontrolled type 2 diabetes mellitus with microalbuminuria, with long-term current use of insulin (H)    -  1      Care Instructions    Insulin:  Take 45 units of Basaglar and 10 units of Novolog right before your 8:00 am meal  Take 45 units of Basaglar and 30 units of Novolog right before your dinner    Test your blood sugars 2 times/day- first thing in the morning and right before dinner OR 2 hours after dinner.    Carrie Castro, RD, LD, CDE            Follow-ups after your visit        Who to contact     If you have questions or need follow up information about today's clinic visit or your schedule please contact [facility] directly at [phone].  Normal or non-critical lab and imaging results will be communicated to you by MyChart, letter or phone within 4 business days after the clinic has received the results. If you do not hear from us within 7 days, please contact the clinic through [employer] or phone. If you have a critical or abnormal lab result, we will notify you by phone as soon as possible.  Submit refill requests through [employer] or call your pharmacy and they will forward the refill request to us. Please allow 3 business days for your refill to be completed.          Additional Information About Your Visit        Care EveryWhere ID     This is your Care EveryWhere ID. This could be used by other organizations to access your [facility] medical records  [id number]        Your Vitals Were     BMI (Body Mass Index)                   31 kg/m2            Blood Pressure from Last 3 Encounters:   10/30/18 157/72   08/13/18 162/80   03/12/18 142/82    Weight from Last 3  Encounters:   12/07/18 79.4 kg (175 lb)   10/30/18 79.9 kg (176 lb 1.6 oz)   08/13/18 78.5 kg (173 lb)              Today, you had the following     No orders found for display         Today's Medication Changes          These changes are accurate as of 12/7/18  1:48 PM.  If you have any questions, ask your nurse or doctor.               Start taking these medicines.        Dose/Directions    blood glucose monitoring lancets   Used for:  Uncontrolled type 2 diabetes mellitus with microalbuminuria, with long-term current use of insulin (H)        Use to test blood sugar 2 times daily or as directed.   Quantity:  100 each   Refills:  3            Where to get your medicines      These medications were sent to [facility]/pharmacy #[id number] - [facility], [place] - [address]  [address], [place] [address]     Phone:  [phone]     blood glucose monitoring lancets                Primary Care Provider Office Phone # Fax #    Marcella Paulette Teixeira, YVON [phone]       [address]  [facility] [address]        Goals        General    Taking Medication     Notes - Note created  12/7/2018  1:46 PM by Carrie Castro, RAFFY    My Goal: I will take my insulin 75% of the time    What I need to meet my goal: Bring your insulin to work with you to take at 8:00 am    I plan to meet my goal by this date: Next diabetes education appointment           Equal Access to Services     ANTONIO WALL AH: Hadii francesca acostao Soyamil, waaxda luqadaha, qaybta kaalmada adeegyada, ethel gleason. So [facility] [phone].    ATENCIÓN: Si habla español, tiene a dyson disposición servicios gratuitos de asistencia lingüística. Llame al [phone].    We comply with applicable federal civil rights laws and [place] laws. We do not discriminate on the basis of race, color, national origin, age, disability, sex, sexual orientation, or gender identity.            Thank you!     Thank you for  choosing [facility]  for your care. Our goal is always to provide you with excellent care. Hearing back from our patients is one way we can continue to improve our services. Please take a few minutes to complete the written survey that you may receive in the mail after your visit with us. Thank you!             Your Updated Medication List - Protect others around you: Learn how to safely use, store and throw away your medicines at www.disposemymeds.org.          This list is accurate as of 12/7/18  1:48 PM.  Always use your most recent med list.                   Brand Name Dispense Instructions for use Diagnosis    amLODIPine 10 MG tablet    NORVASC    90 tablet    Take 1 tablet (10 mg) by mouth daily    Hypertension goal BP (blood pressure) < 130/80       atorvastatin 40 MG tablet    LIPITOR    90 tablet    Take 1 tablet (40 mg) by mouth daily    Type 2 diabetes mellitus with diabetic nephropathy, with long-term current use of insulin (H), Uncontrolled type 2 diabetes mellitus with microalbuminuria, with long-term current use of insulin (H)       blood glucose monitoring lancets     100 each    Use to test blood sugar 2 times daily or as directed.    Uncontrolled type 2 diabetes mellitus with microalbuminuria, with long-term current use of insulin (H)       escitalopram 10 MG tablet    LEXAPRO    90 tablet    Take 1 tablet (10 mg) by mouth daily    Moderate episode of recurrent major depressive disorder (H)       insulin aspart 100 UNIT/ML pen    NovoLOG FLEXPEN    15 mL    10 units before breakfast, 30 units before dinner    Uncontrolled type 2 diabetes mellitus with microalbuminuria, with long-term current use of insulin (H)       insulin glargine 100 UNIT/ML pen     15 mL    Inject 45 Units Subcutaneous 2 times daily    Uncontrolled type 2 diabetes mellitus with microalbuminuria, with long-term current use of insulin (H)       insulin pen needle 31G X 5 MM miscellaneous    B-D U/F     180 each    Use 3 pen needles daily or as directed.    Uncontrolled type 2 diabetes mellitus with microalbuminuria, with long-term current use of insulin (H)       losartan-hydrochlorothiazide 100-12.5 MG tablet    HYZAAR    90 tablet    Take 1 tablet by mouth daily    Type 2 diabetes mellitus with diabetic nephropathy, with long-term current use of insulin (H)       metFORMIN 1000 MG tablet    GLUCOPHAGE    180 tablet    Take 1 tablet (1,000 mg) by mouth 2 times daily (with meals)    Uncontrolled type 2 diabetes mellitus with microalbuminuria, with long-term current use of insulin (H)

## 2019-01-14 DIAGNOSIS — R80.9 ALBUMINURIA: Primary | ICD-10-CM

## 2019-01-14 NOTE — TELEPHONE ENCOUNTER
"Requested Prescriptions   Pending Prescriptions Disp Refills     insulin aspart (NOVOLOG FLEXPEN) 100 UNIT/ML pen 15 mL 1      Last Written Prescription Date:  08/13/18  Last Fill Quantity: 15ml,  # refills: 1   Last Office Visit with [facility], [facility] or [facility] prescribing provider:  10/30/18Sarah     Future Office Visit:    Sig: 10 units before breakfast, 30 units before dinner    Short Acting Insulin Protocol Failed - 1/14/2019  2:49 PM       Failed - Blood pressure less than 140/90 in past 6 months    BP Readings from Last 3 Encounters:   10/30/18 157/72   08/13/18 162/80   03/12/18 142/82                Failed - Serum creatinine on file in past 12 months    Recent Labs   Lab Test 12/29/17  0930   CR 0.60            Failed - HgbA1C in past 3 or 6 months    If HgbA1C is 8 or greater, it needs to be on file within the past 3 months.  If less than 8, must be on file within the past 6 months.     Recent Labs   Lab Test 08/13/18  0935   A1C 12.9*            Passed - LDL on file in past 12 months    Recent Labs   Lab Test 08/13/18  0935   LDL Cannot estimate LDL when triglyceride exceeds 400 mg/dL  85            Passed - Microalbumin on file in past 12 months    Recent Labs   Lab Test 08/13/18  0945   MICROL 286   UMALCR 506.20*            Passed - Medication is active on med list       Passed - Patient is age 18 or older       Passed - Recent (6 mo) or future (30 days) visit within the authorizing provider's specialty    Patient had office visit in the last 6 months or has a visit in the next 30 days with authorizing provider or within the authorizing provider's specialty.  See \"Patient Info\" tab in inbasket, or \"Choose Columns\" in Meds & Orders section of the refill encounter.              "

## 2019-01-16 PROBLEM — R80.9 ALBUMINURIA: Status: ACTIVE | Noted: 2019-01-16

## 2019-01-16 RX ORDER — INSULIN GLARGINE 100 [IU]/ML
45 INJECTION, SOLUTION SUBCUTANEOUS 2 TIMES DAILY
Qty: 15 ML | Refills: 0 | Status: SHIPPED | OUTPATIENT
Start: 2019-01-16 | End: 2019-04-19

## 2019-01-16 NOTE — TELEPHONE ENCOUNTER
Called and spoke to patient and scheduled a fasting lab only appt for 1/26/19 at 9 am and an appt with Marcella, 1/29/19 at 4 pm.  Ruth Ann Jarrell MA/  For [facility] and [facility]

## 2019-01-16 NOTE — TELEPHONE ENCOUNTER
Routing refill request to provider for review/approval because:  Labs not current:  Creatinine, A1C      Nichole Gamble RN, BSN

## 2019-01-16 NOTE — TELEPHONE ENCOUNTER
Refilled x 1.  Patient MUST come in for labs and do at least an evisit or telephone visit prior to future refills.  Please have her do this within the next 2 weeks.  YVON Tate CNP

## 2019-01-16 NOTE — TELEPHONE ENCOUNTER
Reason for Call:  Other prescription    Detailed comments: also asking for Basaglar insuline.    Phone Number Patient can be reached at: Other phone number:  [facility]/pharmacy #[id number] - [place], [place] - [address]    Best Time: any    Can we leave a detailed message on this number? YES    Call taken on 1/16/2019 at 10:28 AM by Vanessa High

## 2019-01-29 DIAGNOSIS — I10 HYPERTENSION GOAL BP (BLOOD PRESSURE) < 130/80: ICD-10-CM

## 2019-01-29 NOTE — LETTER
Ellen Hill  [address] [place] [address] [address] [id number]          02/12/19      Dear Ellen Hill        At [facility] we care about your health and are committed to providing quality patient care. Regular appointments are a vital part of the care and management of your health and can help prevent many of the complications that can occur.      It has come to our attention that you are due for a lab only and telephone follow up visit. Please call [facility] at [phone] soon to schedule your appointments.    If you have transferred care to another clinic please call to inform us so that we do not continue to send you reminder letters.      Sincerely,      [facility] Care Team

## 2019-01-29 NOTE — TELEPHONE ENCOUNTER
"Requesting 90 day supply.      Requested Prescriptions   Pending Prescriptions Disp Refills     insulin glargine (BASAGLAR KWIKPEN) 100 UNIT/ML pen 15 mL 0      Last Written Prescription Date:  01/16/19  Last Fill Quantity: 15ml,  # refills: 0   [facility] provider:  10/30/18ta   Future Office Visit:    Sig: Inject 45 Units Subcutaneous 2 times daily    Long Acting Insulin Protocol Failed - 1/29/2019  1:30 PM       Failed - Blood pressure less than 140/90 in past 6 months    BP Readings from Last 3 Encounters:   10/30/18 157/72   08/13/18 162/80   03/12/18 142/82                Failed - Serum creatinine on file in past 12 months    Recent Labs   Lab Test 12/29/17  0930   CR 0.60            Failed - HgbA1C in past 3 or 6 months    If HgbA1C is 8 or greater, it needs to be on file within the past 3 months.  If less than 8, must be on file within the past 6 months.     Recent Labs   Lab Test 08/13/18  0935   A1C 12.9*            Passed - LDL on file in past 12 months    Recent Labs   Lab Test 08/13/18  0935   LDL Cannot estimate LDL when triglyceride exceeds 400 mg/dL  85            Passed - Microalbumin on file in past 12 months    Recent Labs   Lab Test 08/13/18  0945   MICROL 286   UMALCR 506.20*            Passed - Medication is active on med list       Passed - Patient is age 18 or older       Passed - Recent (6 mo) or future (30 days) visit within the authorizing provider's specialty    Patient had office visit in the last 6 months or has a visit in the next 30 days with authorizing provider or within the authorizing provider's specialty.  See \"Patient Info\" tab in inbasket, or \"Choose Columns\" in Meds & Orders section of the refill encounter.              "

## 2019-02-01 RX ORDER — INSULIN GLARGINE 100 [IU]/ML
45 INJECTION, SOLUTION SUBCUTANEOUS 2 TIMES DAILY
Qty: 15 ML | Refills: 0 | OUTPATIENT
Start: 2019-02-01

## 2019-02-01 NOTE — TELEPHONE ENCOUNTER
This writer attempted to contact Patient on 02/01/19      Reason for call Patient needs lab appt and a follow Appt and left message.      If patient calls back:   Schedule Lab appointment/follow up appt within 1 week with lab/primary, postponing message      Ruth Ann Jarrell MA

## 2019-02-01 NOTE — TELEPHONE ENCOUNTER
Please inform patient I have to see her for refills.  At the very least we need labs and a telephone visit.  Please assist in scheduling.  Thanks!  Marcella

## 2019-02-01 NOTE — TELEPHONE ENCOUNTER
Routing refill request to provider for review/approval because:  Patient needs to be seen in office.   Refill was given on 1/16/19 but now asking for 90 day supply. RN unable to approve as request fails protocol.  **Patient cancelled primary care appt for 1/29/19, has not rescheduled as of yet.    Erin Gray RN  [facility] Triage

## 2019-02-12 ENCOUNTER — TELEPHONE (OUTPATIENT)
Dept: FAMILY MEDICINE | Facility: CLINIC | Age: 64
End: 2019-02-12

## 2019-02-12 NOTE — TELEPHONE ENCOUNTER
Provider to address the request for 3 month supply.    Kandy Knutson RN, [facility]

## 2019-02-12 NOTE — TELEPHONE ENCOUNTER
Pharmacy is requesting 90 days supply for insulin aspart (NOVOLOG FLEXPEN) 100 UNIT/ML pen.          Arnel Faarax  [facility]

## 2019-02-12 NOTE — TELEPHONE ENCOUNTER
Please have patient submit evisit or to telephone visit to address this refill/diabetes.  YVON Tate CNP

## 2019-02-12 NOTE — TELEPHONE ENCOUNTER
This writer attempted to contact Patient on 02/12/19      Reason for call Patient to start e-visit through her My Chart account or call us back to schedule a Telephone visit and left message.      If patient calls back:   Schedule Telephone Visit appointment within 1 week with PCP, explain telephone visit charges, schedule appt and close encounter         Ruth Ann Jarrell MA

## 2019-02-12 NOTE — TELEPHONE ENCOUNTER
This writer attempted to contact Patient on 02/12/19      Reason for call Patient needs a lab only appt and a follow up telephone visit and left message and sent letter.      If patient calls back:   Schedule Lab appointment and telephone visit  within 1 week with PCP.        Ruth Ann Jarrell MA

## 2019-03-02 ENCOUNTER — OFFICE VISIT (OUTPATIENT)
Dept: URGENT CARE | Facility: URGENT CARE | Age: 64
End: 2019-03-02
Payer: COMMERCIAL

## 2019-03-02 VITALS
DIASTOLIC BLOOD PRESSURE: 70 MMHG | OXYGEN SATURATION: 97 % | HEART RATE: 88 BPM | SYSTOLIC BLOOD PRESSURE: 181 MMHG | RESPIRATION RATE: 16 BRPM | WEIGHT: 179.25 LBS | BODY MASS INDEX: 31.75 KG/M2 | TEMPERATURE: 98.1 F

## 2019-03-02 DIAGNOSIS — I10 ELEVATED BLOOD PRESSURE READING WITH DIAGNOSIS OF HYPERTENSION: ICD-10-CM

## 2019-03-02 DIAGNOSIS — J01.90 ACUTE SINUSITIS WITH SYMPTOMS > 10 DAYS: Primary | ICD-10-CM

## 2019-03-02 PROCEDURE — 99214 OFFICE O/P EST MOD 30 MIN: CPT | Performed by: NURSE PRACTITIONER

## 2019-03-02 ASSESSMENT — ENCOUNTER SYMPTOMS
SHORTNESS OF BREATH: 0
FEVER: 0
COUGH: 1
VOMITING: 0
SINUS PAIN: 1
RHINORRHEA: 1
CHILLS: 0
SORE THROAT: 0
DIARRHEA: 0
HEADACHES: 1
NAUSEA: 0
SINUS PRESSURE: 1

## 2019-03-02 NOTE — PATIENT INSTRUCTIONS
Patient Education     Sinusitis (Antibiotic Treatment)    The sinuses are air-filled spaces within the bones of the face. They connect to the inside of the nose. Sinusitis is an inflammation of the tissue that lines the sinuses. Sinusitis can occur during a cold. It can also happen due to allergies to pollens and other particles in the air. Sinusitis can cause symptoms of sinus congestion and a feeling of fullness. A sinus infection causes fever, headache, and facial pain. There is often green or yellow fluid draining from the nose or into the back of the throat (post-nasal drip). You have been given antibiotics to treat this condition.  Home care    Take the full course of antibiotics as instructed. Do not stop taking them, even when you feel better.    Drink plenty of water, hot tea, and other liquids. This may help thin nasal mucus. It also may help your sinuses drain fluids.    Heat may help soothe painful areas of your face. Use a towel soaked in hot water. Or,  the shower and direct the warm spray onto your face. Using a vaporizer along with a menthol rub at night may also help soothe symptoms.     An expectorant with guaifenesin may help thin nasal mucus and help your sinuses drain fluids.    You can use an over-the-counter decongestant, unless a similar medicine was prescribed to you. Nasal sprays work the fastest. Use one that contains phenylephrine or oxymetazoline. First blow your nose gently. Then use the spray. Do not use these medicines more often than directed on the label. If you do, your symptoms may get worse. You may also take pills that contain pseudoephedrine. Don t use products that combine multiple medicines. This is because side effects may be increased. Read labels. You can also ask the pharmacist for help. (People with high blood pressure should not use decongestants. They can raise blood pressure.)    Over-the-counter antihistamines may help if allergies contributed to your  sinusitis.      Do not use nasal rinses or irrigation during an acute sinus infection, unless your healthcare provider tells you to. Rinsing may spread the infection to other areas in your sinuses.    Use acetaminophen or ibuprofen to control pain, unless another pain medicine was prescribed to you. If you have chronic liver or kidney disease or ever had a stomach ulcer, talk with your healthcare provider before using these medicines. (Aspirin should never be taken by anyone under age 18 who is ill with a fever. It may cause severe liver damage.)    Don't smoke. This can make symptoms worse.  Follow-up care  Follow up with your healthcare provider or our staff if you are better in 1 week.  When to seek medical advice  Call your healthcare provider if any of these occur:    Facial pain or headache that gets worse    Stiff neck    Unusual drowsiness or confusion    Swelling of your forehead or eyelids    Vision problems, such as blurred or double vision    Fever of 100.4 F (38 C) or higher, or as directed by your healthcare provider    Seizure    Breathing problems    Symptoms don't go away in 10 days  Prevention  Here are steps you can take to help prevent an infection:    Keep good hand washing habits.    Don t have close contact with people who have sore throats, colds, or other upper respiratory infections.    Don t smoke, and stay away from secondhand smoke.    Stay up to date with of your vaccines.  Date Last Reviewed: 11/1/2017 2000-2018 [employer]. [address] [place] [address], [place] [address]. All rights reserved. This information is not intended as a substitute for professional medical care. Always follow your healthcare professional's instructions.

## 2019-03-02 NOTE — PROGRESS NOTES
"SUBJECTIVE:   Ellen Hill is a 63 year old female presenting with a chief complaint of   Chief Complaint   Patient presents with     URI     Pressure, dull pain, swelling, neck glands hurt, congestion     Ear Problem     Pain in left ear down face to jaw, present for a couple of weeks, dull ache on left side of face       She is an established patient of [facility].    URI Adult    Onset of symptoms was 2 week(s) ago.  Course of illness is worsening.    Severity moderate  Current and Associated symptoms: cough - productive, ear pain left, facial pain/pressure and headache, face feels  like it's swelling  Treatment measures tried include OTC Cough med.  Predisposing factors include None.      Review of Systems   Constitutional: Negative for chills and fever.   HENT: Positive for congestion, ear pain, postnasal drip, rhinorrhea, sinus pressure and sinus pain (face feels swollen). Negative for sore throat.    Respiratory: Positive for cough. Negative for shortness of breath.    Gastrointestinal: Negative for diarrhea, nausea and vomiting.   Neurological: Positive for headaches.   All other systems reviewed and are negative.      Past Medical History:   Diagnosis Date     Advanced directives, counseling/discussion 3/28/2013    Patient does not have an Advance/Health Care Directive (HCD), given \"What is Advance Care Planning?\" flyer.  Nola Lopez March 28, 2013      Hyperlipidemia LDL goal <100 10/27/2010     Hypertension goal BP (blood pressure) < 130/80 12/21/2010     Microalbuminuria 2/12/2015     Shingles 2009    Right posterior shoulder as per patient     Type 2 diabetes, HbA1c goal < 7% (H) 10/31/2010     Family History   Problem Relation Age of Onset     Alcohol/Drug Mother      Heart Disease Mother      Alcohol/Drug Father      Diabetes Brother      Current Outpatient Medications   Medication Sig Dispense Refill     amLODIPine (NORVASC) 10 MG tablet Take 1 tablet (10 mg) by mouth daily 90 tablet 1     " amoxicillin-clavulanate (AUGMENTIN) 875-125 MG tablet Take 1 tablet by mouth 2 times daily for 10 days 20 tablet 0     atorvastatin (LIPITOR) 40 MG tablet Take 1 tablet (40 mg) by mouth daily 90 tablet 1     blood glucose monitoring (AL MICROLET) lancets Use to test blood sugar 2 times daily or as directed. 100 each 3     escitalopram (LEXAPRO) 10 MG tablet Take 1 tablet (10 mg) by mouth daily 90 tablet 0     insulin aspart (NOVOLOG FLEXPEN) 100 UNIT/ML pen 10 units before breakfast, 30 units before dinner 15 mL 0     insulin glargine (BASAGLAR KWIKPEN) 100 UNIT/ML pen Inject 45 Units Subcutaneous 2 times daily 15 mL 0     insulin pen needle (B-D U/F) 31G X 5 MM Use 3 pen needles daily or as directed. 180 each 1     losartan-hydrochlorothiazide (HYZAAR) 100-12.5 MG per tablet Take 1 tablet by mouth daily 90 tablet 1     metFORMIN (GLUCOPHAGE) 1000 MG tablet Take 1 tablet (1,000 mg) by mouth 2 times daily (with meals) (Patient not taking: Reported on 3/2/2019) 180 tablet 3     Social History     Tobacco Use     Smoking status: Current Some Day Smoker     Packs/day: 0.10     Years: 10.00     Pack years: 1.00     Types: Cigarettes     Last attempt to quit: 9/10/2013     Years since quittin.4     Smokeless tobacco: Never Used     Tobacco comment: patient trying to quit on 3-4 cig/week   Substance Use Topics     Alcohol use: Yes     Comment: drinks 1-2 times a month       OBJECTIVE  /70   Pulse 88   Temp 98.1  F (36.7  C) (Oral)   Resp 16   Wt 81.3 kg (179 lb 4 oz)   SpO2 97%   Breastfeeding? No   BMI 31.75 kg/m      Physical Exam   Constitutional: No distress.   HENT:   Head: Normocephalic and atraumatic.   Right Ear: Tympanic membrane and external ear normal.   Left Ear: Tympanic membrane and external ear normal.   Nose: Mucosal edema and rhinorrhea present. Right sinus exhibits maxillary sinus tenderness and frontal sinus tenderness. Left sinus exhibits maxillary sinus tenderness and frontal sinus  tenderness.   Mouth/Throat: Uvula is midline and oropharynx is clear and moist.   Eyes: EOM are normal. Pupils are equal, round, and reactive to light.   Neck: Normal range of motion. Neck supple.   Pulmonary/Chest: Effort normal and breath sounds normal. No respiratory distress.   Lymphadenopathy:     She has no cervical adenopathy.   Neurological: She is alert. No cranial nerve deficit.   Skin: Skin is warm and dry. She is not diaphoretic.   Psychiatric: She has a normal mood and affect.   Nursing note and vitals reviewed.        ASSESSMENT:      ICD-10-CM    1. Acute sinusitis with symptoms > 10 days J01.90 amoxicillin-clavulanate (AUGMENTIN) 875-125 MG tablet   2. Elevated blood pressure reading with diagnosis of hypertension I10           Differential Diagnosis:  URI Adult/Peds:  Influenza, Pneumonia and Viral upper respiratory illness    Serious Comorbid Conditions:  Adult:  Diabetes    PLAN:  Advised lots of rest and fluids.   Salty water rinses to keep sinuses and nose moist. Antibiotics as prescribed.  RTC if any worsening symptoms or if not improving.  In addition to the above, diabetes was also briefly addressed today. Health maintenance, medications and recent results reviewed. Patient advised to follow up with PCP with any other concerns.      Patient reports that she has not taken her BP medication this morning.Ellen to follow up with Primary Care provider regarding elevated blood pressure.      Followup:    If not improving or if condition worsens, follow up with your Primary Care Provider    Patient Instructions     Patient Education     Sinusitis (Antibiotic Treatment)    The sinuses are air-filled spaces within the bones of the face. They connect to the inside of the nose. Sinusitis is an inflammation of the tissue that lines the sinuses. Sinusitis can occur during a cold. It can also happen due to allergies to pollens and other particles in the air. Sinusitis can cause symptoms of sinus congestion  and a feeling of fullness. A sinus infection causes fever, headache, and facial pain. There is often green or yellow fluid draining from the nose or into the back of the throat (post-nasal drip). You have been given antibiotics to treat this condition.  Home care    Take the full course of antibiotics as instructed. Do not stop taking them, even when you feel better.    Drink plenty of water, hot tea, and other liquids. This may help thin nasal mucus. It also may help your sinuses drain fluids.    Heat may help soothe painful areas of your face. Use a towel soaked in hot water. Or,  the shower and direct the warm spray onto your face. Using a vaporizer along with a menthol rub at night may also help soothe symptoms.     An expectorant with guaifenesin may help thin nasal mucus and help your sinuses drain fluids.    You can use an over-the-counter decongestant, unless a similar medicine was prescribed to you. Nasal sprays work the fastest. Use one that contains phenylephrine or oxymetazoline. First blow your nose gently. Then use the spray. Do not use these medicines more often than directed on the label. If you do, your symptoms may get worse. You may also take pills that contain pseudoephedrine. Don t use products that combine multiple medicines. This is because side effects may be increased. Read labels. You can also ask the pharmacist for help. (People with high blood pressure should not use decongestants. They can raise blood pressure.)    Over-the-counter antihistamines may help if allergies contributed to your sinusitis.      Do not use nasal rinses or irrigation during an acute sinus infection, unless your healthcare provider tells you to. Rinsing may spread the infection to other areas in your sinuses.    Use acetaminophen or ibuprofen to control pain, unless another pain medicine was prescribed to you. If you have chronic liver or kidney disease or ever had a stomach ulcer, talk with your healthcare  provider before using these medicines. (Aspirin should never be taken by anyone under age 18 who is ill with a fever. It may cause severe liver damage.)    Don't smoke. This can make symptoms worse.  Follow-up care  Follow up with your healthcare provider or our staff if you are better in 1 week.  When to seek medical advice  Call your healthcare provider if any of these occur:    Facial pain or headache that gets worse    Stiff neck    Unusual drowsiness or confusion    Swelling of your forehead or eyelids    Vision problems, such as blurred or double vision    Fever of 100.4 F (38 C) or higher, or as directed by your healthcare provider    Seizure    Breathing problems    Symptoms don't go away in 10 days  Prevention  Here are steps you can take to help prevent an infection:    Keep good hand washing habits.    Don t have close contact with people who have sore throats, colds, or other upper respiratory infections.    Don t smoke, and stay away from secondhand smoke.    Stay up to date with of your vaccines.  Date Last Reviewed: 11/1/2017 2000-2018 [employer]. [address] [place] [address], [place] [address]. All rights reserved. This information is not intended as a substitute for professional medical care. Always follow your healthcare professional's instructions.

## 2019-04-05 ENCOUNTER — OFFICE VISIT (OUTPATIENT)
Dept: FAMILY MEDICINE | Facility: CLINIC | Age: 64
End: 2019-04-05
Payer: COMMERCIAL

## 2019-04-05 VITALS
RESPIRATION RATE: 18 BRPM | HEIGHT: 63 IN | SYSTOLIC BLOOD PRESSURE: 174 MMHG | DIASTOLIC BLOOD PRESSURE: 78 MMHG | OXYGEN SATURATION: 99 % | BODY MASS INDEX: 31.71 KG/M2 | TEMPERATURE: 97.5 F | HEART RATE: 90 BPM | WEIGHT: 179 LBS

## 2019-04-05 DIAGNOSIS — I10 HTN, GOAL BELOW 140/90: ICD-10-CM

## 2019-04-05 DIAGNOSIS — Z79.4 TYPE 2 DIABETES MELLITUS WITH DIABETIC NEPHROPATHY, WITH LONG-TERM CURRENT USE OF INSULIN (H): ICD-10-CM

## 2019-04-05 DIAGNOSIS — M25.512 ACUTE PAIN OF LEFT SHOULDER: Primary | ICD-10-CM

## 2019-04-05 DIAGNOSIS — E11.21 TYPE 2 DIABETES MELLITUS WITH DIABETIC NEPHROPATHY, WITH LONG-TERM CURRENT USE OF INSULIN (H): ICD-10-CM

## 2019-04-05 DIAGNOSIS — F32.1 MODERATE MAJOR DEPRESSION (H): ICD-10-CM

## 2019-04-05 LAB
ALBUMIN SERPL-MCNC: 4 G/DL (ref 3.4–5)
ALP SERPL-CCNC: 99 U/L (ref 40–150)
ALT SERPL W P-5'-P-CCNC: 31 U/L (ref 0–50)
ANION GAP SERPL CALCULATED.3IONS-SCNC: 10 MMOL/L (ref 3–14)
AST SERPL W P-5'-P-CCNC: 19 U/L (ref 0–45)
BILIRUB SERPL-MCNC: 0.7 MG/DL (ref 0.2–1.3)
BUN SERPL-MCNC: 17 MG/DL (ref 7–30)
CALCIUM SERPL-MCNC: 9.2 MG/DL (ref 8.5–10.1)
CHLORIDE SERPL-SCNC: 103 MMOL/L (ref 94–109)
CO2 SERPL-SCNC: 23 MMOL/L (ref 20–32)
CREAT SERPL-MCNC: 0.52 MG/DL (ref 0.52–1.04)
GFR SERPL CREATININE-BSD FRML MDRD: >90 ML/MIN/{1.73_M2}
GLUCOSE SERPL-MCNC: 274 MG/DL (ref 70–99)
HBA1C MFR BLD: 12.5 % (ref 0–5.6)
POTASSIUM SERPL-SCNC: 3.8 MMOL/L (ref 3.4–5.3)
PROT SERPL-MCNC: 7.3 G/DL (ref 6.8–8.8)
SODIUM SERPL-SCNC: 136 MMOL/L (ref 133–144)

## 2019-04-05 PROCEDURE — 99214 OFFICE O/P EST MOD 30 MIN: CPT | Performed by: FAMILY MEDICINE

## 2019-04-05 PROCEDURE — 80053 COMPREHEN METABOLIC PANEL: CPT | Performed by: FAMILY MEDICINE

## 2019-04-05 PROCEDURE — 36415 COLL VENOUS BLD VENIPUNCTURE: CPT | Performed by: FAMILY MEDICINE

## 2019-04-05 PROCEDURE — 83036 HEMOGLOBIN GLYCOSYLATED A1C: CPT | Performed by: FAMILY MEDICINE

## 2019-04-05 RX ORDER — CELECOXIB 200 MG/1
200 CAPSULE ORAL 2 TIMES DAILY
Qty: 14 CAPSULE | Refills: 0 | Status: SHIPPED | OUTPATIENT
Start: 2019-04-05 | End: 2019-04-19

## 2019-04-05 ASSESSMENT — MIFFLIN-ST. JEOR: SCORE: 1352.19

## 2019-04-05 NOTE — PROGRESS NOTES
SUBJECTIVE:   Ellen Hill is a 63 year old female who presents to clinic today for the following health issues:    Shoulder Pain    Onset: Patient states about 3 weeks ago     Description:   Location: right shoulder  Character: Sharp, Dull ache, Stabbing, Burning and Cramping    Intensity: severe    Progression of Symptoms: worse    Accompanying Signs & Symptoms:  Other symptoms: numbness, tingling and swelling    History:   Previous similar pain: no       Precipitating factors:   Trauma or overuse: YES- Overuse     Alleviating factors:  Improved by: nothing    Therapies Tried and outcome: Aspercreme, Motrin, Advil, Tylenol, Ibuprofen, Ice, Heat, Exercise, Movements    HTN:   /78   Amlodipine, Hyzaar     BP Readings from Last 6 Encounters:   04/05/19 174/78   03/02/19 181/70   10/30/18 157/72   08/13/18 162/80   03/12/18 142/82   02/07/18 136/72     DM:   Last A1c 12.9 on 8/13/2018   On insulin: Novolog 10 units with meals                     Basaglar:  45 units 2 times daily    Hyperlipidemia:    Lipitor 40 mg     Last check: 8/12/18 was very high.    Depression:    Lexapro 10 mg daily      PHQ-9 SCORE 5/8/2017 12/29/2017 10/30/2018   PHQ-9 Total Score - - -   PHQ-9 Total Score 1 6 3     Alcohol Use    Quit about 1 year.    HCM:   Colonoscopy   Mammo   A1c, BMP, LDL, TSH    Problem list and histories reviewed & adjusted, as indicated.  Additional history: as documented    Patient Active Problem List   Diagnosis     Post herpetic neuralgia     Hyperlipidemia LDL goal <100     Essential hypertension with goal blood pressure less than 130/80     Moderate major depression (H)     Vulvar pruritus     History of adenomatous polyp of colon     Alcoholic (H)     Advanced directives, counseling/discussion     Obesity, Class I, BMI 30-34.9     Leblanc's esophagus without dysplasia     Uncontrolled type 2 diabetes mellitus with microalbuminuria, with long-term current use of insulin (H)     Alcoholic liver  "disease (H)     Noncompliance with medication regimen     Smoker     Albuminuria     Past Surgical History:   Procedure Laterality Date     BIOPSY Right     Breast-Benign lesion as per patient       Social History     Tobacco Use     Smoking status: Current Some Day Smoker     Packs/day: 0.10     Years: 10.00     Pack years: 1.00     Types: Cigarettes     Last attempt to quit: 9/10/2013     Years since quittin.5     Smokeless tobacco: Never Used     Tobacco comment: patient trying to quit on 3-4 cig/week   Substance Use Topics     Alcohol use: Yes     Comment: drinks 1-2 times a month     Family History   Problem Relation Age of Onset     Alcohol/Drug Mother      Heart Disease Mother      Alcohol/Drug Father      Diabetes Brother          Reviewed and updated as needed this visit by Provider    ROS:  Constitutional, HEENT, cardiovascular, pulmonary, gi and gu systems are negative, except as otherwise noted.    OBJECTIVE:     /78   Pulse 90   Temp 97.5  F (36.4  C) (Oral)   Resp 18   Ht 1.61 m (5' 3.39\")   Wt 81.2 kg (179 lb)   SpO2 99%   BMI 31.32 kg/m    Body mass index is 31.32 kg/m .  GENERAL: healthy, alert and no distress  NECK: no adenopathy and thyroid normal to palpation  RESP: lungs clear to auscultation - no rales, rhonchi or wheezes  CV: regular rate and rhythm, normal S1 S2, no S3 or S4, no murmur, click or rub,  MS: RT Shoulder         Vague tenderness around shoulder, slight limitation with abduction.  Diagnostic Test Results:  Results for orders placed or performed in visit on 19   Hemoglobin A1c   Result Value Ref Range    Hemoglobin A1C 12.5 (H) 0 - 5.6 %   Comprehensive metabolic panel (BMP + Alb, Alk Phos, ALT, AST, Total. Bili, TP)   Result Value Ref Range    Sodium 136 133 - 144 mmol/L    Potassium 3.8 3.4 - 5.3 mmol/L    Chloride 103 94 - 109 mmol/L    Carbon Dioxide 23 20 - 32 mmol/L    Anion Gap 10 3 - 14 mmol/L    Glucose 274 (H) 70 - 99 mg/dL    Urea Nitrogen 17 7 " - 30 mg/dL    Creatinine 0.52 0.52 - 1.04 mg/dL    GFR Estimate >90 >60 mL/min/[1.73_m2]    GFR Estimate If Black >90 >60 mL/min/[1.73_m2]    Calcium 9.2 8.5 - 10.1 mg/dL    Bilirubin Total 0.7 0.2 - 1.3 mg/dL    Albumin 4.0 3.4 - 5.0 g/dL    Protein Total 7.3 6.8 - 8.8 g/dL    Alkaline Phosphatase 99 40 - 150 U/L    ALT 31 0 - 50 U/L    AST 19 0 - 45 U/L       ASSESSMENT/PLAN:     (M25.512) Acute pain of left shoulder  (primary encounter diagnosis)  Comment: Consistent with adhesive capsulitis; especially in setting of uncontrolled DM. NSAIDs. PT for ROM exercises  Plan: celecoxib (CELEBREX) 200 MG capsule, ANGELINE PT,         HAND, AND CHIROPRACTIC REFERRAL    (E11.29,  E11.65,  R80.9,  Z79.4) Uncontrolled type 2 diabetes mellitus with microalbuminuria, with long-term current use of insulin (H)  Comment: Uncontrolled. A1c 12.5. Not been taking her medications and prescribed, will restart.  Reviewed the nature of diabetes and its complications.  Went over co morbidities, need for good blood sugar control as well as BP and Cholesterol control.  Follow up with PCP in 1 month.  Plan: Hemoglobin A1c, Comprehensive metabolic panel         (BMP + Alb, Alk Phos, ALT, AST, Total. Bili,         TP)    (F32.1) Moderate major depression (H)  Comment: In remission    (I10) HTN, goal below 140/90  Comment: Been uncontrolled in last year. Not taking Amlodipine, last refill for Hyzaar was 12/17, looks like not been taking.  Restart medication and recheck in 1 month.  Plan: Comprehensive metabolic panel (BMP + Alb, Alk         Phos, ALT, AST, Total. Bili, TP)    Follow up in 1 month or sooner with concerns    Jj Joaquin MD  [facility]

## 2019-04-07 DIAGNOSIS — I10 HYPERTENSION GOAL BP (BLOOD PRESSURE) < 130/80: ICD-10-CM

## 2019-04-07 DIAGNOSIS — Z79.4 TYPE 2 DIABETES MELLITUS WITH DIABETIC NEPHROPATHY, WITH LONG-TERM CURRENT USE OF INSULIN (H): ICD-10-CM

## 2019-04-07 DIAGNOSIS — E11.21 TYPE 2 DIABETES MELLITUS WITH DIABETIC NEPHROPATHY, WITH LONG-TERM CURRENT USE OF INSULIN (H): ICD-10-CM

## 2019-04-07 NOTE — TELEPHONE ENCOUNTER
"Requested Prescriptions   Pending Prescriptions Disp Refills     amLODIPine (NORVASC) 10 MG tablet  Last Written Prescription Date:  12/29/17  Last Fill Quantity: 90,  # refills: 1   [place] [facility] or [facility] prescribing provider:  4/5/19   Future Office Visit:      90 tablet 1     Sig: Take 1 tablet (10 mg) by mouth daily       Calcium Channel Blockers Protocol  Failed - 4/7/2019  9:28 AM        Failed - Blood pressure under 140/90 in past 12 months     BP Readings from Last 3 Encounters:   04/05/19 174/78   03/02/19 181/70   10/30/18 157/72                 Passed - Recent (12 mo) or future (30 days) visit within the authorizing provider's specialty     Patient had office visit in the last 12 months or has a visit in the next 30 days with authorizing provider or within the authorizing provider's specialty.  See \"Patient Info\" tab in inbasket, or \"Choose Columns\" in Meds & Orders section of the refill encounter.              Passed - Medication is active on med list        Passed - Patient is age 18 or older        Passed - No active pregnancy on record        Passed - Normal serum creatinine on file in past 12 months     Recent Labs   Lab Test 04/05/19  1439   CR 0.52             Passed - No positive pregnancy test in past 12 months        atorvastatin (LIPITOR) 40 MG tablet  Last Written Prescription Date:  12/29/17  Last Fill Quantity: 90,  # refills: 1   Last Office Visit with [facility] or [facility] prescribing provider:  4/5/19   Future Office Visit:      90 tablet 1     Sig: Take 1 tablet (40 mg) by mouth daily       Statins Protocol Passed - 4/7/2019  9:28 AM        Passed - LDL on file in past 12 months     Recent Labs   Lab Test 08/13/18  0935   LDL Cannot estimate LDL when triglyceride exceeds 400 mg/dL  85             Passed - No abnormal creatine kinase in past 12 months     No lab results found.             Passed - Recent (12 mo) or future (30 days) visit within the authorizing " "provider's specialty     Patient had office visit in the last 12 months or has a visit in the next 30 days with authorizing provider or within the authorizing provider's specialty.  See \"Patient Info\" tab in inbasket, or \"Choose Columns\" in Meds & Orders section of the refill encounter.              Passed - Medication is active on med list        Passed - Patient is age 18 or older        Passed - No active pregnancy on record        Passed - No positive pregnancy test in past 12 months          "

## 2019-04-09 ENCOUNTER — RESULT FOLLOW UP (OUTPATIENT)
Dept: FAMILY MEDICINE | Facility: CLINIC | Age: 64
End: 2019-04-09

## 2019-04-09 RX ORDER — AMLODIPINE BESYLATE 10 MG/1
10 TABLET ORAL DAILY
Qty: 30 TABLET | Refills: 0 | Status: SHIPPED | OUTPATIENT
Start: 2019-04-09 | End: 2019-04-19

## 2019-04-09 RX ORDER — ATORVASTATIN CALCIUM 40 MG/1
40 TABLET, FILM COATED ORAL DAILY
Qty: 30 TABLET | Refills: 0 | Status: SHIPPED | OUTPATIENT
Start: 2019-04-09 | End: 2019-04-19

## 2019-04-09 NOTE — TELEPHONE ENCOUNTER
Routing refill request to provider for review/approval because:  A break in medication - last Rx 12/2017  Nereida Ramirez RN

## 2019-04-09 NOTE — PROGRESS NOTES
Left VM;    A1c 12.5, diabetes not well controlled. Liver and kidney function normal   Please follow up with PCP to discuss the abnormal labs.   Call back at  with any concerns.

## 2019-04-09 NOTE — TELEPHONE ENCOUNTER
Refilled x 1 month.  Patient was seen on 4/5/19 by another clinic for chronic disease management.   YVON Tate CNP

## 2019-04-10 RX ORDER — LOSARTAN POTASSIUM AND HYDROCHLOROTHIAZIDE 12.5; 1 MG/1; MG/1
1 TABLET ORAL DAILY
Qty: 90 TABLET | Refills: 1 | Status: SHIPPED | OUTPATIENT
Start: 2019-04-10 | End: 2019-04-19

## 2019-04-15 ENCOUNTER — TRANSFERRED RECORDS (OUTPATIENT)
Dept: HEALTH INFORMATION MANAGEMENT | Facility: CLINIC | Age: 64
End: 2019-04-15

## 2019-04-19 ENCOUNTER — NURSE TRIAGE (OUTPATIENT)
Dept: NURSING | Facility: CLINIC | Age: 64
End: 2019-04-19

## 2019-04-19 ENCOUNTER — OFFICE VISIT (OUTPATIENT)
Dept: FAMILY MEDICINE | Facility: CLINIC | Age: 64
End: 2019-04-19
Payer: COMMERCIAL

## 2019-04-19 VITALS
BODY MASS INDEX: 31.04 KG/M2 | WEIGHT: 175.2 LBS | OXYGEN SATURATION: 97 % | TEMPERATURE: 98.2 F | RESPIRATION RATE: 17 BRPM | DIASTOLIC BLOOD PRESSURE: 82 MMHG | HEART RATE: 100 BPM | HEIGHT: 63 IN | SYSTOLIC BLOOD PRESSURE: 178 MMHG

## 2019-04-19 DIAGNOSIS — F10.20 ALCOHOLIC (H): ICD-10-CM

## 2019-04-19 DIAGNOSIS — I10 ESSENTIAL HYPERTENSION WITH GOAL BLOOD PRESSURE LESS THAN 130/80: ICD-10-CM

## 2019-04-19 DIAGNOSIS — Z13.89 SCREENING FOR DIABETIC PERIPHERAL NEUROPATHY: ICD-10-CM

## 2019-04-19 DIAGNOSIS — F32.1 MODERATE MAJOR DEPRESSION (H): ICD-10-CM

## 2019-04-19 PROCEDURE — 99215 OFFICE O/P EST HI 40 MIN: CPT | Performed by: NURSE PRACTITIONER

## 2019-04-19 RX ORDER — AMLODIPINE BESYLATE 10 MG/1
10 TABLET ORAL DAILY
Qty: 90 TABLET | Refills: 1 | Status: SHIPPED | OUTPATIENT
Start: 2019-04-19 | End: 2019-12-31

## 2019-04-19 RX ORDER — METRONIDAZOLE 250 MG/1
TABLET ORAL
COMMUNITY
Start: 2019-04-16 | End: 2019-12-02

## 2019-04-19 RX ORDER — GLUCOSAMINE HCL/CHONDROITIN SU 500-400 MG
CAPSULE ORAL
Qty: 100 EACH | Refills: 3 | Status: SHIPPED | OUTPATIENT
Start: 2019-04-19 | End: 2020-08-20

## 2019-04-19 RX ORDER — ESCITALOPRAM OXALATE 10 MG/1
10 TABLET ORAL DAILY
Refills: 3 | COMMUNITY
Start: 2019-04-12 | End: 2019-12-31

## 2019-04-19 RX ORDER — AMOXICILLIN 250 MG/1
CAPSULE ORAL
COMMUNITY
Start: 2019-04-17 | End: 2019-12-02

## 2019-04-19 RX ORDER — LOSARTAN POTASSIUM AND HYDROCHLOROTHIAZIDE 12.5; 1 MG/1; MG/1
1 TABLET ORAL DAILY
Qty: 90 TABLET | Refills: 1 | Status: SHIPPED | OUTPATIENT
Start: 2019-04-19 | End: 2019-12-31

## 2019-04-19 RX ORDER — ATORVASTATIN CALCIUM 40 MG/1
40 TABLET, FILM COATED ORAL DAILY
Qty: 90 TABLET | Refills: 1 | Status: SHIPPED | OUTPATIENT
Start: 2019-04-19 | End: 2019-12-31

## 2019-04-19 ASSESSMENT — MIFFLIN-ST. JEOR: SCORE: 1334.95

## 2019-04-19 ASSESSMENT — PATIENT HEALTH QUESTIONNAIRE - PHQ9
5. POOR APPETITE OR OVEREATING: NOT AT ALL
SUM OF ALL RESPONSES TO PHQ QUESTIONS 1-9: 2

## 2019-04-19 ASSESSMENT — ANXIETY QUESTIONNAIRES
1. FEELING NERVOUS, ANXIOUS, OR ON EDGE: NOT AT ALL
3. WORRYING TOO MUCH ABOUT DIFFERENT THINGS: NOT AT ALL
2. NOT BEING ABLE TO STOP OR CONTROL WORRYING: NOT AT ALL
6. BECOMING EASILY ANNOYED OR IRRITABLE: NOT AT ALL
5. BEING SO RESTLESS THAT IT IS HARD TO SIT STILL: NOT AT ALL
7. FEELING AFRAID AS IF SOMETHING AWFUL MIGHT HAPPEN: NOT AT ALL
GAD7 TOTAL SCORE: 0

## 2019-04-19 ASSESSMENT — PAIN SCALES - GENERAL: PAINLEVEL: NO PAIN (0)

## 2019-04-19 NOTE — Clinical Note
Please call patient in 2 weeks to check on her blood sugars. She should have recorded fasting glucoses and postprandial glucoses. Thank you, YVON Tate CNP

## 2019-04-19 NOTE — Clinical Note
Please abstract the following data from this visit with this patient into the appropriate field in Epic:Mammogram done on this date: 4/15/2019 (approximately), by this group: [facility], results were normal.

## 2019-04-19 NOTE — PATIENT INSTRUCTIONS
For your diabetes:  -STOP basaglar and novolog  -START Novolin pen 30 units in AM and 30 units in PM.  Recommend you take with meals.    -RECORD fasting blood sugar every morning AND blood sugar 2 hours after eating dinner every night    -we will follow up in 2 weeks on these blood sugars and make changes to your insulin accordingly.

## 2019-04-19 NOTE — TELEPHONE ENCOUNTER
"    Additional Information    Negative: Drug overdose and nurse unable to answer question    Negative: Caller requesting information not related to medicine    Negative: Caller requesting a prescription for Strep throat and has a positive culture result    Negative: Rash while taking a medication or within 3 days of stopping it    Negative: Immunization reaction suspected    Negative: [1] Asthma and [2] having symptoms of asthma (cough, wheezing, etc)    Negative: MORE THAN A DOUBLE DOSE of a prescription or over-the-counter (OTC) drug    Negative: [1] DOUBLE DOSE (an extra dose or lesser amount) of over-the-counter (OTC) drug AND [2] any symptoms (e.g., dizziness, nausea, pain, sleepiness)    Negative: [1] DOUBLE DOSE (an extra dose or lesser amount) of prescription drug AND [2] any symptoms (e.g., dizziness, nausea, pain, sleepiness)    Negative: Took another person's prescription drug    Negative: [1] DOUBLE DOSE (an extra dose or lesser amount) of prescription drug AND [2] NO symptoms (Exception: a double dose of antibiotics)    Negative: Diabetes drug error or overdose (e.g., insulin or extra dose)    Negative: [1] Request for URGENT new prescription or refill of \"essential\" medication (i.e., likelihood of harm to patient if not taken) AND [2] triager unable to fill per unit policy    Negative: [1] Prescription not at pharmacy AND [2] was prescribed today by PCP    Negative: Pharmacy calling with prescription questions and triager unable to answer question    Negative: Caller has URGENT medication question about med that PCP prescribed and triager unable to answer question    Negative: Caller has NON-URGENT medication question about med that PCP prescribed and triager unable to answer question    Negative: Caller requesting a NON-URGENT new prescription or refill and triager unable to refill per unit policy    Negative: Caller has medication question about med not prescribed by PCP and triager unable to answer " question (e.g., compatibility with other med, storage)    Negative: [1] DOUBLE DOSE (an extra dose or lesser amount) of over-the-counter (OTC) drug AND [2] NO symptoms (all triage questions negative)    Negative: [1] DOUBLE DOSE (an extra dose or lesser amount) of antibiotic drug AND [2] NO symptoms (all triage questions negative)    Negative: Caller has medication question only, adult not sick, and triager answers question    Negative: Caller has medication question, adult has minor symptoms, caller declines triage, and triager answers question    Negative: Caller requesting information about medication during pregnancy; adult is not ill and triager answers question    Negative: Caller requesting information about medication use with breastfeeding; neither adult nor infant is ill, and triager answers question    Negative: Caller requesting a refill, no triage required, and triager able to refill per unit policy    Pharmacy calling with prescription question and triager answers question    Protocols used: MEDICATION QUESTION CALL-ADULT-    Patient's pharmacy calling to report that the patient was prescribed a humulin pen instead of a novolin pen.  Writer was able to verify via provider note that the plan was to prescribe the patient the novolin.  Verbal order was given for novolin as discussed in provider note.    Kandy Turner RN  [facility]

## 2019-04-19 NOTE — PROGRESS NOTES
SUBJECTIVE:   Ellen Hill is a 63 year old female who presents to clinic today for the following   health issues:      Diabetes Follow-up      Patient is checking blood sugars: Once a week    Diabetic concerns: None and other - High blood sugars     Symptoms of hypoglycemia (low blood sugar): none     Paresthesias (numbness or burning in feet) or sores: No     Date of last diabetic eye exam: 2018/10  Eating a lot of sweets, carbs, soda  Out of insulin  Blood sugars about 200 in the morning  taking 45 basaglar consistently  Taking novolog 10 in AM and 30 in PM, not with meals, not taking noon dose  Dinner biggest meal, eats lunch daily  Has not noticed low blood sugars  Not taking metformin due to diarrhea    Diabetes Management Resources    Hyperlipidemia Follow-Up      Rate your low fat/cholesterol diet?: fair    Taking statin?  No    Other lipid medications/supplements?:  none  Intermittently takes statin, was not sure which was was for her cholesterol    Hypertension Follow-up      Outpatient blood pressures are not being checked.    Low Salt Diet: not monitoring salt    Has not picked up refills.  She did not take blood pressure medication today  denies headache, chest pain, dizziness, shortness of breath, weakness, or vision changes.      BP Readings from Last 2 Encounters:   04/05/19 174/78   03/02/19 181/70     Hemoglobin A1C (%)   Date Value   04/05/2019 12.5 (H)   08/13/2018 12.9 (H)     LDL Cholesterol Calculated (mg/dL)   Date Value   08/13/2018     Cannot estimate LDL when triglyceride exceeds 400 mg/dL   05/08/2017 136 (H)     LDL Cholesterol Direct (mg/dL)   Date Value   08/13/2018 85       Amount of exercise or physical activity: None    Problems taking medications regularly: No    Medication side effects: none    Diet: regular (no restrictions)    Depression Followup    Status since last visit: Worsened as she was not taking Lexapro    See PHQ-9 for current symptoms.  Other associated  symptoms: None    Complicating factors:   Significant life event:  No   Current substance abuse:  None  Anxiety or Panic symptoms:  Yes-  anxiety    PHQ 2017 2017 10/30/2018   PHQ-9 Total Score 1 6 3   Q9: Thoughts of better off dead/self-harm past 2 weeks Not at all Not at all Not at all   Saw her gynecologist recently who restarted her on lexapro.      PHQ-9  English  PHQ-9   Any Language  Suicide Assessment Five-step Evaluation and Treatment (SAFE-T)    Additional history: as documented    Reviewed  and updated as needed this visit by clinical staff  Tobacco  Allergies  Meds  Problems  Med Hx  Surg Hx  Fam Hx  Soc Hx          Reviewed and updated as needed this visit by Provider  Tobacco  Allergies  Meds  Problems  Med Hx  Surg Hx  Fam Hx         Patient Active Problem List   Diagnosis     Post herpetic neuralgia     Hyperlipidemia LDL goal <100     Essential hypertension with goal blood pressure less than 130/80     Moderate major depression (H)     Vulvar pruritus     History of adenomatous polyp of colon     Alcoholic (H)     Advanced directives, counseling/discussion     Obesity, Class I, BMI 30-34.9     Leblanc's esophagus without dysplasia     Uncontrolled type 2 diabetes mellitus with microalbuminuria, with long-term current use of insulin (H)     Alcoholic liver disease (H)     Noncompliance with medication regimen     Smoker     Albuminuria     Past Surgical History:   Procedure Laterality Date     BIOPSY Right     Breast-Benign lesion as per patient       Social History     Tobacco Use     Smoking status: Former Smoker     Packs/day: 0.10     Years: 10.00     Pack years: 1.00     Types: Cigarettes     Last attempt to quit: 9/10/2013     Years since quittin.6     Smokeless tobacco: Never Used   Substance Use Topics     Alcohol use: Yes     Alcohol/week: 0.6 oz     Types: 1 Cans of beer per week     Comment: drinks 1-2 times a month     Family History   Problem Relation Age  of Onset     Alcohol/Drug Mother      Heart Disease Mother      Alcohol/Drug Father      Diabetes Brother          Current Outpatient Medications   Medication Sig Dispense Refill     alcohol swab prep pads Use to swab area of injection/mariza as directed. 100 each 3     amLODIPine (NORVASC) 10 MG tablet Take 1 tablet (10 mg) by mouth daily 90 tablet 1     amoxicillin (AMOXIL) 250 MG capsule        atorvastatin (LIPITOR) 40 MG tablet Take 1 tablet (40 mg) by mouth daily 90 tablet 1     blood glucose monitoring (AL MICROLET) lancets Use to test blood sugar 2 times daily or as directed. 100 each 3     escitalopram (LEXAPRO) 10 MG tablet Take 10 mg by mouth daily  3     insulin isophane & regular (HUMULIN MIX 70/30 KWIKPEN) susp 30 units before breakfast 30 units before dinner 9 mL 3     insulin pen needle (B-D U/F) 31G X 5 MM Use 3 pen needles daily or as directed. 180 each 1     insulin pen needle (ULTICARE MICRO) 32G X 4 MM miscellaneous Use 2 pen needles daily or as directed. 100 each 3     losartan-hydrochlorothiazide (HYZAAR) 100-12.5 MG tablet Take 1 tablet by mouth daily 90 tablet 1     metroNIDAZOLE (FLAGYL) 250 MG tablet        Allergies   Allergen Reactions     Insulin Detemir Hives     Alfredo products cause hives**       Liraglutide Hives     Alfredo Nordisk product     Victoza Hives     Alfredo Nordisk product     Enalapril Cough     Lisinopril Cough     Wellbutrin [Bupropion Hcl] Hives     hives     Recent Labs   Lab Test 04/05/19  1439 08/13/18  0935 03/12/18  1247 12/29/17  0930  05/08/17  1541 01/10/17  07/12/12  1000   A1C 12.5* 12.9* 12.8* 12.4*  --  11.2*  --    < > 10.4*   LDL  --  Cannot estimate LDL when triglyceride exceeds 400 mg/dL  85  --   --   --  136* 108   < > Cannot estimate LDL when triglyceride exceeds 400 mg/dL  54   HDL  --  32*  --   --   --  45* 39*   < > 36*   TRIG  --  573*  --   --   --  336* 377*   < > 514*   ALT 31  --   --  38  --  37  --    < > 102*   CR 0.52  --   --  0.60   < >  "0.57  --    < > 0.60   GFRESTIMATED >90  --   --  >90   < > >90  Non  GFR Calc    --    < > >90   GFRESTBLACK >90  --   --  >90   < > >90  African American GFR Calc    --    < > >90   POTASSIUM 3.8  --   --  3.8   < > 3.9  --    < > 4.0   TSH  --   --   --   --   --  1.02  --   --  1.35    < > = values in this interval not displayed.      BP Readings from Last 3 Encounters:   04/19/19 178/82   04/05/19 174/78   03/02/19 181/70    Wt Readings from Last 3 Encounters:   04/19/19 79.5 kg (175 lb 3.2 oz)   04/05/19 81.2 kg (179 lb)   03/02/19 81.3 kg (179 lb 4 oz)              ROS:  Constitutional, HEENT, cardiovascular, pulmonary, GI, , musculoskeletal, neuro, skin, endocrine and psych systems are negative, except as otherwise noted.    OBJECTIVE:     /82 (BP Location: Right arm, Patient Position: Sitting, Cuff Size: Adult Regular)   Pulse 100   Temp 98.2  F (36.8  C) (Oral)   Resp 17   Ht 1.61 m (5' 3.39\")   Wt 79.5 kg (175 lb 3.2 oz)   SpO2 97%   BMI 30.66 kg/m    Body mass index is 30.66 kg/m .  GENERAL: healthy, alert and no distress  NECK: no adenopathy, no asymmetry, masses, or scars and thyroid normal to palpation  RESP: lungs clear to auscultation - no rales, rhonchi or wheezes  CV: regular rate and rhythm, normal S1 S2, no S3 or S4, no murmur, click or rub, no peripheral edema and peripheral pulses strong  ABDOMEN: soft, nontender, no hepatosplenomegaly, no masses and bowel sounds normal  MS: no gross musculoskeletal defects noted, no edema  NEURO: Normal strength and tone, mentation intact and speech normal  PSYCH: mentation appears normal, affect flat and anxious    Diagnostic Test Results:  Results for orders placed or performed in visit on 04/05/19   Hemoglobin A1c   Result Value Ref Range    Hemoglobin A1C 12.5 (H) 0 - 5.6 %   Comprehensive metabolic panel (BMP + Alb, Alk Phos, ALT, AST, Total. Bili, TP)   Result Value Ref Range    Sodium 136 133 - 144 mmol/L    Potassium 3.8 " 3.4 - 5.3 mmol/L    Chloride 103 94 - 109 mmol/L    Carbon Dioxide 23 20 - 32 mmol/L    Anion Gap 10 3 - 14 mmol/L    Glucose 274 (H) 70 - 99 mg/dL    Urea Nitrogen 17 7 - 30 mg/dL    Creatinine 0.52 0.52 - 1.04 mg/dL    GFR Estimate >90 >60 mL/min/[1.73_m2]    GFR Estimate If Black >90 >60 mL/min/[1.73_m2]    Calcium 9.2 8.5 - 10.1 mg/dL    Bilirubin Total 0.7 0.2 - 1.3 mg/dL    Albumin 4.0 3.4 - 5.0 g/dL    Protein Total 7.3 6.8 - 8.8 g/dL    Alkaline Phosphatase 99 40 - 150 U/L    ALT 31 0 - 50 U/L    AST 19 0 - 45 U/L       ASSESSMENT/PLAN:     1. Uncontrolled type 2 diabetes mellitus with microalbuminuria, with long-term current use of insulin (H)  Discussed with patient in detail today.  History of very poor compliance.  I asked her what limits her ability to be compliant and she stated the insulin and then further elaborated stating that injecting herself is difficult.  She does not like to do it so often.  I asked if she thinks she could be better compliant with 2 daily injections and she agreed enthusiastically.  In interest of obtaining better control of her diabetes, I will switch her to Novolin and stop mealtime and basal.  I will have her record fasting and postprandial sugars for next two weeks and follow up after that.  Patient states she cannot afford a visit in 2 weeks so we will call her to obtain sugars (states she cannot do mychart).  She agrees to come in to clinic in 6 weeks for repeat a1c and follow up on diabetes.    - atorvastatin (LIPITOR) 40 MG tablet; Take 1 tablet (40 mg) by mouth daily  Dispense: 90 tablet; Refill: 1  - insulin isophane & regular (HUMULIN MIX 70/30 KWIKPEN) susp; 30 units before breakfast 30 units before dinner  Dispense: 9 mL; Refill: 3  - insulin pen needle (ULTICARE MICRO) 32G X 4 MM miscellaneous; Use 2 pen needles daily or as directed.  Dispense: 100 each; Refill: 3  - alcohol swab prep pads; Use to swab area of injection/mariza as directed.  Dispense: 100 each;  Refill: 3    2. Essential hypertension with goal blood pressure less than 130/80  Did not take medication today.  Refilled.    - amLODIPine (NORVASC) 10 MG tablet; Take 1 tablet (10 mg) by mouth daily  Dispense: 90 tablet; Refill: 1  - losartan-hydrochlorothiazide (HYZAAR) 100-12.5 MG tablet; Take 1 tablet by mouth daily  Dispense: 90 tablet; Refill: 1    3. Moderate major depression (H)  Was recently restarted on this with gynecology.    - escitalopram (LEXAPRO) 10 MG tablet; Take 10 mg by mouth daily; Refill: 3    4. Alcoholic (H)  Per patient she is drinking about once a week, one beer at a time, sometimes a cocktail.  She denies drinking any more than that.  We will continue to address it.      Patient Instructions   For your diabetes:  -STOP basaglar and novolog  -START Novolin pen 30 units in AM and 30 units in PM.  Recommend you take with meals.    -RECORD fasting blood sugar every morning AND blood sugar 2 hours after eating dinner every night    -we will follow up in 2 weeks on these blood sugars and make changes to your insulin accordingly.          YVON Tate [facility]

## 2019-04-20 ASSESSMENT — ANXIETY QUESTIONNAIRES: GAD7 TOTAL SCORE: 0

## 2019-05-07 ENCOUNTER — TELEPHONE (OUTPATIENT)
Dept: FAMILY MEDICINE | Facility: CLINIC | Age: 64
End: 2019-05-07

## 2019-05-07 NOTE — PROGRESS NOTES
Left message for patient to call back. Message routed to team pool.   LXIONG3, MEDICAL ASSISTANT

## 2019-05-07 NOTE — TELEPHONE ENCOUNTER
Please call patient in 2 weeks to check on her blood sugars. She should have recorded fasting glucoses and postprandial glucoses. Thank you,   Marcella Teixeira, APRN CNP       This writer attempted to contact 1 on 05/07/19      Reason for call per message and left message.      If patient calls back:   2nd floor [facility] Care Team (MA/TC) called. Inform patient that someone from the team will contact them, document that pt called and route to care team.         LXIONG3, MEDICAL ASSISTANT

## 2019-05-10 ENCOUNTER — TELEPHONE (OUTPATIENT)
Dept: FAMILY MEDICINE | Facility: CLINIC | Age: 64
End: 2019-05-10

## 2019-05-10 NOTE — TELEPHONE ENCOUNTER
Looks like patient is experiencing hypoglycemic symptoms with fasting acting insulin of 30 units BID with meals.  Patient has been taking it before she ate.    Suggested to inject after eating to prevent shakiness, weakness, sweating, hunger that she has been experiencing.  Alos c/o upset stomach.  Patient has been eating sweets: cookie, ice cream.   BS have been:  AM fastin, 183, 212, 238, 180,   PM: 228, 116, 224    For last two days has not been injecting insulin.  She will resume the schedule. Advised to avoid sugars, eat lean protein, fruits and vegetables, whole grains.  Discussed fruits that are high in sugar to avoid.  She still has enough of insulin supply.  Scheduled lab apt on

## 2019-05-10 NOTE — TELEPHONE ENCOUNTER
This writer attempted to contact patient on 05/10/19      Reason for call provider's message and left message.      If patient calls back:   Registered Nurse called. Follow Triage Call workflow        Alicia Andres RN

## 2019-05-10 NOTE — TELEPHONE ENCOUNTER
Reason for Call:  Other prescription    Detailed comments: patient was put onto a new insulin medication and she is having constipation, upset stomach, and feels very tired.    Phone Number Patient can be reached at: Home number on file [phone] (home)    Best Time: any    Can we leave a detailed message on this number? YES    Call taken on 5/10/2019 at 1:26 PM by Vanessa High

## 2019-05-10 NOTE — TELEPHONE ENCOUNTER
She can try decreasing dosing to 25 units twice a day.  Should make sure she is still eating three times a day.  I would like to know her fasting AM glucoses as well as at least one postprandial glucose daily for the next week.  Please notify patient.  She should call back in one week to report on her sugars.  She should call sooner if she continues to experience hypoglycemia symptoms.    YVON Tate CNP

## 2019-05-12 NOTE — TELEPHONE ENCOUNTER
"Requested Prescriptions   Pending Prescriptions Disp Refills     insulin isophane & regular (HUMULIN MIX 70/30 KWIKPEN) susp        Last Written Prescription Date:  19  Last Fill Quantity: 9 ml,  # refills: 3   Last Office Visit with [facility], [facility] or [facility] prescribing provider:  19   Future Office Visit:      9 mL 3     Si units before breakfast 30 units before dinner       Short Acting Insulin Protocol Failed - 2019  9:59 AM        Failed - Blood pressure less than 140/90 in past 6 months     BP Readings from Last 3 Encounters:   19 178/82   19 174/78   19 181/70                 Passed - LDL on file in past 12 months     Recent Labs   Lab Test 18  0935   LDL Cannot estimate LDL when triglyceride exceeds 400 mg/dL  85             Passed - Microalbumin on file in past 12 months     Recent Labs   Lab Test 18  0945   MICROL 286   UMALCR 506.20*             Passed - Serum creatinine on file in past 12 months     Recent Labs   Lab Test 19  1439   CR 0.52             Passed - HgbA1C in past 3 or 6 months     If HgbA1C is 8 or greater, it needs to be on file within the past 3 months.  If less than 8, must be on file within the past 6 months.     Recent Labs   Lab Test 19  1439   A1C 12.5*             Passed - Medication is active on med list        Passed - Patient is age 18 or older        Passed - Recent (6 mo) or future (30 days) visit within the authorizing provider's specialty     Patient had office visit in the last 6 months or has a visit in the next 30 days with authorizing provider or within the authorizing provider's specialty.  See \"Patient Info\" tab in inbasket, or \"Choose Columns\" in Meds & Orders section of the refill encounter.                  Arnel Faarax  [facility]  "

## 2019-05-13 NOTE — TELEPHONE ENCOUNTER
Updated patient with providers message and patient verbalized understanding. She did not check her fasting blood sugar this morning yet and already ate. Writer instructed patient to check fasting blood sugars for one week as well as after a meal for one week . Patient is scheduled for A1C lab as well this week.       Nichole Gamble RN, BSN, PHN

## 2019-05-14 NOTE — TELEPHONE ENCOUNTER
Different pharmacy being requested for refill than previously sent to.   Prescription approved per [facility] Refill Protocol.      Nichole Gamble RN, BSN, PHN

## 2019-05-15 ENCOUNTER — TELEPHONE (OUTPATIENT)
Dept: FAMILY MEDICINE | Facility: CLINIC | Age: 64
End: 2019-05-15

## 2019-05-15 NOTE — TELEPHONE ENCOUNTER
Faxed message from pharmacy:    Preferred product is Novolin 70/30, also waht thet've had before.  Please send new rx for this instead of insulin isophane & regular (HUMULIN MIX 70/30 KWIKPEN) susp

## 2019-07-15 ENCOUNTER — OFFICE VISIT (OUTPATIENT)
Dept: SURGERY | Facility: CLINIC | Age: 64
End: 2019-07-15
Payer: COMMERCIAL

## 2019-07-15 VITALS
DIASTOLIC BLOOD PRESSURE: 77 MMHG | HEIGHT: 63 IN | HEART RATE: 88 BPM | BODY MASS INDEX: 31.01 KG/M2 | WEIGHT: 175 LBS | SYSTOLIC BLOOD PRESSURE: 169 MMHG

## 2019-07-15 DIAGNOSIS — D17.1 LIPOMA OF BACK: Primary | ICD-10-CM

## 2019-07-15 PROCEDURE — 99213 OFFICE O/P EST LOW 20 MIN: CPT | Performed by: SURGERY

## 2019-07-15 ASSESSMENT — MIFFLIN-ST. JEOR: SCORE: 1327.92

## 2019-07-15 NOTE — PROGRESS NOTES
"Patient seen for lipoma on back  HPI:  Patient is a 63 year old female  with complaints of lump on right shoulder blade area  The patient noticed the symptoms about 3 weeks ago.  Feels itchy and painful like a bee sting  nothing makes the episode better.  I removed one on left upper back area in 2017  Patient has not family history of similar problems    Review Of Systems    Skin: as above  Ears/Nose/Throat: negative  Respiratory: No shortness of breath, dyspnea on exertion, cough, or hemoptysis  Cardiovascular: negative  Gastrointestinal: negative  Genitourinary: negative  Musculoskeletal: negative  Neurologic: negative  Hematologic/Lymphatic/Immunologic: negative  Endocrine: negative and diabetes      Past Medical History:   Diagnosis Date     Advanced directives, counseling/discussion 3/28/2013    Patient does not have an Advance/Health Care Directive (HCD), given \"What is Advance Care Planning?\" flyer.  Nola Lopez March 28, 2013      Hyperlipidemia LDL goal <100 10/27/2010     Hypertension goal BP (blood pressure) < 130/80 12/21/2010     Microalbuminuria 2/12/2015     Shingles 2009    Right posterior shoulder as per patient     Type 2 diabetes, HbA1c goal < 7% (H) 10/31/2010       Past Surgical History:   Procedure Laterality Date     BIOPSY Right 1989    Breast-Benign lesion as per patient       Social History     Socioeconomic History     Marital status:      Spouse name: Not on file     Number of children: 1     Years of education: 13     Highest education level: Not on file   Occupational History     Occupation: [employer]     Employer: [employer]     Comment: 3 years   Social Needs     Financial resource strain: Not on file     Food insecurity:     Worry: Not on file     Inability: Not on file     Transportation needs:     Medical: Not on file     Non-medical: Not on file   Tobacco Use     Smoking status: Former Smoker     Packs/day: 0.10     Years: 10.00     Pack years: 1.00     Types: " Cigarettes     Last attempt to quit: 9/10/2013     Years since quittin.8     Smokeless tobacco: Never Used   Substance and Sexual Activity     Alcohol use: Yes     Alcohol/week: 0.6 oz     Types: 1 Cans of beer per week     Comment: drinks 1-2 times a month     Drug use: No     Sexual activity: Yes     Partners: Male     Birth control/protection: None   Lifestyle     Physical activity:     Days per week: Not on file     Minutes per session: Not on file     Stress: Not on file   Relationships     Social connections:     Talks on phone: Not on file     Gets together: Not on file     Attends Rastafari service: Not on file     Active member of club or organization: Not on file     Attends meetings of clubs or organizations: Not on file     Relationship status: Not on file     Intimate partner violence:     Fear of current or ex partner: Not on file     Emotionally abused: Not on file     Physically abused: Not on file     Forced sexual activity: Not on file   Other Topics Concern     Parent/sibling w/ CABG, MI or angioplasty before 65F 55M? Not Asked   Social History Narrative     Not on file       Current Outpatient Medications   Medication Sig Dispense Refill     alcohol swab prep pads Use to swab area of injection/mariza as directed. 100 each 3     amLODIPine (NORVASC) 10 MG tablet Take 1 tablet (10 mg) by mouth daily 90 tablet 1     amoxicillin (AMOXIL) 250 MG capsule        atorvastatin (LIPITOR) 40 MG tablet Take 1 tablet (40 mg) by mouth daily 90 tablet 1     blood glucose monitoring (AL MICROLET) lancets Use to test blood sugar 2 times daily or as directed. 100 each 3     escitalopram (LEXAPRO) 10 MG tablet Take 10 mg by mouth daily  3     insulin isophane & regular (NOVOLIN 70/30 FLEXPEN) susp Inject 30 Units Subcutaneous 2 times daily (with meals) 15 mL 3     insulin pen needle (B-D U/F) 31G X 5 MM Use 3 pen needles daily or as directed. 180 each 1     insulin pen needle (ULTICARE MICRO) 32G X 4 MM  "miscellaneous Use 2 pen needles daily or as directed. 100 each 3     losartan-hydrochlorothiazide (HYZAAR) 100-12.5 MG tablet Take 1 tablet by mouth daily 90 tablet 1     metroNIDAZOLE (FLAGYL) 250 MG tablet          Medications and history reviewed    Physical exam:  Vitals: /77   Pulse 88   Ht 1.6 m (5' 3\")   Wt 79.4 kg (175 lb)   BMI 31.00 kg/m    BMI= Body mass index is 31 kg/m .    Constitutional: healthy, alert and no distress  Head: Normocephalic. No masses, lesions, tenderness or abnormalities  Cardiovascular: negative, PMI normal. No lifts, heaves, or thrills. RRR. No murmurs, clicks gallops or rub  Respiratory: negative, Percussion normal. Good diaphragmatic excursion. Lungs clear  Gastrointestinal: Abdomen soft, non-tender. BS normal. No masses, organomegaly  : Deferred  Musculoskeletal: extremities normal- no gross deformities noted, gait normal and normal muscle tone  Skin: just medial to tip of right scapula is about 1 x 1 cm or so somewhat ill defined lump. Left upper back with well healed scar no lipoma recurrence  Psychiatric: mentation appears normal and affect normal/bright  Patient able to get up on table without difficulty.      Assessment:     ICD-10-CM    1. Lipoma of back D17.1      Plan: Likely lipoma as had before, also may be intramuscular again as is ill defined on exam. Still small so can plan to remove in clinic. Will plan to do at our [place] clinic as procedure room has better lighting and supplies. Patient agreeable. Will schedule.  Ellen to follow up with Primary Care provider regarding elevated blood pressure.      Kelvin Hutchison MD    "

## 2019-07-15 NOTE — LETTER
"    7/15/2019         RE: Ellen Hill  [address] [place] [address]        Dear Colleague,    Thank you for referring your patient, Ellen Hill, to the [facility] [place]. Please see a copy of my visit note below.    Patient seen for lipoma on back  HPI:  Patient is a 63 year old female  with complaints of lump on right shoulder blade area  The patient noticed the symptoms about 3 weeks ago.  Feels itchy and painful like a bee sting  nothing makes the episode better.  I removed one on left upper back area in 2017  Patient has not family history of similar problems    Review Of Systems    Skin: as above  Ears/Nose/Throat: negative  Respiratory: No shortness of breath, dyspnea on exertion, cough, or hemoptysis  Cardiovascular: negative  Gastrointestinal: negative  Genitourinary: negative  Musculoskeletal: negative  Neurologic: negative  Hematologic/Lymphatic/Immunologic: negative  Endocrine: negative and diabetes      Past Medical History:   Diagnosis Date     Advanced directives, counseling/discussion 3/28/2013    Patient does not have an Advance/Health Care Directive (HCD), given \"What is Advance Care Planning?\" flyer.  Nola Lopez March 28, 2013      Hyperlipidemia LDL goal <100 10/27/2010     Hypertension goal BP (blood pressure) < 130/80 12/21/2010     Microalbuminuria 2/12/2015     Shingles 2009    Right posterior shoulder as per patient     Type 2 diabetes, HbA1c goal < 7% (H) 10/31/2010       Past Surgical History:   Procedure Laterality Date     BIOPSY Right 1989    Breast-Benign lesion as per patient       Social History     Socioeconomic History     Marital status:      Spouse name: Not on file     Number of children: 1     Years of education: 13     Highest education level: Not on file   Occupational History     Occupation: [employer]     Employer: [employer]     Comment: 3 years   Social Needs     Financial resource strain: Not on file     Food " insecurity:     Worry: Not on file     Inability: Not on file     Transportation needs:     Medical: Not on file     Non-medical: Not on file   Tobacco Use     Smoking status: Former Smoker     Packs/day: 0.10     Years: 10.00     Pack years: 1.00     Types: Cigarettes     Last attempt to quit: 9/10/2013     Years since quittin.8     Smokeless tobacco: Never Used   Substance and Sexual Activity     Alcohol use: Yes     Alcohol/week: 0.6 oz     Types: 1 Cans of beer per week     Comment: drinks 1-2 times a month     Drug use: No     Sexual activity: Yes     Partners: Male     Birth control/protection: None   Lifestyle     Physical activity:     Days per week: Not on file     Minutes per session: Not on file     Stress: Not on file   Relationships     Social connections:     Talks on phone: Not on file     Gets together: Not on file     Attends Zoroastrianism service: Not on file     Active member of club or organization: Not on file     Attends meetings of clubs or organizations: Not on file     Relationship status: Not on file     Intimate partner violence:     Fear of current or ex partner: Not on file     Emotionally abused: Not on file     Physically abused: Not on file     Forced sexual activity: Not on file   Other Topics Concern     Parent/sibling w/ CABG, MI or angioplasty before 65F 55M? Not Asked   Social History Narrative     Not on file       Current Outpatient Medications   Medication Sig Dispense Refill     alcohol swab prep pads Use to swab area of injection/mariza as directed. 100 each 3     amLODIPine (NORVASC) 10 MG tablet Take 1 tablet (10 mg) by mouth daily 90 tablet 1     amoxicillin (AMOXIL) 250 MG capsule        atorvastatin (LIPITOR) 40 MG tablet Take 1 tablet (40 mg) by mouth daily 90 tablet 1     blood glucose monitoring (AL MICROLET) lancets Use to test blood sugar 2 times daily or as directed. 100 each 3     escitalopram (LEXAPRO) 10 MG tablet Take 10 mg by mouth daily  3     insulin  "isophane & regular (NOVOLIN 70/30 FLEXPEN) susp Inject 30 Units Subcutaneous 2 times daily (with meals) 15 mL 3     insulin pen needle (B-D U/F) 31G X 5 MM Use 3 pen needles daily or as directed. 180 each 1     insulin pen needle (ULTICARE MICRO) 32G X 4 MM miscellaneous Use 2 pen needles daily or as directed. 100 each 3     losartan-hydrochlorothiazide (HYZAAR) 100-12.5 MG tablet Take 1 tablet by mouth daily 90 tablet 1     metroNIDAZOLE (FLAGYL) 250 MG tablet          Medications and history reviewed    Physical exam:  Vitals: /77   Pulse 88   Ht 1.6 m (5' 3\")   Wt 79.4 kg (175 lb)   BMI 31.00 kg/m     BMI= Body mass index is 31 kg/m .    Constitutional: healthy, alert and no distress  Head: Normocephalic. No masses, lesions, tenderness or abnormalities  Cardiovascular: negative, PMI normal. No lifts, heaves, or thrills. RRR. No murmurs, clicks gallops or rub  Respiratory: negative, Percussion normal. Good diaphragmatic excursion. Lungs clear  Gastrointestinal: Abdomen soft, non-tender. BS normal. No masses, organomegaly  : Deferred  Musculoskeletal: extremities normal- no gross deformities noted, gait normal and normal muscle tone  Skin: just medial to tip of right scapula is about 1 x 1 cm or so somewhat ill defined lump. Left upper back with well healed scar no lipoma recurrence  Psychiatric: mentation appears normal and affect normal/bright  Patient able to get up on table without difficulty.      Assessment:     ICD-10-CM    1. Lipoma of back D17.1      Plan: Likely lipoma as had before, also may be intramuscular again as is ill defined on exam. Still small so can plan to remove in clinic. Will plan to do at our [place] clinic as procedure room has better lighting and supplies. Patient agreeable. Will schedule.  Ellen to follow up with Primary Care provider regarding elevated blood pressure.      Kelvin Hutchison MD      Again, thank you for allowing me to participate in the care of your " patient.        Sincerely,        Kelvin Hutchison MD

## 2019-07-24 ENCOUNTER — OFFICE VISIT (OUTPATIENT)
Dept: SURGERY | Facility: CLINIC | Age: 64
End: 2019-07-24
Payer: COMMERCIAL

## 2019-07-24 VITALS — RESPIRATION RATE: 16 BRPM | HEIGHT: 63 IN | WEIGHT: 175 LBS | BODY MASS INDEX: 31.01 KG/M2

## 2019-07-24 DIAGNOSIS — D17.1 LIPOMA OF BACK: Primary | ICD-10-CM

## 2019-07-24 DIAGNOSIS — D17.9 INTRAMUSCULAR LIPOMA: ICD-10-CM

## 2019-07-24 PROCEDURE — 21932 EXC BACK TUM DEEP < 5 CM: CPT | Performed by: SURGERY

## 2019-07-24 PROCEDURE — 88304 TISSUE EXAM BY PATHOLOGIST: CPT | Performed by: SURGERY

## 2019-07-24 ASSESSMENT — MIFFLIN-ST. JEOR: SCORE: 1327.92

## 2019-07-24 NOTE — LETTER
7/24/2019         RE: Ellen Hill  [address] [place] [address]        Dear Colleague,    Thank you for referring your patient, Ellen Hill, to the [facility]. Please see a copy of my visit note below.    PROCEDURE:   Written consent was obtained  The right back area was prepped and appropriately anesthetized with 1% lidocaine with epinephrine. I made a 3 cm incision over the tender palpable soft tissue mass. I dissected into the subcutaneous tissues. Lipoma tissue seen. This extended intramuscular and appeared to be located right over rib. I dissected sharply with iris scissors until the palpably abnormal fatty tissue was removed. I carefully dissected the muscle fibers away rather than cut through them where possible. The total area excised, including lesion and margins was 3.2 cm. Hemostasis obtained with direct pressure.  Closure was accomplished with interrupted 3-0 vicryl for the deep subcutaneous layer and running subcuticular 4-0 vicryl for the skin.  Incision was covered with dermabond. Final wound length 3 cm.  The procedure was well tolerated and without complications. Specimen was sent to Pathology.    Kelvin Hutchison MD      Again, thank you for allowing me to participate in the care of your patient.        Sincerely,        Kelvin Hutchison MD

## 2019-07-24 NOTE — LETTER
Ellen Hill  [address] [place] [address] [address] [place] [address]      July 29, 2019    Dear Ellen,  This letter is to inform you of the results of your pathology report on your lipoma removel.    Your pathology report was:  FINAL DIAGNOSIS:   Lipoma, right back:   - Lipoma.  As expected  If you do have further questions please don t hesitate to call my assistant at [phone].  We do not have someone answering the phone all the time at my assistants number so if leave a message may take a day or so to get back to you.  So if more urgent then call the below number.    To make an appointment call .   Sincerely,     Kelvin Hutchison M.D.

## 2019-07-24 NOTE — PROGRESS NOTES
PROCEDURE:   Written consent was obtained  The right back area was prepped and appropriately anesthetized with 1% lidocaine with epinephrine. I made a 3 cm incision over the tender palpable soft tissue mass. I dissected into the subcutaneous tissues. Lipoma tissue seen. This extended intramuscular and appeared to be located right over rib. I dissected sharply with iris scissors until the palpably abnormal fatty tissue was removed. I carefully dissected the muscle fibers away rather than cut through them where possible. The total area excised, including lesion and margins was 3.2 cm. Hemostasis obtained with direct pressure.  Closure was accomplished with interrupted 3-0 vicryl for the deep subcutaneous layer and running subcuticular 4-0 vicryl for the skin.  Incision was covered with dermabond. Final wound length 3 cm.  The procedure was well tolerated and without complications. Specimen was sent to Pathology.    Kelvin Hutchison MD

## 2019-07-29 LAB — COPATH REPORT: NORMAL

## 2019-08-01 ENCOUNTER — TELEPHONE (OUTPATIENT)
Dept: FAMILY MEDICINE | Facility: CLINIC | Age: 64
End: 2019-08-01

## 2019-08-01 NOTE — TELEPHONE ENCOUNTER
Prescription approved per [facility] Refill Protocol.  3 month supply of Novolin 70/30 pens sent in to pharmacy.    Team, please return call to patient and inform was given a 90 day supply. No additional refills given as needs to schedule OV with provider as soon as able, needs recheck on uncontrolled diabetes and repeat A1c test. Assist in scheduling, if able.   Thank you.    Erin Gray RN

## 2019-08-01 NOTE — TELEPHONE ENCOUNTER
Reason for Call:  Other prescription insulin isophane & regular (NOVOLIN 70/30 FLEXPEN) susp    Detailed comments: Price for 5 pens is $102, iAsking if you are able to rewrite insulin isophane & regular (NOVOLIN 70/30 FLEXPEN) susp for 3 month supply at a time which would bring the price $50.  Please call and let her know if and when this was sent. Thank you     Phone Number Patient can be reached at: Home number on file [phone] (home)    Best Time: Any    Can we leave a detailed message on this number? YES    Call taken on 8/1/2019 at 5:07 PM by Elisah frazier injection pen.  Refill was 3 months would make it cheaper

## 2019-08-02 NOTE — TELEPHONE ENCOUNTER
This writer attempted to contact pt on 08/02/19      Reason for call Dm follow up and left message.      If patient calls back:   2nd floor [facility] Care Team (MA/TC) called. Inform patient that someone from the team will contact them, document that pt called and route to care team.         Juanita Waller MA

## 2019-08-05 ENCOUNTER — TELEPHONE (OUTPATIENT)
Dept: SURGERY | Facility: CLINIC | Age: 64
End: 2019-08-05

## 2019-08-05 NOTE — TELEPHONE ENCOUNTER
Called and spoke to patient and gave her the RNs message. Offered to schedule the appointment. Patient states that she will call back to schedule an appointment.  Ruth Ann Jarrell MA/  For Teams Samantha

## 2019-08-05 NOTE — TELEPHONE ENCOUNTER
Reason for Call:  Other prescription    Detailed comments: for the Novolin script is for 54 mg, and asking if they could change it to 60 mg for 3 month supply?    Phone Number Patient can be reached at: Other phone number:  [phone]    Best Time: any    Can we leave a detailed message on this number? YES    Call taken on 8/5/2019 at 1:49 PM by Vanessa High

## 2019-08-05 NOTE — TELEPHONE ENCOUNTER
..Reason for Call:  post surgical pain    Detailed comments: lipoma removal on 07-24/ is in severe pain, swollen, discovered a bump on incision area; Patient could not be transferred or connected with triage as she was on lunch break - was unsure of what to do and has limited times she can be seen; please call cell first [phone], will try to answer, otherwise,     Phone Number Patient can be reached at: Home number on file [phone] (home)    Best Time: home phone - after 3:30     Can we leave a detailed message on this number? YES    Call taken on 8/5/2019 at 12:06 PM by Padmini Crandall

## 2019-08-05 NOTE — TELEPHONE ENCOUNTER
Prescription approved per [facility] Refill Protocol.  Patient still needing to schedule DM recheck.        Nichole Gamble RN, BSN, PHN

## 2019-08-06 NOTE — TELEPHONE ENCOUNTER
2nd attempt to reach patient was unsuccessful.  Left message for her to return call to [phone].    Kagn Tinsley RN....8/6/2019 1:21 PM

## 2019-08-06 NOTE — TELEPHONE ENCOUNTER
Patient had lipoma removed from right back on 7/24/19. Calling in with report of constant severe pain, 10/10, and a lump the size of a tennis ball on incision site area. Denies redness, fever, chills. Taking Advil, Tylenol, and using an ice pack with minimal relief. Paged Dr. Hutchison and he approved calling in verbal for Tramadol, 50mg tablet q6hrs PRN, #12 to [facility] in [place]. Patient advised to go to ED if this does not provide relief or if swelling increases.    Kang Tinsley RN....8/6/2019 4:19 PM

## 2019-08-07 ENCOUNTER — OFFICE VISIT (OUTPATIENT)
Dept: SURGERY | Facility: CLINIC | Age: 64
End: 2019-08-07
Payer: COMMERCIAL

## 2019-08-07 VITALS
DIASTOLIC BLOOD PRESSURE: 86 MMHG | BODY MASS INDEX: 31.01 KG/M2 | HEART RATE: 80 BPM | HEIGHT: 63 IN | SYSTOLIC BLOOD PRESSURE: 138 MMHG | WEIGHT: 175 LBS

## 2019-08-07 DIAGNOSIS — L76.31 POSTOPERATIVE HEMATOMA OF SUBCUTANEOUS TISSUE FOLLOWING DERMATOLOGIC PROCEDURE: Primary | ICD-10-CM

## 2019-08-07 PROCEDURE — 99212 OFFICE O/P EST SF 10 MIN: CPT | Mod: 24 | Performed by: SURGERY

## 2019-08-07 PROCEDURE — 10160 PNXR ASPIR ABSC HMTMA BULLA: CPT | Mod: 79 | Performed by: SURGERY

## 2019-08-07 ASSESSMENT — MIFFLIN-ST. JEOR: SCORE: 1327.92

## 2019-08-07 NOTE — PROGRESS NOTES
"General Surgery Post Op    Pt returns for follow up visit s/p lipoma removal right upper back on 7/24/19.    Patient noticed some swelling there and soreness post op, this seemed to worsen recently with becoming \"very bad\" yesterday. Was able to get some tramadol from our RN which didn't seem to do much last night. Has not noticed any redness or other concern for infection, just the swelling and pain there worsening    Physical exam: Vitals: /86   Pulse 80   Ht 1.6 m (5' 3\")   Wt 79.4 kg (175 lb)   BMI 31.00 kg/m    BMI= Body mass index is 31 kg/m .    Exam:  Constitutional: healthy, alert and mild distress  Skin: Right upper back incision intact, no signs of infection. There is surrounding palpable swelling and fluid collection ~5 cm, soft, rounded    Path:  Lipoma    Assessment:     ICD-10-CM    1. Postoperative hematoma of subcutaneous tissue following dermatologic procedure L76.31 PUNCTURE ASPIRATION ABSCESS/HEMATOMA/CYST     Plan: As this is symptomatic with pain will aspirate here in clinic. Patient agreeable  Advised to continue to monitor for recurrent collection or signs of infection  Follow up as needed    PROCEDURE:   Written consent was obtained  The right upper back area was prepped and appropriately anesthetized with 1% lidocaine with epinephrine. I then inserted an 18g needle into the fluid collection and aspirated 20ccs of dark bloody fluid. Not cloudy/murky or otherwise concerning for infection. No further collection palpated. Needle site covered with band-aid.        Kelvin Hutchison MD      "

## 2019-08-07 NOTE — LETTER
"    8/7/2019         RE: Ellen Hill  [address] [place] [address]        Dear Colleague,    Thank you for referring your patient, Ellen Hill, to the [facility]. Please see a copy of my visit note below.    General Surgery Post Op    Pt returns for follow up visit s/p lipoma removal right upper back on 7/24/19.    Patient noticed some swelling there and soreness post op, this seemed to worsen recently with becoming \"very bad\" yesterday. Was able to get some tramadol from our RN which didn't seem to do much last night. Has not noticed any redness or other concern for infection, just the swelling and pain there worsening    Physical exam: Vitals: /86   Pulse 80   Ht 1.6 m (5' 3\")   Wt 79.4 kg (175 lb)   BMI 31.00 kg/m     BMI= Body mass index is 31 kg/m .    Exam:  Constitutional: healthy, alert and mild distress  Skin: Right upper back incision intact, no signs of infection. There is surrounding palpable swelling and fluid collection ~5 cm, soft, rounded    Path:  Lipoma    Assessment:     ICD-10-CM    1. Postoperative hematoma of subcutaneous tissue following dermatologic procedure L76.31 PUNCTURE ASPIRATION ABSCESS/HEMATOMA/CYST     Plan: As this is symptomatic with pain will aspirate here in clinic. Patient agreeable  Advised to continue to monitor for recurrent collection or signs of infection  Follow up as needed    PROCEDURE:   Written consent was obtained  The right upper back area was prepped and appropriately anesthetized with 1% lidocaine with epinephrine. I then inserted an 18g needle into the fluid collection and aspirated 20ccs of dark bloody fluid. Not cloudy/murky or otherwise concerning for infection. No further collection palpated. Needle site covered with band-aid.        Kelvin Hutchison MD        Again, thank you for allowing me to participate in the care of your patient.        Sincerely,        Kelvin Hutchison MD    "

## 2019-08-07 NOTE — LETTER
[address]  [place] [address]  [phone]          August 7, 2019    RE:  Ellen Hill                                                                                                                                                       [address] [place] [address]            To whom it may concern:    Ellenstoney Hill is under my professional care for Postoperative hematoma of subcutaneous tissue following dermatologic procedure now post aspiration. She  may return to work with the following: The employee is UNABLE to return to work until 8/9/19 to prevent further fluid or blood collection in the wound      Sincerely,        Kelvin Hutchison MD

## 2019-10-15 ENCOUNTER — OFFICE VISIT (OUTPATIENT)
Dept: FAMILY MEDICINE | Facility: CLINIC | Age: 64
End: 2019-10-15
Payer: COMMERCIAL

## 2019-10-15 VITALS
WEIGHT: 170 LBS | TEMPERATURE: 98.1 F | RESPIRATION RATE: 18 BRPM | OXYGEN SATURATION: 97 % | SYSTOLIC BLOOD PRESSURE: 156 MMHG | HEIGHT: 63 IN | DIASTOLIC BLOOD PRESSURE: 68 MMHG | BODY MASS INDEX: 30.12 KG/M2 | HEART RATE: 90 BPM

## 2019-10-15 DIAGNOSIS — Z12.11 SCREEN FOR COLON CANCER: ICD-10-CM

## 2019-10-15 DIAGNOSIS — S76.911A MUSCLE STRAIN OF RIGHT THIGH, INITIAL ENCOUNTER: ICD-10-CM

## 2019-10-15 DIAGNOSIS — B37.31 VAGINAL CANDIDIASIS: Primary | ICD-10-CM

## 2019-10-15 LAB
ALBUMIN UR-MCNC: 100 MG/DL
APPEARANCE UR: CLEAR
BACTERIA #/AREA URNS HPF: ABNORMAL /HPF
BILIRUB UR QL STRIP: NEGATIVE
COLOR UR AUTO: YELLOW
GLUCOSE UR STRIP-MCNC: >=1000 MG/DL
HGB UR QL STRIP: ABNORMAL
KETONES UR STRIP-MCNC: 15 MG/DL
LEUKOCYTE ESTERASE UR QL STRIP: NEGATIVE
NITRATE UR QL: NEGATIVE
NON-SQ EPI CELLS #/AREA URNS LPF: ABNORMAL /LPF
PH UR STRIP: 5.5 PH (ref 5–7)
RBC #/AREA URNS AUTO: ABNORMAL /HPF
SOURCE: ABNORMAL
SP GR UR STRIP: >1.03 (ref 1–1.03)
SPECIMEN SOURCE: ABNORMAL
UROBILINOGEN UR STRIP-ACNC: 0.2 EU/DL (ref 0.2–1)
WBC #/AREA URNS AUTO: ABNORMAL /HPF
WET PREP SPEC: ABNORMAL

## 2019-10-15 PROCEDURE — 87210 SMEAR WET MOUNT SALINE/INK: CPT | Performed by: PHYSICIAN ASSISTANT

## 2019-10-15 PROCEDURE — 81001 URINALYSIS AUTO W/SCOPE: CPT | Performed by: PHYSICIAN ASSISTANT

## 2019-10-15 PROCEDURE — 99214 OFFICE O/P EST MOD 30 MIN: CPT | Performed by: PHYSICIAN ASSISTANT

## 2019-10-15 RX ORDER — MICONAZOLE NITRATE 2 %
1 CREAM WITH APPLICATOR VAGINAL AT BEDTIME
Qty: 45 G | Refills: 1 | Status: SHIPPED | OUTPATIENT
Start: 2019-10-15 | End: 2020-04-08

## 2019-10-15 RX ORDER — NAPROXEN 500 MG/1
500 TABLET ORAL 2 TIMES DAILY WITH MEALS
Qty: 30 TABLET | Refills: 1 | Status: SHIPPED | OUTPATIENT
Start: 2019-10-15 | End: 2020-04-08

## 2019-10-15 RX ORDER — FLUCONAZOLE 150 MG/1
150 TABLET ORAL DAILY
Qty: 3 TABLET | Refills: 0 | Status: SHIPPED | OUTPATIENT
Start: 2019-10-15 | End: 2019-12-02

## 2019-10-15 RX ORDER — METHOCARBAMOL 750 MG/1
750 TABLET, FILM COATED ORAL 3 TIMES DAILY PRN
Qty: 45 TABLET | Refills: 1 | Status: SHIPPED | OUTPATIENT
Start: 2019-10-15 | End: 2019-12-02

## 2019-10-15 ASSESSMENT — PAIN SCALES - GENERAL: PAINLEVEL: EXTREME PAIN (9)

## 2019-10-15 ASSESSMENT — MIFFLIN-ST. JEOR: SCORE: 1305.24

## 2019-10-15 NOTE — PROGRESS NOTES
Subjective     Ellen Hill is a 63 year old female who presents to clinic today for the following health issues:    HPI   Joint Pain    Onset: 1month    Description:   Location: right thigh  Character: Dull ache    Intensity: 9/10    Progression of Symptoms: worse    Accompanying Signs & Symptoms:  Other symptoms: feels veins lumps    History:   Previous similar pain: no       Precipitating factors:   Trauma or overuse: YES- patient went for a walk and after that noticed pain in the thigh that radiates to the buttock    Alleviating factors:  Improved by: Aleve, ice pack    Therapies Tried and outcome: Aleve, elevating, ice, heat      Vaginal Symptoms  Onset: 1.5 weeks    Description:  Vaginal Discharge: clear   Itching (Pruritis): YES- severe  Burning sensation:  YES- severe  Odor: no     Accompanying Signs & Symptoms:  Pain with Urination: no   Abdominal Pain: no   Fever: no   Back Pain: right sided lower back pain    History:   Sexually active: no   New Partner: no   Possibility of Pregnancy:  No    Precipitating factors:   Recent Antibiotic Use: no     Alleviating factors:  none    Therapies Tried and outcome: none        Patient Active Problem List   Diagnosis     Post herpetic neuralgia     Hyperlipidemia LDL goal <100     Essential hypertension with goal blood pressure less than 130/80     Moderate major depression (H)     Vulvar pruritus     History of adenomatous polyp of colon     Alcoholic (H)     Advanced directives, counseling/discussion     Obesity, Class I, BMI 30-34.9     Leblanc's esophagus without dysplasia     Uncontrolled type 2 diabetes mellitus with microalbuminuria, with long-term current use of insulin (H)     Noncompliance with medication regimen     Smoker     Albuminuria     Past Surgical History:   Procedure Laterality Date     BIOPSY Right 1989    Breast-Benign lesion as per patient       Social History     Tobacco Use     Smoking status: Former Smoker     Packs/day: 0.10     Years:  10.00     Pack years: 1.00     Types: Cigarettes     Last attempt to quit: 9/10/2013     Years since quittin.0     Smokeless tobacco: Never Used   Substance Use Topics     Alcohol use: Yes     Alcohol/week: 1.0 standard drinks     Types: 1 Cans of beer per week     Comment: drinks 1-2 times a month     Family History   Problem Relation Age of Onset     Alcohol/Drug Mother      Heart Disease Mother      Alcohol/Drug Father      Diabetes Brother          Current Outpatient Medications   Medication Sig Dispense Refill     alcohol swab prep pads Use to swab area of injection/mariza as directed. 100 each 3     amLODIPine (NORVASC) 10 MG tablet Take 1 tablet (10 mg) by mouth daily 90 tablet 1     amoxicillin (AMOXIL) 250 MG capsule        atorvastatin (LIPITOR) 40 MG tablet Take 1 tablet (40 mg) by mouth daily 90 tablet 1     blood glucose monitoring ([employer] Truman Show[employer]) lancets Use to test blood sugar 2 times daily or as directed. 100 each 3     escitalopram (LEXAPRO) 10 MG tablet Take 10 mg by mouth daily  3     fluconazole (DIFLUCAN) 150 MG tablet Take 1 tablet (150 mg) by mouth daily for 3 days 3 tablet 0     insulin isophane & regular (NOVOLIN 70/30 FLEXPEN) susp Inject 30 Units Subcutaneous 2 times daily (with meals) ++NEED OFFICE VISIT AND LABS++ 60 mL 0     insulin pen needle (B-D U/F) 31G X 5 MM Use 3 pen needles daily or as directed. 180 each 1     insulin pen needle (ULTICARE MICRO) 32G X 4 MM miscellaneous Use 2 pen needles daily or as directed. 100 each 3     losartan-hydrochlorothiazide (HYZAAR) 100-12.5 MG tablet Take 1 tablet by mouth daily 90 tablet 1     methocarbamol (ROBAXIN) 750 MG tablet Take 1 tablet (750 mg) by mouth 3 times daily as needed for muscle spasms 45 tablet 1     metroNIDAZOLE (FLAGYL) 250 MG tablet        miconazole (MONISTAT 7 SIMPLY CURE) 2 % cream Place 1 applicator vaginally At Bedtime 45 g 1     naproxen (NAPROSYN) 500 MG tablet Take 1 tablet (500 mg) by mouth 2 times daily (with  "meals) 30 tablet 1     Allergies   Allergen Reactions     Insulin Detemir Hives     Alfredo products cause hives**       Liraglutide Hives     Alfredo Nordisk product     Victoza Hives     Alfredo Nordisk product     Enalapril Cough     Lisinopril Cough     Wellbutrin [Bupropion Hcl] Hives     hives       Reviewed and updated as needed this visit by Provider  Tobacco  Allergies  Meds  Problems  Med Hx  Surg Hx  Fam Hx         Review of Systems   ROS COMP: Constitutional, HEENT, cardiovascular, pulmonary, gi and gu systems are negative, except as otherwise noted.      Objective    BP (!) 156/68 (BP Location: Left arm, Patient Position: Chair, Cuff Size: Adult Large)   Pulse 90   Temp 98.1  F (36.7  C) (Oral)   Resp 18   Ht 1.6 m (5' 3\")   Wt 77.1 kg (170 lb)   SpO2 97%   BMI 30.11 kg/m    Body mass index is 30.11 kg/m .  Physical Exam   GENERAL: healthy, alert and no distress  EYES: Eyes grossly normal to inspection,  conjunctivae and sclerae normal  HENT: normal cephalic/atraumatic, face is symmetrical,   RESP: no difficulty breathing, no use of accessory muscles observed.   CV: no peripheral edema and color normal, no parasternal heaves visualized.   MS: no gross musculoskeletal defects noted, no edema. Right hip-NROM  no erythema, no induration, normal temperature to touch, tenderness over the lateral aspect of the thigh  SKIN: no suspicious lesions or rashes  NEURO: Normal strength and tone, mentation intact and speech normal  PSYCH: mentation appears normal, affect normal/bright      Diagnostic Test Results:  Labs reviewed in Epic  Results for orders placed or performed in visit on 10/15/19 (from the past 24 hour(s))   Wet prep   Result Value Ref Range    Specimen Description Vagina     Wet Prep Rare  Yeast seen   (A)     Wet Prep No clue cells seen     Wet Prep No Trichomonas seen     Wet Prep Rare  WBC'S seen      *UA reflex to Microscopic and Culture ([place] and [facility] (except [place] and " "[place])   Result Value Ref Range    Color Urine Yellow     Appearance Urine Clear     Glucose Urine >=1000 (A) NEG^Negative mg/dL    Bilirubin Urine Negative NEG^Negative    Ketones Urine 15 (A) NEG^Negative mg/dL    Specific Gravity Urine >1.030 1.003 - 1.035    Blood Urine Small (A) NEG^Negative    pH Urine 5.5 5.0 - 7.0 pH    Protein Albumin Urine 100 (A) NEG^Negative mg/dL    Urobilinogen Urine 0.2 0.2 - 1.0 EU/dL    Nitrite Urine Negative NEG^Negative    Leukocyte Esterase Urine Negative NEG^Negative    Source Midstream Urine    Urine Microscopic   Result Value Ref Range    WBC Urine 0 - 5 OTO5^0 - 5 /HPF    RBC Urine O - 2 OTO2^O - 2 /HPF    Squamous Epithelial /LPF Urine Few FEW^Few /LPF    Bacteria Urine Few (A) NEG^Negative /HPF           Assessment & Plan       ICD-10-CM    1. Vaginal candidiasis B37.3 Wet prep     *UA reflex to Microscopic and Culture ([place] and [facility] (except [place] and [place])     Urine Microscopic     fluconazole (DIFLUCAN) 150 MG tablet     miconazole (MONISTAT 7 SIMPLY CURE) 2 % cream   2. Muscle strain of right thigh, initial encounter S76.911A methocarbamol (ROBAXIN) 750 MG tablet     naproxen (NAPROSYN) 500 MG tablet   3. Uncontrolled type 2 diabetes mellitus with microalbuminuria, with long-term current use of insulin (H) E11.29 ENDOCRINOLOGY ADULT REFERRAL    E11.65     R80.9     Z79.4    4. Screen for colon cancer Z12.11       Naproxen 500 mg twice a day with food for 3-5 days then use as needed   Robaxin 750 mg three times a day 3-5 days then use as needed     Diflucan 1 tablet once, then wait if not better in 2-3 days may repeat the tablet  Monistat apply vaginally 7 nights     Patient declined to address diabetes today. She was given referral to see endocrinology.     BMI:   Estimated body mass index is 30.11 kg/m  as calculated from the following:    Height as of this encounter: 1.6 m (5' 3\").    Weight as of this encounter: 77.1 kg (170 lb).   Weight " management plan: Discussed healthy diet and exercise guidelines      Return in about 1 week (around 10/22/2019), or if symptoms worsen or fail to improve.    Henny Anderson PA-C  [facility]

## 2019-10-15 NOTE — PATIENT INSTRUCTIONS
Naproxen 500 mg twice a day with food for 3-5 days then use as needed   Robaxin 750 mg three times a day 3-5 days then use as needed     Diflucan 1 tablet once, then wait if not better in 2-3 days may repeat the tablet  Monistat apply vaginally 7 nights

## 2019-10-24 ENCOUNTER — ANCILLARY PROCEDURE (OUTPATIENT)
Dept: GENERAL RADIOLOGY | Facility: CLINIC | Age: 64
End: 2019-10-24
Attending: FAMILY MEDICINE
Payer: COMMERCIAL

## 2019-10-24 ENCOUNTER — OFFICE VISIT (OUTPATIENT)
Dept: FAMILY MEDICINE | Facility: CLINIC | Age: 64
End: 2019-10-24
Payer: COMMERCIAL

## 2019-10-24 VITALS
RESPIRATION RATE: 20 BRPM | DIASTOLIC BLOOD PRESSURE: 74 MMHG | WEIGHT: 171 LBS | HEART RATE: 85 BPM | TEMPERATURE: 97.9 F | SYSTOLIC BLOOD PRESSURE: 144 MMHG | OXYGEN SATURATION: 98 % | HEIGHT: 63 IN | BODY MASS INDEX: 30.3 KG/M2

## 2019-10-24 DIAGNOSIS — M79.651 PAIN OF RIGHT THIGH: Primary | ICD-10-CM

## 2019-10-24 DIAGNOSIS — Z28.21 VACCINATION NOT CARRIED OUT BECAUSE OF PATIENT REFUSAL: ICD-10-CM

## 2019-10-24 DIAGNOSIS — M79.651 PAIN OF RIGHT THIGH: ICD-10-CM

## 2019-10-24 DIAGNOSIS — M54.16 LUMBAR RADICULOPATHY: ICD-10-CM

## 2019-10-24 PROCEDURE — 72100 X-RAY EXAM L-S SPINE 2/3 VWS: CPT

## 2019-10-24 PROCEDURE — 99214 OFFICE O/P EST MOD 30 MIN: CPT | Performed by: FAMILY MEDICINE

## 2019-10-24 PROCEDURE — 73552 X-RAY EXAM OF FEMUR 2/>: CPT | Mod: RT

## 2019-10-24 RX ORDER — GABAPENTIN 300 MG/1
CAPSULE ORAL
Qty: 90 CAPSULE | Refills: 1 | Status: SHIPPED | OUTPATIENT
Start: 2019-10-24 | End: 2019-12-02

## 2019-10-24 ASSESSMENT — MIFFLIN-ST. JEOR: SCORE: 1309.78

## 2019-10-24 ASSESSMENT — PAIN SCALES - GENERAL: PAINLEVEL: WORST PAIN (10)

## 2019-10-24 NOTE — PATIENT INSTRUCTIONS
At [facility], we strive to deliver an exceptional experience to you, every time we see you.  If you receive a survey in the mail, please send us back your thoughts. We really do value your feedback.    Based on your medical history, these are the current health maintenance/preventive care services that you are due for (some may have been done at this visit.)  Health Maintenance Due   Topic Date Due     PREVENTIVE CARE VISIT  1955     EYE EXAM  1955     HIV SCREENING  11/29/1972     PNEUMOCOCCAL IMMUNIZATION 19-64 MEDIUM RISK (1 of 1 - PPSV23) 11/29/1976     HPV  11/29/1978     ZOSTER IMMUNIZATION (1 of 2) 11/29/2007     COLONOSCOPY  11/19/2017     ADVANCE CARE PLANNING  03/28/2018     DIABETIC FOOT EXAM  05/08/2018     TSH W/FREE T4 REFLEX  05/08/2019     A1C  07/05/2019     LIPID  08/13/2019     MICROALBUMIN  08/13/2019     INFLUENZA VACCINE (1) 09/01/2019     PHQ-9  10/19/2019         Suggested websites for health information:  Www.[facility].[employer] : Up to date and easily searchable information on multiple topics.  Www.medlineplus.gov : medication info, interactive tutorials, watch real surgeries online  Www.familydoctor.org : good info from the Academy of Family Physicians  Www.cdc.gov : public health info, travel advisories, epidemics (H1N1)  Www.aap.org : children's health info, normal development, vaccinations  Www.health.[facility].[place] : [place] dept of health, public health issues in [place], N1N1    Your care team:                            Family Medicine Internal Medicine   MD Jorge Alberto Ribeiro MD Shantel Branch-Fleming, MD Katya Georgiev PA-C Nam Ho, MD Pediatrics   ESCOBAR Christie, MD Jigna Driver CNP, MD Deborah Mielke, MD Kim Thein, APRN LADONNA      Clinic hours: Monday - Thursday 7 am-7 pm; Fridays 7 am-5 pm.   Urgent care: Monday - Friday 11 am-9 pm; Saturday and Sunday 9 am-5 pm.  Pharmacy :  Monday -Thursday 8 am-8 pm; Friday 8 am-6 pm; Saturday and Sunday 9 am-5 pm.     Clinic: [phone]   Pharmacy: [phone]      Patient Education     Understanding Lumbar Radiculopathy    Lumbar radiculopathy is irritation or inflammation of a nerve root in the low back. It causes symptoms that spread out from the back down one or both legs. To understand this condition, it helps to understand the parts of the spine:    Vertebrae. These are bones that stack to form the spine. The lumbar spine contains the 5 bottom vertebrae.    Disks. These are soft pads of tissue between the vertebrae. They act as shock absorbers for the spine.    Spinal canal. This is a tunnel formed within the stacked vertebrae. In the lumbar spine, nerves run through this canal.    Nerves. These branch off and leave the spinal canal, traveling out to parts of the body. As they leave the spinal canal, nerves pass through openings between the vertebrae. The nerve root is the part of the nerve that is closest to the spinal canal.    Sciatic nerve. This is a large nerve formed from several nerve roots in the low back. This nerve extends down the back of the leg to the foot.  With lumbar radiculopathy, nerve roots in the low back become irritated. This leads to pain and symptoms. The sciatic nerve is commonly involved, so the condition is often called sciatica.  What causes lumbar radiculopathy?  Aging, injury, poor posture, extra body weight, and other issues can lead to problems in the low back. These problems may then irritate nerve roots. They include:    Damage to a disk in the lumbar spine. The damaged disk may then press on nearby nerve roots.    Degeneration from wear and tear, and aging. This can lead to narrowing (stenosis) of the openings between the vertebrae. The narrowed openings press on nerve roots as they leave the spinal canal.    Unstable spine. This is when a vertebra slips forward. It can then press on a nerve  root.  Other, less common things can put pressure on nerves in the low back. These include diabetes, infection, or a tumor.  Symptoms of lumbar radiculopathy  These include:    Pain in the low back    Pain, numbness, tingling, or weakness that travels into the buttocks, hip, groin, or leg    Muscle spasms  Treatment for lumbar radiculopathy  In most cases, your healthcare provider will first try treatments that help relieve symptoms. These may include:    Prescription and over-the-counter pain medicines. These help relieve pain, swelling, and irritation.    Limits on positions and activities that increase pain. But lying in bed or avoiding all movement is only recommended for a short period of time.    Physical therapy, including exercises and stretches. This helps decrease pain and increase movement and function.    Steroid shots into the lower back. This may help relieve symptoms for a time.    Weight-loss program. If you are overweight, losing extra pounds may help relieve symptoms.  In some cases, you may need surgery to fix the underlying problem. This depends on the cause, the symptoms, and how long the pain has lasted.  Possible complications  Over time, an irritated and inflamed nerve may become damaged. This may lead to long-lasting (permanent) numbness or weakness in your legs and feet. If symptoms change suddenly or get worse, be sure to let your healthcare provider know.  When to call your healthcare provider  Call your healthcare provider right away if you have any of these:    New pain or pain that gets worse    New or increasing weakness, tingling, or numbness in your leg or foot    Problems controlling your bladder or bowel   Date Last Reviewed: 3/10/2016    3549-4675 [employer]. [address] [place] [address], [place] [address]. All rights reserved. This information is not intended as a substitute for professional medical care. Always follow your healthcare professional's instructions.        "    Patient Education       * Sciatica    Sciatica (\"Lumbar Radiculopathy\") causes a pain that spreads from the lower back down into the buttock, hip and leg. Sometimes leg pain can occur without any back pain. Sciatica is due to irritation or pressure on a spinal nerve as it comes out of the spinal canal. This is most often due to pressure on the nerve from a nearby spinal disk (the cartilage cushion between each spinal bone). Other causes include spinal stenosis (narrowing of the spinal canal) and spasm of the piriformis muscle (a muscle in the buttocks that the sciatic nerve passes through).  Sciatica may begin after a sudden twisting/bending force (such as in a car accident), or sometimes after a simple awkward movement. In either case, muscle spasm is commonly present and can add to the pain.  The diagnosis of sciatica is made from the symptoms and physical exam. Unless you had a physical injury (such as a car accident or fall), X-rays are usually not ordered for the initial evaluation of sciatica because the nerves and disks cannot be seen on an X-ray. Most sciatica (80-90%) gets better with time.  What can I do about my low back pain?  There are three main things you can do to ease low back pain and help it go away.    Stay active! Use positions, movements and exercises. Too much rest can make your symptoms worse.    Use heat or cold packs.    Take medicine as directed.  Using positions, movements and exercises  Research tells us that moving your joints and muscles can help you recover from back pain. Such activity should be simple and gentle.  Use walking to help relieve your discomfort. Try taking a short walk every 3 to 4 hours during the day. Walk for a few minutes inside your home or take longer walks outside, on a treadmill or at a mall. Slowly increase the amount of time you walk. Expect discomfort when you begin, but it should lessen as your back starts to recover.  Finding a position that is " comfortable  When your back pain is new, you may find that certain positions will ease your pain. Gently try each of the following positions until you find one that eases your pain. Once you find a position of comfort, use it as often as you like while you recover. Return to your daily routine as soon as possible.     Lie on your back with your legs bent. You can do this by placing a pillow under your knees or lie on the floor and rest your lower legs on the seat of a chair.    Lie on your side with your knees bent and place a pillow between your knees.    Lie on your stomach over pillows.  Using heat or cold packs  Try cold packs or gentle heat to ease your pain. Use whichever gives you the most relief. Apply the cold pack or heat for 15 minutes at a time, as often as needed.  Taking medicine  If taking over-the-counter medicine:    Take ibuprofen (Advil, Motrin) 600 mg. three times a day as needed for pain.  OR    Take Aleve (naproxen sodium) 220 to 440 mg. two times a day as needed for pain.  If your doctor prescribed medicine, follow the instructions. Stop taking the medicine as soon as you can.  When should I call my doctor?  Your back pain should improve over the first couple of weeks. As it improves, you should be able to return to your normal activities. But call your doctor if:    You have a sudden change in your ability to control? your bladder or bowels.    You begin to feel tingling in your groin or legs.    The pain spreads down your leg and into your foot.    Your toes, feet or leg muscles begin to feel weak.    You feel generally unwell or sick.    Your pain gets worse.    9404-9840 [employer]. [address] [place] [address], [place] [address]. All rights reserved. This information is not intended as a substitute for professional medical care. Always follow your healthcare professional's instructions.  This information has been modified by your health care provider with permission from the  publisher.

## 2019-10-24 NOTE — PROGRESS NOTES
"Subjective     Ellen Hill is a 63 year old female who presents to clinic today for the following health issues:    HPI   Joint Pain    Onset: 1month, follow up    Description:   Location: Right upper thigh  Character: Sharp, throbbing, heavy    Intensity: severe    Progression of Symptoms: worse    Accompanying Signs & Symptoms:  Other symptoms: lump    History:   Previous similar pain: YES      Precipitating factors:   Trauma or overuse: no     Alleviating factors:  Improved by: nothing    Therapies Tried and outcome: Rx not helping, ice and hot packs, muscle relaxants, aleve and naproxen not helping    A previous doctor said it was musculoskeletal. Squeezing her right thigh hurts really bad. She's starting to limp. Her foot feels little numb. She has a little right lower back and right buttock pain. The pain goes from the top of her right knee all through the thigh, sometimes into the groin area. She's been having trouble sleeping because of it. \"It feels like it's burning.\" She has all the symptoms that are in the sciatica handout under \"when to see your doctor.\"    Although she's due for a diabetes check, she doesn't want one because she thinks her HgbA1c will be bad.    Past medical, family, and social histories, medications, and allergies are reviewed and updated in [employer].    Review of Systems   ROS COMP: Constitutional, HEENT, cardiovascular, pulmonary, gi and gu systems are negative, except as otherwise noted.      Objective    BP (!) 144/74 (BP Location: Right arm, Patient Position: Chair, Cuff Size: Adult Large)   Pulse 85   Temp 97.9  F (36.6  C) (Oral)   Resp 20   Ht 1.6 m (5' 3\")   Wt 77.6 kg (171 lb)   SpO2 98%   BMI 30.29 kg/m    Body mass index is 30.29 kg/m .     Physical Exam   GENERAL: healthy, alert and no distress  EYES: Eyes grossly normal to inspection, PERRL, EOMI, sclerae white and conjunctivae normal  MS: no gross musculoskeletal defects noted, no edema  SKIN: no suspicious " lesions or rashes to visible skin  NEURO: Normal strength and tone, sensory exam grossly normal, mentation intact, oriented times 3 and cranial nerves 2-12 intact  PSYCH: mentation appears normal, affect normal/bright    Diagnostic Test Results:  Results for orders placed or performed in visit on 10/15/19   *UA reflex to Microscopic and Culture ([place] and [facility] (except [place] and [place])   Result Value Ref Range    Color Urine Yellow     Appearance Urine Clear     Glucose Urine >=1000 (A) NEG^Negative mg/dL    Bilirubin Urine Negative NEG^Negative    Ketones Urine 15 (A) NEG^Negative mg/dL    Specific Gravity Urine >1.030 1.003 - 1.035    Blood Urine Small (A) NEG^Negative    pH Urine 5.5 5.0 - 7.0 pH    Protein Albumin Urine 100 (A) NEG^Negative mg/dL    Urobilinogen Urine 0.2 0.2 - 1.0 EU/dL    Nitrite Urine Negative NEG^Negative    Leukocyte Esterase Urine Negative NEG^Negative    Source Midstream Urine    Urine Microscopic   Result Value Ref Range    WBC Urine 0 - 5 OTO5^0 - 5 /HPF    RBC Urine O - 2 OTO2^O - 2 /HPF    Squamous Epithelial /LPF Urine Few FEW^Few /LPF    Bacteria Urine Few (A) NEG^Negative /HPF   Wet prep   Result Value Ref Range    Specimen Description Vagina     Wet Prep Rare  Yeast seen   (A)     Wet Prep No clue cells seen     Wet Prep No Trichomonas seen     Wet Prep Rare  WBC'S seen              Assessment & Plan   (M79.651) Pain of right thigh  (primary encounter diagnosis)  Comment: x-ray does not reveal a focal abnormality to explain the pain (bony lesion, thigh mass, etc.)  Plan: XR Femur Right 2 Views, XR Lumbar Spine 2/3         Views            (M54.16) Lumbar radiculopathy  Comment: vs sciatica  Plan: gabapentin (NEURONTIN) 300 MG capsule        Handout(s) provided. The patient will try chiropractic, but if that fails her, she will contact her primary to order an MRI and be referred to a spine specialist (based on MRI result).    (E11.29,  E11.65,  R80.9,  Z79.4)  Uncontrolled type 2 diabetes mellitus with microalbuminuria, with long-term current use of insulin (H)  Comment: uncontrolled  Plan: patient refuses to update her diabetes labs, knowing that her HgbA1c will be too high    (Z28.21) Vaccination not carried out because of patient refusal  Comment: Influenza vaccine offered but declined by the patient.   Plan:      The information in this document, created by the medical scribe for me, accurately reflects the services I personally performed and the decisions made by me. I have reviewed and approved this document for accuracy prior to leaving the patient care area.  Luis Zambrano MD

## 2019-10-26 NOTE — LETTER
Ellen Hill  [facility] [place] [id number]          10/31/19      Dear Ellen Hill        At [facility] we care about your health and are committed to providing quality patient care. Regular appointments are a vital part of the care and management of your health and can help prevent many of the complications that can occur.      It has come to our attention that you are due for a Diabetes office visit for further refills. Please call [facility] at [phone] soon to schedule your appointment.    If you have transferred care to another clinic please call to inform us so that we do not continue to send you reminder letters.      Sincerely,      [facility] Care Team                   Writer spoke with patient enquiring if there were any questions or concerns regarding her surgery on the 28th of January 2019. Patient has no questions but is interested in alternative in pain management postsurgically as she is intolerant of opiates. Writer offered some suggestions. No further questions at this time.

## 2019-10-26 NOTE — TELEPHONE ENCOUNTER
"Requested Prescriptions   Pending Prescriptions Disp Refills     insulin isophane & regular (NOVOLIN 70/30 FLEXPEN) susp 60 mL 0     Sig: Inject 30 Units Subcutaneous 2 times daily (with meals) ++NEED OFFICE VISIT AND LABS++         Last Written Prescription Date:  8/5/19  Last Fill Quantity: 60 ml,  # refills: 0   Last Office Visit with [facility], [facility] or [facility] prescribing provider:  10/24/19   Future Office Visit:         Short Acting Insulin Protocol Failed - 10/26/2019 10:46 AM        Failed - Blood pressure less than 140/90 in past 6 months     BP Readings from Last 3 Encounters:   10/24/19 (!) 144/74   10/15/19 (!) 156/68   08/07/19 138/86                 Failed - LDL on file in past 12 months     Recent Labs   Lab Test 08/13/18  0935   LDL Cannot estimate LDL when triglyceride exceeds 400 mg/dL  85             Failed - HgbA1C in past 3 or 6 months     If HgbA1C is 8 or greater, it needs to be on file within the past 3 months.  If less than 8, must be on file within the past 6 months.     Recent Labs   Lab Test 04/05/19  1439   A1C 12.5*             Passed - Microalbumin on file in past 12 months     Recent Labs   Lab Test 08/13/18  0945   MICROL 286   UMALCR 506.20*             Passed - Serum creatinine on file in past 12 months     Recent Labs   Lab Test 04/05/19  1439   CR 0.52             Passed - Medication is active on med list        Passed - Patient is age 18 or older        Passed - Recent (6 mo) or future (30 days) visit within the authorizing provider's specialty     Patient had office visit in the last 6 months or has a visit in the next 30 days with authorizing provider or within the authorizing provider's specialty.  See \"Patient Info\" tab in inbasket, or \"Choose Columns\" in Meds & Orders section of the refill encounter.                  Arnelitzel Sylvesterrax  [facility]  "

## 2019-10-29 NOTE — TELEPHONE ENCOUNTER
This writer attempted to contact patient on 10/29/19      Reason for call needs office visit for further refills and left message.      If patient calls back:   Schedule Office Visit appointment within 2 weeks with PCP, postponing message.        Ruth Ann Jarrell MA

## 2019-10-31 NOTE — TELEPHONE ENCOUNTER
This writer attempted to contact Patient on 10/31/19      Reason for call Needs Diabetes office visit for further refills and left message and sent letter.      If patient calls back:   Schedule Office Visit appointment within 2 weeks with PCP, document that pt called and close encounter         Ruth Ann Jarrell MA

## 2019-11-11 ENCOUNTER — TELEPHONE (OUTPATIENT)
Dept: FAMILY MEDICINE | Facility: CLINIC | Age: 64
End: 2019-11-11

## 2019-11-11 NOTE — TELEPHONE ENCOUNTER
Reason for call:  Patient reporting a symptom    Symptom or request: rt thigh leg nerve pain    Duration (how long have symptoms been present): 1 month    Have you been treated for this before? Yes    Additional comments: Needs call back about next step.    Phone Number patient can be reached at:  Home number on file [phone] (home)    Best Time:  any    Can we leave a detailed message on this number:  YES    Call taken on 11/11/2019 at 11:57 AM by Tila Diamond

## 2019-11-11 NOTE — TELEPHONE ENCOUNTER
This writer attempted to contact patient on 11/11/19      Reason for call return call and to discuss symptoms and the recommendations that were made at her 10/24/2019 visit and left message.      If patient calls back:   Registered Nurse called. Follow Triage Call workflow        Jazzy Deras RN

## 2019-11-12 NOTE — TELEPHONE ENCOUNTER
Reason for Call:  Other returning call    Detailed comments: PT is returning call and would like to request another call back. Thank you.    Phone Number Patient can be reached at: Home number on file [phone] (home)    Best Time: Any    Can we leave a detailed message on this number? YES    Call taken on 11/12/2019 at 5:54 PM by Lilian Montes De Oca

## 2019-11-12 NOTE — TELEPHONE ENCOUNTER
(2nd attempt) This writer attempted to contact patient on 11/12/19      Reason for call Review sx and discuss plan that was made at last apt for the leg pain and left message.      If patient calls back:   Registered Nurse called. Follow Triage Call workflow        Jazzy Deras RN

## 2019-11-13 NOTE — TELEPHONE ENCOUNTER
Patient contacted and reports that perhaps the gabapentin is starting to work a little. She does still have very intense pain. She believes the pain is worse at night and it wakes her from her sleep. She tries ice packs to relieve the pain. She tried taking Ibuprofen PM last night and she still woke up. She has titrated up to the 3 tabs of gabapentin per day. She tried a muscle rub and applied that to the area twice today. This seemed to ease her pain. Patient has not pursued chiropractor yet but plans to try to get an appointment scheduled. She will let clinic know if she wants a MRI.     She would like to know why her pain is worse at night and if there is anything else she can be doing for the pain which is from her knee to her thigh to her groin, a burning and intense pain. Should she wait a little longer to see if the gabapentin works more?    Routing to provider to review and advise.   Nereida Ramirez RN  [facility]

## 2019-11-24 ENCOUNTER — TELEPHONE (OUTPATIENT)
Dept: FAMILY MEDICINE | Facility: CLINIC | Age: 64
End: 2019-11-24

## 2019-11-24 ENCOUNTER — NURSE TRIAGE (OUTPATIENT)
Dept: NURSING | Facility: CLINIC | Age: 64
End: 2019-11-24

## 2019-11-24 NOTE — TELEPHONE ENCOUNTER
Action Needed    Ellen called and continues to have severe low back pain. She's not slept a full night for a month now.  She's had a massage and seen a chiropractor twice.  Nothing has helped.  Can she take a higher dose of gabapentin? The 300 mg nightly isn't helping at all.    She's been taking both ibuprofen and Aleve. I told her to stop this and take one or the other.  Is the methocarbamol the maximum dose she can take?    She wants to schedule a MRI for asap.  She doesn't have any sick time but will consider anytime during the day though 3:30 of after any afternoon will work.  She's available all day Friday, 11/29/2019.    She'd like Dr Zambrano to order the MRI. If it's possible for her to get one Monday, after 3:30pm 11/25/2019 she will.  Is there anyway someone can go ahead schedule one for her?  She gets off work at 3pm everyday.    Please call Ellen's home phone and leave a detailed message.      Routed: P [id number] [facility] Dr Zambrano high priority  Lisa IGNACIO RN [facility]     Please close encounter once addressed

## 2019-11-24 NOTE — TELEPHONE ENCOUNTER
Action Needed    Ellen called and continues to have severe low back pain. She's not slept a full night for a month now.  She's had a massage and seen a chiropractor twice.  Nothing has helped.  Can she take a higher dose of gabapentin? The 300 mg nightly isn't helping at all.    She's been taking both ibuprofen and Aleve. I told her to stop this and take one or the other.  Is the methocarbamol the maximum dose she can take?    She wants to schedule a MRI for asap.  She doesn't have any sick time but will consider anytime during the day though 3:30 of after any afternoon will work.  She's available all day Friday, 11/29/2019.    She'd like Dr Zambrano to order the MRI. If it's possible for her to get one Monday, after 3:30pm 11/25/2019 she will.  Is there anyway someone can go ahead schedule one for her?  She gets off work at 3pm everyday.    Please call Ellen's home phone and leave a detailed message.      Routed: P [id number] [facility] Dr Zambrano high priority  Lisa IGNACIO RN [facility]     Please close encounter once addressed

## 2019-11-25 NOTE — TELEPHONE ENCOUNTER
This writer attempted to contact patient on 11/25/19      Reason for call return ana/triagel and left message.      If patient calls back:   Registered Nurse called. Follow Triage Call workflow        Jazzy Deras RN

## 2019-11-26 NOTE — TELEPHONE ENCOUNTER
This writer attempted to contact patient on 11/26/19      Reason for call triage pain and left message.      If patient calls back:   Registered Nurse called. Follow Triage Call workflow        Jzazy Deras RN

## 2019-11-27 NOTE — TELEPHONE ENCOUNTER
(3rd attempt)    This writer attempted to contact patient on 11/27/19      Reason for call triage and discuss requested referral and left message.      If patient calls back:   Registered Nurse called. Follow Triage Call workflow    Message being routed to provider to please review and advise.           Jazzy Deras RN

## 2019-11-27 NOTE — TELEPHONE ENCOUNTER
"Spoke with patient on the phone as she called back. She is having back pain. She was at work, only had a few minutes to talk. Did not get to have complete conversation due to her needing to go back to work.    She reports back pain rated 8/10. Lower extremities color WNL, right thigh \"stinging\" sensation. She reports that she can ambulate and she does not have incontinence issues secondary to back issue.       Writer told her that she needs an assessment by provider, could not finish conversation to give recommended time frame in which she needs to be seen as she had to get off phone and go back to work. She will call back for appointment with PCP.    Will route to provider for FYI.    Nicole Pierre RN     "

## 2019-11-27 NOTE — TELEPHONE ENCOUNTER
Ellen returned call    Best number to reach caller: Home number on file [phone] (home)    Is it ok to leave a detailed message: YES

## 2019-11-29 ENCOUNTER — NURSE TRIAGE (OUTPATIENT)
Dept: NURSING | Facility: CLINIC | Age: 64
End: 2019-11-29

## 2019-11-29 NOTE — TELEPHONE ENCOUNTER
"\"I am having severe pain in my right thigh.\" Ongoing pain x 1 month that has increased since yesterday. Patient reporting she now feels a hard lump on her left thigh. Rating pain \"15\" on 1-10 pain scale. Stating she he alternating hot and cold packs, taking Aleve, Neurontin with no improvement.  Per guidelines advised to be seen with in 4 hours. Patient will go into the Emergency Room now.    Aga Max RN  [facility]        Reason for Disposition    [1] SEVERE pain (e.g., excruciating, unable to do any normal activities) AND [2] not improved after 2 hours of pain medicine    Additional Information    Negative: Looks like a broken bone or dislocated joint (e.g., crooked or deformed)    Negative: Sounds like a life-threatening emergency to the triager    Negative: Chest pain    Negative: Difficulty breathing    Negative: Entire foot is cool or blue in comparison to other side    Negative: Unable to walk    Negative: [1] Red area or streak AND [2] fever    Negative: [1] Swollen joint AND [2] fever    Negative: [1] Cast on leg or ankle AND [2] now increased pain    Negative: Patient sounds very sick or weak to the triager    Protocols used: LEG PAIN-A-AH      "

## 2019-12-02 ENCOUNTER — OFFICE VISIT (OUTPATIENT)
Dept: FAMILY MEDICINE | Facility: CLINIC | Age: 64
End: 2019-12-02
Payer: COMMERCIAL

## 2019-12-02 VITALS
WEIGHT: 173 LBS | RESPIRATION RATE: 16 BRPM | SYSTOLIC BLOOD PRESSURE: 160 MMHG | HEART RATE: 78 BPM | OXYGEN SATURATION: 99 % | DIASTOLIC BLOOD PRESSURE: 82 MMHG | TEMPERATURE: 97.8 F | BODY MASS INDEX: 30.65 KG/M2 | HEIGHT: 63 IN

## 2019-12-02 DIAGNOSIS — M79.651 PAIN OF RIGHT THIGH: ICD-10-CM

## 2019-12-02 DIAGNOSIS — M54.16 LUMBAR RADICULOPATHY: Primary | ICD-10-CM

## 2019-12-02 LAB
ANION GAP SERPL CALCULATED.3IONS-SCNC: 9 MMOL/L (ref 3–14)
BUN SERPL-MCNC: 26 MG/DL (ref 7–30)
CALCIUM SERPL-MCNC: 9.5 MG/DL (ref 8.5–10.1)
CHLORIDE SERPL-SCNC: 104 MMOL/L (ref 94–109)
CHOLEST SERPL-MCNC: 274 MG/DL
CO2 SERPL-SCNC: 24 MMOL/L (ref 20–32)
CREAT SERPL-MCNC: 0.64 MG/DL (ref 0.52–1.04)
CREAT UR-MCNC: 47 MG/DL
GFR SERPL CREATININE-BSD FRML MDRD: >90 ML/MIN/{1.73_M2}
GLUCOSE SERPL-MCNC: 268 MG/DL (ref 70–99)
HBA1C MFR BLD: 12.4 % (ref 0–5.6)
HDLC SERPL-MCNC: 49 MG/DL
LDLC SERPL CALC-MCNC: 161 MG/DL
MICROALBUMIN UR-MCNC: 221 MG/L
MICROALBUMIN/CREAT UR: 471.22 MG/G CR (ref 0–25)
NONHDLC SERPL-MCNC: 225 MG/DL
POTASSIUM SERPL-SCNC: 3.9 MMOL/L (ref 3.4–5.3)
SODIUM SERPL-SCNC: 137 MMOL/L (ref 133–144)
TRIGL SERPL-MCNC: 322 MG/DL
TSH SERPL DL<=0.005 MIU/L-ACNC: 2.58 MU/L (ref 0.4–4)

## 2019-12-02 PROCEDURE — 84443 ASSAY THYROID STIM HORMONE: CPT | Performed by: NURSE PRACTITIONER

## 2019-12-02 PROCEDURE — 36415 COLL VENOUS BLD VENIPUNCTURE: CPT | Performed by: NURSE PRACTITIONER

## 2019-12-02 PROCEDURE — 83036 HEMOGLOBIN GLYCOSYLATED A1C: CPT | Performed by: NURSE PRACTITIONER

## 2019-12-02 PROCEDURE — 80061 LIPID PANEL: CPT | Performed by: NURSE PRACTITIONER

## 2019-12-02 PROCEDURE — 99214 OFFICE O/P EST MOD 30 MIN: CPT | Performed by: NURSE PRACTITIONER

## 2019-12-02 PROCEDURE — 80048 BASIC METABOLIC PNL TOTAL CA: CPT | Performed by: NURSE PRACTITIONER

## 2019-12-02 PROCEDURE — 82043 UR ALBUMIN QUANTITATIVE: CPT | Performed by: NURSE PRACTITIONER

## 2019-12-02 RX ORDER — PREDNISONE 20 MG/1
40 TABLET ORAL
COMMUNITY
Start: 2019-11-30 | End: 2019-12-13

## 2019-12-02 RX ORDER — GABAPENTIN 300 MG/1
600 CAPSULE ORAL 3 TIMES DAILY
Qty: 90 CAPSULE | Refills: 1 | Status: SHIPPED | OUTPATIENT
Start: 2019-12-02 | End: 2020-01-20

## 2019-12-02 RX ORDER — HYDROCODONE BITARTRATE AND ACETAMINOPHEN 5; 325 MG/1; MG/1
1 TABLET ORAL EVERY 6 HOURS PRN
Qty: 10 TABLET | Refills: 0 | Status: SHIPPED | OUTPATIENT
Start: 2019-12-02 | End: 2019-12-13

## 2019-12-02 RX ORDER — HYDROCODONE BITARTRATE AND ACETAMINOPHEN 5; 325 MG/1; MG/1
1-2 TABLET ORAL
COMMUNITY
Start: 2019-11-29 | End: 2020-01-20

## 2019-12-02 RX ORDER — METHOCARBAMOL 750 MG/1
750 TABLET, FILM COATED ORAL 3 TIMES DAILY PRN
Qty: 45 TABLET | Refills: 1 | Status: SHIPPED | OUTPATIENT
Start: 2019-12-02 | End: 2020-04-08

## 2019-12-02 ASSESSMENT — MIFFLIN-ST. JEOR: SCORE: 1313.85

## 2019-12-02 ASSESSMENT — PAIN SCALES - GENERAL: PAINLEVEL: EXTREME PAIN (8)

## 2019-12-02 NOTE — PROGRESS NOTES
Subjective     Ellen Hill is a 64 year old female who presents to clinic today for the following health issues:    HPI   Concern - Sciatic Nerve Pain   Onset: October 24th 2019    Description:   Patient has been dealing with this pain for a period of time now and doesn't know what else to do. Patient was just in the ER for this pain and is now taking Hydrocodone and Prednisone. Pain came on suddenly on 10/24.  No injury.  No lifting.    Intensity: moderate    Progression of Symptoms:  same    Precipitating factors:   Worsened by: Unable to determine     Alleviating factors:  Improved by: None    Therapies Tried and outcome: Hydrocodone, Prednisone, Ice- No relief     Went to chiro twice with no improvement  Got massage with no improvement  Thigh burning all day long, worse at night  Gabapentin is not working (she takes 300 mg as needed)  States has not slept in about a week due to pain.    Drifton from emergency room helps for about 6 hours but she is trying not to take too much of it.      Of note, has not been compliant with her diabetes medications.        Patient Active Problem List   Diagnosis     Post herpetic neuralgia     Hyperlipidemia LDL goal <100     Essential hypertension with goal blood pressure less than 130/80     Moderate major depression (H)     Vulvar pruritus     History of adenomatous polyp of colon     Alcoholic (H)     Advanced directives, counseling/discussion     Obesity, Class I, BMI 30-34.9     Leblanc's esophagus without dysplasia     Uncontrolled type 2 diabetes mellitus with microalbuminuria, with long-term current use of insulin (H)     Noncompliance with medication regimen     Smoker     Albuminuria     Past Surgical History:   Procedure Laterality Date     BIOPSY Right 1989    Breast-Benign lesion as per patient       Social History     Tobacco Use     Smoking status: Former Smoker     Packs/day: 0.10     Years: 10.00     Pack years: 1.00     Types: Cigarettes     Last attempt to  quit: 9/10/2013     Years since quittin.2     Smokeless tobacco: Never Used   Substance Use Topics     Alcohol use: Yes     Alcohol/week: 1.0 standard drinks     Types: 1 Cans of beer per week     Comment: drinks 1-2 times a month     Family History   Problem Relation Age of Onset     Alcohol/Drug Mother      Heart Disease Mother      Alcohol/Drug Father      Diabetes Brother          Current Outpatient Medications   Medication Sig Dispense Refill     alcohol swab prep pads Use to swab area of injection/mariza as directed. 100 each 3     amLODIPine (NORVASC) 10 MG tablet Take 1 tablet (10 mg) by mouth daily 90 tablet 1     atorvastatin (LIPITOR) 40 MG tablet Take 1 tablet (40 mg) by mouth daily 90 tablet 1     blood glucose monitoring (Baeta MICROLET) lancets Use to test blood sugar 2 times daily or as directed. 100 each 3     escitalopram (LEXAPRO) 10 MG tablet Take 10 mg by mouth daily  3     gabapentin (NEURONTIN) 300 MG capsule Take 2 capsules (600 mg) by mouth 3 times daily 90 capsule 1     HYDROcodone-acetaminophen (NORCO) 5-325 MG tablet Take 1-2 tablets by mouth       HYDROcodone-acetaminophen (NORCO) 5-325 MG tablet Take 1 tablet by mouth every 6 hours as needed for severe pain 10 tablet 0     insulin isophane & regular (NOVOLIN 70/30 FLEXPEN) susp Inject 30 Units Subcutaneous 2 times daily (with meals) ++NEED OFFICE VISIT AND LABS++ 15 mL 0     insulin pen needle (B-D U/F) 31G X 5 MM Use 3 pen needles daily or as directed. 180 each 1     insulin pen needle (ULTICARE MICRO) 32G X 4 MM miscellaneous Use 2 pen needles daily or as directed. 100 each 3     losartan-hydrochlorothiazide (HYZAAR) 100-12.5 MG tablet Take 1 tablet by mouth daily 90 tablet 1     methocarbamol (ROBAXIN) 750 MG tablet Take 1 tablet (750 mg) by mouth 3 times daily as needed for muscle spasms 45 tablet 1     miconazole (MONISTAT 7 SIMPLY CURE) 2 % cream Place 1 applicator vaginally At Bedtime 45 g 1     naproxen (NAPROSYN) 500 MG  "tablet Take 1 tablet (500 mg) by mouth 2 times daily (with meals) 30 tablet 1     predniSONE (DELTASONE) 20 MG tablet Take 40 mg by mouth       Allergies   Allergen Reactions     Insulin Detemir Hives     Alfredo products cause hives**       Liraglutide Hives     Alfredo Nordisk product     Victoza Hives     Alfredo Nordisk product     Enalapril Cough     Lisinopril Cough     Wellbutrin [Bupropion Hcl] Hives     hives     Recent Labs   Lab Test 12/02/19  1625 04/05/19  1439 08/13/18  0935  12/29/17  0930  05/08/17  1541   A1C 12.4* 12.5* 12.9*   < > 12.4*  --  11.2*   *  --  Cannot estimate LDL when triglyceride exceeds 400 mg/dL  85  --   --   --  136*   HDL 49*  --  32*  --   --   --  45*   TRIG 322*  --  573*  --   --   --  336*   ALT  --  31  --   --  38  --  37   CR 0.64 0.52  --   --  0.60   < > 0.57   GFRESTIMATED >90 >90  --   --  >90   < > >90  Non  GFR Calc     GFRESTBLACK >90 >90  --   --  >90   < > >90  African American GFR Calc     POTASSIUM 3.9 3.8  --   --  3.8   < > 3.9   TSH 2.58  --   --   --   --   --  1.02    < > = values in this interval not displayed.      BP Readings from Last 3 Encounters:   12/02/19 (!) 160/82   10/24/19 (!) 144/74   10/15/19 (!) 156/68    Wt Readings from Last 3 Encounters:   12/02/19 78.5 kg (173 lb)   10/24/19 77.6 kg (171 lb)   10/15/19 77.1 kg (170 lb)                    Reviewed and updated as needed this visit by Provider         Review of Systems   ROS COMP: Constitutional, HEENT, cardiovascular, pulmonary, GI, , musculoskeletal, neuro, skin, endocrine and psych systems are negative, except as otherwise noted.      Objective    BP (!) 160/82 (BP Location: Right arm, Patient Position: Sitting, Cuff Size: Adult Large)   Pulse 78   Temp 97.8  F (36.6  C) (Oral)   Resp 16   Ht 1.6 m (5' 3\")   Wt 78.5 kg (173 lb)   LMP  (LMP Unknown)   SpO2 99%   Breastfeeding No   BMI 30.65 kg/m    Body mass index is 30.65 kg/m .  Physical Exam   GENERAL: " healthy, alert and no distress  NECK: no adenopathy, no asymmetry, masses, or scars and thyroid normal to palpation  RESP: lungs clear to auscultation - no rales, rhonchi or wheezes  CV: regular rate and rhythm, normal S1 S2, no S3 or S4, no murmur, click or rub, no peripheral edema and peripheral pulses strong  ABDOMEN: soft, nontender, no hepatosplenomegaly, no masses and bowel sounds normal  MS: no gross musculoskeletal defects noted, no edema  NEURO: Normal strength and tone, mentation intact and speech normal  Comprehensive back pain exam:  Tenderness of lumbar paraspinals, Pain limits the following motions: all planes, Lower extremity strength functional and equal on both sides, Lower extremity reflexes within normal limits bilaterally, Lower extremity sensation normal and equal on both sides and Straight leg raise negative bilaterally  PSYCH: mentation appears normal, anxious and appearance disheveled    Diagnostic Test Results:  Results for orders placed or performed in visit on 12/02/19 (from the past 24 hour(s))   Basic metabolic panel  (Ca, Cl, CO2, Creat, Gluc, K, Na, BUN)   Result Value Ref Range    Sodium 137 133 - 144 mmol/L    Potassium 3.9 3.4 - 5.3 mmol/L    Chloride 104 94 - 109 mmol/L    Carbon Dioxide 24 20 - 32 mmol/L    Anion Gap 9 3 - 14 mmol/L    Glucose 268 (H) 70 - 99 mg/dL    Urea Nitrogen 26 7 - 30 mg/dL    Creatinine 0.64 0.52 - 1.04 mg/dL    GFR Estimate >90 >60 mL/min/[1.73_m2]    GFR Estimate If Black >90 >60 mL/min/[1.73_m2]    Calcium 9.5 8.5 - 10.1 mg/dL   Lipid panel reflex to direct LDL Fasting   Result Value Ref Range    Cholesterol 274 (H) <200 mg/dL    Triglycerides 322 (H) <150 mg/dL    HDL Cholesterol 49 (L) >49 mg/dL    LDL Cholesterol Calculated 161 (H) <100 mg/dL    Non HDL Cholesterol 225 (H) <130 mg/dL   HEMOGLOBIN A1C   Result Value Ref Range    Hemoglobin A1C 12.4 (H) 0 - 5.6 %   TSH WITH FREE T4 REFLEX   Result Value Ref Range    TSH 2.58 0.40 - 4.00 mU/L   Albumin  "Random Urine Quantitative with Creat Ratio   Result Value Ref Range    Creatinine Urine 47 mg/dL    Albumin Urine mg/L 221 mg/L    Albumin Urine mg/g Cr 471.22 (H) 0 - 25 mg/g Cr           Assessment & Plan     1. Lumbar radiculopathy  MRI then to see spine.  Refilled norco for short course.  Increased Gabapentin as well.   - MR Lumbar Spine w/o Contrast; Future  - gabapentin (NEURONTIN) 300 MG capsule; Take 2 capsules (600 mg) by mouth 3 times daily  Dispense: 90 capsule; Refill: 1  - ORTHO  REFERRAL  - HYDROcodone-acetaminophen (NORCO) 5-325 MG tablet; Take 1 tablet by mouth every 6 hours as needed for severe pain  Dispense: 10 tablet; Refill: 0    2. Pain of right thigh  - ORTHO  REFERRAL  - HYDROcodone-acetaminophen (NORCO) 5-325 MG tablet; Take 1 tablet by mouth every 6 hours as needed for severe pain  Dispense: 10 tablet; Refill: 0    3. Uncontrolled type 2 diabetes mellitus with microalbuminuria, with long-term current use of insulin (H)  Not controlled.  Patient to follow up to discuss plan.  She shows no signs or symptoms of DKA today.   - TSH WITH FREE T4 REFLEX; Future  - HEMOGLOBIN A1C; Future  - Lipid panel reflex to direct LDL Fasting; Future  - Albumin Random Urine Quantitative with Creat Ratio; Future  - Basic metabolic panel  (Ca, Cl, CO2, Creat, Gluc, K, Na, BUN); Future  - methocarbamol (ROBAXIN) 750 MG tablet; Take 1 tablet (750 mg) by mouth 3 times daily as needed for muscle spasms  Dispense: 45 tablet; Refill: 1  - Basic metabolic panel  (Ca, Cl, CO2, Creat, Gluc, K, Na, BUN)  - Albumin Random Urine Quantitative with Creat Ratio  - Lipid panel reflex to direct LDL Fasting  - HEMOGLOBIN A1C  - TSH WITH FREE T4 REFLEX     BMI:   Estimated body mass index is 30.65 kg/m  as calculated from the following:    Height as of this encounter: 1.6 m (5' 3\").    Weight as of this encounter: 78.5 kg (173 lb).   Weight management plan: Discussed healthy diet and exercise " guidelines        Patient Instructions   Take Norco sparingly.  Increase Gabapentin to 2 capsules (600 mg ) three times a day for pain  Can take Robaxin at night to help with muscle spasms    See spine.      Return in about 3 days (around 12/5/2019) for with spine and as soon as able regarding diabetes.    YVON Tate [facility]

## 2019-12-02 NOTE — PATIENT INSTRUCTIONS
Take Norco sparingly.  Increase Gabapentin to 2 capsules (600 mg ) three times a day for pain  Can take Robaxin at night to help with muscle spasms    See spine.

## 2019-12-05 ENCOUNTER — ANCILLARY PROCEDURE (OUTPATIENT)
Dept: MRI IMAGING | Facility: CLINIC | Age: 64
End: 2019-12-05
Attending: NURSE PRACTITIONER
Payer: COMMERCIAL

## 2019-12-05 DIAGNOSIS — M54.16 LUMBAR RADICULOPATHY: ICD-10-CM

## 2019-12-05 PROCEDURE — 72148 MRI LUMBAR SPINE W/O DYE: CPT | Mod: TC

## 2019-12-06 ENCOUNTER — TELEPHONE (OUTPATIENT)
Dept: FAMILY MEDICINE | Facility: CLINIC | Age: 64
End: 2019-12-06

## 2019-12-06 NOTE — TELEPHONE ENCOUNTER
Patient contacted and informed of the below per provider documentation. Patient verbalizes understanding.     Nereida Ramirez RN          Notes recorded by Marcella Teixeira APRN CNP on 12/5/2019 at 3:04 PM CST  Please call patient to discuss MRI.  Bulging disc seen at L4-L5.  Continue with plan to meet with spine.  Thank you,  YVON Tate CNP

## 2019-12-06 NOTE — TELEPHONE ENCOUNTER
Reason for Call:  Request for results:    Name of test or procedure: MRI LUMBAR SPINE WITHOUT CONTRAST       Date of test of procedure: 12/05/19    Location of the test or procedure: [facility]    OK to leave the result message on voice mail or with a family member? YES    Phone number Patient can be reached at:  Home number on file [phone] (home)    Additional comments: Pt calling for she came in for an MRI on 12/05/19 and would like a call back to discuss results.    Call taken on 12/6/2019 at 1:30 PM by Brendon Ann

## 2019-12-08 ENCOUNTER — NURSE TRIAGE (OUTPATIENT)
Dept: NURSING | Facility: CLINIC | Age: 64
End: 2019-12-08

## 2019-12-08 NOTE — TELEPHONE ENCOUNTER
Ellen as been having severe back pain. She has lumbar radiculopathy:    MRI:  L4-L5:  Loss of T2 signal from the disc. Mild annular disc bulge.  Severe degenerative change in the facet joints. Thickening of the  ligamentum flavum. These degenerative changes are leading to moderate  central spinal stenosis. Mild right and left foraminal stenosis due to  the bulging disc.    She noticed she felt great this morning when she awoke.  She did a lot of house work that she'd been unable to do since this started several weeks ago, went out for lunch and did some shopping. When out shopping her pain became severe again and it was difficult for her to even walk.    She called asking what she could do.  She isn't doing a good job of keeping track of her medications.  I instructed she write everything down to keep better track of what she's taking and when.    I explained that it's likely gravity that caused her pain to return and told her too to not do as much cleaning as she did today. She laughed and agreed.    She's going to call tomorrow to see if she can move her appointment to see neuro provider to an earlier date.  She's also going to call her pcp to see what else she an do for her.    I asked Ellen to call back with more questions and concerns.  [facility]       [facility]

## 2019-12-13 ENCOUNTER — OFFICE VISIT (OUTPATIENT)
Dept: NEUROLOGY | Facility: CLINIC | Age: 64
End: 2019-12-13
Payer: COMMERCIAL

## 2019-12-13 VITALS — WEIGHT: 171.7 LBS | HEIGHT: 63 IN | BODY MASS INDEX: 30.42 KG/M2 | OXYGEN SATURATION: 98 %

## 2019-12-13 DIAGNOSIS — M54.10 RADICULAR SYNDROME OF RIGHT LEG: Primary | ICD-10-CM

## 2019-12-13 PROCEDURE — 99215 OFFICE O/P EST HI 40 MIN: CPT | Performed by: PHYSICIAN ASSISTANT

## 2019-12-13 ASSESSMENT — PAIN SCALES - GENERAL: PAINLEVEL: WORST PAIN (10)

## 2019-12-13 ASSESSMENT — MIFFLIN-ST. JEOR: SCORE: 1307.96

## 2019-12-13 NOTE — LETTER
12/13/2019         RE: Ellen Hill  [address] [place] [address]        Dear Colleague,    Thank you for referring your patient, Ellen Hill, to the [facility]. Please see a copy of my visit note below.    Neurosurgery Clinic Consult    HPI    Ms. Hill is a 64-year-old female referred to us by her primary care provider for evaluation of right thigh pain both anterior and medial.  This is been going on since the end of October.  She denies any inciting event.  She denies weakness denies numbness denies any change in her urinary function she denies any left leg symptoms.  She is tried pain medications gabapentin and oral steroid medication without any improvement she feels like the symptoms are worsening.    Medical history  Type 2 diabetes  Hypertension    Social history  Works as an  at a medical device plant      Exam    Alert and oriented no acute distress  Bilateral lower extremities with 5-5 strength  Reflexes 2+ patella neck, absent ankles  Negative ankle clonus negative Babinski, negative straight leg raise  Lumbar spine nontender palpation there is tenderness off to the right at the sacroiliac joint.    Imaging    Lumbar MRI demonstrates mild disc bulge at L4-5 as well as ligamentum flavum hypertrophy causing moderate spinal stenosis and mild low right neuroforaminal stenosis, there is also some degree of right lateral recess stenosis which may be impinging the transiting right L5 nerve.    Assessment    64-year-old female with right leg pain primarily in her anterior medial thigh this does not entirely correlate with her MRI findings of stenosis at L4-5.    Plan:      Recommend the patient try an epidural steroid injection at L4-5 interlaminar.  She will monitor how she is doing over the next 2 to 3 weeks if she is not improving she should return to clinic for further evaluation.  She should have her pain medication managed by her primary care  provider.    Total time of 45 minutes met with the patient today greater than 50% spent face to face in counseling and coordination of care.    Again, thank you for allowing me to participate in the care of your patient.        Sincerely,        Claudio Magaña PA-C

## 2019-12-13 NOTE — PROGRESS NOTES
Neurosurgery Clinic Consult    HPI    Ms. Hill is a 64-year-old female referred to us by her primary care provider for evaluation of right thigh pain both anterior and medial.  This is been going on since the end of October.  She denies any inciting event.  She denies weakness denies numbness denies any change in her urinary function she denies any left leg symptoms.  She is tried pain medications gabapentin and oral steroid medication without any improvement she feels like the symptoms are worsening.    Medical history  Type 2 diabetes  Hypertension    Social history  Works as an  at a medical device plant      Exam    Alert and oriented no acute distress  Bilateral lower extremities with 5-5 strength  Reflexes 2+ patella neck, absent ankles  Negative ankle clonus negative Babinski, negative straight leg raise  Lumbar spine nontender palpation there is tenderness off to the right at the sacroiliac joint.    Imaging    Lumbar MRI demonstrates mild disc bulge at L4-5 as well as ligamentum flavum hypertrophy causing moderate spinal stenosis and mild low right neuroforaminal stenosis, there is also some degree of right lateral recess stenosis which may be impinging the transiting right L5 nerve.    Assessment    64-year-old female with right leg pain primarily in her anterior medial thigh this does not entirely correlate with her MRI findings of stenosis at L4-5.    Plan:      Recommend the patient try an epidural steroid injection at L4-5 interlaminar.  She will monitor how she is doing over the next 2 to 3 weeks if she is not improving she should return to clinic for further evaluation.  She should have her pain medication managed by her primary care provider.    Total time of 45 minutes met with the patient today greater than 50% spent face to face in counseling and coordination of care.

## 2019-12-13 NOTE — NURSING NOTE
"Ellen Hill's goals for this visit include: consult  She requests these members of her care team be copied on today's visit information:     PCP: Marcella Teixeira    Referring Provider:  Marcella Teixeira, APRN CNP  [address] [place] [address]    Ht 1.6 m (5' 3\")   Wt 77.9 kg (171 lb 11.2 oz)   LMP  (LMP Unknown)   SpO2 98%   BMI 30.42 kg/m      Do you need any medication refills at today's visit? n  "

## 2019-12-23 ENCOUNTER — TELEPHONE (OUTPATIENT)
Dept: FAMILY MEDICINE | Facility: CLINIC | Age: 64
End: 2019-12-23

## 2019-12-23 ENCOUNTER — OFFICE VISIT (OUTPATIENT)
Dept: SURGERY | Facility: CLINIC | Age: 64
End: 2019-12-23
Payer: COMMERCIAL

## 2019-12-23 VITALS
HEART RATE: 78 BPM | BODY MASS INDEX: 30.3 KG/M2 | WEIGHT: 171 LBS | SYSTOLIC BLOOD PRESSURE: 189 MMHG | DIASTOLIC BLOOD PRESSURE: 88 MMHG | HEIGHT: 63 IN

## 2019-12-23 DIAGNOSIS — D17.9 MULTIPLE LIPOMAS: Primary | ICD-10-CM

## 2019-12-23 PROCEDURE — 99213 OFFICE O/P EST LOW 20 MIN: CPT | Performed by: SURGERY

## 2019-12-23 ASSESSMENT — MIFFLIN-ST. JEOR: SCORE: 1304.78

## 2019-12-23 NOTE — LETTER
"    12/23/2019         RE: Ellen ROMO Sergio  [address] [place] [address]        Dear Colleague,    Thank you for referring your patient, Ellen Hill, to the [facility] [place]. Please see a copy of my visit note below.    Patient seen in consultation for lipoma by self    HPI:  Patient is a 64 year old female  with complaints of painful lump right thigh area  The patient noticed the symptoms about 2 months ago.    Painful, feels like the lipomas she had in her upper back  Has tried lotions, rubbing it and not helping  Feels like it can move around  nothing makes the episode better.  Patient has not family history of similar problems  Upper back areas now feel well, no recurrence of lump or seroma that she had    Review Of Systems    Skin: as above  Ears/Nose/Throat: negative  Respiratory: No shortness of breath, dyspnea on exertion, cough, or hemoptysis  Cardiovascular: negative  Gastrointestinal: negative  Genitourinary: negative  Musculoskeletal: negative  Neurologic: negative  Hematologic/Lymphatic/Immunologic: negative  Endocrine: negative      Past Medical History:   Diagnosis Date     Advanced directives, counseling/discussion 3/28/2013    Patient does not have an Advance/Health Care Directive (HCD), given \"What is Advance Care Planning?\" flyer.  Nola Lopez March 28, 2013      Hyperlipidemia LDL goal <100 10/27/2010     Hypertension goal BP (blood pressure) < 130/80 12/21/2010     Microalbuminuria 2/12/2015     Shingles 2009    Right posterior shoulder as per patient     Type 2 diabetes, HbA1c goal < 7% (H) 10/31/2010       Past Surgical History:   Procedure Laterality Date     BIOPSY Right 1989    Breast-Benign lesion as per patient       Social History     Socioeconomic History     Marital status:      Spouse name: Not on file     Number of children: 1     Years of education: 13     Highest education level: Not on file   Occupational History     " Occupation: [employer]     Employer: [employer]     Comment: 3 years   Social Needs     Financial resource strain: Not on file     Food insecurity:     Worry: Not on file     Inability: Not on file     Transportation needs:     Medical: Not on file     Non-medical: Not on file   Tobacco Use     Smoking status: Former Smoker     Packs/day: 0.10     Years: 10.00     Pack years: 1.00     Types: Cigarettes     Last attempt to quit: 9/10/2013     Years since quittin.2     Smokeless tobacco: Never Used   Substance and Sexual Activity     Alcohol use: Yes     Alcohol/week: 1.0 standard drinks     Types: 1 Cans of beer per week     Comment: drinks 1-2 times a month     Drug use: No     Sexual activity: Yes     Partners: Male     Birth control/protection: None   Lifestyle     Physical activity:     Days per week: Not on file     Minutes per session: Not on file     Stress: Not on file   Relationships     Social connections:     Talks on phone: Not on file     Gets together: Not on file     Attends Orthodox service: Not on file     Active member of club or organization: Not on file     Attends meetings of clubs or organizations: Not on file     Relationship status: Not on file     Intimate partner violence:     Fear of current or ex partner: Not on file     Emotionally abused: Not on file     Physically abused: Not on file     Forced sexual activity: Not on file   Other Topics Concern     Parent/sibling w/ CABG, MI or angioplasty before 65F 55M? Not Asked   Social History Narrative     Not on file       Current Outpatient Medications   Medication Sig Dispense Refill     alcohol swab prep pads Use to swab area of injection/mariza as directed. 100 each 3     amLODIPine (NORVASC) 10 MG tablet Take 1 tablet (10 mg) by mouth daily 90 tablet 1     atorvastatin (LIPITOR) 40 MG tablet Take 1 tablet (40 mg) by mouth daily 90 tablet 1     blood glucose monitoring (AL MICROLET) lancets Use to test blood sugar 2 times daily or as  "directed. 100 each 3     escitalopram (LEXAPRO) 10 MG tablet Take 10 mg by mouth daily  3     gabapentin (NEURONTIN) 300 MG capsule Take 2 capsules (600 mg) by mouth 3 times daily 90 capsule 1     HYDROcodone-acetaminophen (NORCO) 5-325 MG tablet Take 1-2 tablets by mouth       insulin isophane & regular (NOVOLIN 70/30 FLEXPEN) susp Inject 30 Units Subcutaneous 2 times daily (with meals) ++NEED OFFICE VISIT AND LABS++ 15 mL 0     insulin pen needle (B-D U/F) 31G X 5 MM Use 3 pen needles daily or as directed. 180 each 1     insulin pen needle (ULTICARE MICRO) 32G X 4 MM miscellaneous Use 2 pen needles daily or as directed. 100 each 3     losartan-hydrochlorothiazide (HYZAAR) 100-12.5 MG tablet Take 1 tablet by mouth daily 90 tablet 1     methocarbamol (ROBAXIN) 750 MG tablet Take 1 tablet (750 mg) by mouth 3 times daily as needed for muscle spasms 45 tablet 1     miconazole (MONISTAT 7 SIMPLY CURE) 2 % cream Place 1 applicator vaginally At Bedtime 45 g 1     naproxen (NAPROSYN) 500 MG tablet Take 1 tablet (500 mg) by mouth 2 times daily (with meals) 30 tablet 1       Medications and history reviewed    Physical exam:  Vitals: BP (!) 189/88   Pulse 78   Ht 1.6 m (5' 3\")   Wt 77.6 kg (171 lb)   LMP  (LMP Unknown)   BMI 30.29 kg/m     BMI= Body mass index is 30.29 kg/m .    Constitutional: healthy, alert and no distress  Head: Normocephalic. No masses, lesions, tenderness or abnormalities  Cardiovascular: negative, PMI normal. No lifts, heaves, or thrills. RRR. No murmurs, clicks gallops or rub  Respiratory: negative, Percussion normal. Good diaphragmatic excursion. Lungs clear  Gastrointestinal: Abdomen soft, non-tender. BS normal. No masses, organomegaly  : Deferred  Musculoskeletal: extremities normal- no gross deformities noted, gait normal and normal muscle tone  Skin: right thigh with few small lipomas, ~1cm, each with focal pain with palpation  Psychiatric: mentation appears normal and affect " normal/bright  Hematologic/Lymphatic/Immunologic: Normal cervical lymph nodes  Patient able to get up on table without difficulty.      Assessment:     ICD-10-CM    1. Multiple lipomas D17.9      Plan: Unusual for small lipomas like this to be so painful, however this was true for her back as appears to now be for this right thigh.  That was on the back no feel well so can only hope this continues for the thigh.  Discussed that lipomas are something that cannot be prevented or controlled, and seems that she is someone who makes these.  Given that these are symptomatic, we will go ahead and plan for excision in clinic under local.  Ellen to follow up with Primary Care provider regarding elevated blood pressure.      Kelvin Hutchison MD      Again, thank you for allowing me to participate in the care of your patient.        Sincerely,        Kelvin Hutchison MD

## 2019-12-23 NOTE — TELEPHONE ENCOUNTER
.Reason for call:  Form   Our goal is to have forms completed within 72 hours, however some forms may require a visit or additional information.     Who is the form from? Patient  Where did the form come from? Patient or family brought in     What clinic location was the form placed at? [facility]  Where was the form placed? PLACED ON DUM   What number is listed as a contact on the form? [phone]    Phone call message - patient request for a letter, form or note:     Date needed: as soon as possible  Patient will  at the clinic when completed  Has the patient signed a consent form for release of information? YES    Additional comments:     Type of letter, form or note: [employer]OR [employer]LE    Phone number to reach patient:  Home number on file [phone] (home)    Best Time:  ANY    Can we leave a detailed message on this number?  YES

## 2019-12-23 NOTE — PROGRESS NOTES
"Patient seen in consultation for lipoma by self    HPI:  Patient is a 64 year old female  with complaints of painful lump right thigh area  The patient noticed the symptoms about 2 months ago.    Painful, feels like the lipomas she had in her upper back  Has tried lotions, rubbing it and not helping  Feels like it can move around  nothing makes the episode better.  Patient has not family history of similar problems  Upper back areas now feel well, no recurrence of lump or seroma that she had    Review Of Systems    Skin: as above  Ears/Nose/Throat: negative  Respiratory: No shortness of breath, dyspnea on exertion, cough, or hemoptysis  Cardiovascular: negative  Gastrointestinal: negative  Genitourinary: negative  Musculoskeletal: negative  Neurologic: negative  Hematologic/Lymphatic/Immunologic: negative  Endocrine: negative      Past Medical History:   Diagnosis Date     Advanced directives, counseling/discussion 3/28/2013    Patient does not have an Advance/Health Care Directive (HCD), given \"What is Advance Care Planning?\" flyer.  Nola Lopez March 28, 2013      Hyperlipidemia LDL goal <100 10/27/2010     Hypertension goal BP (blood pressure) < 130/80 12/21/2010     Microalbuminuria 2/12/2015     Shingles 2009    Right posterior shoulder as per patient     Type 2 diabetes, HbA1c goal < 7% (H) 10/31/2010       Past Surgical History:   Procedure Laterality Date     BIOPSY Right 1989    Breast-Benign lesion as per patient       Social History     Socioeconomic History     Marital status:      Spouse name: Not on file     Number of children: 1     Years of education: 13     Highest education level: Not on file   Occupational History     Occupation: [employer]     Employer: [employer]     Comment: 3 years   Social Needs     Financial resource strain: Not on file     Food insecurity:     Worry: Not on file     Inability: Not on file     Transportation needs:     Medical: Not on file     Non-medical: Not on " file   Tobacco Use     Smoking status: Former Smoker     Packs/day: 0.10     Years: 10.00     Pack years: 1.00     Types: Cigarettes     Last attempt to quit: 9/10/2013     Years since quittin.2     Smokeless tobacco: Never Used   Substance and Sexual Activity     Alcohol use: Yes     Alcohol/week: 1.0 standard drinks     Types: 1 Cans of beer per week     Comment: drinks 1-2 times a month     Drug use: No     Sexual activity: Yes     Partners: Male     Birth control/protection: None   Lifestyle     Physical activity:     Days per week: Not on file     Minutes per session: Not on file     Stress: Not on file   Relationships     Social connections:     Talks on phone: Not on file     Gets together: Not on file     Attends Evangelical service: Not on file     Active member of club or organization: Not on file     Attends meetings of clubs or organizations: Not on file     Relationship status: Not on file     Intimate partner violence:     Fear of current or ex partner: Not on file     Emotionally abused: Not on file     Physically abused: Not on file     Forced sexual activity: Not on file   Other Topics Concern     Parent/sibling w/ CABG, MI or angioplasty before 65F 55M? Not Asked   Social History Narrative     Not on file       Current Outpatient Medications   Medication Sig Dispense Refill     alcohol swab prep pads Use to swab area of injection/mariza as directed. 100 each 3     amLODIPine (NORVASC) 10 MG tablet Take 1 tablet (10 mg) by mouth daily 90 tablet 1     atorvastatin (LIPITOR) 40 MG tablet Take 1 tablet (40 mg) by mouth daily 90 tablet 1     blood glucose monitoring (AL MICROLET) lancets Use to test blood sugar 2 times daily or as directed. 100 each 3     escitalopram (LEXAPRO) 10 MG tablet Take 10 mg by mouth daily  3     gabapentin (NEURONTIN) 300 MG capsule Take 2 capsules (600 mg) by mouth 3 times daily 90 capsule 1     HYDROcodone-acetaminophen (NORCO) 5-325 MG tablet Take 1-2 tablets by mouth   "     insulin isophane & regular (NOVOLIN 70/30 FLEXPEN) susp Inject 30 Units Subcutaneous 2 times daily (with meals) ++NEED OFFICE VISIT AND LABS++ 15 mL 0     insulin pen needle (B-D U/F) 31G X 5 MM Use 3 pen needles daily or as directed. 180 each 1     insulin pen needle (ULTICARE MICRO) 32G X 4 MM miscellaneous Use 2 pen needles daily or as directed. 100 each 3     losartan-hydrochlorothiazide (HYZAAR) 100-12.5 MG tablet Take 1 tablet by mouth daily 90 tablet 1     methocarbamol (ROBAXIN) 750 MG tablet Take 1 tablet (750 mg) by mouth 3 times daily as needed for muscle spasms 45 tablet 1     miconazole (MONISTAT 7 SIMPLY CURE) 2 % cream Place 1 applicator vaginally At Bedtime 45 g 1     naproxen (NAPROSYN) 500 MG tablet Take 1 tablet (500 mg) by mouth 2 times daily (with meals) 30 tablet 1       Medications and history reviewed    Physical exam:  Vitals: BP (!) 189/88   Pulse 78   Ht 1.6 m (5' 3\")   Wt 77.6 kg (171 lb)   LMP  (LMP Unknown)   BMI 30.29 kg/m    BMI= Body mass index is 30.29 kg/m .    Constitutional: healthy, alert and no distress  Head: Normocephalic. No masses, lesions, tenderness or abnormalities  Cardiovascular: negative, PMI normal. No lifts, heaves, or thrills. RRR. No murmurs, clicks gallops or rub  Respiratory: negative, Percussion normal. Good diaphragmatic excursion. Lungs clear  Gastrointestinal: Abdomen soft, non-tender. BS normal. No masses, organomegaly  : Deferred  Musculoskeletal: extremities normal- no gross deformities noted, gait normal and normal muscle tone  Skin: right thigh with few small lipomas, ~1cm, each with focal pain with palpation  Psychiatric: mentation appears normal and affect normal/bright  Hematologic/Lymphatic/Immunologic: Normal cervical lymph nodes  Patient able to get up on table without difficulty.      Assessment:     ICD-10-CM    1. Multiple lipomas D17.9      Plan: Unusual for small lipomas like this to be so painful, however this was true for her " back as appears to now be for this right thigh.  That was on the back no feel well so can only hope this continues for the thigh.  Discussed that lipomas are something that cannot be prevented or controlled, and seems that she is someone who makes these.  Given that these are symptomatic, we will go ahead and plan for excision in clinic under local.  Ellen to follow up with Primary Care provider regarding elevated blood pressure.      Kelvin Hutchison MD

## 2019-12-30 ENCOUNTER — OFFICE VISIT (OUTPATIENT)
Dept: SURGERY | Facility: CLINIC | Age: 64
End: 2019-12-30
Payer: COMMERCIAL

## 2019-12-30 VITALS
BODY MASS INDEX: 30.3 KG/M2 | HEART RATE: 119 BPM | HEIGHT: 63 IN | DIASTOLIC BLOOD PRESSURE: 90 MMHG | SYSTOLIC BLOOD PRESSURE: 170 MMHG | WEIGHT: 171 LBS

## 2019-12-30 DIAGNOSIS — D17.9 MULTIPLE LIPOMAS: Primary | ICD-10-CM

## 2019-12-30 PROCEDURE — 88304 TISSUE EXAM BY PATHOLOGIST: CPT | Performed by: SURGERY

## 2019-12-30 PROCEDURE — 27327 EXC THIGH/KNEE LES SC < 3 CM: CPT | Mod: RT | Performed by: SURGERY

## 2019-12-30 ASSESSMENT — MIFFLIN-ST. JEOR: SCORE: 1304.78

## 2019-12-30 NOTE — TELEPHONE ENCOUNTER
This writer attempted to contact pt on 12/30/19      Reason for call relay message and schedule needed appt and left message.    If patient calls back:   Relay message Please inform patient that I am unable to fill out these forms until we follow up on her diabetes.  Her diabetes is very poorly controlled so I am uncomfortable signing these forms.  She can make an appointment to discuss further., (read verbatim).  Schedule Office Visit appointment within 2 weeks with PCP, document that pt called and close encounter         Leelee Gonzáles

## 2019-12-30 NOTE — TELEPHONE ENCOUNTER
Please inform patient that I am unable to fill out these forms until we follow up on her diabetes.  Her diabetes is very poorly controlled so I am uncomfortable signing these forms.  She can make an appointment to discuss further.  YVON Tate CNP

## 2019-12-30 NOTE — LETTER
12/30/2019         RE: Ellen Hill  [address] [place] [address]        Dear Colleague,    Thank you for referring your patient, Ellen Hill, to the [facility]. Please see a copy of my visit note below.    PROCEDURE:   Written consent was obtained  The right lateral thigh area was prepped and appropriately anesthetized with 1% lidocaine with epinephrine.  I made a 1.5 cm incision over the palpable and painful subcutaneous mass in the right lateral thigh.  There was lipoma tissue here which was totally freed and removed. The excised tissue measured 1.8 cm in total.  Closure was accomplished with running subcuticular 4-0 vicryl for the skin.  Incision was covered with dermabond.  Final wound length 1.5 cm.  The procedure was well tolerated and without complications. Specimen was sent to Pathology.    The right anterior thigh area was prepped and appropriately anesthetized with 1% lidocaine with epinephrine.  I made a 1.5 cm incision over the palpable and painful subcutaneous mass in the right lateral thigh.  There was lipoma tissue here which was totally freed and removed.  This appeared to extend a bit deeper than in the lateral thigh. The excised tissue measured 2 cm in total.  Closure was accomplished with running subcuticular 4-0 vicryl for the skin.  Incision was covered with dermabond.  Final wound length 1.5 cm.  The procedure was well tolerated and without complications. Specimen was sent to Pathology.    Patient instructed to monitor for signs of bleeding infection fluid collection or other wound concerns.  She can follow-up as needed.    Kelvin Hutchison MD      Again, thank you for allowing me to participate in the care of your patient.        Sincerely,        Kelvin Hutchison MD

## 2019-12-30 NOTE — LETTER
Ellen Hill  [address] [place] [address] [address] [place] [address]      January 6, 2020    Dear Ellen,  This letter is to inform you of the results of your pathology report on your thigh lipomas.    Your pathology report was:  FINAL DIAGNOSIS:   A.  Subcutaneous mass, right lateral thigh, excision:   - Multiple fragments of mature adipose tissue, consistent with lipoma.     B.  Subcutaneous mass, right anterior thigh, excision:   - Multiple fragments of mature adipose tissue, consistent with lipoma.   As suspected  If you do have further questions please don t hesitate to call my assistant at [phone].  We do not have someone answering the phone all the time at my assistants number so if leave a message may take a day or so to get back to you.  So if more urgent then call the below number.    To make an appointment call .   Sincerely,     Kelvin Hutchison M.D.

## 2019-12-30 NOTE — PROGRESS NOTES
PROCEDURE:   Written consent was obtained  The right lateral thigh area was prepped and appropriately anesthetized with 1% lidocaine with epinephrine.  I made a 1.5 cm incision over the palpable and painful subcutaneous mass in the right lateral thigh.  There was lipoma tissue here which was totally freed and removed. The excised tissue measured 1.8 cm in total.  Closure was accomplished with running subcuticular 4-0 vicryl for the skin.  Incision was covered with dermabond.  Final wound length 1.5 cm.  The procedure was well tolerated and without complications. Specimen was sent to Pathology.    The right anterior thigh area was prepped and appropriately anesthetized with 1% lidocaine with epinephrine.  I made a 1.5 cm incision over the palpable and painful subcutaneous mass in the right lateral thigh.  There was lipoma tissue here which was totally freed and removed.  This appeared to extend a bit deeper than in the lateral thigh. The excised tissue measured 2 cm in total.  Closure was accomplished with running subcuticular 4-0 vicryl for the skin.  Incision was covered with dermabond.  Final wound length 1.5 cm.  The procedure was well tolerated and without complications. Specimen was sent to Pathology.    Patient instructed to monitor for signs of bleeding infection fluid collection or other wound concerns.  She can follow-up as needed.    Kelvin Hutchison MD

## 2019-12-31 ENCOUNTER — OFFICE VISIT (OUTPATIENT)
Dept: FAMILY MEDICINE | Facility: CLINIC | Age: 64
End: 2019-12-31
Payer: COMMERCIAL

## 2019-12-31 VITALS
RESPIRATION RATE: 20 BRPM | HEART RATE: 94 BPM | OXYGEN SATURATION: 98 % | BODY MASS INDEX: 29.55 KG/M2 | WEIGHT: 166.8 LBS | HEIGHT: 63 IN | SYSTOLIC BLOOD PRESSURE: 154 MMHG | DIASTOLIC BLOOD PRESSURE: 82 MMHG | TEMPERATURE: 98.2 F

## 2019-12-31 DIAGNOSIS — F32.1 MODERATE MAJOR DEPRESSION (H): ICD-10-CM

## 2019-12-31 DIAGNOSIS — I10 ESSENTIAL HYPERTENSION WITH GOAL BLOOD PRESSURE LESS THAN 130/80: ICD-10-CM

## 2019-12-31 DIAGNOSIS — Z02.89 ENCOUNTER FOR COMPLETION OF FORM WITH PATIENT: ICD-10-CM

## 2019-12-31 DIAGNOSIS — F10.20 ALCOHOLIC (H): ICD-10-CM

## 2019-12-31 LAB
ETHANOL SERPL-MCNC: <0.01 G/DL
ETHANOL UR QL SCN: NEGATIVE
GLUCOSE BLD-MCNC: 284 MG/DL (ref 70–99)

## 2019-12-31 PROCEDURE — 82947 ASSAY GLUCOSE BLOOD QUANT: CPT | Performed by: NURSE PRACTITIONER

## 2019-12-31 PROCEDURE — 80320 DRUG SCREEN QUANTALCOHOLS: CPT | Mod: 59 | Performed by: NURSE PRACTITIONER

## 2019-12-31 PROCEDURE — 36415 COLL VENOUS BLD VENIPUNCTURE: CPT | Performed by: NURSE PRACTITIONER

## 2019-12-31 PROCEDURE — 99214 OFFICE O/P EST MOD 30 MIN: CPT | Performed by: NURSE PRACTITIONER

## 2019-12-31 RX ORDER — ATORVASTATIN CALCIUM 40 MG/1
40 TABLET, FILM COATED ORAL DAILY
Qty: 90 TABLET | Refills: 3 | Status: SHIPPED | OUTPATIENT
Start: 2019-12-31 | End: 2020-11-11

## 2019-12-31 RX ORDER — LOSARTAN POTASSIUM AND HYDROCHLOROTHIAZIDE 12.5; 1 MG/1; MG/1
1 TABLET ORAL DAILY
Qty: 90 TABLET | Refills: 3 | Status: SHIPPED | OUTPATIENT
Start: 2019-12-31 | End: 2020-11-04

## 2019-12-31 RX ORDER — ESCITALOPRAM OXALATE 10 MG/1
10 TABLET ORAL DAILY
Qty: 90 TABLET | Refills: 3 | Status: SHIPPED | OUTPATIENT
Start: 2019-12-31 | End: 2020-11-11

## 2019-12-31 RX ORDER — AMLODIPINE BESYLATE 10 MG/1
10 TABLET ORAL DAILY
Qty: 90 TABLET | Refills: 3 | Status: SHIPPED | OUTPATIENT
Start: 2019-12-31 | End: 2020-11-04

## 2019-12-31 ASSESSMENT — MIFFLIN-ST. JEOR: SCORE: 1285.73

## 2019-12-31 ASSESSMENT — PAIN SCALES - GENERAL: PAINLEVEL: EXTREME PAIN (8)

## 2019-12-31 NOTE — PROGRESS NOTES
Subjective     Ellen Hill is a 64 year old female who presents to clinic today for the following health issues:    HPI     Diabetes Follow-up    How often are you checking your blood sugar? Two times daily  Blood sugar testing frequency justification:  Uncontrolled diabetes  What time of day are you checking your blood sugars (select all that apply)?  Before meals  Have you had any blood sugars above 200?  Yes 340  Have you had any blood sugars below 70?  No    What symptoms do you notice when your blood sugar is low?  None    What concerns do you have today about your diabetes? None and Blood sugar is often over 200     Do you have any of these symptoms? (Select all that apply)  No numbness or tingling in feet.  No redness, sores or blisters on feet.  No complaints of excessive thirst.  No reports of blurry vision.  No significant changes to weight.     Have you had a diabetic eye exam in the last 12 months? No    Diabetes Management Resources    Hyperlipidemia Follow-Up      Are you regularly taking any medication or supplement to lower your cholesterol?   Yes- atorvastatin    Are you having muscle aches or other side effects that you think could be caused by your cholesterol lowering medication?  No    Hypertension Follow-up      Do you check your blood pressure regularly outside of the clinic? No     Are you following a low salt diet? No    Are your blood pressures ever more than 140 on the top number (systolic) OR more   than 90 on the bottom number (diastolic), for example 140/90?  Does not monitor BP outside of clinic visits    BP Readings from Last 2 Encounters:   12/31/19 (!) 154/82   12/30/19 (!) 170/90     Hemoglobin A1C (%)   Date Value   12/02/2019 12.4 (H)   04/05/2019 12.5 (H)     LDL Cholesterol Calculated (mg/dL)   Date Value   12/02/2019 161 (H)   08/13/2018     Cannot estimate LDL when triglyceride exceeds 400 mg/dL     LDL Cholesterol Direct (mg/dL)   Date Value   08/13/2018 85  "    .a1c      How many servings of fruits and vegetables do you eat daily?  0-1    On average, how many sweetened beverages do you drink each day (Examples: soda, juice, sweet tea, etc.  Do NOT count diet or artificially sweetened beverages)?   1    How many days per week do you miss taking your medication? 0    Patient presents today as a follow up on her diabetes.  I asked her to come in because she needs her [place] [employer] Insulin-treated diabetes mellitus forms filled out.     Patient is aware her A1c is elevated.  She states her compliance with insulin has been poor, missing several doses a week.  She cannot tell me how often she actually taking her insulin. States she does not take it due to forgetting.  States it is better now that she is on a twice a day regimen versus four times a day as was previously.  She denies that her noncompliance is due to her depression.  She understands risk of uncontrolled diabetes (stroke, kidney disease, heart attack, blindness, neuropathy).  When asked about her alcohol intake she initially said she has been \"sober for 3 years\".  She then states she \"drinks sometimes, but not every day\".  When I mentioned doing and alcohol blood level today she states \"I really don't want to do that because if I have been drinking, you won't sign my forms [referring to the DMV forms] and I will lose my license.  I can't afford to lose my job.\"      Patient also discusses cost of insulin and cost of her health care bills as barriers to compliance today.  She has been referred to [facility] prescription assistance program previously as well as referred to MTM and diabetic education for help with this.  She has not followed up with any of these referrals.      She also did not take her blood pressure medication today.  States initially she was rushing and then states she has run out.      Patient became tearful at the end of the visit and states \"I promise I'll do better taking my " "insulin, I promise this time I will\"    Patient Active Problem List   Diagnosis     Post herpetic neuralgia     Hyperlipidemia LDL goal <100     Essential hypertension with goal blood pressure less than 130/80     Moderate major depression (H)     Vulvar pruritus     History of adenomatous polyp of colon     Alcoholic (H)     Advanced directives, counseling/discussion     Obesity, Class I, BMI 30-34.9     Leblanc's esophagus without dysplasia     Uncontrolled type 2 diabetes mellitus with microalbuminuria, with long-term current use of insulin (H)     Noncompliance with medication regimen     Smoker     Albuminuria     Past Surgical History:   Procedure Laterality Date     BIOPSY Right     Breast-Benign lesion as per patient       Social History     Tobacco Use     Smoking status: Former Smoker     Packs/day: 0.10     Years: 10.00     Pack years: 1.00     Types: Cigarettes     Last attempt to quit: 9/10/2013     Years since quittin.3     Smokeless tobacco: Never Used   Substance Use Topics     Alcohol use: Yes     Alcohol/week: 1.0 standard drinks     Types: 1 Cans of beer per week     Comment: drinks 1-2 times a month     Family History   Problem Relation Age of Onset     Alcohol/Drug Mother      Heart Disease Mother      Alcohol/Drug Father      Diabetes Brother          Current Outpatient Medications   Medication Sig Dispense Refill     alcohol swab prep pads Use to swab area of injection/mariza as directed. 100 each 3     amLODIPine (NORVASC) 10 MG tablet Take 1 tablet (10 mg) by mouth daily 90 tablet 3     atorvastatin (LIPITOR) 40 MG tablet Take 1 tablet (40 mg) by mouth daily 90 tablet 3     blood glucose monitoring (AL MICROLET) lancets Use to test blood sugar 2 times daily or as directed. 100 each 3     escitalopram (LEXAPRO) 10 MG tablet Take 1 tablet (10 mg) by mouth daily 90 tablet 3     HYDROcodone-acetaminophen (NORCO) 5-325 MG tablet Take 1-2 tablets by mouth       insulin isophane & regular " "(NOVOLIN 70/30 FLEXPEN) susp Inject 30 Units Subcutaneous 2 times daily (with meals) ++NEED OFFICE VISIT AND LABS++ 15 mL 0     insulin pen needle (B-D U/F) 31G X 5 MM Use 3 pen needles daily or as directed. 180 each 1     insulin pen needle (ULTICARE MICRO) 32G X 4 MM miscellaneous Use 2 pen needles daily or as directed. 100 each 3     losartan-hydrochlorothiazide (HYZAAR) 100-12.5 MG tablet Take 1 tablet by mouth daily 90 tablet 3     miconazole (MONISTAT 7 SIMPLY CURE) 2 % cream Place 1 applicator vaginally At Bedtime 45 g 1     gabapentin (NEURONTIN) 300 MG capsule Take 2 capsules (600 mg) by mouth 3 times daily (Patient not taking: Reported on 12/31/2019) 90 capsule 1     methocarbamol (ROBAXIN) 750 MG tablet Take 1 tablet (750 mg) by mouth 3 times daily as needed for muscle spasms (Patient not taking: Reported on 12/31/2019) 45 tablet 1     naproxen (NAPROSYN) 500 MG tablet Take 1 tablet (500 mg) by mouth 2 times daily (with meals) (Patient not taking: Reported on 12/31/2019) 30 tablet 1     Allergies   Allergen Reactions     Insulin Detemir Hives     Alfredo products cause hives**       Liraglutide Hives     Alfredo Nordisk product     Victoza Hives     Alfredo Nordisk product     Enalapril Cough     Lisinopril Cough     Wellbutrin [Bupropion Hcl] Hives     hives     BP Readings from Last 3 Encounters:   12/31/19 (!) 154/82   12/30/19 (!) 170/90   12/23/19 (!) 189/88    Wt Readings from Last 3 Encounters:   12/31/19 75.7 kg (166 lb 12.8 oz)   12/30/19 77.6 kg (171 lb)   12/23/19 77.6 kg (171 lb)                      Reviewed and updated as needed this visit by Provider         Review of Systems   ROS COMP: Constitutional, HEENT, cardiovascular, pulmonary, GI, , musculoskeletal, neuro, skin, endocrine and psych systems are negative, except as otherwise noted.      Objective    BP (!) 154/82   Pulse 94   Temp 98.2  F (36.8  C) (Oral)   Resp 20   Ht 1.6 m (5' 3\")   Wt 75.7 kg (166 lb 12.8 oz)   LMP  (Exact Date)  "  SpO2 98%   Breastfeeding No   BMI 29.55 kg/m    Body mass index is 29.55 kg/m .  Physical Exam   GENERAL: healthy, alert and no distress  PSYCH: tangential, anxious, speech pressured and appearance disheveled    Diagnostic Test Results:  Results for orders placed or performed in visit on 12/31/19 (from the past 24 hour(s))   Glucose, whole blood   Result Value Ref Range    Glucose Whole Blood 284 (H) 70 - 99 mg/dL   Alcohol ethyl   Result Value Ref Range    Ethanol g/dL <0.01 <0.01 g/dL           Assessment & Plan       ICD-10-CM    1. Uncontrolled type 2 diabetes mellitus with microalbuminuria, with long-term current use of insulin (H) E11.29 atorvastatin (LIPITOR) 40 MG tablet    E11.65 insulin isophane & regular (NOVOLIN 70/30 FLEXPEN) susp    R80.9 insulin pen needle (ULTICARE MICRO) 32G X 4 MM miscellaneous    Z79.4 AMBULATORY ADULT DIABETES EDUCATOR REFERRAL     Glucose, whole blood   2. Essential hypertension with goal blood pressure less than 130/80 I10 amLODIPine (NORVASC) 10 MG tablet     losartan-hydrochlorothiazide (HYZAAR) 100-12.5 MG tablet   3. Moderate major depression (H) F32.1 escitalopram (LEXAPRO) 10 MG tablet   4. Alcoholic (H) F10.20 Alcohol ethyl     Ethanol urine   5. Encounter for completion of form with patient Z02.89       patient continues with noncompliance.  Her actions today lead me to believe she is not telling me the full truth about taking her insulin or her alcohol intake.  The ethyl alcohol level is undetectable but I am still hesitant to fill out her DMV forms.  I will await her urine alcohol level and decide from there about this.      There are no Patient Instructions on file for this visit.    No follow-ups on file.    YVON Tate [facility]

## 2019-12-31 NOTE — TELEPHONE ENCOUNTER
Pt returned call    Best number to reach caller: Home number on file [phone] (home)    Is it ok to leave a detailed message: Not Applicable     Relayed message and Patient scheduled first available appointment on 01-

## 2019-12-31 NOTE — TELEPHONE ENCOUNTER
Reason for Call:  Other     Detailed comments: Patient states that she will do everything she can do , she will see a diabetic educator and anything else she needs to do so her form will be filled out for her drivers licence.    Phone Number Patient can be reached at: Home number on file [phone] (home)    Best Time: any    Can we leave a detailed message on this number? YES    Call taken on 12/31/2019 at 12:02 PM by Vanessa High

## 2020-01-02 ENCOUNTER — TELEPHONE (OUTPATIENT)
Dept: FAMILY MEDICINE | Facility: CLINIC | Age: 65
End: 2020-01-02

## 2020-01-03 LAB — COPATH REPORT: NORMAL

## 2020-01-06 NOTE — TELEPHONE ENCOUNTER
Received signed form and bringing to the  by 5:00 pm today, 1/6/2020. Copy to TC and abstracting. Called and left a voicemail message regarding form .  Ruth Ann Jarrell MA  [facility]  2nd Floor  Primary Care

## 2020-01-20 ENCOUNTER — OFFICE VISIT (OUTPATIENT)
Dept: FAMILY MEDICINE | Facility: CLINIC | Age: 65
End: 2020-01-20
Payer: COMMERCIAL

## 2020-01-20 VITALS
DIASTOLIC BLOOD PRESSURE: 74 MMHG | SYSTOLIC BLOOD PRESSURE: 192 MMHG | TEMPERATURE: 98 F | HEART RATE: 95 BPM | WEIGHT: 171 LBS | BODY MASS INDEX: 30.3 KG/M2 | HEIGHT: 63 IN | RESPIRATION RATE: 18 BRPM | OXYGEN SATURATION: 95 %

## 2020-01-20 DIAGNOSIS — I10 UNCONTROLLED HYPERTENSION: ICD-10-CM

## 2020-01-20 DIAGNOSIS — Z12.11 SCREEN FOR COLON CANCER: ICD-10-CM

## 2020-01-20 DIAGNOSIS — M54.16 LUMBAR RADICULOPATHY: Primary | ICD-10-CM

## 2020-01-20 PROCEDURE — 99214 OFFICE O/P EST MOD 30 MIN: CPT | Performed by: FAMILY MEDICINE

## 2020-01-20 RX ORDER — SULINDAC 200 MG/1
200 TABLET ORAL 2 TIMES DAILY
Qty: 60 TABLET | Refills: 0 | Status: SHIPPED | OUTPATIENT
Start: 2020-01-20 | End: 2020-03-27

## 2020-01-20 ASSESSMENT — MIFFLIN-ST. JEOR: SCORE: 1304.78

## 2020-01-20 ASSESSMENT — PAIN SCALES - GENERAL: PAINLEVEL: WORST PAIN (10)

## 2020-01-20 NOTE — PROGRESS NOTES
"Subjective     Ellen Hill is a 64 year old female who presents to clinic today for the following health issues:    HPI   Joint Pain    Onset: ongoing since 10/24/2019    Description:   Location: right thigh   Character: Sharp    Intensity: 10/10    Progression of Symptoms: worse    Accompanying Signs & Symptoms:  Other symptoms: radiation of pain to low back     History:   Previous similar pain: YES      Precipitating factors:   Trauma or overuse: no     Alleviating factors:  Improved by: nothing    Therapies Tried and outcome: ibuprofen and muscle relaxer with no relief, gabapentin 600 mg tid did not help the pain per patient.  Has not done PT or had the injections.         Reviewed and updated as needed this visit by Provider  Tobacco  Allergies  Meds  Problems  Med Hx  Surg Hx  Fam Hx         Review of Systems   ROS COMP: Constitutional, HEENT, cardiovascular, pulmonary, gi and gu systems are negative, except as otherwise noted.      Objective    BP (!) 192/74 (BP Location: Right arm, Patient Position: Chair, Cuff Size: Adult Regular)   Pulse 95   Temp 98  F (36.7  C) (Oral)   Resp 18   Ht 1.6 m (5' 3\")   Wt 77.6 kg (171 lb)   SpO2 95%   BMI 30.29 kg/m    Body mass index is 30.29 kg/m .  Physical Exam   GENERAL: healthy, alert and no distress  NECK: no adenopathy, no asymmetry, masses, or scars and thyroid normal to palpation  RESP: lungs clear to auscultation - no rales, rhonchi or wheezes  CV: regular rate and rhythm, normal S1 S2, no S3 or S4, no murmur, click or rub, no peripheral edema and peripheral pulses strong  ABDOMEN: soft, nontender, no hepatosplenomegaly, no masses and bowel sounds normal  MS: pain right SIJ  PSYCH: mentation appears normal, affect normal/bright    Diagnostic Test Results:  Labs reviewed in Epic and MRI reviewed        Assessment & Plan     1. Lumbar radiculopathy  Trial of sulindac for pain and referral to pain management.  - sulindac (CLINORIL) 200 MG tablet; " Take 1 tablet (200 mg) by mouth 2 times daily  Dispense: 60 tablet; Refill: 0  - PAIN MANAGEMENT REFERRAL    2. Uncontrolled type 2 diabetes mellitus with microalbuminuria, with long-term current use of insulin (H)  Discussed need for compliance as this can reduce therapies available    3. Uncontrolled hypertension  As above    4. Screen for colon cancer  Recommended.     The uses and side effects, including black box warnings as appropriate, were discussed in detail.  All patient questions were answered.  The patient was instructed to call immediately if any side effects developed.     Return in about 2 weeks (around 2/3/2020), or if symptoms worsen or fail to improve.    Mony Valadez MD  [facility]

## 2020-01-20 NOTE — PATIENT INSTRUCTIONS
At [facility], we strive to deliver an exceptional experience to you, every time we see you.  If you receive a survey in the mail, please send us back your thoughts. We really do value your feedback.    Based on your medical history, these are the current health maintenance/preventive care services that you are due for (some may have been done at this visit.)  Health Maintenance Due   Topic Date Due     PREVENTIVE CARE VISIT  1955     EYE EXAM  1955     HIV SCREENING  11/29/1972     PNEUMOCOCCAL IMMUNIZATION 19-64 MEDIUM RISK (1 of 1 - PPSV23) 11/29/1976     HPV TEST  11/29/1978     ZOSTER IMMUNIZATION (1 of 2) 11/29/2007     COLONOSCOPY  11/19/2017     ADVANCE CARE PLANNING  03/28/2018     DIABETIC FOOT EXAM  05/08/2018     INFLUENZA VACCINE (1) 09/01/2019     PHQ-9  10/19/2019         Suggested websites for health information:  Www.[facility].org : Up to date and easily searchable information on multiple topics.  Www.medlineplus.gov : medication info, interactive tutorials, watch real surgeries online  Www.familydoctor.org : good info from the Academy of Family Physicians  Www.cdc.gov : public health info, travel advisories, epidemics (H1N1)  Www.aap.org : children's health info, normal development, vaccinations  Www.health.state.[place].us : [place] dept of health, public health issues in [place], N1N1    Your care team:                            Family Medicine Internal Medicine   MD Jorge Alberto Ribeiro MD Shantel Branch-Fleming, MD Katya Georgiev PA-C Nam Ho, MD Pediatrics   ESCOBAR Christie CNP Amelia Massimini APRN MD Jigna Joshi MD Deborah Mielke, MD Kim Thein, APRN CNP      Clinic hours: Monday - Thursday 7 am-7 pm; Fridays 7 am-5 pm.   Urgent care: Monday - Friday 11 am-9 pm; Saturday and Sunday 9 am-5 pm.  Pharmacy : Monday -Thursday 8 am-8 pm; Friday 8 am-6 pm; Saturday and Sunday 9 am-5 pm.     Clinic: [phone]    Pharmacy: [phone]

## 2020-01-21 ENCOUNTER — TELEPHONE (OUTPATIENT)
Dept: PALLIATIVE MEDICINE | Facility: CLINIC | Age: 65
End: 2020-01-21

## 2020-01-21 NOTE — TELEPHONE ENCOUNTER
Patient will call back at another time to schedule                 Pain Management Center Referral      1. Confirmed address with patient? Yes  2. Confirmed phone number with patient? Yes  3. Confirmed referring provider? Yes  4. Is the PCP the same as the referring provider? Yes  5. Has the patient been to any previous pain clinics? No  (If yes, send BARBIE with welcome letter)  6. Which insurance are we to bill for this appointment?  BCBS    7. Informed pt of cancellation (48 hour) policy? Yes    REGARDING OPIOID MEDICATIONS: We will always address appropriateness of opioid pain medications, but we generally will not automatically take on a prescribing role. When we do take on prescribing of opioids for chronic pain, it is in collaboration with the referring physician for an intermediate period of time (months), with an expectation that the primary physician or provider will assume the prescribing role if medications are effective at stable doses with demonstrated compliance. Therefore, please do not assume that your prescribing responsibilities end on the day of pain clinic consultation.  8. Informed pt of prescribing policy? Yes    9.Please be aware that once you are established with a pain provider and location, you will need to continue have all future visits with that provider and location. It is best to determine what location is the most convenient for you and schedule with that one.    ** PATIENT INFORMED OF THIS POLICY Yes      9. Referring Provider: Marcella Teixeira    [facility]

## 2020-02-20 ENCOUNTER — ANCILLARY PROCEDURE (OUTPATIENT)
Dept: GENERAL RADIOLOGY | Facility: CLINIC | Age: 65
End: 2020-02-20
Attending: PHYSICIAN ASSISTANT
Payer: COMMERCIAL

## 2020-02-20 DIAGNOSIS — M54.10 RADICULAR SYNDROME OF RIGHT LEG: ICD-10-CM

## 2020-02-20 PROCEDURE — 62323 NJX INTERLAMINAR LMBR/SAC: CPT | Performed by: RADIOLOGY

## 2020-02-20 RX ORDER — METHYLPREDNISOLONE ACETATE 80 MG/ML
80 INJECTION, SUSPENSION INTRA-ARTICULAR; INTRALESIONAL; INTRAMUSCULAR; SOFT TISSUE ONCE
Status: COMPLETED | OUTPATIENT
Start: 2020-02-20 | End: 2020-02-20

## 2020-02-20 RX ORDER — BUPIVACAINE HYDROCHLORIDE 5 MG/ML
10 INJECTION, SOLUTION PERINEURAL ONCE
Status: COMPLETED | OUTPATIENT
Start: 2020-02-20 | End: 2020-02-20

## 2020-02-20 RX ORDER — IOPAMIDOL 408 MG/ML
10 INJECTION, SOLUTION INTRATHECAL ONCE
Status: COMPLETED | OUTPATIENT
Start: 2020-02-20 | End: 2020-02-20

## 2020-02-20 RX ADMIN — BUPIVACAINE HYDROCHLORIDE 2 ML: 5 INJECTION, SOLUTION PERINEURAL at 09:30

## 2020-02-20 RX ADMIN — IOPAMIDOL 2 ML: 408 INJECTION, SOLUTION INTRATHECAL at 09:30

## 2020-02-20 RX ADMIN — METHYLPREDNISOLONE ACETATE 80 MG: 80 INJECTION, SUSPENSION INTRA-ARTICULAR; INTRALESIONAL; INTRAMUSCULAR; SOFT TISSUE at 09:30

## 2020-02-20 NOTE — PROGRESS NOTES
: Ellen was seen in X-ray today for a lumbar injection. Patient rated pain before procedure 10/10. After procedure patient rated pain 8/10.  Patient discharged home with her boyfriend.

## 2020-02-20 NOTE — DISCHARGE SUMMARY
AFTER YOU GO HOME   ü DO relax; minimize your activity for 24 hours   ü You may resume normal activity tomorrow   ü You may remove the bandage in the evening or next morning   ü You may resume bathing the next day   ü Drink at least 4 extra glasses of fluid today if not on fluid restrictions   ü DO NOT drive or operate machinery at home or at work for at least 24 hours   VISIT THE EMERGENCY ROOM OR URGENT CARE IF:   ü There is redness or swelling at the injection site   ü There is discharge from the injection site   ü You develop a temperature of 101  F or greater   ADDITIONAL INSTRUCTIONS:   ü You may resume your Coumadin or other blood thinner at your regular dose today. Follow up with your physician to have your INR rechecked if indicated.   ü If you gain no relief from the injection after two (2) weeks, follow-up with your provider for your options.   Contacts:   During business hours from 8 to 5 pm, you may call [phone] to reach a nurse advisor at [facility].   After hours, call [facility]  [phone]. Ask for the Radiologist on-call. Someone is on-call 24 hrs/day.   [facility] Toll Free Number   [phone]

## 2020-02-23 ENCOUNTER — TELEPHONE (OUTPATIENT)
Dept: NURSING | Facility: CLINIC | Age: 65
End: 2020-02-23

## 2020-02-23 ENCOUNTER — NURSE TRIAGE (OUTPATIENT)
Dept: NURSING | Facility: CLINIC | Age: 65
End: 2020-02-23

## 2020-02-23 NOTE — TELEPHONE ENCOUNTER
"Ellen had lumbar injection on 2/20 and leg pain is still \"just as bad as before\". She would like follow up call/appointment to discuss next steps or if a second injection should be done.   Call back: [phone]  Thanks!  Nuzhat Pineda, RN  [facility] Nurse Advisor     "

## 2020-02-23 NOTE — TELEPHONE ENCOUNTER
Thursday cortisone shot in back for radiculopathy of right leg. Felt slightly better right after procedure but now pain in leg just as bad as before. Ellen is wondering if this is expected.   After several attempts FNA unable to contact correct IR team.  FNA called Ellen back to update her that she may need a second shot (per office visit note 2/14). FNA will sent message to Marcella Tammywilliam to follow up with patient about further pain options.   Also advised she seek care in UC or ER if pain becomes unbearable.     Patient voiced understand and will follow disposition.   Nuzhat Pineda RN  [facility] Nurse Advisor         Reason for Disposition    [1] Follow-up call from patient regarding patient's clinical status AND [2] information urgent    Additional Information    Negative: [1] Prescription not at pharmacy AND [2] was prescribed today by PCP    Negative: Lab or radiology calling with CRITICAL test results    Negative: Call about patient who is currently hospitalized    Negative: Physician call to PCP    Negative: ED call to PCP    Negative: Lab calling with strep throat test results and triager can call in prescription    Negative: Lab calling with urinalysis test results and triager can call in prescription    Negative: Medication questions    Protocols used: PCP CALL - NO TRIAGE-A-

## 2020-02-24 NOTE — TELEPHONE ENCOUNTER
Called and left another message to return our call for a message from her provider.  Ruth Ann Jarrell MA  [facility]  2nd Floor  Primary Care

## 2020-02-24 NOTE — TELEPHONE ENCOUNTER
Patient needs to follow up with pain management as planned.  She received a referral at her last visit on 1/20/20. Thanks!  Marcella

## 2020-02-24 NOTE — TELEPHONE ENCOUNTER
This writer attempted to contact Patient on 02/24/20      Reason for call Need to contact patient with a message from the provider, it was not indicated if it is ok to leave a voicemail message, patient to return our call  and left message.      If patient calls back:   2nd floor [facility] Team (TC) called. [facility] Team when patient is on the phone.    Ruth Ann Jarrell MA

## 2020-03-27 ENCOUNTER — VIRTUAL VISIT (OUTPATIENT)
Dept: FAMILY MEDICINE | Facility: CLINIC | Age: 65
End: 2020-03-27
Payer: COMMERCIAL

## 2020-03-27 ENCOUNTER — TELEPHONE (OUTPATIENT)
Dept: FAMILY MEDICINE | Facility: CLINIC | Age: 65
End: 2020-03-27

## 2020-03-27 ENCOUNTER — ANCILLARY PROCEDURE (OUTPATIENT)
Dept: ULTRASOUND IMAGING | Facility: CLINIC | Age: 65
End: 2020-03-27
Attending: NURSE PRACTITIONER
Payer: COMMERCIAL

## 2020-03-27 DIAGNOSIS — M79.661 PAIN OF RIGHT LOWER LEG: ICD-10-CM

## 2020-03-27 DIAGNOSIS — M79.661 PAIN OF RIGHT LOWER LEG: Primary | ICD-10-CM

## 2020-03-27 DIAGNOSIS — M54.16 LUMBAR RADICULOPATHY: ICD-10-CM

## 2020-03-27 PROCEDURE — 99214 OFFICE O/P EST MOD 30 MIN: CPT | Mod: TEL | Performed by: NURSE PRACTITIONER

## 2020-03-27 PROCEDURE — 93971 EXTREMITY STUDY: CPT | Mod: RT | Performed by: RADIOLOGY

## 2020-03-27 RX ORDER — SULINDAC 200 MG/1
200 TABLET ORAL 2 TIMES DAILY
Qty: 60 TABLET | Refills: 0 | Status: SHIPPED | OUTPATIENT
Start: 2020-03-27 | End: 2020-05-05

## 2020-03-27 RX ORDER — GABAPENTIN 400 MG/1
400 CAPSULE ORAL 3 TIMES DAILY
Qty: 90 CAPSULE | Refills: 1 | Status: SHIPPED | OUTPATIENT
Start: 2020-03-27 | End: 2020-04-30

## 2020-03-27 RX ORDER — HYDROCODONE BITARTRATE AND ACETAMINOPHEN 5; 325 MG/1; MG/1
1-2 TABLET ORAL
COMMUNITY
Start: 2020-02-14 | End: 2020-04-30

## 2020-03-27 RX ORDER — GABAPENTIN 300 MG/1
300 CAPSULE ORAL 3 TIMES DAILY
COMMUNITY
Start: 2020-03-05 | End: 2020-03-27 | Stop reason: DRUGHIGH

## 2020-03-27 NOTE — TELEPHONE ENCOUNTER
If patient calls back:   Registered Nurse called. Follow Triage Call workflow      Nereida Ramirez RN

## 2020-03-27 NOTE — TELEPHONE ENCOUNTER
Left message for call back. When she calls back, please let her know the following:    I called patient to discuss US results. US was NORMAL.  I will increase gabapentin to 400 mg PO TID.   I will also refill sulindac (NSAID) - she should not take ibuprofen or naproxen with it  Follow up if not improving early next week.  Go to emergency department if worsening

## 2020-03-27 NOTE — PROGRESS NOTES
"Ellen Hill is a 64 year old female who is being evaluated via a billable telephone visit.      The patient has been notified of following:     \"This telephone visit will be conducted via a call between you and your physician/provider. We have found that certain health care needs can be provided without the need for a physical exam.  This service lets us provide the care you need with a short phone conversation.  If a prescription is necessary we can send it directly to your pharmacy.  If lab work is needed we can place an order for that and you can then stop by our lab to have the test done at a later time.    If during the course of the call the physician/provider feels a telephone visit is not appropriate, you will not be charged for this service.\"     Ellen Hill complains of   Chief Complaint   Patient presents with     Musculoskeletal Problem     patient states severe leg pain, ankle to knee and thigh. ice pack not helping. ongoing for a while, took a fall in January. dx with sciatica nerve pain.  pain scale 10/10.        I have reviewed and updated the patient's Past Medical History, Social History, Family History and Medication List.    ALLERGIES  Insulin detemir; Liraglutide; Victoza; Enalapril; Lisinopril; and Wellbutrin [bupropion hcl]      64 year old female initiated virtual visit for RLE pain. She has a history of sciatica on the right as well but this feels different. She reports severe, progressively worsening pain for the last month. It is \"between my ankle and knee\" and \"in my thigh.\" She has been sedentary recently due to ongoing sciatica pain. She's not sure if the area is swollen. She has been icing the thigh and calf area but even the ice pack causes pain. No bruising or redness. Feet are normal color. Hard to walk to to pain in leg. Right leg feels warmer than left. She has a history of diabetes. She notes that she fell in January and didn't have pain right away. She's not sure if " it's related. She has been taking gabapentin 300 mg QAM and at noon. She is supposed to take 600 mg at bedtime but she hasn't been because it makes her too groggy the next day and she has gotten in trouble at work. No back pain. No pain with pulling toes back toward her. She has an EMG set up for 4/11 for sciatica, but again, this pain is different. No COVID 19 symptoms.    Assessment/Plan:  1. Pain of right lower leg  Will get US to check for blood clot due to sedentary lifestyle recently due to other RLE/back pain. I also asked her to take her gabapentin 300 mg TID (stop 600 mg a bedtime). May continue sulindac and acetaminophen/hydrocodone (Norco) as needed. Further plan pending results of US.  - US Lower Extremity Venous Duplex Right; Future    Return precautions discussed, including when to seek urgent/emergent care.    Patient verbalizes understanding and agrees with plan of care.     Phone call duration:  17 minutes    YVON Huddleston CNP    Addendum: 3:20pm  US is normal (see below)          RIGHT LOWER EXTREMITY DUPLEX VENOUS ULTRASOUND 3/27/2020     CLINICAL HISTORY: Right leg pain for more than a month. Worse in calf  and thigh.     COMPARISONS: None available.     ORDERING PROVIDER: SB SANDERS     TECHNIQUE: Grayscale, color Doppler, Doppler waveform ultrasound  evaluation was performed through the right common femoral, femoral,  popliteal veins. Right posterior tibial and peroneal veins were  evaluated with grayscale imaging and compression.     Left common femoral vein was evaluated for symmetry.     FINDINGS: Left common femoral vein is patent, fully compressible,  demonstrates normal phasic Doppler waveform.     Right common femoral, femoral, and popliteal veins are fully  compressible, patent, demonstrate normal phasic Doppler waveforms.     Right posterior tibial veins are fully compressible to the ankle.     Right peroneal veins are fully compressible to the distal calf.     No  ultrasound abnormality demonstrated in the area of pain.                                                                      IMPRESSION: No deep venous thrombosis demonstrated in the RIGHT leg.  No ultrasound abnormality demonstrated in the area of pain.     JESSICA OWENS MD

## 2020-04-08 ENCOUNTER — VIRTUAL VISIT (OUTPATIENT)
Dept: FAMILY MEDICINE | Facility: CLINIC | Age: 65
End: 2020-04-08
Payer: COMMERCIAL

## 2020-04-08 DIAGNOSIS — M54.16 LUMBAR RADICULOPATHY: Primary | ICD-10-CM

## 2020-04-08 PROCEDURE — 99214 OFFICE O/P EST MOD 30 MIN: CPT | Mod: TEL | Performed by: PREVENTIVE MEDICINE

## 2020-04-08 RX ORDER — PREDNISONE 20 MG/1
20 TABLET ORAL 2 TIMES DAILY
Qty: 10 TABLET | Refills: 0 | Status: SHIPPED | OUTPATIENT
Start: 2020-04-08 | End: 2020-07-10

## 2020-04-08 NOTE — PROGRESS NOTES
"Subjective     Ellen Hill is a 64 year old female who is being evaluated via a billable telephone visit.      The patient has been notified of following:     \"This telephone visit will be conducted via a call between you and your physician/provider. We have found that certain health care needs can be provided without the need for a physical exam.  This service lets us provide the care you need with a short phone conversation.  If a prescription is necessary we can send it directly to your pharmacy.  If lab work is needed we can place an order for that and you can then stop by our lab to have the test done at a later time.    Telephone visits are billed at different rates depending on your insurance coverage. During this emergency period, for some insurers they may be billed the same as an in-person visit.  Please reach out to your insurance provider with any questions.    If during the course of the call the physician/provider feels a telephone visit is not appropriate, you will not be charged for this service.\"    Patient has given verbal consent for Telephone visit?  Yes    How would you like to obtain your AVS? Mail a copy    Ellen Hill complains of   Chief Complaint   Patient presents with     Musculoskeletal Problem     Right leg pan       ALLERGIES  Insulin detemir; Liraglutide; Victoza; Enalapril; Lisinopril; and Wellbutrin [bupropion hcl]    Joint Pain    Onset: 2 months ago    Description:   Location: right leg(thigh, knee, ankle)  Character: throbbing pain    Intensity: severe    Progression of Symptoms: same    Accompanying Signs & Symptoms:  Other symptoms: tingling and difficulty walking    History:   Previous similar pain: no       Precipitating factors:   Trauma or overuse: YES- Fall    Alleviating factors:  Improved by: nothing    Therapies Tried and outcome: Sulindac, Norco, Gabapentin and home remedies: no relief    Has been seen by [facility] 3/5/2020. Also has EMG scheduled with " them   US negative for DVT 3/27/2020  Constant pain  Pain more on the side of the leg  Pressure feeling  Cortisone injection about a month ago, did not help  Fell about a month ago  No fever   No rash   Only taking 400 mg of gabapentin     The patient does not have any  urine or stool incontinence, no hematuria, no saddle anesthesia and no gait abnormalities.       Diabetes  -fasting reading in the 140 range    Patient Active Problem List   Diagnosis     Post herpetic neuralgia     Hyperlipidemia LDL goal <100     Essential hypertension with goal blood pressure less than 130/80     Moderate major depression (H)     Vulvar pruritus     History of adenomatous polyp of colon     Alcoholic (H)     Advanced directives, counseling/discussion     Obesity, Class I, BMI 30-34.9     Leblanc's esophagus without dysplasia     Uncontrolled type 2 diabetes mellitus with microalbuminuria, with long-term current use of insulin (H)     Noncompliance with medication regimen     Smoker     Albuminuria     Past Surgical History:   Procedure Laterality Date     BIOPSY Right     Breast-Benign lesion as per patient       Social History     Tobacco Use     Smoking status: Former Smoker     Packs/day: 0.10     Years: 10.00     Pack years: 1.00     Types: Cigarettes     Last attempt to quit: 9/10/2013     Years since quittin.5     Smokeless tobacco: Never Used   Substance Use Topics     Alcohol use: Yes     Alcohol/week: 1.0 standard drinks     Types: 1 Cans of beer per week     Comment: drinks 1-2 times a month     Family History   Problem Relation Age of Onset     Alcohol/Drug Mother      Heart Disease Mother      Alcohol/Drug Father      Diabetes Brother          Current Outpatient Medications   Medication Sig Dispense Refill     alcohol swab prep pads Use to swab area of injection/mariza as directed. 100 each 3     amLODIPine (NORVASC) 10 MG tablet Take 1 tablet (10 mg) by mouth daily 90 tablet 3     atorvastatin (LIPITOR) 40 MG  tablet Take 1 tablet (40 mg) by mouth daily 90 tablet 3     blood glucose monitoring (AL MICROLET) lancets Use to test blood sugar 2 times daily or as directed. 100 each 3     escitalopram (LEXAPRO) 10 MG tablet Take 1 tablet (10 mg) by mouth daily 90 tablet 3     gabapentin (NEURONTIN) 400 MG capsule Take 1 capsule (400 mg) by mouth 3 times daily 90 capsule 1     insulin isophane & regular (NOVOLIN 70/30 FLEXPEN) susp Inject 30 Units Subcutaneous 2 times daily (with meals) ++NEED OFFICE VISIT AND LABS++ 15 mL 0     insulin pen needle (B-D U/F) 31G X 5 MM Use 3 pen needles daily or as directed. 180 each 1     insulin pen needle (ULTICARE MICRO) 32G X 4 MM miscellaneous Use 2 pen needles daily or as directed. 100 each 3     losartan-hydrochlorothiazide (HYZAAR) 100-12.5 MG tablet Take 1 tablet by mouth daily 90 tablet 3     predniSONE (DELTASONE) 20 MG tablet Take 1 tablet (20 mg) by mouth 2 times daily for 5 days 10 tablet 0     sulindac (CLINORIL) 200 MG tablet Take 1 tablet (200 mg) by mouth 2 times daily 60 tablet 0     tiZANidine (ZANAFLEX) 4 MG tablet Take 1 tablet (4 mg) by mouth 3 times daily as needed for muscle spasms 60 tablet 0     HYDROcodone-acetaminophen (NORCO) 5-325 MG tablet Take 1-2 tablets by mouth       Allergies   Allergen Reactions     Insulin Detemir Hives     Alfredo products cause hives**       Liraglutide Hives     Alfredo Nordisk product     Victoza Hives     Alfredo Nordisk product     Enalapril Cough     Lisinopril Cough     Wellbutrin [Bupropion Hcl] Hives     hives     BP Readings from Last 3 Encounters:   01/20/20 (!) 192/74   12/31/19 (!) 154/82   12/30/19 (!) 170/90    Wt Readings from Last 3 Encounters:   01/20/20 77.6 kg (171 lb)   12/31/19 75.7 kg (166 lb 12.8 oz)   12/30/19 77.6 kg (171 lb)           Reviewed and updated as needed this visit by Provider  Tobacco  Allergies  Meds  Problems  Med Hx  Surg Hx  Fam Hx         Review of Systems   ROS COMP: Constitutional, HEENT,  cardiovascular, pulmonary, gi and gu systems are negative, except as otherwise noted.       Objective   Reported vitals:  There were no vitals taken for this visit.   healthy, alert and no distress  Psych: Alert and oriented times 3; coherent speech, normal   rate and volume, able to articulate logical thoughts  Physical exam not done as this was a telephone visit during Covid pandemic     Diagnostic Test Results:  Labs reviewed in Epic  No results found for this or any previous visit (from the past 24 hour(s)).        Assessment/Plan:  1. Lumbar radiculopathy  -Has had cortisone injection  -already seeing a Spine specialist through [facility]  -Has EMG scheduled for 5/1/2020  -Continue Gabapentin 400 mg, taking at night as day time use makes her groggy   - predniSONE (DELTASONE) 20 MG tablet; Take 1 tablet (20 mg) by mouth 2 times daily for 5 days  Dispense: 10 tablet; Refill: 0  - tiZANidine (ZANAFLEX) 4 MG tablet; Take 1 tablet (4 mg) by mouth 3 times daily as needed for muscle spasms  Dispense: 60 tablet; Refill: 0  -ER precautions: focal weakness, saddle anesthesia, fever over 101 F    2. Uncontrolled type 2 diabetes mellitus with microalbuminuria, with long-term current use of insulin (H)  -Very uncontrolled, last HbA1C was 12.4 (12/2019)  -discussed that Prednisone will increase blood glucose, monitor closely and contact us if over 400 mg/dl       Return in about 1 week (around 4/15/2020), or if symptoms worsen or fail to improve.      Phone call duration:  10 minutes    Ave Lentz MD MPH

## 2020-04-27 ENCOUNTER — TELEPHONE (OUTPATIENT)
Dept: FAMILY MEDICINE | Facility: CLINIC | Age: 65
End: 2020-04-27

## 2020-04-27 NOTE — TELEPHONE ENCOUNTER
Panel Management Review   One phone call and send letter if unable to reach them or MtoVhart message and send letter if not read after 2 weeks (You will get a message to your inbasket)      BP Readings from Last 1 Encounters:   01/20/20 (!) 192/74        Health Maintenance Due   Topic Date Due     PREVENTIVE CARE VISIT  1955     EYE EXAM  1955     HIV SCREENING  11/29/1972     PNEUMOCOCCAL IMMUNIZATION 19-64 MEDIUM RISK (1 of 1 - PPSV23) 11/29/1976     ZOSTER IMMUNIZATION (1 of 2) 11/29/2007     COLORECTAL CANCER SCREENING  11/19/2017     ADVANCE CARE PLANNING  03/28/2018     DIABETIC FOOT EXAM  05/08/2018     INFLUENZA VACCINE (1) 09/01/2019     PHQ-9  10/19/2019     A1C  03/02/2020     PAP  08/04/2020        Fail List measure: BP Check        Patient is due/failing the following:   BP CHECK    Action needed:   Patient needs nurse only appointment.    Type of outreach:    Phone, left message for patient to call back.  Please help them schedule a drive up BP check    Questions for provider review:    None                                                                                                                                   Kimberly Lujan

## 2020-04-29 ENCOUNTER — TELEPHONE (OUTPATIENT)
Dept: FAMILY MEDICINE | Facility: CLINIC | Age: 65
End: 2020-04-29

## 2020-04-29 NOTE — TELEPHONE ENCOUNTER
Reason for call:  Patient reporting a symptom    Symptom or request: Pain in right leg.    Duration (how long have symptoms been present)  October    Have you been treated for this before? Yes    Additional comments: Patient has a EMG scheduled for May 15 2020.    Phone Number patient can be reached at:  Home number on file [phone] (home)    Best Time:  Anytime.    Can we leave a detailed message on this number:  YES    Call taken on 4/29/2020 at 4:07 PM by Estephanie Stephens

## 2020-04-30 ENCOUNTER — VIRTUAL VISIT (OUTPATIENT)
Dept: FAMILY MEDICINE | Facility: CLINIC | Age: 65
End: 2020-04-30
Payer: COMMERCIAL

## 2020-04-30 VITALS — WEIGHT: 150 LBS | BODY MASS INDEX: 26.57 KG/M2

## 2020-04-30 DIAGNOSIS — M54.16 LUMBAR RADICULOPATHY: Primary | ICD-10-CM

## 2020-04-30 DIAGNOSIS — M79.661 PAIN OF RIGHT LOWER LEG: ICD-10-CM

## 2020-04-30 PROCEDURE — 99214 OFFICE O/P EST MOD 30 MIN: CPT | Mod: 95 | Performed by: FAMILY MEDICINE

## 2020-04-30 RX ORDER — HYDROCODONE BITARTRATE AND ACETAMINOPHEN 5; 325 MG/1; MG/1
1 TABLET ORAL EVERY 6 HOURS PRN
Qty: 30 TABLET | Refills: 0 | Status: SHIPPED | OUTPATIENT
Start: 2020-04-30 | End: 2020-05-29

## 2020-04-30 RX ORDER — GABAPENTIN 400 MG/1
800 CAPSULE ORAL 2 TIMES DAILY
Qty: 120 CAPSULE | Refills: 2 | Status: SHIPPED | OUTPATIENT
Start: 2020-04-30 | End: 2020-07-10

## 2020-04-30 ASSESSMENT — PAIN SCALES - GENERAL: PAINLEVEL: EXTREME PAIN (8)

## 2020-04-30 NOTE — TELEPHONE ENCOUNTER
Right leg pain.     Patient is wondering if she has a bursa in her knee.     I can barley walk because my pain is so severe.     EMG is scheduled for May 15th.     Had an US of her lower extremities on 03/27/2020    I go to bed with an ice pack because the pain is so bad.     No relief with pain medication or gabapentin.    Pain goes from middle of thigh to her right ankle. No swelling.     Ellen is requesting an order for a xray. Pain is currently at a 8.     Pain is constant.     Virtual visit is scheduled for 530 this afternoon.     Jazzy Pedraza RN  [facility] [place], [facility]

## 2020-04-30 NOTE — PATIENT INSTRUCTIONS
At [facility], we strive to deliver an exceptional experience to you, every time we see you.  If you receive a survey in the mail, please send us back your thoughts. We really do value your feedback.    Based on your medical history, these are the current health maintenance/preventive care services that you are due for (some may have been done at this visit.)  Health Maintenance Due   Topic Date Due     PREVENTIVE CARE VISIT  1955     EYE EXAM  1955     HIV SCREENING  11/29/1972     PNEUMOCOCCAL IMMUNIZATION 19-64 MEDIUM RISK (1 of 1 - PPSV23) 11/29/1976     ZOSTER IMMUNIZATION (1 of 2) 11/29/2007     COLORECTAL CANCER SCREENING  11/19/2017     ADVANCE CARE PLANNING  03/28/2018     DIABETIC FOOT EXAM  05/08/2018     INFLUENZA VACCINE (1) 09/01/2019     PHQ-9  10/19/2019     A1C  03/02/2020     PAP  08/04/2020         Suggested websites for health information:  Www.[place].org : Up to date and easily searchable information on multiple topics.  Www.medlineplus.gov : medication info, interactive tutorials, watch real surgeries online  Www.familydoctor.org : good info from the Academy of Family Physicians  Www.cdc.gov : public health info, travel advisories, epidemics (H1N1)  Www.aap.org : children's health info, normal development, vaccinations  Www.health.[facility].[place].us : [place] dept of health, public health issues in [place], N1N1    Your care team:                            Family Medicine Internal Medicine   MD Jorge Alberto Ribeiro MD Shantel Branch-Fleming, MD Katya Georgiev PA-C Nam Ho, MD Pediatrics   ESCOBAR Christie, MD Jigna Driver CNP, MD Deborah Mielke, MD Kim Thein, APRN CNP      Clinic hours: Monday - Thursday 7 am-7 pm; Fridays 7 am-5 pm.   Urgent care: Monday - Friday 11 am-9 pm; Saturday and Sunday 9 am-5 pm.  Pharmacy : Monday -Thursday 8 am-8 pm; Friday 8 am-6 pm; Saturday and Sunday 9 am-5  pm.     Clinic: [phone]   Pharmacy: [phone]      Please call [facility]: [phone] ([place]) or [facility]: [phone] ([place]) to schedule your lumbar MRI.   Patient Education     Understanding Lumbar Radiculopathy    Lumbar radiculopathy is irritation or inflammation of a nerve root in the low back. It causes symptoms that spread out from the back down one or both legs. To understand this condition, it helps to understand the parts of the spine:    Vertebrae. These are bones that stack to form the spine. The lumbar spine contains the 5 bottom vertebrae.    Disks. These are soft pads of tissue between the vertebrae. They act as shock absorbers for the spine.    Spinal canal. This is a tunnel formed within the stacked vertebrae. In the lumbar spine, nerves run through this canal.    Nerves. These branch off and leave the spinal canal, traveling out to parts of the body. As they leave the spinal canal, nerves pass through openings between the vertebrae. The nerve root is the part of the nerve that is closest to the spinal canal.    Sciatic nerve. This is a large nerve formed from several nerve roots in the low back. This nerve extends down the back of the leg to the foot.  With lumbar radiculopathy, nerve roots in the low back become irritated. This leads to pain and symptoms. The sciatic nerve is commonly involved, so the condition is often called sciatica.  What causes lumbar radiculopathy?  Aging, injury, poor posture, extra body weight, and other issues can lead to problems in the low back. These problems may then irritate nerve roots. They include:    Damage to a disk in the lumbar spine. The damaged disk may then press on nearby nerve roots.    Degeneration from wear and tear, and aging. This can lead to narrowing (stenosis) of the openings between the vertebrae. The narrowed openings press on nerve roots as they leave the spinal canal.    Unstable spine.  This is when a vertebra slips forward. It can then press on a nerve root.  Other, less common things can put pressure on nerves in the low back. These include diabetes, infection, or a tumor.  Symptoms of lumbar radiculopathy  These include:    Pain in the low back    Pain, numbness, tingling, or weakness that travels into the buttocks, hip, groin, or leg    Muscle spasms  Treatment for lumbar radiculopathy  In most cases, your healthcare provider will first try treatments that help relieve symptoms. These may include:    Prescription and over-the-counter pain medicines. These help relieve pain, swelling, and irritation.    Limits on positions and activities that increase pain. But lying in bed or avoiding all movement is only recommended for a short period of time.    Physical therapy, including exercises and stretches. This helps decrease pain and increase movement and function.    Steroid shots into the lower back. This may help relieve symptoms for a time.    Weight-loss program. If you are overweight, losing extra pounds may help relieve symptoms.  In some cases, you may need surgery to fix the underlying problem. This depends on the cause, the symptoms, and how long the pain has lasted.  Possible complications  Over time, an irritated and inflamed nerve may become damaged. This may lead to long-lasting (permanent) numbness or weakness in your legs and feet. If symptoms change suddenly or get worse, be sure to let your healthcare provider know.  When to call your healthcare provider  Call your healthcare provider right away if you have any of these:    New pain or pain that gets worse    New or increasing weakness, tingling, or numbness in your leg or foot    Problems controlling your bladder or bowel   Date Last Reviewed: 3/10/2016    [id number] [employer]. [address] [place] [address]. All rights reserved. This information is not intended as a substitute for professional medical  care. Always follow your healthcare professional's instructions.

## 2020-04-30 NOTE — LETTER
2020        Re:  Ellen Hill  : 1955  Our MR#: [id number]         To whom it may concern,    Please excuse Ms. Hill for the entire day that she is scheduled to have her MRI done (predicted to be 2020 or 2020), and for the entire day that she is scheduled to have her EMG (5/15/2020 as of today).    Sincerely,          Luis Zambrano MD

## 2020-04-30 NOTE — TELEPHONE ENCOUNTER
This writer attempted to contact patient on 04/30/20      Reason for call triage and left message.      If patient calls back:   Registered Nurse called. Follow Triage Call workflow        Jazzy Pedraza RN

## 2020-04-30 NOTE — PROGRESS NOTES
"Ellen Hill is a 64 year old female who is being evaluated via a billable telephone visit.      The patient has been notified of following:     \"This telephone visit will be conducted via a call between you and your physician/provider. We have found that certain health care needs can be provided without the need for a physical exam.  This service lets us provide the care you need with a short phone conversation.  If a prescription is necessary we can send it directly to your pharmacy.  If lab work is needed we can place an order for that and you can then stop by our lab to have the test done at a later time.    Telephone visits are billed at different rates depending on your insurance coverage. During this emergency period, for some insurers they may be billed the same as an in-person visit.  Please reach out to your insurance provider with any questions.    If during the course of the call the physician/provider feels a telephone visit is not appropriate, you will not be charged for this service.\"    Patient has given verbal consent for Telephone visit?  Yes    How would you like to obtain your AVS? Mail a copy    Subjective     Ellen Hill is a 64 year old female who presents to clinic today for the following health issues:    HPI  Musculoskeletal problem/pain - right lower extremity pain      Duration: onset ~ late Sept 2019    Description  Location: RT ankle, RT knee, RT shin, RT ant thigh, RT lateral thigh    Intensity:  severe    Accompanying signs and symptoms: numbness (ankle), tingling (all over thigh), sensation of water dripping down leg, and weakness of entire LE    History  Previous similar problem: YES- last fall  Previous evaluation:  MRI 12/5/19 showed DJD at L4-L5, centrally herniated disc, and spinal stenosis (no foraminal stenosis)    Precipitating or alleviating factors:  Trauma or overuse: no   Aggravating factors include: difficult to tell    Therapies tried and outcome: ice packs on " ankle & knee, and gabapentin, out of hydrocodone for 1-2 weeks    Past medical, family, and social histories, medications, and allergies are reviewed and updated in Epic.     Review of Systems   ROS COMP: Constitutional, HEENT, cardiovascular, pulmonary, gi and gu systems are negative, except as otherwise noted.       Objective   Reported vitals:  Wt 68 kg (150 lb)   BMI 26.57 kg/m     PSYCH: Alert and oriented times 3; coherent speech, normal   rate and volume, able to articulate logical thoughts, able   to abstract reason, no tangential thoughts, no hallucinations   or delusions  Her affect is normal  RESP: No cough, no audible wheezing, able to talk in full sentences  Remainder of exam unable to be completed due to telephone visits          ASSESSMENT/PLAN:  (M54.16) Lumbar radiculopathy  (primary encounter diagnosis)  (M79.661) Pain of right lower leg  Comment: I suspect the symptoms she is describing is a progression of radiculopathy/myelopathy since her last visit with me.  Plan: gabapentin (NEURONTIN) 400 MG capsule, MR         Lumbar Spine w/o Contrast,         HYDROcodone-acetaminophen (NORCO) 5-325 MG         tablet        I suggest an increase (1200 --> 1600mg/d)  in her gabapentin dose, an update of her MRI, and she can have a short course of hydrocodone.  Has EMG scheduled for 5/15. Return in about 18 days (around 5/18/2020) for review EMG and MRI results with your Orthopaedist.        Phone call duration:  24 minutes    Luis Zambrano MD

## 2020-04-30 NOTE — TELEPHONE ENCOUNTER
Reason for Call:  Other returning call    Detailed comments: Transferred to RN Line    Phone Number Patient can be reached at: Home number on file [phone] (home)    Best Time: Any    Can we leave a detailed message on this number? Not Applicable    Call taken on 4/30/2020 at 4:15 PM by Lilian Montes De Oca

## 2020-05-01 DIAGNOSIS — M54.16 LUMBAR RADICULOPATHY: ICD-10-CM

## 2020-05-01 DIAGNOSIS — M79.661 PAIN OF RIGHT LOWER LEG: ICD-10-CM

## 2020-05-01 NOTE — PROGRESS NOTES
Mailed AVS with signed letter.  Gonzalez Arredondo,  For 1st Floor [facility] ([facility])

## 2020-05-05 RX ORDER — SULINDAC 200 MG/1
200 TABLET ORAL 2 TIMES DAILY
Qty: 60 TABLET | Refills: 0 | Status: SHIPPED | OUTPATIENT
Start: 2020-05-05 | End: 2020-05-05

## 2020-05-05 RX ORDER — SULINDAC 200 MG/1
200 TABLET ORAL 2 TIMES DAILY PRN
Qty: 60 TABLET | Refills: 0 | Status: SHIPPED | OUTPATIENT
Start: 2020-05-05 | End: 2020-11-04

## 2020-05-05 NOTE — TELEPHONE ENCOUNTER
sulindac (CLINORIL) 200 MG tablet     Routing refill request to provider for review/approval because:  Labs not current:  ALT, AST, CBC  BP above goal    Erin Gray RN  [facility]

## 2020-05-05 NOTE — TELEPHONE ENCOUNTER
Please let her know if she's taking it daily concurrently with the Lexapro she has an increased risk of GI bleed. If she has any dark stools, nausea, vomiting or abdominal pain, she should stop it. Also, this should only be a short term solution for her pain as it can be hard on her stomach and kidneys. Use only as needed sparingly. She should follow up with her spine specialist

## 2020-05-08 ENCOUNTER — ANCILLARY PROCEDURE (OUTPATIENT)
Dept: MRI IMAGING | Facility: CLINIC | Age: 65
End: 2020-05-08
Attending: FAMILY MEDICINE
Payer: COMMERCIAL

## 2020-05-08 DIAGNOSIS — M54.16 LUMBAR RADICULOPATHY: ICD-10-CM

## 2020-05-08 DIAGNOSIS — M79.661 PAIN OF RIGHT LOWER LEG: ICD-10-CM

## 2020-05-08 PROCEDURE — 72148 MRI LUMBAR SPINE W/O DYE: CPT | Performed by: RADIOLOGY

## 2020-05-12 ENCOUNTER — TELEPHONE (OUTPATIENT)
Dept: FAMILY MEDICINE | Facility: CLINIC | Age: 65
End: 2020-05-12

## 2020-05-12 NOTE — TELEPHONE ENCOUNTER
.Reason for call:  Results   Name of test or procedure: MRI  Date of test or procedure: 5/8/2020  Location of test or procedure: [facility]    Additional comments: pt would like results when available. Please call    Phone number to reach patient:  Home number on file [phone] (home)    Best Time:  anytime    Can we leave a detailed message on this number?  YES    Travel screening: Negative

## 2020-06-03 ENCOUNTER — TELEPHONE (OUTPATIENT)
Dept: FAMILY MEDICINE | Facility: CLINIC | Age: 65
End: 2020-06-03

## 2020-06-24 DIAGNOSIS — M54.16 LUMBAR RADICULOPATHY: ICD-10-CM

## 2020-06-24 DIAGNOSIS — M79.661 PAIN OF RIGHT LOWER LEG: ICD-10-CM

## 2020-06-24 NOTE — TELEPHONE ENCOUNTER
Reason for Call:  Medication or medication refill:    Do you use a [facility]?  Name of the pharmacy and phone number for the current request:  [id number] - [place], [place] - [address] AT [facility] [address]    Name of the medication requested: HYDROcodone-acetaminophen (NORCO) 5-325 MG tablet    Can we leave a detailed message on this number? YES    Phone number patient can be reached at: Home number on file [phone] (home)    Best Time: anytime    Call taken on 6/24/2020 at 2:52 PM by Brendon Ann

## 2020-06-25 RX ORDER — HYDROCODONE BITARTRATE AND ACETAMINOPHEN 5; 325 MG/1; MG/1
1 TABLET ORAL EVERY 6 HOURS PRN
Qty: 30 TABLET | Refills: 0 | Status: SHIPPED | OUTPATIENT
Start: 2020-06-26 | End: 2020-11-04

## 2020-06-25 NOTE — TELEPHONE ENCOUNTER
Controlled Substance Refill Request for Norco  Problem List Complete:  No     PROVIDER TO CONSIDER COMPLETION OF PROBLEM LIST AND OVERVIEW/CONTROLLED SUBSTANCE AGREEMENT    Last Written Prescription Date:  5/29/20  Last Fill Quantity: 30 tablets   # refills: 0    THE MOST RECENT OFFICE VISIT MUST BE WITHIN THE PAST 3 MONTHS. AT LEAST ONE FACE TO FACE VISIT MUST OCCUR EVERY 6 MONTHS. ADDITIONAL VISITS CAN BE VIRTUAL.  (THIS STATEMENT SHOULD BE DELETED.)    [place] [facility] [place]: 4/30/20 Virtual visit with Dr. Zambrano    Future Office visit: None    Controlled substance agreement:   Encounter-Level CSA:    There are no encounter-level csa.     Patient-Level CSA:    There are no patient-level csa.         Last Urine Drug Screen: No results found for: CDAUT, No results found for: COMDAT, No results found for: THC13, PCP13, COC13, MAMP13, OPI13, AMP13, BZO13, TCA13, MTD13, BAR13, OXY13, PPX13, BUP13     Processing:  Rx to be electronically transmitted to pharmacy by provider      [email or web address]       checked in past 3 months?  No-problem list incomplete so  not checked.           Nichole Gamble RN, BSN, PHN

## 2020-06-26 ENCOUNTER — TRANSFERRED RECORDS (OUTPATIENT)
Dept: HEALTH INFORMATION MANAGEMENT | Facility: CLINIC | Age: 65
End: 2020-06-26

## 2020-06-26 NOTE — TELEPHONE ENCOUNTER
Reason for Call:  Other prescription    Detailed comments: Pt is in pain with her leg and she also saw her to her specialist who recommended if Pt can stay on Rx for Pt is having a AYDEN  test done.  She would like to see if Dr. Zambrano can send in Rx to Pharmacy as soon as possible.   Pt would like a call back to confirm Rx has been sent.     Phone Number Patient can be reached at: Home number on file [phone] (home)    Best Time: anytime    Can we leave a detailed message on this number? YES    Call taken on 6/26/2020 at 12:34 PM by Brendon Ann

## 2020-07-03 ENCOUNTER — TELEPHONE (OUTPATIENT)
Dept: FAMILY MEDICINE | Facility: CLINIC | Age: 65
End: 2020-07-03

## 2020-07-03 DIAGNOSIS — M54.16 LUMBAR RADICULOPATHY: ICD-10-CM

## 2020-07-03 DIAGNOSIS — M79.661 PAIN OF RIGHT LOWER LEG: ICD-10-CM

## 2020-07-03 NOTE — TELEPHONE ENCOUNTER
Reason for Call:  Other returning call    Detailed comments: Transferring to Rn line    Phone Number Patient can be reached at: Home number on file [phone] (home)    Best Time: ANy    Can we leave a detailed message on this number? YES    Call taken on 7/3/2020 at 4:51 PM by Lilian Montes De Oca

## 2020-07-03 NOTE — TELEPHONE ENCOUNTER
This writer attempted to contact Ellen on 07/03/20      Reason for call triage and unable to leave message.      If patient calls back:   Registered Nurse called. Follow Triage Call workflow        Nicole Pierre RN

## 2020-07-03 NOTE — TELEPHONE ENCOUNTER
Reason for Call:  Other call back    Detailed comments: Pt has been struggling with a lot of pain in her right leg and has undergone numerous tests which have not showed the problem. She was recommended to see a spine specialist but is not too keen on the that idea and is wondering if she should come see provider and if he would be able to give her any answers as to what may be going on. Thank you.    Phone Number Patient can be reached at: Home number on file [phone] (home)    Best Time: Any    Can we leave a detailed message on this number? YES    Call taken on 7/3/2020 at 3:16 PM by Lilian Montes De Oca

## 2020-07-03 NOTE — TELEPHONE ENCOUNTER
S-(situation): spoke with patient on the phone. Her back pain is causing other pain in LE. She states that she has seen Dr. Zambrano for this in the past.     B-(background): She has had injections done. AYDEN testing done today. Does not have PVD.     A-(assessment): Nothing is helping with pain. 8/10 pain felt in upper thigh, down to knee ankle. She is Able to get around . She has Feeling and sensation in LE . Constant chronic pain. Told her UC open tonight she can come in. She states she does not want to come in tonight. Sent a refill of her [place] to refill pool. She wants apt and does not want to go to UC or ED. Assisted her in scheduling phone apt July 10 th. Told her if things worsen she needs to call the clinic prior to apt. Told her if she develops the below bolded symptoms she needs to be seen by a provider immediately. She states that this pain is not new it is chronic.   PCP out of clinic next week scheduled her with another provider.      Negative for following symptoms: incontinence of stool/urine, inability to ambulate, loss of sensation in LE.       Nicole Pierre RN

## 2020-07-06 RX ORDER — HYDROCODONE BITARTRATE AND ACETAMINOPHEN 5; 325 MG/1; MG/1
1 TABLET ORAL EVERY 6 HOURS PRN
Qty: 30 TABLET | Refills: 0 | OUTPATIENT
Start: 2020-07-06

## 2020-07-06 NOTE — TELEPHONE ENCOUNTER
Controlled Substance Refill Request for Norco  Problem List Complete:  No      PROVIDER TO CONSIDER COMPLETION OF PROBLEM LIST AND OVERVIEW/CONTROLLED SUBSTANCE AGREEMENT     Last Written Prescription Date:  6/26/20  Last Fill Quantity: 30 tablets   # refills: 0     THE MOST RECENT OFFICE VISIT MUST BE WITHIN THE PAST 3 MONTHS. AT LEAST ONE FACE TO FACE VISIT MUST OCCUR EVERY 6 MONTHS. ADDITIONAL VISITS CAN BE VIRTUAL.  (THIS STATEMENT SHOULD BE DELETED.)     Last Office Visit with [facility] primary care provider: 4/30/20 Virtual visit with Dr. Zambrano     Future Office visit: 7/10/20 with Alejandra Bal     Controlled substance agreement:   Encounter-Level CSA:    There are no encounter-level csa.      Patient-Level CSA:    There are no patient-level csa.           Last Urine Drug Screen: No results found for: CDAUT, No results found for: COMDAT, No results found for: THC13, PCP13, COC13, MAMP13, OPI13, AMP13, BZO13, TCA13, MTD13, BAR13, OXY13, PPX13, BUP13     Processing:  Rx to be electronically transmitted to pharmacy by provider       [email or web address]         checked in past 3 months?  No-problem list incomplete so  not checked.               Nichole Gamble RN, BSN, PHN

## 2020-07-06 NOTE — TELEPHONE ENCOUNTER
Patient needs a visit to discuss her pain.  Refills of  narcotics will likely not be provided at that time.  YVON Tate CNP

## 2020-07-10 ENCOUNTER — TELEPHONE (OUTPATIENT)
Dept: FAMILY MEDICINE | Facility: CLINIC | Age: 65
End: 2020-07-10

## 2020-07-10 ENCOUNTER — VIRTUAL VISIT (OUTPATIENT)
Dept: FAMILY MEDICINE | Facility: CLINIC | Age: 65
End: 2020-07-10
Payer: COMMERCIAL

## 2020-07-10 DIAGNOSIS — M79.661 PAIN OF RIGHT LOWER LEG: ICD-10-CM

## 2020-07-10 DIAGNOSIS — M54.16 LUMBAR RADICULOPATHY: ICD-10-CM

## 2020-07-10 PROCEDURE — 99213 OFFICE O/P EST LOW 20 MIN: CPT | Mod: 95 | Performed by: NURSE PRACTITIONER

## 2020-07-10 RX ORDER — GABAPENTIN 400 MG/1
800 CAPSULE ORAL 2 TIMES DAILY
Qty: 120 CAPSULE | Refills: 2 | Status: SHIPPED | OUTPATIENT
Start: 2020-07-10 | End: 2020-11-04

## 2020-07-10 ASSESSMENT — PATIENT HEALTH QUESTIONNAIRE - PHQ9: SUM OF ALL RESPONSES TO PHQ QUESTIONS 1-9: 2

## 2020-07-10 NOTE — PROGRESS NOTES
"Ellen Hill is a 64 year old female who is being evaluated via a billable telephone visit.      The patient has been notified of following:     \"This telephone visit will be conducted via a call between you and your physician/provider. We have found that certain health care needs can be provided without the need for a physical exam.  This service lets us provide the care you need with a short phone conversation.  If a prescription is necessary we can send it directly to your pharmacy.  If lab work is needed we can place an order for that and you can then stop by our lab to have the test done at a later time.    Telephone visits are billed at different rates depending on your insurance coverage. During this emergency period, for some insurers they may be billed the same as an in-person visit.  Please reach out to your insurance provider with any questions.    If during the course of the call the physician/provider feels a telephone visit is not appropriate, you will not be charged for this service.\"    Patient has given verbal consent for Telephone visit?  Yes    What phone number would you like to be contacted at? [phone]    How would you like to obtain your AVS? Mail a copy    Subjective     Ellen Hill is a 64 year old female who presents via phone visit today for the following health issues:    HPI  Back Pain       Duration: Oct 2019        Specific cause: none    Description:   Location of pain: low-  Mid back   Character of pain: numb and tinling,   Pain radiation:radiates into the right buttocks, radiates into the right leg, right thigh and radiates into the right foot  New numbness or weakness in legs, not attributed to pain:  YES    Intensity: Currently 0/10, At its worst 10/10    History:   Pain interferes with job: Not applicable  History of back problems: previous degenerative joint disease of the lumbar spine  Any previous MRI or X-rays: YES  Sees a specialist for back pain:  No  Therapies " "tried without relief: None.    Alleviating factors:   Improved by: gabapentin, chiropractor, hyddrocodone, ice     Precipitating factors:  Worsened by: when sit all day          Accompanying Signs & Symptoms:  Risk of Fracture:  None  Risk of Cauda Equina:  None  Risk of Infection:  None  Risk of Cancer:  None  Risk of Ankylosing Spondylitis:  Onset at age <35, male, AND morning back stiffness. no          Had EMG at [facility] (negative), MRI at [facility], AYDEN and everything is negative  Dr. Valadez at [facility] recommended she see spine surgeon  Patient wants a second opinion about what to do  Today feeling good with no pain at all  Yesterday had pain  No loss of bowel or bladder  Gabapentin helping  Had 2 injections - L4 & L5   Taking advil  Pain is more in right leg into thigh  Feels like a pressure - \"irritating pain\"  Flares when sitting at work  Starts with leg - applies ice            Patient Active Problem List   Diagnosis     Post herpetic neuralgia     Hyperlipidemia LDL goal <100     Essential hypertension with goal blood pressure less than 130/80     Moderate major depression (H)     Vulvar pruritus     History of adenomatous polyp of colon     Alcoholic (H)     Advanced directives, counseling/discussion     Obesity, Class I, BMI 30-34.9     Leblanc's esophagus without dysplasia     Uncontrolled type 2 diabetes mellitus with microalbuminuria, with long-term current use of insulin (H)     Noncompliance with medication regimen     Smoker     Albuminuria     Past Surgical History:   Procedure Laterality Date     BIOPSY Right     Breast-Benign lesion as per patient       Social History     Tobacco Use     Smoking status: Former Smoker     Packs/day: 0.10     Years: 10.00     Pack years: 1.00     Types: Cigarettes     Last attempt to quit: 9/10/2013     Years since quittin.8     Smokeless tobacco: Never Used   Substance Use Topics     Alcohol use: Yes     Alcohol/week: 1.0 standard drinks     Types: 1 Cans " of beer per week     Comment: drinks 1-2 times a month     Family History   Problem Relation Age of Onset     Alcohol/Drug Mother      Heart Disease Mother      Alcohol/Drug Father      Diabetes Brother          Current Outpatient Medications   Medication Sig Dispense Refill     alcohol swab prep pads Use to swab area of injection/mariza as directed. 100 each 3     amLODIPine (NORVASC) 10 MG tablet Take 1 tablet (10 mg) by mouth daily 90 tablet 3     atorvastatin (LIPITOR) 40 MG tablet Take 1 tablet (40 mg) by mouth daily 90 tablet 3     blood glucose monitoring (AL MICROLET) lancets Use to test blood sugar 2 times daily or as directed. 100 each 3     escitalopram (LEXAPRO) 10 MG tablet Take 1 tablet (10 mg) by mouth daily 90 tablet 3     gabapentin (NEURONTIN) 400 MG capsule Take 2 capsules (800 mg) by mouth 2 times daily for nerve pain. 120 capsule 2     HYDROcodone-acetaminophen (NORCO) 5-325 MG tablet Take 1 tablet by mouth every 6 hours as needed for severe pain (no refills) 30 tablet 0     insulin isophane & regular (NOVOLIN 70/30 FLEXPEN) susp Inject 30 Units Subcutaneous 2 times daily (with meals) ++NEED OFFICE VISIT AND LABS++ 15 mL 0     insulin pen needle (B-D U/F) 31G X 5 MM Use 3 pen needles daily or as directed. 180 each 1     insulin pen needle (ULTICARE MICRO) 32G X 4 MM miscellaneous Use 2 pen needles daily or as directed. 100 each 3     losartan-hydrochlorothiazide (HYZAAR) 100-12.5 MG tablet Take 1 tablet by mouth daily 90 tablet 3     sulindac (CLINORIL) 200 MG tablet Take 1 tablet (200 mg) by mouth 2 times daily as needed (pain) 60 tablet 0     tiZANidine (ZANAFLEX) 4 MG tablet Take 1 tablet (4 mg) by mouth 3 times daily as needed for muscle spasms 60 tablet 0     Allergies   Allergen Reactions     Insulin Detemir Hives     Alfredo products cause hives**       Liraglutide Hives     Alfredo Nordisk product     Victoza Hives     Alfredo Nordisk product     Enalapril Cough     Lisinopril Cough      Wellbutrin [Bupropion Hcl] Hives     hives       Reviewed and updated as needed this visit by Provider         Review of Systems   Constitutional, HEENT, cardiovascular, pulmonary, gi and gu systems are negative, except as otherwise noted.       Objective   Reported vitals:  There were no vitals taken for this visit.   healthy, alert and no distress  PSYCH: Alert and oriented times 3; coherent speech, normal   rate and volume, able to articulate logical thoughts, able   to abstract reason, no tangential thoughts, no hallucinations   or delusions  Her affect is normal  RESP: No cough, no audible wheezing, able to talk in full sentences  Remainder of exam unable to be completed due to telephone visits    Diagnostic Test Results:  Labs reviewed in Epic        Assessment/Plan:  1. Lumbar radiculopathy  2. Pain of right lower leg  Refilled gabapentin. I agree with her general orthopedist at [facility] - she should see a spine surgeon to see if she's a candidate for surgery and if not would consider referral to comprehensive spine clinic. No red flag symptoms per patient report.  - gabapentin (NEURONTIN) 400 MG capsule; Take 2 capsules (800 mg) by mouth 2 times daily for nerve pain.  Dispense: 120 capsule; Refill: 2  - Orthopedic & Spine  Referral; Future    Return in about 3 weeks (around 7/31/2020).     The benefits, risks and potential side effects were discussed in detail. Black box warnings discussed as relevant. All patient questions were answered. The patient was instructed to follow up immediately if any adverse reactions develop.    Return precautions discussed, including when to seek urgent/emergent care.    Patient verbalizes understanding and agrees with plan of care. Patient stable for discharge.        Phone call duration:  16:18 minutes     YVON Huddleston CNP

## 2020-07-10 NOTE — TELEPHONE ENCOUNTER
Patient has been roomed and arrived for provider appointment today.Was scheduled for 1:20 pm.  Team was unable to outreach to patient prior to this time right now.  Confirmed with provider, will continue on with appointment.    Erin Gray RN  [facility]

## 2020-07-10 NOTE — TELEPHONE ENCOUNTER
Patient is on my schedule this afternoon for back pain. She has had several virtual visits but she has not been seen in person for this since February that I can see. She should be evaluated with in person clinic visit today rather than phone visit. She needs physical assessment.

## 2020-07-29 ENCOUNTER — TELEPHONE (OUTPATIENT)
Dept: FAMILY MEDICINE | Facility: CLINIC | Age: 65
End: 2020-07-29

## 2020-07-29 DIAGNOSIS — M54.16 LUMBAR RADICULOPATHY: Primary | ICD-10-CM

## 2020-07-29 DIAGNOSIS — M79.661 PAIN OF RIGHT LOWER LEG: ICD-10-CM

## 2020-07-29 NOTE — TELEPHONE ENCOUNTER
Patient is requesting a referral for the pain clinic instead of consults the surgeon. Please call back at [phone] - ok to leave a message

## 2020-07-29 NOTE — TELEPHONE ENCOUNTER
I really feel patient should be evaluated by spine specialist. I will also place referral for comprehensive pain clinic.

## 2020-07-30 ENCOUNTER — TELEPHONE (OUTPATIENT)
Dept: PALLIATIVE MEDICINE | Facility: CLINIC | Age: 65
End: 2020-07-30

## 2020-08-10 ENCOUNTER — TRANSFERRED RECORDS (OUTPATIENT)
Dept: HEALTH INFORMATION MANAGEMENT | Facility: CLINIC | Age: 65
End: 2020-08-10

## 2020-08-11 ENCOUNTER — TELEPHONE (OUTPATIENT)
Dept: FAMILY MEDICINE | Facility: CLINIC | Age: 65
End: 2020-08-11

## 2020-08-11 NOTE — TELEPHONE ENCOUNTER
This writer attempted to contact Patient on 08/11/20      Reason for call needs visit and left message.      If patient calls back:   Schedule Virtual appointment within 1 week with primary care, document that pt called and close encounter         Ruth Ann Jarrell MA

## 2020-08-11 NOTE — TELEPHONE ENCOUNTER
Reason for Call:  Medication or medication refill:    Do you use a [facility]?  Name of the pharmacy and phone number for the current request:    [facility] [address] - [phone]    Name of the medication requested:   HYDROcodone-acetaminophen (NORCO) 5-325 MG tablet  1 tablet  Oral  EVERY 6 HOURS PRN  20 mg MEDD        Other request: Patient states she has seen a surgeon Dr. Miles @ [facility].  He suggests surgery to correct the pinched nerves on L4 and L5.      Can we leave a detailed message on this number? YES    Phone number patient can be reached at: Home number on file [phone] (home)    Best Time: anytime    Call taken on 8/11/2020 at 8:52 AM by Rocio Panchal

## 2020-08-11 NOTE — TELEPHONE ENCOUNTER
**See patient comments below in incoming message, notes she was seen by provider at [facility] and surgery is recommended.    Routing refill request to provider for review/approval because:  Drug not on the [facility] refill protocol     Controlled Substance Refill Request for Norco  Problem List Complete:  No     PROVIDER TO CONSIDER COMPLETION OF PROBLEM LIST AND OVERVIEW/CONTROLLED SUBSTANCE AGREEMENT    Last Written Prescription Date:  6/26/20  Last Fill Quantity: 30,   # refills: 0      Last Office Visit with [facility] primary care provider: 7/10/20, Virtual visit with Alejandra Bal CNP.    Future Office visit:     Controlled substance agreement:   Encounter-Level CSA:    There are no encounter-level csa.     Patient-Level CSA:    There are no patient-level csa.         Last Urine Drug Screen: No results found for: CDAUT, No results found for: COMDAT, No results found for: THC13, PCP13, COC13, MAMP13, OPI13, AMP13, BZO13, TCA13, MTD13, BAR13, OXY13, PPX13, BUP13     Processing:  Rx to be electronically transmitted to pharmacy by provider      [email or web address]       checked in past 3 months? No,  was not reviewed. No CSA or Chronic Pain diagnosis on problem list.

## 2020-08-11 NOTE — LETTER
Ellen Hill  [facility] [place] [address]          08/12/20      Dear Ellen Hill        At [facility] we care about your health and are committed to providing quality patient care. Regular appointments are a vital part of the care and management of your health and can help prevent many of the complications that can occur.      It has come to our attention that you are due for a Virtual visit for your refill request. Please call [facility] at [phone] soon to schedule your appointment.    If you have transferred care to another clinic please call to inform us so that we do not continue to send you reminder letters.      Sincerely,      [facility] Care Team

## 2020-08-12 NOTE — TELEPHONE ENCOUNTER
This writer attempted to contact Patient on 08/12/20      Reason for call needs visit and left message and mailed letter.    If patient calls back:   Schedule Virtual appointment within 1 week with primary care, document that pt called and close encounter         Ruth Ann Jarrell MA

## 2020-08-13 DIAGNOSIS — M79.661 PAIN OF RIGHT LOWER LEG: ICD-10-CM

## 2020-08-13 DIAGNOSIS — M54.16 LUMBAR RADICULOPATHY: ICD-10-CM

## 2020-08-13 NOTE — TELEPHONE ENCOUNTER
Pending Prescriptions:                       Disp   Refills    HYDROcodone-acetaminophen (NORCO) 5-325 M*30 tab*0            Sig: Take 1 tablet by mouth every 6 hours as needed           for severe pain (no refills)

## 2020-08-18 RX ORDER — HYDROCODONE BITARTRATE AND ACETAMINOPHEN 5; 325 MG/1; MG/1
1 TABLET ORAL EVERY 6 HOURS PRN
Qty: 30 TABLET | Refills: 0 | OUTPATIENT
Start: 2020-08-18

## 2020-08-18 NOTE — TELEPHONE ENCOUNTER
Routing refill request to provider for review/approval because:  Drug not on [place] [facility] [place]     Controlled Substance Refill Request for Norco  Problem List Complete:  No     PROVIDER TO CONSIDER COMPLETION OF PROBLEM LIST AND OVERVIEW/CONTROLLED SUBSTANCE AGREEMENT    Last Written Prescription Date:  6/26/20  Last Fill Quantity: 30,   # refills: 0      Last Office Visit with [facility] [place] care provider: Virtual visit on 7/10/20. Last face-to-face visit was 1/20/20.    Future Office visit:   Next 5 appointments (look out 90 days)    Aug 20, 2020  4:00 PM CDT  SHORT with YVON Navarro CNP  [facility] [address] [place] [id number] [phone]          Controlled substance agreement:   Encounter-Level CSA:    There are no encounter-level csa.     Patient-Level CSA:    There are no patient-level csa.         Last Urine Drug Screen: No results found for: CDAUT, No results found for: COMDAT, No results found for: THC13, PCP13, COC13, MAMP13, OPI13, AMP13, BZO13, TCA13, MTD13, BAR13, OXY13, PPX13, BUP13     Processing:  Rx to be electronically transmitted to pharmacy by provider      [email or web address]       checked in past 3 months?  No, overview is incomplete. No chronic pain diagnosis on problem list and no CSA on file.      Erin Gray RN  [facility]

## 2020-08-20 ENCOUNTER — OFFICE VISIT (OUTPATIENT)
Dept: FAMILY MEDICINE | Facility: CLINIC | Age: 65
End: 2020-08-20
Payer: COMMERCIAL

## 2020-08-20 VITALS
SYSTOLIC BLOOD PRESSURE: 162 MMHG | WEIGHT: 161 LBS | HEIGHT: 63 IN | TEMPERATURE: 97.7 F | OXYGEN SATURATION: 100 % | BODY MASS INDEX: 28.53 KG/M2 | DIASTOLIC BLOOD PRESSURE: 60 MMHG | RESPIRATION RATE: 16 BRPM | HEART RATE: 72 BPM

## 2020-08-20 DIAGNOSIS — I10 ESSENTIAL HYPERTENSION WITH GOAL BLOOD PRESSURE LESS THAN 130/80: ICD-10-CM

## 2020-08-20 DIAGNOSIS — E78.5 HYPERLIPIDEMIA LDL GOAL <100: ICD-10-CM

## 2020-08-20 DIAGNOSIS — F32.1 MODERATE MAJOR DEPRESSION (H): ICD-10-CM

## 2020-08-20 DIAGNOSIS — R63.4 WEIGHT LOSS: ICD-10-CM

## 2020-08-20 DIAGNOSIS — F10.20 ALCOHOLIC (H): ICD-10-CM

## 2020-08-20 DIAGNOSIS — R80.9 ALBUMINURIA: ICD-10-CM

## 2020-08-20 LAB
BASOPHILS # BLD AUTO: 0 10E9/L (ref 0–0.2)
BASOPHILS NFR BLD AUTO: 0.7 %
DIFFERENTIAL METHOD BLD: NORMAL
EOSINOPHIL # BLD AUTO: 0.1 10E9/L (ref 0–0.7)
EOSINOPHIL NFR BLD AUTO: 1.8 %
ERYTHROCYTE [DISTWIDTH] IN BLOOD BY AUTOMATED COUNT: 12.2 % (ref 10–15)
HBA1C MFR BLD: 9.5 % (ref 0–5.6)
HCT VFR BLD AUTO: 43.2 % (ref 35–47)
HGB BLD-MCNC: 14.2 G/DL (ref 11.7–15.7)
LYMPHOCYTES # BLD AUTO: 2.6 10E9/L (ref 0.8–5.3)
LYMPHOCYTES NFR BLD AUTO: 43.6 %
MCH RBC QN AUTO: 28.3 PG (ref 26.5–33)
MCHC RBC AUTO-ENTMCNC: 32.9 G/DL (ref 31.5–36.5)
MCV RBC AUTO: 86 FL (ref 78–100)
MONOCYTES # BLD AUTO: 0.3 10E9/L (ref 0–1.3)
MONOCYTES NFR BLD AUTO: 4.7 %
NEUTROPHILS # BLD AUTO: 2.9 10E9/L (ref 1.6–8.3)
NEUTROPHILS NFR BLD AUTO: 49.2 %
PLATELET # BLD AUTO: 270 10E9/L (ref 150–450)
RBC # BLD AUTO: 5.01 10E12/L (ref 3.8–5.2)
WBC # BLD AUTO: 6 10E9/L (ref 4–11)

## 2020-08-20 PROCEDURE — 83036 HEMOGLOBIN GLYCOSYLATED A1C: CPT | Performed by: NURSE PRACTITIONER

## 2020-08-20 PROCEDURE — 36415 COLL VENOUS BLD VENIPUNCTURE: CPT | Performed by: NURSE PRACTITIONER

## 2020-08-20 PROCEDURE — 85025 COMPLETE CBC W/AUTO DIFF WBC: CPT | Performed by: NURSE PRACTITIONER

## 2020-08-20 PROCEDURE — 80053 COMPREHEN METABOLIC PANEL: CPT | Performed by: NURSE PRACTITIONER

## 2020-08-20 PROCEDURE — 80061 LIPID PANEL: CPT | Performed by: NURSE PRACTITIONER

## 2020-08-20 PROCEDURE — 99214 OFFICE O/P EST MOD 30 MIN: CPT | Performed by: NURSE PRACTITIONER

## 2020-08-20 RX ORDER — GLUCOSAMINE HCL/CHONDROITIN SU 500-400 MG
CAPSULE ORAL
Qty: 100 EACH | Refills: 3 | Status: SHIPPED | OUTPATIENT
Start: 2020-08-20 | End: 2020-12-21

## 2020-08-20 ASSESSMENT — PAIN SCALES - GENERAL: PAINLEVEL: MILD PAIN (3)

## 2020-08-20 ASSESSMENT — MIFFLIN-ST. JEOR: SCORE: 1259.42

## 2020-08-20 NOTE — PROGRESS NOTES
"Subjective     Ellen Hill is a 64 year old female who presents to clinic today for the following health issues:    HPI       Diabetes Follow-up    How often are you checking your blood sugar?  Once a week.  What time of day are you checking your blood sugars (select all that apply)?  varies  Have you had any blood sugars above 200?  Yes - after having cake and sodas  Have you had any blood sugars below 70?  No    What symptoms do you notice when your blood sugar is low?  Shaky    What concerns do you have today about your diabetes? None     Do you have any of these symptoms? (Select all that apply)  No numbness or tingling in feet.  No redness, sores or blisters on feet.  No complaints of excessive thirst.  No reports of blurry vision.  No significant changes to weight.    Have you had a diabetic eye exam in the last 12 months? No  COntinues with right leg pain.      Has met with spine surgery and is considering surgery. Has a second opinion coming up.      Has had many tests- AYDEN, EMG, MRI, everything normal per patient so pain is thought to be coming from back.      States \"I can't get anyone to give my hydrocodone\".  Other medications have not been effective.  Has not yet seen pain clinic though was referred recently.     Has been taking insulin \"faithfully\"  Blood sugar (the other day) in the afternoon 170  Has been feeling well with that.    Wt Readings from Last 5 Encounters:   08/20/20 73 kg (161 lb)   04/30/20 68 kg (150 lb)   01/20/20 77.6 kg (171 lb)   12/31/19 75.7 kg (166 lb 12.8 oz)   12/30/19 77.6 kg (171 lb)           Hyperlipidemia Follow-Up      Are you regularly taking any medication or supplement to lower your cholesterol?   Yes- atorvastatin    Are you having muscle aches or other side effects that you think could be caused by your cholesterol lowering medication?  No    Hypertension Follow-up      Do you check your blood pressure regularly outside of the clinic? No     Are you following a " "low salt diet? Yes    Are your blood pressures ever more than 140 on the top number (systolic) OR more   than 90 on the bottom number (diastolic), for example 140/90? No    BP Readings from Last 2 Encounters:   08/20/20 (!) 174/77   01/20/20 (!) 192/74     Hemoglobin A1C (%)   Date Value   12/02/2019 12.4 (H)   04/05/2019 12.5 (H)     LDL Cholesterol Calculated (mg/dL)   Date Value   12/02/2019 161 (H)   08/13/2018     Cannot estimate LDL when triglyceride exceeds 400 mg/dL     LDL Cholesterol Direct (mg/dL)   Date Value   08/13/2018 85         How many servings of fruits and vegetables do you eat daily?  2-3    On average, how many sweetened beverages do you drink each day (Examples: soda, juice, sweet tea, etc.  Do NOT count diet or artificially sweetened beverages)?   0 -1    How many days per week do you exercise enough to make your heart beat faster? 3 or less    How many minutes a day do you exercise enough to make your heart beat faster? 9 or less    How many days per week do you miss taking your medication? 0        Review of Systems   Constitutional, HEENT, cardiovascular, pulmonary, GI, , musculoskeletal, neuro, skin, endocrine and psych systems are negative, except as otherwise noted.      Objective    BP (!) 174/77 (BP Location: Left arm, Patient Position: Sitting, Cuff Size: Adult Regular)   Pulse 72   Temp 97.7  F (36.5  C) (Oral)   Resp 16   Ht 1.6 m (5' 3\")   Wt 73 kg (161 lb)   SpO2 100%   BMI 28.52 kg/m    Body mass index is 28.52 kg/m .  Physical Exam   GENERAL: healthy, alert and no distress  NECK: no adenopathy, no asymmetry, masses, or scars and thyroid normal to palpation  RESP: lungs clear to auscultation - no rales, rhonchi or wheezes  CV: regular rate and rhythm, normal S1 S2, no S3 or S4, no murmur, click or rub, no peripheral edema and peripheral pulses strong  ABDOMEN: soft, nontender, no hepatosplenomegaly, no masses and bowel sounds normal  MS: no gross musculoskeletal defects " noted, no edema    Results for orders placed or performed in visit on 08/20/20   CBC with platelets and differential     Status: None   Result Value Ref Range    WBC 6.0 4.0 - 11.0 10e9/L    RBC Count 5.01 3.8 - 5.2 10e12/L    Hemoglobin 14.2 11.7 - 15.7 g/dL    Hematocrit 43.2 35.0 - 47.0 %    MCV 86 78 - 100 fl    MCH 28.3 26.5 - 33.0 pg    MCHC 32.9 31.5 - 36.5 g/dL    RDW 12.2 10.0 - 15.0 %    Platelet Count 270 150 - 450 10e9/L    % Neutrophils 49.2 %    % Lymphocytes 43.6 %    % Monocytes 4.7 %    % Eosinophils 1.8 %    % Basophils 0.7 %    Absolute Neutrophil 2.9 1.6 - 8.3 10e9/L    Absolute Lymphocytes 2.6 0.8 - 5.3 10e9/L    Absolute Monocytes 0.3 0.0 - 1.3 10e9/L    Absolute Eosinophils 0.1 0.0 - 0.7 10e9/L    Absolute Basophils 0.0 0.0 - 0.2 10e9/L    Diff Method Automated Method    Comprehensive metabolic panel (BMP + Alb, Alk Phos, ALT, AST, Total. Bili, TP)     Status: Abnormal   Result Value Ref Range    Sodium 139 133 - 144 mmol/L    Potassium 3.7 3.4 - 5.3 mmol/L    Chloride 106 94 - 109 mmol/L    Carbon Dioxide 26 20 - 32 mmol/L    Anion Gap 7 3 - 14 mmol/L    Glucose 108 (H) 70 - 99 mg/dL    Urea Nitrogen 22 7 - 30 mg/dL    Creatinine 0.70 0.52 - 1.04 mg/dL    GFR Estimate >90 >60 mL/min/[1.73_m2]    GFR Estimate If Black >90 >60 mL/min/[1.73_m2]    Calcium 10.1 8.5 - 10.1 mg/dL    Bilirubin Total 0.5 0.2 - 1.3 mg/dL    Albumin 3.8 3.4 - 5.0 g/dL    Protein Total 7.2 6.8 - 8.8 g/dL    Alkaline Phosphatase 75 40 - 150 U/L    ALT 21 0 - 50 U/L    AST 15 0 - 45 U/L   Hemoglobin A1c     Status: Abnormal   Result Value Ref Range    Hemoglobin A1C 9.5 (H) 0 - 5.6 %   Lipid panel reflex to direct LDL Fasting     Status: Abnormal   Result Value Ref Range    Cholesterol 274 (H) <200 mg/dL    Triglycerides 214 (H) <150 mg/dL    HDL Cholesterol 43 (L) >49 mg/dL    LDL Cholesterol Calculated 188 (H) <100 mg/dL    Non HDL Cholesterol 231 (H) <130 mg/dL           Assessment & Plan     Uncontrolled type 2  "diabetes mellitus with microalbuminuria, with long-term current use of insulin (H)  Better controlled but still well above goal.  Will increase to 40 units twice a day and follow up in 2 weeks.  Patient understands that for elective surgery will need a1c below 8.5%.  Still declines MTM or diabetic education referral.   - Hemoglobin A1c  - insulin NPH-Regular (NOVOLIN 70/30 FLEXPEN) susp; Inject 30 Units Subcutaneous 2 times daily (with meals)  - alcohol swab prep pads; Use to swab area of injection/mariza as directed.    Essential hypertension with goal blood pressure less than 130/80  Not well controlled.  Patient states is always anxious at clinic and home values are <140/90.  - Comprehensive metabolic panel (BMP + Alb, Alk Phos, ALT, AST, Total. Bili, TP)    Hyperlipidemia LDL goal <100  Not controlled.  Advise taking medication more consistently.  - Lipid panel reflex to direct LDL Fasting    Albuminuria  Was unable to urinate.  Check next visit.  - Albumin Random Urine Quantitative with Creat Ratio; Future    Weight loss  Unclear etiology but is not significant.  Patient has been eating better.    - CBC with platelets and differential    Moderate major depression (H)  Per patient well controlled.     Alcoholic (H)  Per patient not an issue any longer.        BMI:   Estimated body mass index is 28.52 kg/m  as calculated from the following:    Height as of this encounter: 1.6 m (5' 3\").    Weight as of this encounter: 73 kg (161 lb).   Weight management plan: Discussed healthy diet and exercise guidelines        Patient Instructions   Call  the [facility] at [phone].           No follow-ups on file.    YVON Tate [facility]    "

## 2020-08-21 LAB
ALBUMIN SERPL-MCNC: 3.8 G/DL (ref 3.4–5)
ALP SERPL-CCNC: 75 U/L (ref 40–150)
ALT SERPL W P-5'-P-CCNC: 21 U/L (ref 0–50)
ANION GAP SERPL CALCULATED.3IONS-SCNC: 7 MMOL/L (ref 3–14)
AST SERPL W P-5'-P-CCNC: 15 U/L (ref 0–45)
BILIRUB SERPL-MCNC: 0.5 MG/DL (ref 0.2–1.3)
BUN SERPL-MCNC: 22 MG/DL (ref 7–30)
CALCIUM SERPL-MCNC: 10.1 MG/DL (ref 8.5–10.1)
CHLORIDE SERPL-SCNC: 106 MMOL/L (ref 94–109)
CHOLEST SERPL-MCNC: 274 MG/DL
CO2 SERPL-SCNC: 26 MMOL/L (ref 20–32)
CREAT SERPL-MCNC: 0.7 MG/DL (ref 0.52–1.04)
GFR SERPL CREATININE-BSD FRML MDRD: >90 ML/MIN/{1.73_M2}
GLUCOSE SERPL-MCNC: 108 MG/DL (ref 70–99)
HDLC SERPL-MCNC: 43 MG/DL
LDLC SERPL CALC-MCNC: 188 MG/DL
NONHDLC SERPL-MCNC: 231 MG/DL
POTASSIUM SERPL-SCNC: 3.7 MMOL/L (ref 3.4–5.3)
PROT SERPL-MCNC: 7.2 G/DL (ref 6.8–8.8)
SODIUM SERPL-SCNC: 139 MMOL/L (ref 133–144)
TRIGL SERPL-MCNC: 214 MG/DL

## 2020-08-24 ENCOUNTER — TELEPHONE (OUTPATIENT)
Dept: FAMILY MEDICINE | Facility: CLINIC | Age: 65
End: 2020-08-24

## 2020-08-24 NOTE — TELEPHONE ENCOUNTER
This writer attempted to contact Ellen on 08/24/20      Reason for call results and left message.      If patient calls back:   Registered Nurse called. Follow Triage Call workflow        Nereida Ramirez, RN    Please call patient to discuss lab results.  a1c is better at 9.5 but still well above goal.  Increase insulin to 40 units twice a day.  Follow-up via virtual visit in 2 weeks. Thanks!   Marcella

## 2020-08-24 NOTE — TELEPHONE ENCOUNTER
Patient called back and was informed of the below per provider documentation. Patient verbalizes understanding.     Nereida Ramirez RN  [facility]

## 2020-08-27 ENCOUNTER — TELEPHONE (OUTPATIENT)
Dept: FAMILY MEDICINE | Facility: CLINIC | Age: 65
End: 2020-08-27

## 2020-08-27 NOTE — TELEPHONE ENCOUNTER
Reason for Call:  Other Forms    Detailed comments: Pt is going on short term disability and needs Marcella Teixeira to sign some Forms that her insurance company will soon be faxing over from the [employer].     Phone Number Patient can be reached at: Home number on file [phone] (home)    Best Time: anytime    Can we leave a detailed message on this number? YES    Call taken on 8/27/2020 at 3:20 PM by Brendon Ann

## 2020-08-28 NOTE — TELEPHONE ENCOUNTER
Please find out more information.  If this is regarding her back pain or any procedures related to that, sh should have her orthopedic provider do the forms.  YVON Tate CNP

## 2020-08-28 NOTE — TELEPHONE ENCOUNTER
Called and spoke to the patient and she states that it is related to her back. I explained Marcella's message. Patient will contact [facility] to fax to the orthopedic provider. I will place form in the shred box.  Ruth Ann Jarrell MA  [facility]  2nd Floor  Primary Care

## 2020-08-28 NOTE — TELEPHONE ENCOUNTER
Received form and placed in the provider's red folder for review.  Ruth Ann Jarrell [facility]  2nd Floor  Primary Care

## 2020-11-01 ENCOUNTER — TRANSFERRED RECORDS (OUTPATIENT)
Dept: HEALTH INFORMATION MANAGEMENT | Facility: CLINIC | Age: 65
End: 2020-11-01

## 2020-11-01 LAB — RETINOPATHY: NORMAL

## 2020-11-04 ENCOUNTER — OFFICE VISIT (OUTPATIENT)
Dept: FAMILY MEDICINE | Facility: CLINIC | Age: 65
End: 2020-11-04
Payer: COMMERCIAL

## 2020-11-04 VITALS
TEMPERATURE: 97.2 F | SYSTOLIC BLOOD PRESSURE: 150 MMHG | BODY MASS INDEX: 28.53 KG/M2 | HEIGHT: 63 IN | WEIGHT: 161 LBS | OXYGEN SATURATION: 98 % | DIASTOLIC BLOOD PRESSURE: 82 MMHG | HEART RATE: 84 BPM

## 2020-11-04 DIAGNOSIS — I10 ESSENTIAL HYPERTENSION WITH GOAL BLOOD PRESSURE LESS THAN 130/80: ICD-10-CM

## 2020-11-04 DIAGNOSIS — Z01.818 PREOP GENERAL PHYSICAL EXAM: Primary | ICD-10-CM

## 2020-11-04 DIAGNOSIS — E78.5 HYPERLIPIDEMIA LDL GOAL <100: ICD-10-CM

## 2020-11-04 DIAGNOSIS — E66.811 OBESITY, CLASS I, BMI 30-34.9: ICD-10-CM

## 2020-11-04 LAB
ALBUMIN SERPL-MCNC: 3.7 G/DL (ref 3.4–5)
ALP SERPL-CCNC: 87 U/L (ref 40–150)
ALT SERPL W P-5'-P-CCNC: 27 U/L (ref 0–50)
ANION GAP SERPL CALCULATED.3IONS-SCNC: 6 MMOL/L (ref 3–14)
AST SERPL W P-5'-P-CCNC: 18 U/L (ref 0–45)
BASOPHILS # BLD AUTO: 0 10E9/L (ref 0–0.2)
BASOPHILS NFR BLD AUTO: 0.4 %
BILIRUB SERPL-MCNC: 0.5 MG/DL (ref 0.2–1.3)
BUN SERPL-MCNC: 19 MG/DL (ref 7–30)
CALCIUM SERPL-MCNC: 9.3 MG/DL (ref 8.5–10.1)
CHLORIDE SERPL-SCNC: 106 MMOL/L (ref 94–109)
CO2 SERPL-SCNC: 27 MMOL/L (ref 20–32)
CREAT SERPL-MCNC: 0.56 MG/DL (ref 0.52–1.04)
DIFFERENTIAL METHOD BLD: NORMAL
EOSINOPHIL # BLD AUTO: 0.1 10E9/L (ref 0–0.7)
EOSINOPHIL NFR BLD AUTO: 1.5 %
ERYTHROCYTE [DISTWIDTH] IN BLOOD BY AUTOMATED COUNT: 12.6 % (ref 10–15)
GFR SERPL CREATININE-BSD FRML MDRD: >90 ML/MIN/{1.73_M2}
GLUCOSE SERPL-MCNC: 151 MG/DL (ref 70–99)
HBA1C MFR BLD: 10 % (ref 0–5.6)
HCT VFR BLD AUTO: 42.6 % (ref 35–47)
HGB BLD-MCNC: 14.1 G/DL (ref 11.7–15.7)
LYMPHOCYTES # BLD AUTO: 2.9 10E9/L (ref 0.8–5.3)
LYMPHOCYTES NFR BLD AUTO: 42.7 %
MCH RBC QN AUTO: 28.1 PG (ref 26.5–33)
MCHC RBC AUTO-ENTMCNC: 33.1 G/DL (ref 31.5–36.5)
MCV RBC AUTO: 85 FL (ref 78–100)
MONOCYTES # BLD AUTO: 0.4 10E9/L (ref 0–1.3)
MONOCYTES NFR BLD AUTO: 5.3 %
NEUTROPHILS # BLD AUTO: 3.4 10E9/L (ref 1.6–8.3)
NEUTROPHILS NFR BLD AUTO: 50.1 %
PLATELET # BLD AUTO: 262 10E9/L (ref 150–450)
POTASSIUM SERPL-SCNC: 3.6 MMOL/L (ref 3.4–5.3)
PROT SERPL-MCNC: 7.2 G/DL (ref 6.8–8.8)
RBC # BLD AUTO: 5.02 10E12/L (ref 3.8–5.2)
SODIUM SERPL-SCNC: 139 MMOL/L (ref 133–144)
WBC # BLD AUTO: 6.8 10E9/L (ref 4–11)

## 2020-11-04 PROCEDURE — 83036 HEMOGLOBIN GLYCOSYLATED A1C: CPT | Performed by: NURSE PRACTITIONER

## 2020-11-04 PROCEDURE — 93000 ELECTROCARDIOGRAM COMPLETE: CPT | Performed by: NURSE PRACTITIONER

## 2020-11-04 PROCEDURE — 80053 COMPREHEN METABOLIC PANEL: CPT | Performed by: NURSE PRACTITIONER

## 2020-11-04 PROCEDURE — 36415 COLL VENOUS BLD VENIPUNCTURE: CPT | Performed by: NURSE PRACTITIONER

## 2020-11-04 PROCEDURE — 99215 OFFICE O/P EST HI 40 MIN: CPT | Performed by: NURSE PRACTITIONER

## 2020-11-04 PROCEDURE — 85025 COMPLETE CBC W/AUTO DIFF WBC: CPT | Performed by: NURSE PRACTITIONER

## 2020-11-04 RX ORDER — HYDROCHLOROTHIAZIDE 12.5 MG/1
12.5 CAPSULE ORAL DAILY
COMMUNITY
End: 2020-11-11

## 2020-11-04 RX ORDER — TRAMADOL HYDROCHLORIDE 50 MG/1
50 TABLET ORAL EVERY 6 HOURS PRN
COMMUNITY

## 2020-11-04 RX ORDER — METHOCARBAMOL 750 MG/1
TABLET, FILM COATED ORAL
COMMUNITY
Start: 2020-02-05 | End: 2020-11-11

## 2020-11-04 RX ORDER — GABAPENTIN 100 MG/1
2 CAPSULE ORAL AT BEDTIME
COMMUNITY
Start: 2020-10-27 | End: 2020-11-11

## 2020-11-04 RX ORDER — AMLODIPINE BESYLATE 10 MG/1
10 TABLET ORAL DAILY
Qty: 90 TABLET | Refills: 0 | Status: SHIPPED | OUTPATIENT
Start: 2020-11-04 | End: 2020-12-21

## 2020-11-04 RX ORDER — LOSARTAN POTASSIUM 100 MG/1
100 TABLET ORAL DAILY
COMMUNITY
End: 2020-11-11

## 2020-11-04 ASSESSMENT — PAIN SCALES - GENERAL: PAINLEVEL: MILD PAIN (2)

## 2020-11-04 ASSESSMENT — MIFFLIN-ST. JEOR: SCORE: 1259.42

## 2020-11-04 NOTE — PROGRESS NOTES
[address] [address]  Phone: [phone]  Primary Provider: Alejandra Sanders  Pre-op Performing Provider: ALEJANDRA SANDERS    PREOPERATIVE EVALUATION:  Today's date: 11/4/2020    Ellen Hill is a 64 year old female who presents for a preoperative evaluation.    Surgical Information:  Surgery/Procedure: Unilateral Decompressions and Fusion right    Surgery Location: [facility]   Surgeon: Dr Miles   Surgery Date: 11/11/20   Time of Surgery: TBD   Where patient plans to recover: At home with family  Fax number for surgical facility: [phone]    Type of Anesthesia Anticipated: to be determined    Subjective     HPI related to upcoming procedure: Patient with chronic pain on the right low back with radiculopathy affecting ADLs. She has tried and and failed conservative measures.    Preop Questions 11/4/2020   1. Have you ever had a heart attack or stroke? No   2. Have you ever had surgery on your heart or blood vessels, such as a stent placement, a coronary artery bypass, or surgery on an artery in your head, neck, heart, or legs? No   3. Do you have chest pain with activity? No   4. Do you have a history of  heart failure? No   5. Do you currently have a cold, bronchitis or symptoms of other infection? No   6. Do you have a cough, shortness of breath, or wheezing? No   7. Do you or anyone in your family have previous history of blood clots? No   8. Do you or does anyone in your family have a serious bleeding problem such as prolonged bleeding following surgeries or cuts? No   9. Have you ever had problems with anemia or been told to take iron pills? No   10. Have you had any abnormal blood loss such as black, tarry or bloody stools, or abnormal vaginal bleeding? No   11. Have you ever had a blood transfusion? No   12. Are you willing to have a blood transfusion if it is medically needed before, during, or after  your surgery? Yes   13. Have you or any of your relatives ever had problems with anesthesia? No   14. Do you have sleep apnea, excessive snoring or daytime drowsiness? No   15. Do you have any artifical heart valves or other implanted medical devices like a pacemaker, defibrillator, or continuous glucose monitor? No   16. Do you have artificial joints? No   17. Are you allergic to latex? No       Health Care Directive:  Patient does not have a Health Care Directive or Living Will: Discussed advance care planning with patient; however, patient declined at this time.  [id number]    Status of Chronic Conditions:  DEPRESSION - Patient has a long history of Depression of moderate severity requiring medication for control with recent symptoms being stable..Current symptoms of depression include none.     DIABETES - Patient has a longstanding history of DiabetesType Type II . Patient is being treated with diet and insulin injections and denies significant side effects. Control has been poor. Complicating factors include but are not limited to: hypertension, hyperlipidemia, morbid obesity  and tobacco use.     HYPERLIPIDEMIA - Patient has a long history of significant Hyperlipidemia requiring medication for treatment with recent good control. Patient reports no problems or side effects with the medication.     HYPERTENSION - Patient has longstanding history of HTN , currently denies any symptoms referable to elevated blood pressure. Specifically denies chest pain, palpitations, dyspnea, orthopnea, PND or peripheral edema. Blood pressure readings have been in normal range. Current medication regimen is as listed below. Patient denies any side effects of medication.       Review of Systems Except as noted above  CONSTITUTIONAL: NEGATIVE for fever, chills, change in weight  INTEGUMENTARY/SKIN: NEGATIVE for worrisome rashes, moles or lesions  EYES: NEGATIVE for vision changes or irritation  ENT/MOUTH: NEGATIVE for ear, mouth and  "throat problems  RESP: NEGATIVE for significant cough or SOB  BREAST: NEGATIVE for masses, tenderness or discharge  CV: NEGATIVE for chest pain, palpitations or peripheral edema  GI: NEGATIVE for nausea, abdominal pain, heartburn, or change in bowel habits  : NEGATIVE for frequency, dysuria, or hematuria  MUSCULOSKELETAL: NEGATIVE for significant arthralgias or myalgia  NEURO: NEGATIVE for weakness, dizziness or paresthesias  ENDOCRINE: NEGATIVE for temperature intolerance, skin/hair changes  HEME: NEGATIVE for bleeding problems  PSYCHIATRIC: NEGATIVE for changes in mood or affect    Patient Active Problem List    Diagnosis Date Noted     Albuminuria 01/16/2019     Priority: Medium     Smoker 08/13/2018     Priority: Medium     Noncompliance with medication regimen 12/29/2017     Priority: Medium     Uncontrolled type 2 diabetes mellitus with microalbuminuria, with long-term current use of insulin (H) 06/19/2017     Priority: Medium     Type 2 diabetes, HbA1c goal < 7%       Leblanc's esophagus without dysplasia 05/10/2017     Priority: Medium     Obesity, Class I, BMI 30-34.9 05/15/2014     Priority: Medium     Alcoholic (H) 03/28/2013     Priority: Medium     Advanced directives, counseling/discussion 03/28/2013     Priority: Medium     Patient does not have an Advance/Health Care Directive (HCD), given \"What is Advance Care Planning?\" flyer.    Nola Lopez  March 28, 2013         History of adenomatous polyp of colon 12/03/2012     Priority: Medium     Repeat colonoscopy in 2017       Vulvar pruritus 03/10/2011     Priority: Medium     Moderate major depression (H) 01/17/2011     Priority: Medium     Essential hypertension with goal blood pressure less than 130/80 12/21/2010     Priority: Medium     Hyperlipidemia LDL goal <100 10/27/2010     Priority: Medium     Post herpetic neuralgia 06/03/2010     Priority: Medium      Past Medical History:   Diagnosis Date     Advanced directives, " "counseling/discussion 3/28/2013    Patient does not have an Advance/Health Care Directive (HCD), given \"What is Advance Care Planning?\" flyer.  Nola Lopez March 28, 2013      Hyperlipidemia LDL goal <100 10/27/2010     Hypertension goal BP (blood pressure) < 130/80 12/21/2010     Microalbuminuria 2/12/2015     Shingles 2009    Right posterior shoulder as per patient     Type 2 diabetes, HbA1c goal < 7% (H) 10/31/2010     Past Surgical History:   Procedure Laterality Date     BIOPSY Right 1989    Breast-Benign lesion as per patient     Current Outpatient Medications   Medication Sig Dispense Refill     alcohol swab prep pads Use to swab area of injection/mariza as directed. 100 each 3     amLODIPine (NORVASC) 10 MG tablet Take 1 tablet (10 mg) by mouth daily 90 tablet 0     atorvastatin (LIPITOR) 40 MG tablet Take 1 tablet (40 mg) by mouth daily 90 tablet 3     blood glucose monitoring (AL MICROLET) lancets Use to test blood sugar 2 times daily or as directed. 100 each 3     escitalopram (LEXAPRO) 10 MG tablet Take 1 tablet (10 mg) by mouth daily 90 tablet 3     hydrochlorothiazide (MICROZIDE) 12.5 MG capsule Take 12.5 mg by mouth daily       insulin pen needle (B-D U/F) 31G X 5 MM Use 3 pen needles daily or as directed. 180 each 1     insulin pen needle (ULTICARE MICRO) 32G X 4 MM miscellaneous Use 2 pen needles daily or as directed. 100 each 3     losartan (COZAAR) 100 MG tablet Take 100 mg by mouth daily       traMADol (ULTRAM) 50 MG tablet Take 50 mg by mouth every 6 hours as needed       gabapentin (NEURONTIN) 100 MG capsule TK 1 C PO TID       insulin NPH-Regular (NOVOLIN 70/30 FLEXPEN) susp Inject 40 Units Subcutaneous 2 times daily (with meals) 24 mL 0     methocarbamol (ROBAXIN) 750 MG tablet TAKE ONE TABLET BY MOUTH THREE TIMES A DAY AS NEEDED FOR MUSCLE SPASMS         Allergies   Allergen Reactions     Insulin Detemir Hives     Alfredo products cause hives**       Liraglutide Hives     Alfredo Nordisk " "product     Victoza Hives     Alfredo Podimetrics product     Enalapril Cough     Lisinopril Cough     Wellbutrin [Bupropion Hcl] Hives     hives        Social History     Tobacco Use     Smoking status: Former Smoker     Packs/day: 0.10     Years: 10.00     Pack years: 1.00     Types: Cigarettes     Quit date: 9/10/2013     Years since quittin.1     Smokeless tobacco: Never Used   Substance Use Topics     Alcohol use: Yes     Alcohol/week: 1.0 standard drinks     Types: 1 Cans of beer per week     Comment: drinks 1-2 times a month     Family History   Problem Relation Age of Onset     Alcohol/Drug Mother      Heart Disease Mother      Alcohol/Drug Father      Diabetes Brother      History   Drug Use No         Objective     BP (!) 150/82   Pulse 84   Temp 97.2  F (36.2  C) (Tympanic)   Ht 1.6 m (5' 3\")   Wt 73 kg (161 lb)   SpO2 98%   Breastfeeding No   BMI 28.52 kg/m      Physical Exam    GENERAL APPEARANCE: healthy, alert and no distress     EYES: EOMI, PERRL     HENT: ear canals and TM's normal and nose and mouth without ulcers or lesions     NECK: no adenopathy, no asymmetry, masses, or scars and thyroid normal to palpation     RESP: lungs clear to auscultation - no rales, rhonchi or wheezes     CV: regular rates and rhythm, normal S1 S2, no S3 or S4 and no murmur, click or rub     ABDOMEN:  soft, nontender, no HSM or masses and bowel sounds normal     MS: extremities normal- no gross deformities noted, no evidence of inflammation in joints, FROM in all extremities.     SKIN: no suspicious lesions or rashes     NEURO: Normal strength and tone, sensory exam grossly normal, mentation intact and speech normal     PSYCH: mentation appears normal. and affect normal/bright     LYMPHATICS: No cervical adenopathy    Recent Labs   Lab Test 20  1733 19  1625   HGB 14.2  --      --     137   POTASSIUM 3.7 3.9   CR 0.70 0.64   A1C 9.5* 12.4*        Diagnostics:  Recent Results (from the past 48 " hour(s))   Hemoglobin A1c    Collection Time: 11/04/20  4:39 PM   Result Value Ref Range    Hemoglobin A1C 10.0 (H) 0 - 5.6 %   Comprehensive metabolic panel (BMP + Alb, Alk Phos, ALT, AST, Total. Bili, TP)    Collection Time: 11/04/20  4:39 PM   Result Value Ref Range    Sodium 139 133 - 144 mmol/L    Potassium 3.6 3.4 - 5.3 mmol/L    Chloride 106 94 - 109 mmol/L    Carbon Dioxide 27 20 - 32 mmol/L    Anion Gap 6 3 - 14 mmol/L    Glucose 151 (H) 70 - 99 mg/dL    Urea Nitrogen 19 7 - 30 mg/dL    Creatinine 0.56 0.52 - 1.04 mg/dL    GFR Estimate >90 >60 mL/min/[1.73_m2]    GFR Estimate If Black >90 >60 mL/min/[1.73_m2]    Calcium 9.3 8.5 - 10.1 mg/dL    Bilirubin Total 0.5 0.2 - 1.3 mg/dL    Albumin 3.7 3.4 - 5.0 g/dL    Protein Total 7.2 6.8 - 8.8 g/dL    Alkaline Phosphatase 87 40 - 150 U/L    ALT 27 0 - 50 U/L    AST 18 0 - 45 U/L   CBC with platelets differential    Collection Time: 11/04/20  4:39 PM   Result Value Ref Range    WBC 6.8 4.0 - 11.0 10e9/L    RBC Count 5.02 3.8 - 5.2 10e12/L    Hemoglobin 14.1 11.7 - 15.7 g/dL    Hematocrit 42.6 35.0 - 47.0 %    MCV 85 78 - 100 fl    MCH 28.1 26.5 - 33.0 pg    MCHC 33.1 31.5 - 36.5 g/dL    RDW 12.6 10.0 - 15.0 %    Platelet Count 262 150 - 450 10e9/L    % Neutrophils 50.1 %    % Lymphocytes 42.7 %    % Monocytes 5.3 %    % Eosinophils 1.5 %    % Basophils 0.4 %    Absolute Neutrophil 3.4 1.6 - 8.3 10e9/L    Absolute Lymphocytes 2.9 0.8 - 5.3 10e9/L    Absolute Monocytes 0.4 0.0 - 1.3 10e9/L    Absolute Eosinophils 0.1 0.0 - 0.7 10e9/L    Absolute Basophils 0.0 0.0 - 0.2 10e9/L    Diff Method Automated Method       EKG:    Sinus rhythm   Old anteroseptal infarct   Nonspecific T wave abnormality   Rate 78         QTcH 417     Revised Cardiac Risk Index (RCRI):  The patient has the following serious cardiovascular risks for perioperative complications:   - Diabetes Mellitus (on Insulin) = 1 point     RCRI Interpretation: 1 point: Class II (low risk - 0.9%  complication rate)           Assessment & Plan   The proposed surgical procedure is considered INTERMEDIATE risk.    Ellen was seen today for pre-op exam.    Diagnoses and all orders for this visit:    Preop general physical exam  -     Comprehensive metabolic panel (BMP + Alb, Alk Phos, ALT, AST, Total. Bili, TP)  -     CBC with platelets differential  -     EKG 12-lead complete w/read - Clinics  -     MED THERAPY MANAGE REFERRAL    Essential hypertension with goal blood pressure less than 130/80  -     amLODIPine (NORVASC) 10 MG tablet; Take 1 tablet (10 mg) by mouth daily  -     Comprehensive metabolic panel (BMP + Alb, Alk Phos, ALT, AST, Total. Bili, TP)    Uncontrolled type 2 diabetes mellitus with microalbuminuria, with long-term current use of insulin (H)  -     Hemoglobin A1c  -     Comprehensive metabolic panel (BMP + Alb, Alk Phos, ALT, AST, Total. Bili, TP)  -     MED THERAPY MANAGE REFERRAL    Hyperlipidemia LDL goal <100    Obesity, Class I, BMI 30-34.9             RECOMMENDATION:  Surgery is NOT recommended due to uncontrolled diabetes. Stabilization required prior to elective surgery. We have referred her to the [facility] pharmacy program for intensive glucose management.    Signed Electronically by: YVON Huddleston CNP    Copy of this evaluation report is provided to requesting physician.    Preop [facility] Preop Guidelines    Revised Cardiac Risk Index

## 2020-11-04 NOTE — Clinical Note
Please call Dr. Goetz's office and let them know a1c is 10. Elective surgery is not recommended due to the risk for complications. I have referred to [facility] pharmacy program for intensive glucose management.

## 2020-11-04 NOTE — PATIENT INSTRUCTIONS

## 2020-11-05 ENCOUNTER — TELEPHONE (OUTPATIENT)
Dept: FAMILY MEDICINE | Facility: CLINIC | Age: 65
End: 2020-11-05

## 2020-11-05 NOTE — TELEPHONE ENCOUNTER
Reason for Call:  Questions    Detailed comments: patient is calling because she was due for surgery on Wednesday but blood sugar was 10, and  She needs to be 7.5 under to do the surgery. She states that she was suppose to speak with pharmacy, but wasn't sure how to do that.    Please call and advise    Phone Number Patient can be reached at: Home number on file [phone] (home)    Best Time: any    Can we leave a detailed message on this number? YES    Call taken on 11/5/2020 at 4:34 PM by Martha Lugo

## 2020-11-06 ENCOUNTER — TELEPHONE (OUTPATIENT)
Dept: FAMILY MEDICINE | Facility: CLINIC | Age: 65
End: 2020-11-06

## 2020-11-06 NOTE — TELEPHONE ENCOUNTER
Detailed voicemail left for pt.  Letting pt know that she was referred to [facility].   Provided number to call to schedule the appointment by calling [phone] or toll-free at [phone].       BELLA Whitmore.

## 2020-11-06 NOTE — TELEPHONE ENCOUNTER
From: Alejandra Bal APRN CNP   Sent: 11/6/2020  11:16 AM CST   To: [facility]     Please call Dr. Goetz's office and let them know a1c is 10. Elective surgery is not recommended due to the risk for complications. I have referred to [facility] pharmacy program for intensive glucose management.     This writer attempted to contact Dr. Miles's office on 11/06/20      Reason for call patient cannot have surgery and left detailed message.      If 's office calls back:   Relay message above, (read verbatim), document that pt called and close encounter        Nichole Gamble RN, BSN, PHN

## 2020-11-06 NOTE — RESULT ENCOUNTER NOTE
Please call patient and let them know their lab result was abnormal.  a1c is 10.0. This is above the recommended range for elective surgery. I will refer to the [facility] pharmacist for intensive diabetes management.  Please call patient and notify her  We will fax pre-op to surgeon's office with recommendations as well

## 2020-11-06 NOTE — TELEPHONE ENCOUNTER
Provider please see message below. Writer does not see referral for MTM . Plan not complete from OV 11/4/20.    Can provider please review and advise on message below?    Nicole Pierre RN

## 2020-11-06 NOTE — TELEPHONE ENCOUNTER
This writer attempted to contact Ellen on 11/06/20      Reason for call results and left message.      If patient calls back:   Registered Nurse called. Follow Triage Call workflow        Nicole Pierre, RN       Please call patient and let them know their lab result was abnormal.  a1c is 10.0. This is above the recommended range for elective surgery. I will refer to the [facility] pharmacist for intensive diabetes management.  Please call patient and notify her  We will fax pre-op to surgeon's office with recommendations as well

## 2020-11-10 NOTE — TELEPHONE ENCOUNTER
Thien, Dr. Miles informed, detailed message left.  Patient has a future PHARM apt scheduled.    Kathy Flood RN

## 2020-11-11 ENCOUNTER — VIRTUAL VISIT (OUTPATIENT)
Dept: PHARMACY | Facility: CLINIC | Age: 65
End: 2020-11-11
Payer: COMMERCIAL

## 2020-11-11 DIAGNOSIS — I10 ESSENTIAL HYPERTENSION WITH GOAL BLOOD PRESSURE LESS THAN 130/80: ICD-10-CM

## 2020-11-11 DIAGNOSIS — E78.5 HYPERLIPIDEMIA LDL GOAL <100: ICD-10-CM

## 2020-11-11 DIAGNOSIS — R52 PAIN: ICD-10-CM

## 2020-11-11 DIAGNOSIS — F32.1 MODERATE MAJOR DEPRESSION (H): ICD-10-CM

## 2020-11-11 PROCEDURE — 99607 MTMS BY PHARM ADDL 15 MIN: CPT | Mod: TEL | Performed by: PHARMACIST

## 2020-11-11 PROCEDURE — 99605 MTMS BY PHARM NP 15 MIN: CPT | Mod: TEL | Performed by: PHARMACIST

## 2020-11-11 RX ORDER — ESCITALOPRAM OXALATE 10 MG/1
10 TABLET ORAL DAILY
Qty: 90 TABLET | Refills: 1 | Status: SHIPPED | OUTPATIENT
Start: 2020-11-11 | End: 2022-01-12

## 2020-11-11 RX ORDER — HYDROCHLOROTHIAZIDE 12.5 MG/1
12.5 CAPSULE ORAL DAILY
Qty: 90 CAPSULE | Refills: 1 | Status: SHIPPED | OUTPATIENT
Start: 2020-11-11 | End: 2021-01-08

## 2020-11-11 RX ORDER — CYCLOBENZAPRINE HCL 10 MG
1 TABLET ORAL
COMMUNITY
Start: 2020-09-22 | End: 2022-01-12

## 2020-11-11 RX ORDER — LOSARTAN POTASSIUM 100 MG/1
100 TABLET ORAL DAILY
Qty: 90 TABLET | Refills: 1 | Status: SHIPPED | OUTPATIENT
Start: 2020-11-11 | End: 2022-01-12

## 2020-11-11 RX ORDER — PEN NEEDLE, DIABETIC 32GX 5/32"
NEEDLE, DISPOSABLE MISCELLANEOUS
Qty: 100 EACH | Refills: 11 | Status: SHIPPED | OUTPATIENT
Start: 2020-11-11 | End: 2022-10-25

## 2020-11-11 RX ORDER — ATORVASTATIN CALCIUM 40 MG/1
40 TABLET, FILM COATED ORAL DAILY
Qty: 90 TABLET | Refills: 1 | Status: SHIPPED | OUTPATIENT
Start: 2020-11-11 | End: 2022-01-12

## 2020-11-11 RX ORDER — GABAPENTIN 400 MG/1
2 CAPSULE ORAL AT BEDTIME
COMMUNITY
Start: 2020-07-25 | End: 2022-01-12

## 2020-11-11 NOTE — PROGRESS NOTES
MTM ENCOUNTER  SUBJECTIVE/OBJECTIVE:                           Ellen Hill is a 64 year old female called for an initial visit. She was referred to me from Alejandra Bal.      Reason for visit: Pre-op glucose management. Recent pre-op for unilateral decompressions and fusion for chronic LBP. A1c at pre-op found to be 10.0% and hx of uncontrolled diabetes.    Allergies/ADRs: Reviewed in chart - of note, has had hives [email or web address],   Tobacco: She reports that she quit smoking about 7 years ago. Her smoking use included cigarettes. She has a 1.00 pack-year smoking history. She has never used smokeless tobacco.  Alcohol: Less than 1 beverages / week  Past Medical History: Reviewed in chart  Personal Healthcare Goals: Have back surgery to help relieve pain    Medication Adherence/Access: Patient takes medications directly from bottles. She has a pill box at home that she hasn't been using. She used to get her pill box filled by a pharmacist once a week or every 2 weeks and this was helpful. She hasn't done that since switching clinics a few year ago and has had issues with compliance ever since - see below.  Per patient, misses medication 7 times per week.     Type 2 Diabetes: Currently taking Novolin 70/30 40units twice daily with food. Rarely misses doses, but did miss her dose this morning. Admits she often takes her insulin after eating, not before.  Has been on metformin in the past - most recently stopped 4/19/19, not sure why. She has been on both IR and xr formulas  Actos stopped in 2017  SMBG Ranges (patient reported): doesn't check regularly. When she has checked, reported it was 140-150's and up to 200mg/dL. Last time she recalls checking was in August.  Symptoms of low blood sugar? none.   Symptoms of high blood sugar? none  Aspirin: Not taking  Statin: Yes: atorvastatin 40mg daily and reports she hasn't been taking this daily  ACEi/ARB: Yes: losartan 100mg daily and reports she hasn't been taking  this daily. Urine albumin is   Lab Results   Component Value Date    UMALCR 471.22 (H) 12/02/2019       Lab Results   Component Value Date    A1C 10.0 11/04/2020    A1C 9.5 08/20/2020    A1C 12.4 12/02/2019    A1C 12.5 04/05/2019    A1C 12.9 08/13/2018        GFR Estimate   Date Value Ref Range Status   11/04/2020 >90 >60 mL/min/[1.73_m2] Final     Comment:     Non  GFR Calc  Starting 12/18/2018, serum creatinine based estimated GFR (eGFR) will be   calculated using the Chronic Kidney Disease Epidemiology Collaboration   (CKD-EPI) equation.     08/20/2020 >90 >60 mL/min/[1.73_m2] Final     Comment:     Non  GFR Calc  Starting 12/18/2018, serum creatinine based estimated GFR (eGFR) will be   calculated using the Chronic Kidney Disease Epidemiology Collaboration   (CKD-EPI) equation.     12/02/2019 >90 >60 mL/min/[1.73_m2] Final     Comment:     Non  GFR Calc  Starting 12/18/2018, serum creatinine based estimated GFR (eGFR) will be   calculated using the Chronic Kidney Disease Epidemiology Collaboration   (CKD-EPI) equation.         Hyperlipidemia: Current therapy includes no current medications - as above, she is prescribed atorvastatin, but has not been taking it.   The 10-year ASCVD risk score ([place] HILLS Jr., et al., 2013) is: 19.3%    Values used to calculate the score:      Age: 64 years      Sex: Female      Is Non- : No      Diabetic: Yes      Tobacco smoker: No      Systolic Blood Pressure: 150 mmHg      Is BP treated: Yes      HDL Cholesterol: 43 mg/dL      Total Cholesterol: 274 mg/dL  Recent Labs   Lab Test 08/20/20  1733 12/02/19  1625 02/04/14  0846 02/04/14  0846 03/18/13  1036   CHOL 274* 274*   < > 180 263*   HDL 43* 49*   < > 35* 38*   * 161*   < > Cannot estimate LDL when triglyceride exceeds 400 mg/dL  85 Cannot estimate LDL when triglyceride exceeds 400 mg/dL  61   TRIG 214* 322*   < > 409* 929*   CHOLHDLRATIO  --   --    --  5.2* 6.8*    < > = values in this interval not displayed.       Hypertension: Current medications include losartan 100mg daily and hydrochlorothiazide 12.5mg daily and hasn't been either of these recently. She was prescribed amlodipine at pre-op, but hasn't started it or picked it up from pharmacy yet.  Patient does not self-monitor blood pressure.   BP Readings from Last 3 Encounters:   11/04/20 (!) 150/82   08/20/20 (!) 162/60   01/20/20 (!) 192/74       Chronic pain:  Current medications include: Gabapentin 400mg 2 caps at bedtime. She used to take 2 capsules two times daily, but she was having a hard time concentrating at work, so moved it to 2 caps at bedtime. She does take an Ibuprofen or Aleve most mornings to manage the pain during the day. She has Tramadol 50mg, cyclobenzaprine 10mg, methocarbamol 750mg all at home to use PRN severe pain. She reports taking the tramadol and muscle relaxer most nights at bedtime. She is not taking the cyclobenzaprine and methocarbamol together - she alternates which one she takes. We discussed that she shouldn't take these together, and she plans to use just the cyclobenazprine for now and set aside the methocarbamol. She is also taking melatonin at bedtime and feels this helps her to sleep. She reports sleeping well and feeling rested upon waking, she denies hangover effect the next morning.   Lab Results   Component Value Date    AST 18 11/04/2020     Lab Results   Component Value Date    ALT 27 11/04/2020       Depression:  Current medications include: Escitalopram 10mg QAM. Pt reports that depression symptoms are stable. Admits she doesn't take this daily.  PHQ-9 SCORE 10/30/2018 4/19/2019 7/10/2020   PHQ-9 Total Score - - -   PHQ-9 Total Score 3 2 2       Today's Vitals: There were no vitals taken for this visit.      ASSESSMENT:                              Medication Adherence: See below for considerations and Advised use of pill boxes to help med  adherence    Type 2 Diabetes: Patient is not meeting A1c goal of < 7%.  Self monitoring of blood glucose is not at goal of being checked.  Pt would benefit from   SMBG: Check blood sugars fasting, 2 hours after post-prandial, and with symptoms of hypoglycemia.  Pre-mixed Insulin (Novolin 70/30) :  Begin to titrate up on doses to help reduce fasting and 2hr PP blood sugar.  Aspirin therapy is indicated in this patient, but may want to hold on starting until post-op -- not discussed today.    Hyperlipidemia: Patient is not on high intensity statin which is indicated based on 2019 ACC/AHA guidelines for lipid management. She is prescribed atorvastatin and would benefit from improved compliance.    Hypertension: Patient is not meeting blood pressure goal of < 140/90mmHg. Patient would benefit from the following changes - improved compliance with losartan and hydrochlorothiazide -- may benefit from holding amlodipine until we can re-evaluate BP after improved compliance with prescribed medications.    Pain: Stable. Regular use of NSAIDs may be contributing to elevated BP along with non-compliance with antihypertensives. Clarified duplicate muscle relaxer therapy.    Depression: Stable.    PLAN:                            1. Increase Novolin 70/30 by 2units per dose every 2 days until FBG is at goal 80-130mg/dL and 2hr PP blood sugar is under 180mg/dL. Make sure to take insulin immediately prior to eating breakfast and dinner. Updated Rx sent to pharmacy.  2. Make sure to SMBG fasting, 2hrs PP, and with symptoms of hypoglycemia. Refills for testing supplies sent to pharmacy.  3. Restart atorvastatin, Losartan, hydrochlorothiazide. Refills sent to pharmacy.  4. Do not start amlodipine yet -- ok to keep on hold at pharmacy.  5. Use just one muscle relaxer at a time - current muscle relaxer: cyclobenzaprine. Set the methocarbamol aside.  6. Make sure to take escitalopram every morning.  7. Had patient fill her pill box over  the phone as follows:  AM  Hydrochlorothiazide   Losartan  Atorvastatin  Escitalopram  Ibuprofen or Aleve    HS  Gabapentin  Tramadol if needed  Cyclobenzaprine if needed  Melatonin      I spent 56 minutes with this patient today. All changes were made via collaborative practice agreement with Alejandra Bal. A copy of the visit note was provided to the patient's primary care provider.    Will follow up in 1 week over the phone for blood sugar check.    The patient declined a summary of these recommendations.     Nereida Perrin, Pharm.D., [facility]  Medication Therapy Management Pharmacist  [phone]    Patient consented to a telehealth visit: yes  Telemedicine Visit Details  Type of service:  Telephone visit  Start Time: 4:04 PM  End Time: 5:00 PM  Originating Location (patient location): Home  Distant Location (provider location):  [facility]  Mode of Communication:  Telephone

## 2020-11-12 ENCOUNTER — TELEPHONE (OUTPATIENT)
Dept: FAMILY MEDICINE | Facility: CLINIC | Age: 65
End: 2020-11-12

## 2020-11-12 NOTE — TELEPHONE ENCOUNTER
Plan does not cover blood glucose monitoring (AL MICROLET) lancets.  Please call [phone] to initiate Prior Auth or change med.    Insurance type and ID number: ID# [id number]      Additional Information:     Kimberly Lujan

## 2020-11-13 NOTE — TELEPHONE ENCOUNTER
Central Prior Authorization Team   Phone: [phone]    PA Initiation    Medication: blood glucose monitoring (AL MICROLET) lancets  Insurance Company: [employer] - Phone [phone] Fax [phone]  Pharmacy Filling the Rx: [id number] - [place], [place] - [address] [facility] [address]  Filling Pharmacy Phone: [phone]  Filling Pharmacy Fax: [phone]  Start Date: 11/13/2020

## 2020-11-16 NOTE — TELEPHONE ENCOUNTER
PRIOR AUTHORIZATION DENIED    Medication: blood glucose monitoring (AL MICROLET) lancets-DENIED    Denial Date: 11/16/2020    Denial Rational: MEDICATION IS EXCLUDED FROM COVERAGE.  ALTERNATIVES NOT GIVEN ON EXCLUDED MEDICATIONS.        Appeal Information:  IF YOU WOULD LIKE TO APPEAL PLEASE SUPPLY PA TEAM WITH A LETTER OF MEDICAL NECESSITY WITH CLINICAL REASON.

## 2020-11-18 ENCOUNTER — VIRTUAL VISIT (OUTPATIENT)
Dept: PHARMACY | Facility: CLINIC | Age: 65
End: 2020-11-18
Payer: COMMERCIAL

## 2020-11-18 DIAGNOSIS — I10 ESSENTIAL HYPERTENSION WITH GOAL BLOOD PRESSURE LESS THAN 130/80: ICD-10-CM

## 2020-11-18 DIAGNOSIS — E78.5 HYPERLIPIDEMIA LDL GOAL <100: ICD-10-CM

## 2020-11-18 PROCEDURE — 99607 MTMS BY PHARM ADDL 15 MIN: CPT | Mod: TEL | Performed by: PHARMACIST

## 2020-11-18 PROCEDURE — 99606 MTMS BY PHARM EST 15 MIN: CPT | Mod: TEL | Performed by: PHARMACIST

## 2020-11-18 NOTE — PROGRESS NOTES
MTM ENCOUNTER  SUBJECTIVE/OBJECTIVE:                           Ellen Hill is a 64 year old female called for a follow-up visit from 11/11/20. She was referred to me from Alejandra Bal.      Reason for visit: blood sugar check/Pre-op glucose management.     Allergies/ADRs: Reviewed in chart - of note, [facility]   Tobacco: She reports that she quit smoking about 7 years ago. Her smoking use included cigarettes. She has a 1.00 pack-year smoking history. She has never used smokeless tobacco.  Alcohol: Less than 1 beverages / week  Personal Healthcare Goals: Have back surgery to help relieve pain    Medication Adherence/Access: Restarted using her pill box last week and reports no issues with taking medications over the last week. Takes medications as follows:   AM   Hydrochlorothiazide   Losartan  Atorvastatin  Escitalopram  Ibuprofen or Aleve    HS  Gabapentin  Tramadol if needed  Cyclobenzaprine if needed  Melatonin    Type 2 Diabetes: Currently taking Novolin 70/30 48units twice daily with food - prior to eating most of the time. Has been titrating up on her dose over the last week.  Has been on metformin in the past - most recently stopped 4/19/19, not sure why. She has been on both IR and xr formulas  Actos stopped in 2017  SMBG Ranges (patient reported):   Date FBG/ 2hours post Lunch/2hours post Dinner /2hours post   11/18 174     11/17 115  /158   11/16 173  /206   11/15 249 159    11/14   /176   11/13 123  /252   11/12 132  /254   11/11   /315 (brownies)   Symptoms of low blood sugar? none.   Symptoms of high blood sugar? none  Aspirin: Not taking  Statin: Yes: atorvastatin 40mg daily and restarted last week.  ACEi/ARB: Yes: losartan 100mg daily and restarted last week. Urine albumin is   Lab Results   Component Value Date    UMALCR 471.22 (H) 12/02/2019       Lab Results   Component Value Date    A1C 10.0 11/04/2020    A1C 9.5 08/20/2020    A1C 12.4 12/02/2019    A1C 12.5 04/05/2019     A1C 12.9 08/13/2018        Hyperlipidemia: Current therapy includes atorvastatin, as above restarted last week.  The 10-year ASCVD risk score ([place] HILLS JrCuong, et al., 2013) is: 19.3%    Values used to calculate the score:      Age: 64 years      Sex: Female      Is Non- : No      Diabetic: Yes      Tobacco smoker: No      Systolic Blood Pressure: 150 mmHg      Is BP treated: Yes      HDL Cholesterol: 43 mg/dL      Total Cholesterol: 274 mg/dL  Recent Labs   Lab Test 08/20/20  1733 12/02/19  1625 02/04/14  0846 02/04/14  0846 03/18/13  1036   CHOL 274* 274*   < > 180 263*   HDL 43* 49*   < > 35* 38*   * 161*   < > Cannot estimate LDL when triglyceride exceeds 400 mg/dL  85 Cannot estimate LDL when triglyceride exceeds 400 mg/dL  61   TRIG 214* 322*   < > 409* 929*   CHOLHDLRATIO  --   --   --  5.2* 6.8*    < > = values in this interval not displayed.       Hypertension: Current medications include losartan 100mg daily and hydrochlorothiazide 12.5mg daily and restarted both last week. Patient does not self-monitor blood pressure.   BP Readings from Last 3 Encounters:   11/04/20 (!) 150/82   08/20/20 (!) 162/60   01/20/20 (!) 192/74       ASSESSMENT:                              Medication Adherence: No issues noted since started using pill box again.    Type 2 Diabetes: Patient is not meeting A1c goal of < 7%.  Self monitoring of blood glucose is not consistently at goal of fasting 80-130mg/dL or 2hr PP <180mg/dL.  Pre-mixed Insulin (Novolin 70/30) :  Continue to titrate up on doses to help reduce fasting and 2hr PP blood sugar.  Aspirin therapy is indicated in this patient, but may want to hold on starting until post-op -- not discussed today.    Hyperlipidemia: Patient is on high intensity statin - will want to recheck labs in 5-7 weeks with recent restart.     Hypertension: Patient is not meeting blood pressure goal of < 140/90mmHg; will want to recheck BP with recent restart of BP  medications. Will also want to recheck BMP due to restarting ARB.      PLAN:                            1. Increase Novolin 70/30 by 2units per dose every 2 days until FBG is at goal 80-130mg/dL and 2hr PP blood sugar is under 180mg/dL. Make sure to take insulin immediately prior to eating breakfast and dinner.  2. Future Lipid panel and BMP ordered -- will need to schedule lab appointment and BP check in about 1 month.      I spent 20 minutes with this patient today. All changes were made via collaborative practice agreement with Alejandra Bal. A copy of the visit note was provided to the patient's primary care provider.    Will follow up in 1 week over the phone for blood sugar check.    The patient declined a summary of these recommendations.     Nereida Perrin, Pharm.D., [facility]  Medication Therapy Management Pharmacist  [phone]    Patient consented to a telehealth visit: yes  Telemedicine Visit Details  Type of service:  Telephone visit  Start Time: 4:04 PM  End Time: 4:24 PM  Originating Location (patient location): Home  Distant Location (provider location):  [facility]  Mode of Communication:  Telephone     10-Mar-2018

## 2020-11-19 NOTE — TELEPHONE ENCOUNTER
Spoke to pharmacist at [facility] today.  They were able to find lancets that were covered.  No further action needed at this time.  Zoie Blevins CMA([facility])

## 2020-12-07 ENCOUNTER — VIRTUAL VISIT (OUTPATIENT)
Dept: PHARMACY | Facility: CLINIC | Age: 65
End: 2020-12-07
Payer: COMMERCIAL

## 2020-12-07 PROCEDURE — 99606 MTMS BY PHARM EST 15 MIN: CPT | Mod: TEL | Performed by: PHARMACIST

## 2020-12-07 PROCEDURE — 99607 MTMS BY PHARM ADDL 15 MIN: CPT | Mod: TEL | Performed by: PHARMACIST

## 2020-12-07 NOTE — PROGRESS NOTES
MTM ENCOUNTER  SUBJECTIVE/OBJECTIVE:                           Ellen Hill is a 65 year old female called for a follow-up visit. She was referred to me from Nereida Perrin, PharmD.  Today's visit is a follow-up MTM visit from 2020.     Reason for visit: diabetes follow-up. Patient is frustrated, states that she has been calling clinic and put on hold for about 15 minutes, has been unable to get through.    Allergies/ADRs: Reviewed in chart  Tobacco: She reports that she quit smoking about 7 years ago. Her smoking use included cigarettes. She has a 1.00 pack-year smoking history. She has never used smokeless tobacco.  Alcohol: Less than 1 beverages / week    Medication Adherence/Access: Medication Adherence/Access: Restarted using her pill box 2020.  Takes medications as follows:   AM   Hydrochlorothiazide   Losartan  Atorvastatin  Escitalopram  Ibuprofen or Aleve     HS  Gabapentin  Tramadol if needed  Cyclobenzaprine if needed  Melatonin    Type 2 Diabetes:  Testing supplies: glucometer  Following with certified diabetes care and  (CDCES)? Not currently.  Pt currently taking:  Novolin 70/30 since last visit started at 48 units twice daily with food and instructed to increase dose   by 2units per dose every 2 days until FBG is at goal 80-130mg/dL and 2hr PP blood sugar is under 180mg/dL.   Patient states that she did this increase up to 54 units and then once on that dose for 2 days, reverted back to 48 units twice daily and worked back up from there.    Last use was 54 units twice daily yesterday/today and planning to go back to 48 units tomorrow. Needs a refill.  Medication History: metformin (stopped 2019 - unsure why - has been on both IR and XR formulations), actos (stopped ).  Record of home blood sugars:  SMB-2 times daily. Patient is very excited about her readings, as they used to be in the 200s.  Symptoms of low blood sugar? none, lowest B mg/dL.  Symptoms of  high blood sugar? No BG in the 300s since the last visit.  Date Fasting AM After Dinner   12/7 135 mg/dL (4:30 am)    12/6 117    12/5 113 220 (2 hour post-prandial)   12/4     12/3     12/2 112 243   12/1 113 215   11/26 113 227   11/24 115 215   11/23 115 219   11/22 127 200   11/21 95, 104 267     Lab Results   Component Value Date    A1C 10.0 11/04/2020    A1C 9.5 08/20/2020    A1C 12.4 12/02/2019    A1C 12.5 04/05/2019    A1C 12.9 08/13/2018   Diet: meals at 8:30am, lunch 12pm, 5pm.  Exercise: no routine; does some walking at work. Would like to get on her stationary bike.  ASCVD Prevention:  Aspirin: not taking  Statin atorvastatin 40 mg daily - restarted taking this fall 2020.  ACEi/ARB: Yes: losartan 100 mg - restarted fall 2020. .   Urine Albumin:  Lab Results   Component Value Date    UMALCR 471.22 (H) 12/02/2019     Today's Vitals: There were no vitals taken for this visit.    ASSESSMENT:                            Medication Adherence: misunderstanding about insulin instructions - see below.    Type 2 Diabetes:   Patient's A1c of 10% is not at ADA goal of <7%. SMBG fasting sugars are at or very near ADA goal of  mg/dL and post-prandial sugars are not at goal of less than 180 mg/dL. Although not at goal, major improvement from Patient reported BG in 300s previously.  Patient misunderstood insulin instructions as she has been increasing every 2 days until at 54 units, but then reverting back to 48 units and increasing every 2 days in a cyclical pattern. Education today to continue the 54 units (last dose yesterday) x 2 weeks and will recheck with MTM visit at that time.  Discussed nonpharm exercise to lower BG post-prandially in evening.    PLAN:                            1. Recommend labs - future order already in place by Nereida Perrin. Will have clinic call Patient to help arrange labs and appointment with PCP (BP check in). Message sent to [facility] 1st floor today.  2. Take insulin 30 minutes before  eating. Increase to 54 units twice daily. New Rx sent.  3. Start exercising in the evening after eating.    Medication Therapy Management (MTM) Coordinators scheduling line: [phone].    I spent 25 minutes with this patient today. All changes were made via collaborative practice agreement with Alejandra Bal. A copy of the visit note was provided to the patient's primary care provider.    Will follow up in 2 weeks: appointment scheduled for 12/21/2020.    The patient declined a summary of these recommendations.     Evangelina Garcia, PharmD  Medication Therapy Management (MTM) Pharmacist  [facility]  Pager: [phone].    Patient consented to a telehealth visit: yes  Telemedicine Visit Details  Type of service:  Telephone visit  Start Time: 3:30 pm  End Time: 3:55 pm  Originating Location (patient location): Home  Distant Location (provider location):  [facility] MTM  Mode of Communication:  Telephone      Medication Therapy Recommendations Needing Review  Uncontrolled type 2 diabetes mellitus with microalbuminuria, with long-term current use of insulin (H)    Current Medication: insulin NPH-Regular (NOVOLIN 70/30 FLEXPEN) susp   Rationale: Does not understand instructions - Adherence - Adherence   Recommendation: Provide Education   Status: Patient Agreed - Adherence/Education

## 2020-12-07 NOTE — Clinical Note
Hello - I'm the MTM provider taking over for Danielle Patel PharmD for an interim basis until your new MTM provider Nereida Marychuy returns from maternity leave.  Can I get some assistance with getting this patient scheduled for an appointment with Alejandra Bal NP for blood pressure check (I am remote so unable to obtain vitals) and labs (labs are already future ordered). Patient has called the clinic and been on hold, having trouble getting through to scheduling.  Thanks in advance for your assistance. Please see me attached note for details and let me know if questions.  Evangelina Garcia PharmD  Medication Therapy Management (MTM) Pharmacist

## 2020-12-08 ENCOUNTER — TELEPHONE (OUTPATIENT)
Dept: FAMILY MEDICINE | Facility: CLINIC | Age: 65
End: 2020-12-08

## 2020-12-08 NOTE — TELEPHONE ENCOUNTER
This writer attempted to contact pt on 12/08/20      Reason for call schedule lab visit followed by [facility] OV and left message.      If patient calls back:   Schedule Office Visit appointment within 2 weeks with PCP, document that pt called and close encounter         Leelee Gonzáles

## 2020-12-08 NOTE — TELEPHONE ENCOUNTER
Franchesca Garcia, [facility]  [facility] 1st [facility] Primary Care             Hello - I'm the MTM provider taking over for Danielle Patel PharmD for an interim basis until your new MTM provider Nereida Perrin returns from maternity leave.   Can I get some assistance with getting this patient scheduled for an appointment with Alejandra Bal NP for blood pressure check (I am remote so unable to obtain vitals) and labs (labs are already future ordered). Patient has called the clinic and been on hold, having trouble getting through to scheduling.   Thanks in advance for your assistance. Please see me attached note for details and let me know if questions.   Evangelina Garcia PharmD   Medication Therapy Management (MTM) Pharmacist

## 2020-12-10 NOTE — TELEPHONE ENCOUNTER
This writer attempted to contact pt on 12/10/20        Reason for call schedule lab visit followed by [facility] and left message.        If patient calls back:              Schedule Office Visit appointment within 2 weeks with PCP, document that pt called and close encounter            Leelee Gonzáles

## 2020-12-21 ENCOUNTER — VIRTUAL VISIT (OUTPATIENT)
Dept: PHARMACY | Facility: CLINIC | Age: 65
End: 2020-12-21
Payer: COMMERCIAL

## 2020-12-21 DIAGNOSIS — R52 PAIN: ICD-10-CM

## 2020-12-21 DIAGNOSIS — I10 ESSENTIAL HYPERTENSION WITH GOAL BLOOD PRESSURE LESS THAN 130/80: ICD-10-CM

## 2020-12-21 PROCEDURE — 99606 MTMS BY PHARM EST 15 MIN: CPT | Mod: TEL | Performed by: PHARMACIST

## 2020-12-21 PROCEDURE — 99607 MTMS BY PHARM ADDL 15 MIN: CPT | Mod: TEL | Performed by: PHARMACIST

## 2020-12-21 RX ORDER — IBUPROFEN 200 MG
400 TABLET ORAL EVERY 4 HOURS PRN
COMMUNITY
End: 2022-01-12

## 2020-12-21 RX ORDER — ACETAMINOPHEN 500 MG
500-1000 TABLET ORAL EVERY 4 HOURS PRN
COMMUNITY
End: 2022-10-25

## 2020-12-21 NOTE — PROGRESS NOTES
MTM ENCOUNTER  SUBJECTIVE/OBJECTIVE:                           Ellen Hill is a 65 year old female called for a follow-up visit. She was referred to me from Nereida Perrin, PharmD.  Today's visit is a follow-up MTM visit from 2020.     Reason for visit: diabetes follow-up.  Reports that she did get the VMs from the clinic re: scheduling, but tried calling back and still unable to get through.  I gave her the new central scheduling phone number.    Allergies/ADRs: Reviewed in chart  Tobacco: She reports that she quit smoking about 7 years ago. Her smoking use included cigarettes. She has a 1.00 pack-year smoking history. She has never used smokeless tobacco.  Alcohol: Less than 1 beverages / week    Medication Adherence/Access:   Has an AM and a PM med box, but has not been using it, has not been taking any of her meds besides the pain and diabetes. Does not know what meds are for.    Type 2 Diabetes:  Testing supplies: glucometer  Following with certified diabetes care and  (CDCES)? Not currently.  Pt currently taking:  Novolin 70/30: 54 units twice daily before morning and evening meals. States that she took her evening insulin already, but has not eaten and planning to go to the grocery store.  Medication History: metformin (stopped 2019 - unsure why - has been on both IR and XR formulations; per chart review today, most recent metformin was 2019 1000 mg IR tabs and was stopped due to diarrhea), actos (stopped ), liraglutide/victoza (stopped due to hives), insulin detemir (stopped due to hives - per chart yovanny-nordisk products cause hives).  Record of home blood sugars:  SMB-2 times daily. Patient comments that they have been fantastic (especially compared to previous readings).  Symptoms of low blood sugar? none, lowest B  Symptoms of high blood sugar? thinks that maybe some of her leg pains were tied to uncontrolled diabetes/HYPERglycemia - see below  Date Fasting AM  After Dinner (2 hour post-prandial glucose)    100     129 141    157 (surprised by this) 120    81 179 (was eating cookies on this day)    86 140    98 291 (she is not sure what the cause was)   12/15 140 195 (thinks that maybe she takes her insulin a little late)    120 220    168 227    157 201    100 195   12/10 110 158    107 160    126 170     Lab Results   Component Value Date    A1C 10.0 2020    A1C 9.5 2020    A1C 12.4 2019    A1C 12.5 2019    A1C 12.9 2018   Diet: meals at 8:30am, lunch 12pm, 5pm.  Exercise: no routine; does some walking at work. Would like to get on her stationary bike.  ASCVD Prevention:  Aspirin: not taking  Statin atorvastatin 40 mg daily - has not been taking  ACEi/ARB: Yes: losartan 100 mg -  Has not been taking  Urine Albumin:        Lab Results   Component Value Date     UMALCR 471.22 (H) 2019     Pain:  Pain in leg, thigh and ankle. States that her back hardly ever hurts, it is all in her legs.   She is told this is her L4, L5 in her spine and whenever this shifts it is hitting on her nerves.  Initially was referred to MT for diabetes management to help bring BG down so that she could have the surgery to correct. However, now she is not sure she wants the surgery because she is doing much better than she was about 1 year ago. Does still have flares of the pain but overall it has been better since better DM control.  Relieving factors: ice pack, medications (prescribed by pain providers in the interim until able to have surgery).  Current Meds:  Gabapentin 400 m caps at bedtime daily  Cyclobenzaprine 5 mg: at bedtime daily  Tramadol 50 mg: prescribed as 50 mg every 6 hours as needed for pain. Patient use: taking 1 tab at bedtime daily.  Reports that these 3 pain meds together help the pain overnight so she can go to sleep.  Takes OTC Ibuprofen 200 mg: taking 2 tabs/dose at work as  "needed throughout the day. Estimates that she takes about 6 tabs/day. Not taking with food.  Also has methocarbamol at home, but is not using since using cyclobenzaprine.  Has not tried acetaminophen.  Creatinine   Date Value Ref Range Status   11/04/2020 0.56 0.52 - 1.04 mg/dL Final     AST   Date Value Ref Range Status   11/04/2020 18 0 - 45 U/L Final     Lab Results   Component Value Date    ALT 27 11/04/2020     Hypertension:  Current Meds:  Losartan 100 mg:reports that she has not been taking these recently, not been using her pill box.  Has been kind of \"lazy.\"  Hydrochlorothiazide 12.5 mg: once daily - reports she has not been taking this recently.  Medication History: amlodipine (was stopped by provider when restarting other BP meds)  Patient does not self-monitor blood pressure, does have an arm cuff at home and capable of checking her home BPs.  BP Readings from Last 3 Encounters:   11/04/20 (!) 150/82   08/20/20 (!) 162/60   01/20/20 (!) 192/74     Today's Vitals: There were no vitals taken for this visit.    ASSESSMENT:                            Medication Adherence: Advised use of pill boxes to help med adherence - reviewed all meds today and put together schedule - see below.    Type 2 Diabetes:  Patient's A1c of 10% is not at ADA goal of <7%. SMBG fasting sugars are nearing ADA goal of  mg/dL, much improved and post-prandial sugars are nearing goal of less than 180 mg/dL, much improved.  Patient would benefit from nonpharm changes to help lower BG: increased exercise in the evening, even if only 10-15 minutes - stationary bike or walking.  Education provided about taking insulin before planned meal, unsafe to take without having meal. Educated to eat meal prior to going to the store today since she already used her insulin.  Future: may consider re-trial of metformin, continue titration of insulin.  Due for BMP + urine albumin creatinine ratio + lipids - labs are future ordered.    Pain: " education provided about taking ibuprofen with food.  Encouraged also trying acetaminophen for pain.    Hypertension: unknown BP control - non-adherence to meds. Encouraged starting meds again and scheduling blood pressure follow-up appointment + labs in 2 weeks.  Education about proper  meds disposal.    PLAN:                            1. Due for labs - Patient to make appointment for labs and with PCP: Alejandra Bal. Gave updated central scheduling phone number today:    Phone Numbers   Appointments: [phone] ([phone])   2. Take ibuprofen with food.  3. Try using over the counter acetaminophen 500 mg 1-2 tabs every 4 to 6 hours. Max of 6 tabs/day (or 3000 mg/day).  4. Dispose of  meds.  5. Continue current insulin dose of 54 units twice daily before morning and evening meals. If is unsafe to take your insulin and then not eat a meal. It is best to take this insulin before your planned meal.  Otherwise, you end up eating extra food to keep your blood sugar stable, and this results in weight gain.  6. Start using your pill box again.  Here is the plan for your current medications.    AM   Hydrochlorothiazide  Atorvastatin  Escitalopram    At bedtime  Gabapentin  Tramadol if needed  Cyclobenzaprine if needed  losartan    If needed:  Ibuprofen - with food  Acetaminophen    I spent 45 minutes with this patient today. All changes were made via collaborative practice agreement with Alejandra Bal. A copy of the visit note was provided to the patient's primary care provider.    Will follow up in 2 weeks:  at 3:30 pm.    The patient was mailed a summary of these recommendations.  Clinic to mail.    Evangelina Garcia PharmD  Medication Therapy Management (MTM) Pharmacist  [facility]  Pager: [phone]    Patient consented to a telehealth visit: yes  Telemedicine Visit Details  Type of service:  Telephone visit  Start Time: 3:30 pm  End Time: 4:15 pm  Originating Location  (patient location): Home  Distant Location (provider location):  [facility]  Mode of Communication:  Telephone      Medication Therapy Recommendations  Essential hypertension with goal blood pressure less than 130/80    Current Medication: losartan (COZAAR) 100 MG tablet   Rationale: Patient forgets to take - Adherence - Adherence   Recommendation: Provide Education   Status: Patient Agreed - Adherence/Education         Pain    Current Medication: acetaminophen (TYLENOL) 500 MG tablet   Rationale: Synergistic therapy - Needs additional medication therapy - Indication   Recommendation: Start Medication - available OTC   Status: Patient Agreed - Adherence/Education          Current Medication: ibuprofen (ADVIL/MOTRIN) 200 MG tablet   Rationale: Incorrect administration - Adverse medication event - Safety   Recommendation: Provide Education - take with food   Status: Patient Agreed - Adherence/Education         Uncontrolled type 2 diabetes mellitus with microalbuminuria, with long-term current use of insulin (H)    Current Medication: insulin NPH-Regular (NOVOLIN 70/30 FLEXPEN) susp   Rationale: Incorrect administration - Adverse medication event - Safety   Recommendation: Provide Education - education about always eating after taking insulin   Status: Patient Agreed - Adherence/Education

## 2020-12-22 NOTE — PATIENT INSTRUCTIONS
Recommendations from today's MTM visit:                                                    MTM (medication therapy management) is a service provided by a clinical pharmacist designed to help you get the most of out of your medicines.      1. Due for labs - Make appointment for labs and follow-up with PCP: Alejandra Bal.   Appointments: [phone]     2. Take ibuprofen with food.    3. Try using over the counter acetaminophen 500 mg 1-2 tabs every 4 to 6 hours. Max of 6 tabs/day (or 3000 mg/day).    4. Dispose of  meds.    5. Continue current insulin dose of 54 units twice daily before morning and evening meals. If is unsafe to take your insulin and then not eat a meal. It is best to take this insulin before your planned meal.  Otherwise, you end up eating extra food to keep your blood sugar stable, and this results in weight gain.  6. Start using your pill box again.  Here is the plan for your current medications.    AM   Hydrochlorothiazide  Atorvastatin  Escitalopram    At bedtime  Gabapentin  Tramadol if needed  Cyclobenzaprine if needed  losartan    If needed:  Ibuprofen - with food  Acetaminophen    It was great to speak with you today.  I value your experience and would be very thankful for your time with providing feedback on our clinic survey. You may receive a survey via email or text message in the next few days.     Next MTM visit:  at 3:30 pm. Telephone appointment.    To schedule another MTM appointment, please call the clinic directly or you may call the MTM scheduling line at [phone] or toll-free at [phone].     My Clinical Pharmacist's contact information:                                                      It was a pleasure talking with you today!  Please feel free to contact me with any questions or concerns you have.      Evangelina Garcia, PharmD  Medication Therapy Management (MTM) Pharmacist       Medication List          Accurate as of  December 21, 2020  6:28 PM. If you have any questions, ask your nurse or doctor.            CONTINUE taking these medications      Morning Afternoon Evening Bedtime   acetaminophen 500 MG tablet  Also known as: TYLENOL  Take 500-1,000 mg by mouth every 4 hours as needed for mild pain Max of 3000 mg/day (6 tabs)      If needed for pain   If needed for pain   If needed for pain   If needed for pain     atorvastatin 40 MG tablet  Also known as: LIPITOR  Take 1 tablet (40 mg) by mouth daily              blood glucose monitoring lancets  Use to test blood sugar 2 times daily or as directed.                blood glucose test strip  Also known as: NO BRAND SPECIFIED  Use to test blood sugar 2 times daily or as directed.                cyclobenzaprine 10 MG tablet  Also known as: FLEXERIL  Take 1 tablet by mouth nightly as needed           If needed     escitalopram 10 MG tablet  Also known as: LEXAPRO  Take 1 tablet (10 mg) by mouth daily              gabapentin 400 MG capsule  Also known as: NEURONTIN  Take 2 capsules by mouth At Bedtime              hydrochlorothiazide 12.5 MG capsule  Also known as: MICROZIDE  Take 1 capsule (12.5 mg) by mouth daily              ibuprofen 200 MG tablet  Also known as: ADVIL/MOTRIN  Take 400 mg by mouth every 4 hours as needed for mild pain  Notes to patient: Take with food      Take only if needed; take with food.   Take only if needed; take with food.   Take only if needed; take with food.   Take only if needed; take with food.     insulin NPH-Regular susp  Also known as: NovoLIN 70/30 FlexPen  Inject 54units twice a day before breakfast and evening meal. Dose as of 12/7/2020.        Before meal      Before meal      losartan 100 MG tablet  Also known as: COZAAR  Take 1 tablet (100 mg) by mouth daily              traMADol 50 MG tablet  Also known as: ULTRAM  Take 50 mg by mouth every 6 hours as needed           If needed     ULTICARE MICRO 32G X 4 MM miscellaneous  Use 2 pen needles  daily or as directed.  Generic drug: insulin pen needle        Use with insulin      Use with insulin

## 2021-01-01 ENCOUNTER — TRANSFERRED RECORDS (OUTPATIENT)
Dept: MULTI SPECIALTY CLINIC | Facility: CLINIC | Age: 66
End: 2021-01-01
Payer: COMMERCIAL

## 2021-01-01 LAB — RETINOPATHY: NORMAL

## 2021-01-04 ENCOUNTER — VIRTUAL VISIT (OUTPATIENT)
Dept: PHARMACY | Facility: CLINIC | Age: 66
End: 2021-01-04
Payer: COMMERCIAL

## 2021-01-04 PROCEDURE — 99606 MTMS BY PHARM EST 15 MIN: CPT | Mod: TEL | Performed by: PHARMACIST

## 2021-01-04 NOTE — PROGRESS NOTES
Medication Therapy Management (MTM) Encounter    ASSESSMENT/PLAN:                            Medication Adherence/Access: Advised use of pill boxes to help med adherence to oral meds - see last visit note for more details.    Type 2 Diabetes:  Patient's A1c of 10% is not at ADA goal of <7%. SMBG fasting sugars are not at ADA goal of  mg/dL and post-prandial sugars are not at goal of less than 180 mg/dL.  Reviewed that it is highly unlikely inefficacy of medication/insuilin needles and rather a change in dietary patterns that has contributed to higher blood sugars. Also discussed that the higher blood sugars contribute to fatigue, and she would likely feel better with lower blood sugars.  Patient would benefit from changing her dietary and decreasing sugar/carb intake.  Patient would benefit from nonpharm changes to help lower BG: increased exercise in the evening, even if only 10-15 minutes - stationary bike or walking.  Insulin: continue current dose -as blood sugars were previously very near controlled on this dose pre-holidays.   Future: may consider re-trial of metformin, continue titration of insulin.  Due for BMP + urine albumin creatinine ratio + lipids - labs already future ordered. Patient needs to make appointment.    PLAN:     1. Call to make appointment with Alejandra Bal NP and for labs  2. Decrease sugar/sweets - go back to old dietary habits to help lower blood sugars.  3. No changes to medications today    Will follow up in 3 weeks: appointment scheduled.    SUBJECTIVE/OBJECTIVE:                           Ellen Hill is a 65 year old female called for a follow-up visit. She was referred to me from Nereida Perrin, PharmD.  Today's visit is a follow-up MTM visit from 12/21/2020.     Reason for visit: diabetes follow-up.    Allergies/ADRs: Reviewed in chart  Tobacco: She reports that she quit smoking about 7 years ago. Her smoking use included cigarettes. She has a 1.00 pack-year smoking  history. She has never used smokeless tobacco.  Alcohol: Less than 1 beverages / week    Medication Adherence/Access: has an AM and PM med box - has not been using consistently and therefore not taking other pills consistently. She did receive the med list with what to take in AM and what to take in PM and will try this.  States that she has been feeling very tired the last few weeks.    Type 2 Diabetes:  Testing supplies: glucometer  Following with certified diabetes care and  (CDCES)? Not currently.  Pt currently taking:  Novolin 70/30: 54 units twice daily before morning and evening meals  Medication History: metformin (stopped 2019 - unsure why - has been on both IR and XR formulations; per chart review today, most recent metformin was 2019 1000 mg IR tabs and was stopped due to diarrhea), actos (stopped ), liraglutide/victoza (stopped due to hives), insulin detemir (stopped due to hives - per chart yovanny-nordisk products cause hives).  Record of home blood sugars:  States that since last appointment her blood sugars are higher and she is very frustrated by this. Wondering if this is due to the medication not working right, or her insulin needles being a little shorter.  Notes that she has been eating more sweets and sugars over the past 2 weeks ( and new year's holidays).  SMB-2 times daily.   Ranges (patient reported): see below  Symptoms of low blood sugar? none  Symptoms of high blood sugar? Possible that leg pains are related to hyperglycemia.  Date Fasting AM After Dinner    94    1/3 106     200 151    156 120    125 198    165 130    172 121    165 200    183 143    168 195    176 212    150 200    150      Lab Results   Component Value Date    A1C 10.0 2020    A1C 9.5 2020    A1C 12.4 2019    A1C 12.5 2019    A1C 12.9 2018   Diet: meals at 8:30am, lunch 12pm, 5pm. Has been snacking  on cookies and other sweets due to holiday season.  Exercise: no routine; does some walking at work. Would like to get on her stationary bike.  ASCVD Prevention:  Aspirin: not taking  Statin atorvastatin - adherence concerns.  ACEi/ARB: Yes: prescribed losartan 100 mg - adherence concerns.   Urine Albumin:  Lab Results   Component Value Date    UMALCR 471.22 (H) 12/02/2019     Today's Vitals: There were no vitals taken for this visit.    I spent 13 minutes with this patient today. All changes were made via collaborative practice agreement with Alejandra Bal. A copy of the visit note was provided to the patient's primary care provider.    The patient declined a summary of these recommendations. Telemedicine.    Elizabeth WatersD  Medication Therapy Management (MTM) Pharmacist  [facility]  Pager: [phone]    Patient consented to a telehealth visit: yes  Telemedicine Visit Details  Type of service:  Telephone visit  Start Time: 3:40 pm  End Time: 3:53 pm  Originating Location (patient location): Home  Distant Location (provider location):  [facility] MTM  Mode of Communication:  Telephone      Medication Therapy Recommendations  Essential hypertension with goal blood pressure less than 130/80    Current Medication: losartan (COZAAR) 100 MG tablet   Rationale: Patient prefers not to take - Adherence - Adherence   Recommendation: Provide Education - start using pill boxes   Status: Patient Agreed - Adherence/Education

## 2021-01-08 ENCOUNTER — OFFICE VISIT (OUTPATIENT)
Dept: FAMILY MEDICINE | Facility: CLINIC | Age: 66
End: 2021-01-08
Payer: COMMERCIAL

## 2021-01-08 VITALS
SYSTOLIC BLOOD PRESSURE: 142 MMHG | BODY MASS INDEX: 30.68 KG/M2 | DIASTOLIC BLOOD PRESSURE: 78 MMHG | WEIGHT: 173.13 LBS | OXYGEN SATURATION: 99 % | HEART RATE: 87 BPM | TEMPERATURE: 98.1 F | HEIGHT: 63 IN

## 2021-01-08 DIAGNOSIS — F10.20 ALCOHOLIC (H): ICD-10-CM

## 2021-01-08 DIAGNOSIS — I10 ESSENTIAL HYPERTENSION WITH GOAL BLOOD PRESSURE LESS THAN 130/80: ICD-10-CM

## 2021-01-08 DIAGNOSIS — F32.1 MODERATE MAJOR DEPRESSION (H): ICD-10-CM

## 2021-01-08 LAB
ANION GAP SERPL CALCULATED.3IONS-SCNC: 4 MMOL/L (ref 3–14)
BUN SERPL-MCNC: 24 MG/DL (ref 7–30)
CALCIUM SERPL-MCNC: 10 MG/DL (ref 8.5–10.1)
CHLORIDE SERPL-SCNC: 108 MMOL/L (ref 94–109)
CO2 SERPL-SCNC: 29 MMOL/L (ref 20–32)
CREAT SERPL-MCNC: 0.65 MG/DL (ref 0.52–1.04)
CREAT UR-MCNC: 139 MG/DL
GFR SERPL CREATININE-BSD FRML MDRD: >90 ML/MIN/{1.73_M2}
GLUCOSE SERPL-MCNC: 92 MG/DL (ref 70–99)
HBA1C MFR BLD: 7.7 % (ref 0–5.6)
MICROALBUMIN UR-MCNC: 140 MG/L
MICROALBUMIN/CREAT UR: 100.72 MG/G CR (ref 0–25)
POTASSIUM SERPL-SCNC: 3.9 MMOL/L (ref 3.4–5.3)
SODIUM SERPL-SCNC: 141 MMOL/L (ref 133–144)

## 2021-01-08 PROCEDURE — 83036 HEMOGLOBIN GLYCOSYLATED A1C: CPT | Performed by: NURSE PRACTITIONER

## 2021-01-08 PROCEDURE — 80048 BASIC METABOLIC PNL TOTAL CA: CPT | Performed by: NURSE PRACTITIONER

## 2021-01-08 PROCEDURE — 82043 UR ALBUMIN QUANTITATIVE: CPT | Performed by: NURSE PRACTITIONER

## 2021-01-08 PROCEDURE — 99214 OFFICE O/P EST MOD 30 MIN: CPT | Performed by: NURSE PRACTITIONER

## 2021-01-08 PROCEDURE — 36415 COLL VENOUS BLD VENIPUNCTURE: CPT | Performed by: NURSE PRACTITIONER

## 2021-01-08 RX ORDER — HYDROCHLOROTHIAZIDE 25 MG/1
25 TABLET ORAL DAILY
Qty: 30 TABLET | Refills: 1 | Status: SHIPPED | OUTPATIENT
Start: 2021-01-08 | End: 2022-01-12

## 2021-01-08 ASSESSMENT — PAIN SCALES - GENERAL: PAINLEVEL: MODERATE PAIN (5)

## 2021-01-08 ASSESSMENT — MIFFLIN-ST. JEOR: SCORE: 1309.42

## 2021-01-08 NOTE — PROGRESS NOTES
Assessment & Plan     Uncontrolled type 2 diabetes mellitus with microalbuminuria, with long-term current use of insulin (H)  Labs today. Patient states BG has been controlled in the last 6 weeks. Will get a1c today and if improving and BG in goal, will see if ortho would proceed with surgery.  - Basic metabolic panel  (Ca, Cl, CO2, Creat, Gluc, K, Na, BUN)  - Hemoglobin A1c  - Albumin Random Urine Quantitative with Creat Ratio    Essential hypertension with goal blood pressure less than 130/80  Not at goal. Increased hydrochlorothiazide to 25 mg.   - hydrochlorothiazide (HYDRODIURIL) 25 MG tablet; Take 1 tablet (25 mg) by mouth daily  - Basic metabolic panel  (Ca, Cl, CO2, Creat, Gluc, K, Na, BUN)  - Albumin Random Urine Quantitative with Creat Ratio    Moderate major depression (H)  States mostly due to pain. No thoughts to harm self or others. Feeds safe.    Alcoholic (H)  Last drink long ago.    She also asked about [facility] paperwork. I will fill out until she has surgery and then ortho can do.                 See Patient Instructions    Return in about 3 months (around 4/8/2021).    YVON Huddleston CNP  [facility]    Mary Ellen Hughes is a 65 year old who presents to clinic today for the following health issues     HPI       Diabetes Follow-up    How often are you checking your blood sugar? Two times daily  Blood sugar testing frequency justification:  Uncontrolled diabetes  What time of day are you checking your blood sugars (select all that apply)?  At bedtime and in the morning  Have you had any blood sugars above 200?  No  Have you had any blood sugars below 70?  No    What symptoms do you notice when your blood sugar is low?  None and Not applicable    What concerns do you have today about your diabetes? None     Do you have any of these symptoms? (Select all that apply)  Weight gain    Have you had a diabetic eye exam in the last 12 months? Yes- Date of last eye exam:  11/2020,  Location: Eye clinic in [place]          Hyperlipidemia Follow-Up      Are you regularly taking any medication or supplement to lower your cholesterol?   Yes- Atorvastatin    Are you having muscle aches or other side effects that you think could be caused by your cholesterol lowering medication?  No    Hypertension Follow-up      Do you check your blood pressure regularly outside of the clinic? No     Are you following a low salt diet? No    Are your blood pressures ever more than 140 on the top number (systolic) OR more   than 90 on the bottom number (diastolic), for example 140/90? Yes    BP Readings from Last 2 Encounters:   01/08/21 (!) 142/78   11/04/20 (!) 150/82     Hemoglobin A1C (%)   Date Value   11/04/2020 10.0 (H)   08/20/2020 9.5 (H)     LDL Cholesterol Calculated (mg/dL)   Date Value   08/20/2020 188 (H)   12/02/2019 161 (H)         How many servings of fruits and vegetables do you eat daily?  0-1    On average, how many sweetened beverages do you drink each day (Examples: soda, juice, sweet tea, etc.  Do NOT count diet or artificially sweetened beverages)?   0    How many days per week do you exercise enough to make your heart beat faster? 3 or less    How many minutes a day do you exercise enough to make your heart beat faster? 9 or less  How many days per week do you miss taking your medication? 2    What makes it hard for you to take your medications?  remembering to take    , higher over Christmas holiday  Pain shoots to feet from low back  No loss of bowel or bladder  No saddle anesthesia  No alcohol in quite a while  Needs FMLA forms completed - if has leg pain, please allow her to leave. Last year missed 3-4 days      Review of Systems   Constitutional, HEENT, cardiovascular, pulmonary, GI, , musculoskeletal, neuro, skin, endocrine and psych systems are negative, except as otherwise noted.      Objective    BP (!) 142/78 (BP Location: Left arm, Patient Position: Sitting,  "Cuff Size: Adult Regular)   Pulse 87   Temp 98.1  F (36.7  C) (Oral)   Ht 1.6 m (5' 3\")   Wt 78.5 kg (173 lb 2 oz)   LMP  (LMP Unknown)   SpO2 99%   Breastfeeding No   BMI 30.67 kg/m    Body mass index is 30.67 kg/m .  Physical Exam   GENERAL: healthy, alert and no distress  NECK: no adenopathy, no asymmetry, masses, or scars and thyroid normal to palpation  RESP: lungs clear to auscultation - no rales, rhonchi or wheezes  CV: regular rate and rhythm, normal S1 S2, no S3 or S4, no murmur, click or rub, no peripheral edema and peripheral pulses strong  ABDOMEN: soft, nontender, no hepatosplenomegaly, no masses and bowel sounds normal  MS: no gross musculoskeletal defects noted, no edema  NEURO: Normal strength and tone, mentation intact and speech normal  PSYCH: mentation appears normal, affect normal/bright    No results found for any visits on 01/08/21.            "

## 2021-01-08 NOTE — PROGRESS NOTES
"  {PROVIDER CHARTING PREFERENCE:961812}    Mary Ellen Hughes is a 65 year old who presents to clinic today for the following health issues {ACCOMPANIED BY STATEMENT (Optional):331336}    HPI       Diabetes Follow-up      How often are you checking your blood sugar? { :035242}    What concerns do you have today about your diabetes? { :764606::\"None\"}     Do you have any of these symptoms? (Select all that apply)  { :497249}    Have you had a diabetic eye exam in the last 12 months? { :750074}        BP Readings from Last 2 Encounters:   11/04/20 (!) 150/82   08/20/20 (!) 162/60     Hemoglobin A1C (%)   Date Value   11/04/2020 10.0 (H)   08/20/2020 9.5 (H)     LDL Cholesterol Calculated (mg/dL)   Date Value   08/20/2020 188 (H)   12/02/2019 161 (H)       {Reference  Diabetes Management Resources :957475}    {Reference  Diabetes Log - 7 days :748118}      How many servings of fruits and vegetables do you eat daily?  { :628268}    On average, how many sweetened beverages do you drink each day (Examples: soda, juice, sweet tea, etc.  Do NOT count diet or artificially sweetened beverages)?   { 1-11:266317}    How many days per week do you exercise enough to make your heart beat faster? { :407496}    How many minutes a day do you exercise enough to make your heart beat faster? { :753139}    How many days per week do you miss taking your medication? {0-7 :154985}    {additonal problems for provider to add (Optional):658201}    Review of Systems   {ROS COMP (Optional):026540}      Objective    There were no vitals taken for this visit.  There is no height or weight on file to calculate BMI.  Physical Exam   {Exam List (Optional):715970}    {Diagnostic Test Results (Optional):713939}    {AMBULATORY ATTESTATION (Optional):670526}        "

## 2021-01-09 NOTE — RESULT ENCOUNTER NOTE
Please send letter with attached lab results:    Ellen,  Your lab results are attached. Your a1c is 7.7! Great job!! Your kidneys and electrolytes are normal. You still have a bit of protein in your urine but it's the best it's been in 3 years. You are making great improvements in your health! Please let us know if you have any questions.  Thank you for allowing us to participate in your care.  YVON Huddleston CNP

## 2021-01-11 ASSESSMENT — PATIENT HEALTH QUESTIONNAIRE - PHQ9
SUM OF ALL RESPONSES TO PHQ QUESTIONS 1-9: 8
5. POOR APPETITE OR OVEREATING: NOT AT ALL

## 2021-01-11 ASSESSMENT — ANXIETY QUESTIONNAIRES
IF YOU CHECKED OFF ANY PROBLEMS ON THIS QUESTIONNAIRE, HOW DIFFICULT HAVE THESE PROBLEMS MADE IT FOR YOU TO DO YOUR WORK, TAKE CARE OF THINGS AT HOME, OR GET ALONG WITH OTHER PEOPLE: NOT DIFFICULT AT ALL
3. WORRYING TOO MUCH ABOUT DIFFERENT THINGS: SEVERAL DAYS
7. FEELING AFRAID AS IF SOMETHING AWFUL MIGHT HAPPEN: NOT AT ALL
5. BEING SO RESTLESS THAT IT IS HARD TO SIT STILL: NOT AT ALL
6. BECOMING EASILY ANNOYED OR IRRITABLE: NOT AT ALL
1. FEELING NERVOUS, ANXIOUS, OR ON EDGE: NOT AT ALL
GAD7 TOTAL SCORE: 2
2. NOT BEING ABLE TO STOP OR CONTROL WORRYING: SEVERAL DAYS

## 2021-01-12 ASSESSMENT — ANXIETY QUESTIONNAIRES: GAD7 TOTAL SCORE: 2

## 2021-01-13 ENCOUNTER — TELEPHONE (OUTPATIENT)
Dept: FAMILY MEDICINE | Facility: CLINIC | Age: 66
End: 2021-01-13

## 2021-01-13 NOTE — TELEPHONE ENCOUNTER
DYI:  Patient's surgery was cancelled due to high sugar levels, but now her sugars are coming down, patient has been trying to get a hold of Dr. Goetz (spelling?) to ask when patient can have surgery but patient has not been able to get through.  Gonzalez Arredondo,  For 1st Floor Primary Care

## 2021-01-13 NOTE — TELEPHONE ENCOUNTER
Patient wanted to let Alejandra know patient is scheduled for surgery March 2 2021. Patient will call for a pre-op appointment soon.  Patient did understand blood sugar's needs to be in control for surgery to happen.  Gonzalez Arredondo,  For [facility]st Floor [facility]

## 2021-01-13 NOTE — TELEPHONE ENCOUNTER
Continue current interventions to maintain good control of BG. Schedule surgery and then will need pre-op within 30 days of surgery. a1c and BG will have to still be in control in order to be cleared

## 2021-01-14 ENCOUNTER — TELEPHONE (OUTPATIENT)
Dept: FAMILY MEDICINE | Facility: CLINIC | Age: 66
End: 2021-01-14

## 2021-01-14 NOTE — TELEPHONE ENCOUNTER
Per , patient is here. Form sent down to  for them to have patient sign and put copy into abstract.    Eleazar Pizarro CMA

## 2021-01-14 NOTE — TELEPHONE ENCOUNTER
FMLA paperwork completed.  In 1st floor bin on 2nd floor  Please copy for chart and leave at  for patient to . She needs to sign it before sending it in

## 2021-01-25 ENCOUNTER — VIRTUAL VISIT (OUTPATIENT)
Dept: PHARMACY | Facility: CLINIC | Age: 66
End: 2021-01-25
Payer: COMMERCIAL

## 2021-01-25 DIAGNOSIS — K59.00 CONSTIPATION, UNSPECIFIED CONSTIPATION TYPE: ICD-10-CM

## 2021-01-25 DIAGNOSIS — I10 ESSENTIAL HYPERTENSION WITH GOAL BLOOD PRESSURE LESS THAN 130/80: ICD-10-CM

## 2021-01-25 PROCEDURE — 99606 MTMS BY PHARM EST 15 MIN: CPT | Mod: TEL | Performed by: PHARMACIST

## 2021-01-25 PROCEDURE — 99607 MTMS BY PHARM ADDL 15 MIN: CPT | Mod: TEL | Performed by: PHARMACIST

## 2021-01-25 RX ORDER — METFORMIN HCL 500 MG
TABLET, EXTENDED RELEASE 24 HR ORAL
Qty: 180 TABLET | Refills: 1 | Status: SHIPPED | OUTPATIENT
Start: 2021-01-25 | End: 2022-01-12

## 2021-01-25 NOTE — PROGRESS NOTES
Medication Therapy Management (MTM) Encounter    ASSESSMENT:                            Medication Adherence/Access: continually working to improve overall adherence    Creatinine   Date Value Ref Range Status   01/08/2021 0.65 0.52 - 1.04 mg/dL Final     Type 2 Diabetes:  Patient's A1c of 7.7% is not at ADA goal of <7%, yet much improved compared to previous diabetes control. SMBG fasting sugars are mostly at ADA goal of  mg/dL and post-prandial sugars are not at goal of less than 180 mg/dL.  Patient would benefit from:  Metformin: reviewed history - do not see contraindication to metformin (no hx of lactic acidosis, renal function adequate). Recommend retrial of XR formulation to help stabilize BG.  Insulin: continue current dose; discussed option of changing to vial and syringe - Patient is not interested in this and would prefer to keep paying for pens and meet her deductible.  Discussed the importance of notifying clinic if blood sugars go low with the addition of metformin so that we can lower her insulin doses, Patient will continue to self-monitoring blood sugars twice daily.  Also reviewed HYPOglycemia treatment.  HM: due for DM foot exam - to complete with PCP at in person appointment.  Px therapy: did not discuss aspirin ASCVD prevention today, to address at follow-up appointment.    Hypertension: needs further assessment of increased hydrochlorothiazide dose - plan in place for clinic follow-up blood pressure check.    Constipation: Constipation is likely partially opioid induced from tramadol usage. Recommend senna-docusate. Patient prefers to  OTC. Additionally discussed how metformin can cause loose stools and may balance this constipation side effect from tramadol.    PLAN:                            1. Start metformin  mg: once daily with evening meal. [facility] in [place]. Rx sent.  2. Continue current insulin dose.  3. Continue checking your blood sugars; Reach out if you  start having blood sugars less than 80 mg/dL.  Your blood sugars goals are:  Before eating (fasting): 80 - 130 mg/dL  2-hours after meals (post-prandial): less than 180 mg/dL  Education about HYPOglycemia treatment in AV[facility].  4. Your constipation may resolve with start of metformin. If it does not, you can take senna-s (docusate/senna) - it is 2 medications combined in 1 tablet. Take 1-2 tabs by mouth daily if needed for constipation.  5. Make a pre-op appointment with YUAN Bal (after 2/2/2021).  We will re-check your blood pressure at this appointment.  6. You are due for diabetes foot exam. Please complete this with your primary care provider, Alejandra Farmer at your next in person appointment.     Future: discuss aspirin    Follow-up: 3/1/2021 telemedicine appointment at 3:30 pm.    SUBJECTIVE/OBJECTIVE:                          Ellen Hill is a 65 year old female called for a follow-up visit. She was referred to me from Nereida Perrin, PharmD.  Today's visit is a follow-up MTM visit from 1/4/2021.     Reason for visit: diabetes follow-up.    Allergies/ADRs: Reviewed in chart  Tobacco: She reports that she quit smoking about 7 years ago. Her smoking use included cigarettes. She has a 1.00 pack-year smoking history. She has never used smokeless tobacco.  Alcohol: Less than 1 beverages / week  Personal health care goal: improve diabetes so she can be cleared for her surgery.    Medication Adherence/Access: Patient uses pill box(es): AM and PM dosing - has been working to increase her adherence to medications.    Type 2 Diabetes:  Testing supplies: glucometer  Following with certified diabetes care and  (CDCES)? Not currently.  Pt currently taking:  Novolin 70/30: 54 units twice daily before morning and evening meals. Insulin was $104 for 1 box of insulin, she is wondering why her cost went up since last year.  We discussed her $2000 deductible on her insurance plan.  Medication  History: metformin (stopped 4/2019 - unsure why - has been on both IR and XR formulations; per chart review today, most recent metformin was 4/2019 1000 mg IR tabs and was stopped due to diarrhea), actos (stopped 2017), liraglutide/victoza (stopped due to hives), insulin detemir (stopped due to hives - per chart yovanny-nordisk products cause hives).  Record of home blood sugars: very proud of her recent blood sugars results.  SMBG: two times daily.   Ranges (patient reported): See below  Symptoms of low blood sugar? none  Symptoms of high blood sugar? Possible that leg pains are related to hyperglycemia.  Date Fasting AM After Dinner   1/25 103    1/24 130 250   1/23 92 162   1/22 113 123   1/21 111 120   1/20 --    1/19 133 136   1/18 135 203   1/17 117 280     Lab Results   Component Value Date    A1C 7.7 01/08/2021    A1C 10.0 11/04/2020    A1C 9.5 08/20/2020    A1C 12.4 12/02/2019    A1C 12.5 04/05/2019   Eye exam: up to date, last 11/2020  Foot exam: due  Diet: meals at 8:30am, lunch 12pm, 5pm, less snacking recently  Exercise:  no routine; does some walking at work. Would like to get on her stationary bike  ASCVD Prevention:  Aspirin: not taking - not discussed today.  Statin atorvastatin (hx of intermittent adherence).  ACEi/ARB: Yes: losartan 100 mg - see below.   Urine Albumin:  Lab Results   Component Value Date    UMALCR 100.72 (H) 01/08/2021     Hypertension:  Current Meds:  Losartan 100 mg: once daily  Hydrochlorothiazide 25 mg: once daily (dose increased at last appointment with PCP)  Medication History: amlodipine (was stopped by provider when restarting other BP meds)  Patient does not self-monitor blood pressure, does have an arm cuff at home and capable of checking her home BPs.  BP Readings from Last 3 Encounters:   01/08/21 (!) 142/78   11/04/20 (!) 150/82   08/20/20 (!) 162/60     Constipation: Patient states that sometimes she is going ~3 days without a BM and they are hard to push out. No  current meds for constipation; notable that she takes tramadol for pain.  Hoping to use less tramadol in the future after her surgery.    Today's Vitals: LMP  (LMP Unknown)   ----------------    I spent 24 minutes with this patient today. All changes were made via collaborative practice agreement with Alejandra Bal. A copy of the visit note was provided to the patient's primary care provider.    The patient was mailed a summary of these recommendations. Clinic to mail.    Evangelina Gacria PharmD  Medication Therapy Management (MTM) Pharmacist  [facility]  Pager: [phone]    Telemedicine Visit Details  Type of service:  Telephone visit  Start Time: 3:30 pm  End Time: 3:54 pm  Originating Location (patient location): [place]  Distant Location (provider location):  [facility] MTM      Medication Therapy Recommendations  Constipation, unspecified constipation type    Current Medication: senna-docusate (SENNA S) 8.6-50 MG tablet   Rationale: Untreated condition - Needs additional medication therapy - Indication   Recommendation: Start Medication   Status: Patient Agreed - Adherence/Education         Uncontrolled type 2 diabetes mellitus with microalbuminuria, with long-term current use of insulin (H)    Current Medication: metFORMIN (GLUCOPHAGE-XR) 500 MG 24 hr tablet   Rationale: Synergistic therapy - Needs additional medication therapy - Indication   Recommendation: Start Medication   Status: Accepted per CPA

## 2021-01-26 RX ORDER — AMOXICILLIN 250 MG
1-2 CAPSULE ORAL DAILY PRN
COMMUNITY
End: 2023-05-19

## 2021-01-26 NOTE — PATIENT INSTRUCTIONS
Patient Education     Recommendations from today's MTM visit:                                                    MTM (medication therapy management) is a service provided by a clinical pharmacist designed to help you get the most of out of your medicines.      1. Start metformin  mg: once daily with evening meal.     2. Continue current insulin dose.    3. Continue checking your blood sugars; Reach out if you start having blood sugars less than 80 mg/dL.  Your blood sugars goals are:  Before eating (fasting): 80 - 130 mg/dL  2-hours after meals (post-prandial): less than 180 mg/dL  Education about HYPOglycemia treatment - see below    4. Your constipation may resolve with start of metformin. If it does not, you can take senna-s (docusate/senna) - it is 2 medications combined in 1 tablet. Take 1-2 tabs by mouth daily if needed for constipation.    5. Make a pre-op appointment with YUAN Bal (after 2/2/2021).  We will re-check your blood pressure at this appointment.    6. You are due for diabetes foot exam. Please complete this with your primary care provider, Alejandra Farmer at your next in person appointment.       It was great to speak with you today.  I value your experience and would be very thankful for your time with providing feedback on our clinic survey. You may receive a survey via email or text message in the next few days.     Next MTM visit:  3/1/2021 telemedicine appointment at 3:30 pm.    To schedule another MTM appointment, please call the clinic directly or you may call the MTM scheduling line at [phone] or toll-free at [phone].     My Clinical Pharmacist's contact information:                                                      It was a pleasure talking with you today!  Please feel free to contact me with any questions or concerns you have.      Evangelina Garcia, PharmD  Medication Therapy Management (MTM) Pharmacist    HYPOGLYCEMIA (LOW BLOOD SUGAR) TREATMENT:  If you have  blurry vision, weakness/fatigue, headache, irritability, shakiness, sweatiness, feeling dizzy or hungry, you may be experiencing low blood sugar.    A) Use your glucometer to check your blood sugar. If your sugar is <70 mg/dL, eat or drink 16 grams of fast-acting sugar (orange juice, sugary candies, 4 glucose tabs).    B) Check blood sugar again 15 minutes later.  If your blood sugar is still below 80 mg/dL, treat again with fast acting sugar (as above).  C) Once your blood sugar has normalized, always follow-up with a complex carbohydrate (peanut butter, nuts, eggs with cheese) or your regular meal if it is mealtime.

## 2021-02-01 ENCOUNTER — OFFICE VISIT (OUTPATIENT)
Dept: FAMILY MEDICINE | Facility: CLINIC | Age: 66
End: 2021-02-01
Payer: COMMERCIAL

## 2021-02-01 VITALS
DIASTOLIC BLOOD PRESSURE: 60 MMHG | WEIGHT: 172 LBS | OXYGEN SATURATION: 98 % | SYSTOLIC BLOOD PRESSURE: 134 MMHG | TEMPERATURE: 97.3 F | RESPIRATION RATE: 16 BRPM | BODY MASS INDEX: 30.47 KG/M2 | HEART RATE: 99 BPM

## 2021-02-01 DIAGNOSIS — Z12.11 SCREEN FOR COLON CANCER: ICD-10-CM

## 2021-02-01 DIAGNOSIS — Z79.4 TYPE 2 DIABETES MELLITUS TREATED WITH INSULIN (H): ICD-10-CM

## 2021-02-01 DIAGNOSIS — I10 ESSENTIAL HYPERTENSION WITH GOAL BLOOD PRESSURE LESS THAN 130/80: ICD-10-CM

## 2021-02-01 DIAGNOSIS — F32.1 MODERATE MAJOR DEPRESSION (H): ICD-10-CM

## 2021-02-01 DIAGNOSIS — E66.811 OBESITY, CLASS I, BMI 30-34.9: ICD-10-CM

## 2021-02-01 DIAGNOSIS — G89.29 CHRONIC LOW BACK PAIN, UNSPECIFIED BACK PAIN LATERALITY, UNSPECIFIED WHETHER SCIATICA PRESENT: ICD-10-CM

## 2021-02-01 DIAGNOSIS — E11.9 TYPE 2 DIABETES MELLITUS TREATED WITH INSULIN (H): ICD-10-CM

## 2021-02-01 DIAGNOSIS — Z01.818 PREOP GENERAL PHYSICAL EXAM: Primary | ICD-10-CM

## 2021-02-01 DIAGNOSIS — M54.50 CHRONIC LOW BACK PAIN, UNSPECIFIED BACK PAIN LATERALITY, UNSPECIFIED WHETHER SCIATICA PRESENT: ICD-10-CM

## 2021-02-01 DIAGNOSIS — E78.5 HYPERLIPIDEMIA LDL GOAL <100: ICD-10-CM

## 2021-02-01 LAB
ANION GAP SERPL CALCULATED.3IONS-SCNC: 8 MMOL/L (ref 3–14)
BUN SERPL-MCNC: 19 MG/DL (ref 7–30)
CALCIUM SERPL-MCNC: 9.7 MG/DL (ref 8.5–10.1)
CHLORIDE SERPL-SCNC: 107 MMOL/L (ref 94–109)
CO2 SERPL-SCNC: 25 MMOL/L (ref 20–32)
CREAT SERPL-MCNC: 0.73 MG/DL (ref 0.52–1.04)
GFR SERPL CREATININE-BSD FRML MDRD: 88 ML/MIN/{1.73_M2}
GLUCOSE SERPL-MCNC: 62 MG/DL (ref 70–99)
HBA1C MFR BLD: 6.7 % (ref 0–5.6)
HGB BLD-MCNC: 14 G/DL (ref 11.7–15.7)
POTASSIUM SERPL-SCNC: 3.4 MMOL/L (ref 3.4–5.3)
SODIUM SERPL-SCNC: 140 MMOL/L (ref 133–144)

## 2021-02-01 PROCEDURE — 85018 HEMOGLOBIN: CPT | Performed by: NURSE PRACTITIONER

## 2021-02-01 PROCEDURE — 83036 HEMOGLOBIN GLYCOSYLATED A1C: CPT | Performed by: NURSE PRACTITIONER

## 2021-02-01 PROCEDURE — 99214 OFFICE O/P EST MOD 30 MIN: CPT | Performed by: NURSE PRACTITIONER

## 2021-02-01 PROCEDURE — 80048 BASIC METABOLIC PNL TOTAL CA: CPT | Performed by: NURSE PRACTITIONER

## 2021-02-01 PROCEDURE — 36415 COLL VENOUS BLD VENIPUNCTURE: CPT | Performed by: NURSE PRACTITIONER

## 2021-02-01 RX ORDER — AMLODIPINE BESYLATE 10 MG/1
TABLET ORAL
COMMUNITY
Start: 2021-01-13 | End: 2022-01-12

## 2021-02-01 ASSESSMENT — PAIN SCALES - GENERAL: PAINLEVEL: MODERATE PAIN (5)

## 2021-02-01 NOTE — PROGRESS NOTES
[address] [address]  Phone: [phone]  Primary Provider: Alejandra Sanders  Pre-op Performing Provider: ALEJANDRA SANDERS    PREOPERATIVE EVALUATION:  Today's date: 2/1/2021    Ellen Hill is a 65 year old female who presents for a preoperative evaluation.    Surgical Information:  Surgery/Procedure: fusion of L4 and L5   Surgery Location: [facility] in [place]   Surgeon: Dr. Neo Mancuso   Surgery Date: 03/02/2021  Time of Surgery: TBD   Where patient plans to recover: At home with family  Fax number for surgical facility: [phone]    Type of Anesthesia Anticipated: to be determined    Subjective     HPI related to upcoming procedure: Patient with chronic pain on the right low back with radiculopathy affecting ADLs. She has tried and and failed conservative measures.       Preop Questions 2/1/2021   1. Have you ever had a heart attack or stroke? No   2. Have you ever had surgery on your heart or blood vessels, such as a stent placement, a coronary artery bypass, or surgery on an artery in your head, neck, heart, or legs? No   3. Do you have chest pain with activity? No   4. Do you have a history of  heart failure? No   5. Do you currently have a cold, bronchitis or symptoms of other infection? No   6. Do you have a cough, shortness of breath, or wheezing? No   7. Do you or anyone in your family have previous history of blood clots? No   8. Do you or does anyone in your family have a serious bleeding problem such as prolonged bleeding following surgeries or cuts? No   9. Have you ever had problems with anemia or been told to take iron pills? No   10. Have you had any abnormal blood loss such as black, tarry or bloody stools, or abnormal vaginal bleeding? No   11. Have you ever had a blood transfusion? No   12. Are you willing to have a blood transfusion if it is medically needed before, during, or after your surgery? Yes    13. Have you or any of your relatives ever had problems with anesthesia? No   14. Do you have sleep apnea, excessive snoring or daytime drowsiness? No   15. Do you have any artifical heart valves or other implanted medical devices like a pacemaker, defibrillator, or continuous glucose monitor? No   16. Do you have artificial joints? No   17. Are you allergic to latex? No       Health Care Directive:  Patient does not have a Health Care Directive or Living Will: Discussed advance care planning with patient; however, patient declined at this time.    Preoperative Review of :   reviewed - controlled substances reflected in medication list.      DEPRESSION - Patient has a long history of Depression of moderate severity requiring medication for control with recent symptoms being stable..Current symptoms of depression include none.      DIABETES - Patient has a longstanding history of DiabetesType Type II . Patient is being treated with diet and insulin injections and denies significant side effects. Control has recently been good since working with our [place] pharmacist. Complicating factors include but are not limited to: hypertension, hyperlipidemia, morbid obesity  and tobacco use. BG this      HYPERLIPIDEMIA - Patient has a long history of significant Hyperlipidemia requiring medication for treatment with recent good control. Patient reports no problems or side effects with the medication.      HYPERTENSION - Patient has longstanding history of HTN , currently denies any symptoms referable to elevated blood pressure. Specifically denies chest pain, palpitations, dyspnea, orthopnea, PND or peripheral edema. Blood pressure readings have been in normal range. Current medication regimen is as listed below. Patient denies any side effects of medication.             Review of Systems except as noted above  CONSTITUTIONAL: NEGATIVE for fever, chills, change in weight  INTEGUMENTARY/SKIN: NEGATIVE for worrisome  "rashes, moles or lesions  EYES: NEGATIVE for vision changes or irritation  ENT/MOUTH: NEGATIVE for ear, mouth and throat problems  RESP: NEGATIVE for significant cough or SOB  BREAST: NEGATIVE for masses, tenderness or discharge  CV: NEGATIVE for chest pain, palpitations or peripheral edema  GI: NEGATIVE for nausea, abdominal pain, heartburn, or change in bowel habits  : NEGATIVE for frequency, dysuria, or hematuria  MUSCULOSKELETAL: NEGATIVE for significant arthralgias or myalgia  NEURO: NEGATIVE for weakness, dizziness or paresthesias  ENDOCRINE: NEGATIVE for temperature intolerance, skin/hair changes  HEME: NEGATIVE for bleeding problems  PSYCHIATRIC: NEGATIVE for changes in mood or affect    Patient Active Problem List    Diagnosis Date Noted     Albuminuria 01/16/2019     Priority: Medium     Noncompliance with medication regimen 12/29/2017     Priority: Medium     Uncontrolled type 2 diabetes mellitus with microalbuminuria, with long-term current use of insulin (H) 06/19/2017     Priority: Medium     Type 2 diabetes, HbA1c goal < 7%       Leblanc's esophagus without dysplasia 05/10/2017     Priority: Medium     Obesity, Class I, BMI 30-34.9 05/15/2014     Priority: Medium     Alcoholic (H) 03/28/2013     Priority: Medium     Advanced directives, counseling/discussion 03/28/2013     Priority: Medium     Patient does not have an Advance/Health Care Directive (HCD), given \"What is Advance Care Planning?\" flyer.    Nola Lopez  March 28, 2013         History of adenomatous polyp of colon 12/03/2012     Priority: Medium     Repeat colonoscopy in 2017       Vulvar pruritus 03/10/2011     Priority: Medium     Moderate major depression (H) 01/17/2011     Priority: Medium     Essential hypertension with goal blood pressure less than 130/80 12/21/2010     Priority: Medium     Hyperlipidemia LDL goal <100 10/27/2010     Priority: Medium     Post herpetic neuralgia 06/03/2010     Priority: Medium      Past Medical " "History:   Diagnosis Date     Advanced directives, counseling/discussion 3/28/2013    Patient does not have an Advance/Health Care Directive (HCD), given \"What is Advance Care Planning?\" flyer.  Nola Lopez March 28, 2013      Hyperlipidemia LDL goal <100 10/27/2010     Hypertension goal BP (blood pressure) < 130/80 12/21/2010     Microalbuminuria 2/12/2015     Shingles 2009    Right posterior shoulder as per patient     Type 2 diabetes, HbA1c goal < 7% (H) 10/31/2010     Past Surgical History:   Procedure Laterality Date     BIOPSY Right 1989    Breast-Benign lesion as per patient     Current Outpatient Medications   Medication Sig Dispense Refill     acetaminophen (TYLENOL) 500 MG tablet Take 500-1,000 mg by mouth every 4 hours as needed for mild pain Max of 3000 mg/day (6 tabs)       amLODIPine (NORVASC) 10 MG tablet        atorvastatin (LIPITOR) 40 MG tablet Take 1 tablet (40 mg) by mouth daily 90 tablet 1     blood glucose (NO BRAND SPECIFIED) test strip Use to test blood sugar 2 times daily or as directed. 100 each 11     blood glucose monitoring (CoupmonET) lancets Use to test blood sugar 2 times daily or as directed. 100 each 11     cyclobenzaprine (FLEXERIL) 10 MG tablet Take 1 tablet by mouth nightly as needed       escitalopram (LEXAPRO) 10 MG tablet Take 1 tablet (10 mg) by mouth daily 90 tablet 1     gabapentin (NEURONTIN) 400 MG capsule Take 2 capsules by mouth At Bedtime       hydrochlorothiazide (HYDRODIURIL) 25 MG tablet Take 1 tablet (25 mg) by mouth daily 30 tablet 1     ibuprofen (ADVIL/MOTRIN) 200 MG tablet Take 400 mg by mouth every 4 hours as needed for mild pain       insulin NPH-Regular (NOVOLIN 70/30 FLEXPEN) susp Inject 54units twice a day before breakfast and evening meal. Dose as of 12/7/2020. 30 mL 1     insulin pen needle (ULTICARE MICRO) 32G X 4 MM miscellaneous Use 2 pen needles daily or as directed. 100 each 11     losartan (COZAAR) 100 MG tablet Take 1 tablet (100 mg) by " mouth daily 90 tablet 1     metFORMIN (GLUCOPHAGE-XR) 500 MG 24 hr tablet Take 1 tablet by mouth once daily with evening meal x2 weeks; if tolerated, may increase to 2 tabs/day. 180 tablet 1     senna-docusate (SENNA S) 8.6-50 MG tablet Take 1-2 tablets by mouth daily as needed for constipation       traMADol (ULTRAM) 50 MG tablet Take 50 mg by mouth every 6 hours as needed         Allergies   Allergen Reactions     Insulin Detemir Hives     Alfredo products cause hives**       Liraglutide Hives     Alfredo Nordisk product     Victoza Hives     Alfredo Nordisk product     Enalapril Cough     Lisinopril Cough     Wellbutrin [Bupropion Hcl] Hives     hives        Social History     Tobacco Use     Smoking status: Former Smoker     Packs/day: 0.10     Years: 10.00     Pack years: 1.00     Types: Cigarettes     Quit date: 9/10/2013     Years since quittin.4     Smokeless tobacco: Never Used   Substance Use Topics     Alcohol use: Yes     Alcohol/week: 1.0 standard drinks     Types: 1 Cans of beer per week     Comment: drinks 1-2 times a month     Family History   Problem Relation Age of Onset     Alcohol/Drug Mother      Heart Disease Mother      Alcohol/Drug Father      Diabetes Brother      History   Drug Use No         Objective     /60 (BP Location: Right arm, Patient Position: Chair, Cuff Size: Adult Regular)   Pulse 99   Temp 97.3  F (36.3  C) (Tympanic)   Resp 16   Wt 78 kg (172 lb)   LMP  (LMP Unknown)   SpO2 98%   BMI 30.47 kg/m      Physical Exam    GENERAL APPEARANCE: healthy, alert and no distress     EYES: EOMI, PERRL     HENT: ear canals and TM's normal and nose and mouth without ulcers or lesions     NECK: no adenopathy, no asymmetry, masses, or scars and thyroid normal to palpation     RESP: lungs clear to auscultation - no rales, rhonchi or wheezes     CV: regular rates and rhythm, normal S1 S2, no S3 or S4 and no murmur, click or rub     ABDOMEN:  soft, nontender, obese and bowel sounds  normal     MS: extremities normal- no gross deformities noted, no evidence of inflammation in joints, FROM in all extremities.     SKIN: no suspicious lesions or rashes     NEURO: Normal strength and tone, sensory exam grossly normal, mentation intact and speech normal     PSYCH: mentation appears normal. and affect normal/bright     LYMPHATICS: No cervical adenopathy    Recent Labs   Lab Test 01/08/21  1334 11/04/20  1639 08/20/20  1733   HGB  --  14.1 14.2   PLT  --  262 270    139 139   POTASSIUM 3.9 3.6 3.7   CR 0.65 0.56 0.70   A1C 7.7* 10.0* 9.5*        Diagnostics:  Recent Results (from the past 48 hour(s))   Basic metabolic panel  (Ca, Cl, CO2, Creat, Gluc, K, Na, BUN)    Collection Time: 02/01/21  5:07 PM   Result Value Ref Range    Sodium 140 133 - 144 mmol/L    Potassium 3.4 3.4 - 5.3 mmol/L    Chloride 107 94 - 109 mmol/L    Carbon Dioxide 25 20 - 32 mmol/L    Anion Gap 8 3 - 14 mmol/L    Glucose 62 (L) 70 - 99 mg/dL    Urea Nitrogen 19 7 - 30 mg/dL    Creatinine 0.73 0.52 - 1.04 mg/dL    GFR Estimate 88 >60 mL/min/[1.73_m2]    GFR Estimate If Black >90 >60 mL/min/[1.73_m2]    Calcium 9.7 8.5 - 10.1 mg/dL   Hemoglobin A1c    Collection Time: 02/01/21  5:07 PM   Result Value Ref Range    Hemoglobin A1C 6.7 (H) 0 - 5.6 %   Hemoglobin    Collection Time: 02/01/21  5:07 PM   Result Value Ref Range    Hemoglobin 14.0 11.7 - 15.7 g/dL      No EKG this visit, completed in the last 90 days.    Revised Cardiac Risk Index (RCRI):  The patient has the following serious cardiovascular risks for perioperative complications:   - Diabetes Mellitus (on Insulin) = 1 point     RCRI Interpretation: 1 point: Class II (low risk - 0.9% complication rate)           Assessment & Plan   The proposed surgical procedure is considered INTERMEDIATE risk.    Problem List Items Addressed This Visit        Endocrine    Hyperlipidemia LDL goal <100    Uncontrolled type 2 diabetes mellitus with microalbuminuria, with long-term  current use of insulin (H)    Relevant Medications    insulin NPH-Regular (NOVOLIN 70/30 FLEXPEN) susp       Circulatory    Essential hypertension with goal blood pressure less than 130/80       Behavioral    Moderate major depression (H)       Other    Obesity, Class I, BMI 30-34.9      Other Visit Diagnoses     Preop general physical exam    -  Primary    Relevant Orders    Basic metabolic panel  (Ca, Cl, CO2, Creat, Gluc, K, Na, BUN) (Completed)    Hemoglobin A1c (Completed)    Hemoglobin (Completed)    Chronic low back pain, unspecified back pain laterality, unspecified whether sciatica present        Screen for colon cancer        Relevant Orders    Fecal colorectal cancer screen (FIT)             Risks and Recommendations:  The patient has the following additional risks and recommendations for perioperative complications:   - Consult Hospitalist / IM to assist with post-op medical management    Diabetes:  - Patient is on insulin therapy; diabetic NPO guidelines provided and discussed.    Medication Instructions:  Patient is to take all scheduled medications on the day of surgery EXCEPT for modifications listed below:     - ACE/ARB: HOLD due to exceptional risk of hypotension during surgery.    - Calcium Channel Blockers: May be continued on the day of surgery.   - Diuretics: HOLD on the day of surgery.   - Statins: Continue taking on the day of surgery.    - mixed insulin (70/30m 75/25, 50/50): HOLD day of surgery   - metformin: HOLD day of surgery.   - ibuprofen (Advil, Motrin): HOLD 1 day before surgery.    - SSRIs, SNRIs, TCAs, Antipsychotics: Continue without modification.       RECOMMENDATION:  APPROVAL GIVEN to proceed with proposed procedure, without further diagnostic evaluation.    Signed Electronically by: YVON Huddleston CNP    Copy of this evaluation report is provided to requesting physician.    Preop [facility] Preop Guidelines    Revised Cardiac Risk Index    This visit  took place during the COVID 19 global pandemic.   PPE worn during the visit included: surgical mask and face shield by me and mask by patient

## 2021-02-01 NOTE — PATIENT INSTRUCTIONS
On the day of your surgery, do not take your losartan, hydrochlorothiazide, insulin or metformin. Stop ibuprofen 24 hours before surgery.  Patient Education    At [facility], we strive to deliver an exceptional experience to you, every time we see you. If you receive a survey, please complete it as we do value your feedback.  If you have MyChart, you can expect to receive results automatically within 24 hours of their completion.  Your provider will send a note interpreting your results as well.   If you do not have MyChart, you should receive your results in about a week by mail.    Your care team:                            Family Medicine Internal Medicine   MD Jorge Alberto Ribeiro, MD Mony Calabrese, MD Durga Retana, MD Milvia Gauthier, PAGusC  Alejandra Bal, APRELIZABETH Juares, MD Pediatrics   Delta Berman, PAGusC  Marcella Teixeira, LADONNA Hills, MD Catherine Hernandez APRN CNP   MD Jigna Temple MD Deborah Mielke, MD Kim Thein, APRN [facility]      Clinic hours: Monday - Thursday 7 am-6 pm; Fridays 7 am-5 pm.   Urgent care: Monday - Friday 11 am-9 pm; Saturday and Sunday 9 am-5 pm.    Clinic: [phone]       [facility]: Monday - Thursday 8 am - 7 pm; Friday 8 am - 6 pm  [facility]: [phone]     Use [email or web address] for 24/7 diagnosis and treatment of dozens of conditions.    Preparing for Your Surgery  Getting started  A nurse will call you to review your health history and instructions. They will give you an arrival time based on your scheduled surgery time.  Please be ready to share the following:    Your doctor's clinic name and phone number    Your medical, surgical and anesthesia history    A list of allergies and sensitivities    A list of medicines, including herbal treatments and over-the-counter drugs    Whether the patient has a legal guardian (ask how to send us  the papers in advance)  If you have a child who's having surgery, please ask for a copy of Preparing for Your Child's Surgery.    Preparing for surgery    Within 30 days of surgery: Have a pre-op exam (sometimes called an H&P, or History and Physical). This can be done at a clinic or pre-operative center.  ? If you're having a , you may not need this exam. Talk to your care team    At your pre-op exam, talk to your care team about all medicines you take. If you need to stop any medicines before surgery, ask when to start taking them again.  ? We do this for your safety. Many medicines can make you bleed too much during surgery. Some change how well surgery (anesthesia) drugs work.    Call your insurance company to let them know you're having surgery. (If you don't have insurance, call [phone].)    Call your clinic if there's any change in your health. This includes signs of a cold or flu (sore throat, runny nose, cough, rash, fever). It also includes a scrape or scratch near the surgery site.    If you have questions on the day of surgery, call your hospital or surgery center.  Eating and drinking guidelines  For your safety: Unless your surgeon tells you otherwise, follow the guidelines below.    Eat and drink as usual until 8 hours before surgery. After that, no food or milk.    Drink clear liquids until 2 hours before surgery. These are liquids you can see through, like water, Gatorade and Propel Water. You may also have black coffee and tea (no cream or milk).    Nothing by mouth within 2 hours of surgery. This includes gum, candy and breath mints.    If you drink, stop drinking alcohol the night before surgery.    If your care team tells you to take medicine on the morning of surgery, it's okay to take it with a sip of water.  Preventing infection    Shower or bathe the night before and morning of your surgery. Follow the instructions your clinic gave you. (If no instructions, use regular  soap.)    Don't shave or clip hair near your surgery site. We'll remove the hair if needed.    Don't smoke or vape the morning of surgery. You may chew nicotine gum up to 2 hours before surgery. A nicotine patch is okay.  ? Note: Some surgeries require you to completely quit smoking and nicotine. Check with your surgeon.    Your care team will make every effort to keep you safe from infection. We will:  ? Clean our hands often with soap and water (or an alcohol-based hand rub).  ? Clean the skin at your surgery site with a special soap that kills germs.  ? Give you a special gown to keep you warm. (Cold raises the risk of infection.)  ? Wear special hair covers, masks, gowns and gloves during surgery.  ? Give antibiotic medicine, if prescribed. Not all surgeries need antibiotics.  What to bring on the day of surgery    Photo ID and insurance card    Copy of your health care directive, if you have one    Glasses and hearing aides (bring cases)  ? You can't wear contacts during surgery    Inhaler and eye drops, if you use them (tell us about these when you arrive)    CPAP machine or breathing device, if you use them    A few personal items, if spending the night    If you have . . .  ? A pacemaker or ICD (cardiac defibrillator): Bring the ID card.  ? An implanted stimulator: Bring the remote control.  ? A legal guardian: Bring a copy of the certified (court-stamped) guardianship papers.  Please remove any jewelry, including body piercings. Leave jewelry and other valuables at home.  If you're going home the day of surgery  Important: If you don't follow the rules below, we must cancel your surgery.     Arrange for someone to drive you home after surgery. You may not drive, take a taxi or take public transportation by yourself (unless you'll have local anesthesia only).    Arrange for a responsible adult to stay with you overnight. If you don't, we may keep you in the hospital overnight, and you may need to pay the  costs yourself.  Questions?   If you have any questions for your care team, list them here: _________________________________________________________________________________________________________________________________________________________________________________________________________________________________________________________________________________________________________________________  For informational purposes only. Not to replace the advice of your health care provider. Copyright   2003, 2019 [facility]. All rights reserved. Clinically reviewed by Hanna Quintana MD. SMART[employer] 063583 - REV 4/20.

## 2021-02-01 NOTE — LETTER
February 2, 2021      Ellen Hill  [address] [place] [address]        Dear ,    Your lab results were normal. Great job on all of your work for your diabetes! Please let us know if you have any questions.   Thank you for allowing us to participate in your care.     Best of luck with your surgery,   Alejandra Bal, APRN CNP         Resulted Orders   Basic metabolic panel  (Ca, Cl, CO2, Creat, Gluc, K, Na, BUN)   Result Value Ref Range    Sodium 140 133 - 144 mmol/L    Potassium 3.4 3.4 - 5.3 mmol/L    Chloride 107 94 - 109 mmol/L    Carbon Dioxide 25 20 - 32 mmol/L    Anion Gap 8 3 - 14 mmol/L    Glucose 62 (L) 70 - 99 mg/dL      Comment:      Non Fasting    Urea Nitrogen 19 7 - 30 mg/dL    Creatinine 0.73 0.52 - 1.04 mg/dL    GFR Estimate 88 >60 mL/min/[1.73_m2]      Comment:      Non  GFR Calc  Starting 12/18/2018, serum creatinine based estimated GFR (eGFR) will be   calculated using the Chronic Kidney Disease Epidemiology Collaboration   (CKD-EPI) equation.      GFR Estimate If Black >90 >60 mL/min/[1.73_m2]      Comment:       GFR Calc  Starting 12/18/2018, serum creatinine based estimated GFR (eGFR) will be   calculated using the Chronic Kidney Disease Epidemiology Collaboration   (CKD-EPI) equation.      Calcium 9.7 8.5 - 10.1 mg/dL   Hemoglobin A1c   Result Value Ref Range    Hemoglobin A1C 6.7 (H) 0 - 5.6 %      Comment:      Normal <5.7% Prediabetes 5.7-6.4%  Diabetes 6.5% or higher - adopted from ADA   consensus guidelines.  Results confirmed by repeat test     Hemoglobin   Result Value Ref Range    Hemoglobin 14.0 11.7 - 15.7 g/dL

## 2021-02-02 ENCOUNTER — TELEPHONE (OUTPATIENT)
Dept: FAMILY MEDICINE | Facility: CLINIC | Age: 66
End: 2021-02-02

## 2021-02-02 NOTE — TELEPHONE ENCOUNTER
Patient calling and stated the [facility] has not received her paperwork. And the copy sent to [facility] has not been scanned in, patient does not know what she did with her copy. Patient was told that the form will be faxed to [facility]. Please call patient to discuss and advise.  Gonzalez Arredondo,  For 1st Floor Primary Care

## 2021-02-02 NOTE — TELEPHONE ENCOUNTER
Patient called again and stated the clinic did fax patient's paperwork, the reason why it is pending is because the dates on the form is not fully completed. Patient will bring in the form and put notes on where patient needs to have more information put on for Alejandra to address.  Gonzalez Arredondo,  For 1st Floor Primary Care

## 2021-02-02 NOTE — TELEPHONE ENCOUNTER
.Reason for Call:  Form, our goal is to have forms completed with 72 hours, however, some forms may require a visit or additional information.    Type of letter, form or note:  FMLA    Who is the form from?: Patient    Where did the form come from: Patient or family brought in       What clinic location was the form placed at?: [place]    Where the form was placed: [facility]    What number is listed as a contact on the form?: [phone]        Additional comments: Pt would like form faxed and a call once completed    Call taken on 2/2/2021 at 10:25 AM by RADHA NEW

## 2021-02-02 NOTE — RESULT ENCOUNTER NOTE
Please send letter:    Ms. Hill,  Your lab results were normal. Great job on all of your work for your diabetes! Please let us know if you have any questions.  Thank you for allowing us to participate in your care.  Best of luck with your surgery,  YVON Huddleston CNP

## 2021-02-02 NOTE — TELEPHONE ENCOUNTER
NEW 3 WKS AGO - ON TOP OF EACH OTHER - DOES NOT GET BETTER WHEN HE COVERS LEFT EYE. Patient called back and stated she found her copy and will bring the form to our  staff to make a copy for Angelica PETTY to fax to [place] for patient.  Gonzalez Arredondo,  For 1st Floor Primary Care

## 2021-02-03 PROBLEM — F17.200 SMOKER: Status: RESOLVED | Noted: 2018-08-13 | Resolved: 2021-02-03

## 2021-02-03 NOTE — TELEPHONE ENCOUNTER
Form is received with patient's notes of where her form needs to be corrected or missing information, this form is put in the provider's red folder to address.  Gonzalez Arredondo,  For 1st Floor Primary Care

## 2021-02-03 NOTE — TELEPHONE ENCOUNTER
Form is faxed back to the [facility] at fax # [phone], copy is put in abstraction basket and sent 1 copy down the dumbwaiter for patient to pickup tomorrow from the  staff.  Gonzalez Arredondo,  For 1st Floor Primary Care

## 2021-02-03 NOTE — TELEPHONE ENCOUNTER
Updated with dates of planned surgery.   Completed and in 1st floor TC basket on 2nd floor  YVON Huddleston CNP

## 2021-02-04 NOTE — TELEPHONE ENCOUNTER
Called left msg for patient form is completed and faxed, a copy is at the  for patient to pickup.  Gonzalez Arredondo,  For 1st Floor Primary Care

## 2021-02-23 ENCOUNTER — VIRTUAL VISIT (OUTPATIENT)
Dept: PHARMACY | Facility: CLINIC | Age: 66
End: 2021-02-23
Payer: COMMERCIAL

## 2021-02-23 DIAGNOSIS — I10 ESSENTIAL HYPERTENSION WITH GOAL BLOOD PRESSURE LESS THAN 130/80: ICD-10-CM

## 2021-02-23 PROCEDURE — 99606 MTMS BY PHARM EST 15 MIN: CPT | Mod: TEL | Performed by: PHARMACIST

## 2021-02-23 PROCEDURE — 99607 MTMS BY PHARM ADDL 15 MIN: CPT | Mod: TEL | Performed by: PHARMACIST

## 2021-02-23 NOTE — PROGRESS NOTES
Medication Therapy Management (MTM) Encounter    ASSESSMENT:                            Medication Adherence/Access: Ok to take all daily meds in the AM to avoid missing doses    Type 2 Diabetes: Patient is meeting A1c goal of < 7%.  Self monitoring of blood glucose is at goal of fasting  mg/dL but not consistently at goal post prandial < 180 mg/dL.Will wait to make adjustments until after her surgery, as this is likely to impact her diet and activity levels. There is room to adjust her insulin up or down if needed or titrate up on metformin.    Hypertension: Stable. Patient is meeting blood pressure goal of < 140/90mmHg.      PLAN:                            1. No changes to medications at this time. We will review again after surgery.    2. Ok to take all medications in the mornings to help ensure you don't miss any doses.    Follow-up: 3 weeks over the phone    SUBJECTIVE/OBJECTIVE:                          Ellen Hill is a 65 year old female called for a follow-up visit. She was referred to me from Alejandra Bal.  Today's visit is a follow-up MTM visit from 1/25/2021 visit with Evangelina Garcia PharmD.     Reason for visit: blood sugar check, med review.  Surgery March 2nd - decompression of L4-L5 to help with back pain. Pre-op done 2/1/21 - see notes.    Allergies/ADRs: Reviewed in chart  Tobacco: She reports that she quit smoking about 7 years ago. Her smoking use included cigarettes. She has a 1.00 pack-year smoking history. She has never used smokeless tobacco.  Alcohol: Less than 1 beverages / week    Medication Adherence/Access: Patient uses pill box(es).  Patient takes medications 0-1time(s) per day.   Per patient, misses medication 2 times per week.   AM: hydrochlorothiazide, atorvastatin, amlodipine, losartan, escitalopram, gabapentin (2)  PM: metformin  Usually taking her PRN pain meds in the evenings.     Type 2 Diabetes: Currently taking metformin XR 500mg once daily (forgets dose 2x/week  with PM meal because she takes most meds in the AM), Novolin 70/30 56units twice daily before meals. No side effects noted with recent metformin start.  SMBG Ranges (patient reported):   Date FBG/ 2hours post HS   2/23 94    2/22 100 111   2/21 161 (pain) 178   2/20 113 231   2/19 107 172   2/18 115 250   2/17  234   Symptoms of low blood sugar? shaky, dizzy, weak one time in the last few weeks with a reading of 71mg/dL upon waking  Symptoms of high blood sugar? none  Aspirin: Not taking - will wait to start until post-op  Statin: Yes: atorvastatin  ACEi/ARB: Yes: losartan 100mg daily. Urine albumin is   Lab Results   Component Value Date    UMALCR 100.72 (H) 01/08/2021       Lab Results   Component Value Date    A1C 6.7 02/01/2021    A1C 7.7 01/08/2021    A1C 10.0 11/04/2020    A1C 9.5 08/20/2020    A1C 12.4 12/02/2019        Hypertension: Current medications include amlodipine 10mg daily, losartan 100mg daily.  Patient does not self-monitor blood pressure.  Patient reports no current medication side effects.  BP Readings from Last 3 Encounters:   02/01/21 134/60   01/08/21 (!) 142/78   11/04/20 (!) 150/82       Today's Vitals: LMP  (LMP Unknown)   ----------------      I spent 27 minutes with this patient today. All changes were made via collaborative practice agreement with Alejandra Bal. A copy of the visit note was provided to the patient's primary care provider.    The patient declined a summary of these recommendations.     Nereida Perrin, Pharm.D., [facility]  Medication Therapy Management Pharmacist  [phone]    Telemedicine Visit Details  Type of service:  Telephone visit  Start Time: 4:05 PM  End Time: 4:32 PM  Originating Location (patient location): Home  Distant Location (provider location):  [facility]

## 2021-02-23 NOTE — PATIENT INSTRUCTIONS
1. No changes to medications at this time. We will review again after surgery.    2. Ok to take all medications in the mornings to help ensure you don't miss any doses.    Follow-up: 3 weeks over the phone    It was great to speak with you today.  I value your experience and would be very thankful for your time with providing feedback on our clinic survey. You may receive a survey via email or text message in the next few days. Please feel free to contact me with any questions or concerns you have.       Nereida Perrin, Pharm.D., [facility]   Medication Therapy Management Pharmacist   Voicemail: [phone]   Appointments: [phone]

## 2021-02-26 ENCOUNTER — TELEPHONE (OUTPATIENT)
Dept: FAMILY MEDICINE | Facility: CLINIC | Age: 66
End: 2021-02-26

## 2021-02-26 NOTE — TELEPHONE ENCOUNTER
Form was corrected by Dr. Trejo for Alejandra and the updated information is faxed to the [facility] at fax # [phone].  Gonzalez Arredondo,  For 1st Floor Primary Care

## 2021-02-26 NOTE — TELEPHONE ENCOUNTER
Reason for call: Patient states that Alejandra Bal needs to put that patient is to be off 4 days a month instead of 2 for [facility]    Can we leave a detailed message: Yes      Patient  can be reached at: [phone]    Call taken at 1:15 pm on 2/26/2021    Ruth Ann Jarrell MA  [facility]  2nd Floor  Primary Care

## 2021-02-26 NOTE — TELEPHONE ENCOUNTER
I am not in clinic until 8. Will you please have someone in clinic change to 4 days per month rather than 2?  Thank you

## 2021-03-01 NOTE — TELEPHONE ENCOUNTER
Patient would  Like to know if she has any forms at the  in at  that need to be picked up. Please call back to let her know. Ok to leave detailed message on VM.     Nuzhat Osorio, PSC

## 2021-03-02 ENCOUNTER — TRANSFERRED RECORDS (OUTPATIENT)
Dept: HEALTH INFORMATION MANAGEMENT | Facility: CLINIC | Age: 66
End: 2021-03-02

## 2021-03-15 ENCOUNTER — TRANSFERRED RECORDS (OUTPATIENT)
Dept: HEALTH INFORMATION MANAGEMENT | Facility: CLINIC | Age: 66
End: 2021-03-15

## 2021-03-18 ENCOUNTER — VIRTUAL VISIT (OUTPATIENT)
Dept: PHARMACY | Facility: CLINIC | Age: 66
End: 2021-03-18
Payer: COMMERCIAL

## 2021-03-18 DIAGNOSIS — E11.9 TYPE 2 DIABETES MELLITUS TREATED WITH INSULIN (H): ICD-10-CM

## 2021-03-18 DIAGNOSIS — R52 PAIN: ICD-10-CM

## 2021-03-18 DIAGNOSIS — Z79.4 TYPE 2 DIABETES MELLITUS TREATED WITH INSULIN (H): ICD-10-CM

## 2021-03-18 PROCEDURE — 99607 MTMS BY PHARM ADDL 15 MIN: CPT | Mod: TEL | Performed by: PHARMACIST

## 2021-03-18 PROCEDURE — 99606 MTMS BY PHARM EST 15 MIN: CPT | Mod: TEL | Performed by: PHARMACIST

## 2021-03-18 RX ORDER — OXYCODONE HYDROCHLORIDE 5 MG/1
TABLET ORAL
COMMUNITY
Start: 2021-03-11 | End: 2022-01-12

## 2021-03-18 RX ORDER — HYDROXYZINE PAMOATE 25 MG/1
1-2 CAPSULE ORAL
COMMUNITY
Start: 2021-03-04 | End: 2022-01-12

## 2021-03-18 NOTE — PROGRESS NOTES
Medication Therapy Management (MTM) Encounter    ASSESSMENT:                            Medication Adherence/Access: Ok to take all daily meds in the AM to avoid missing doses    Type 2 Diabetes: Patient is meeting A1c goal of < 7%.  Self monitoring of blood glucose is at goal of fasting  mg/dL but not consistently at goal post prandial < 180 mg/dL. Possibly attributed to irregular diet/activity/missed meds post-op. Will continue to wait to make adjustments until she is back to more usual routine. There is room to adjust her insulin up or down if needed or titrate up on metformin.    Hypertension: Stable. Patient is meeting blood pressure goal of < 140/90mmHg.    Pain: Stable. Patient encouraged to taper off oxycodone/hydroxyzine as pain levels improve.      PLAN:                            1. No changes to medications at this time.     2. Make sure to get back to filling pill box and taking all chronic medications every morning.    Follow-up: early May - A1c and follow-up phone visit    SUBJECTIVE/OBJECTIVE:                          Ellen Hill is a 65 year old female called for a follow-up visit. She was referred to me from Alejandra Bal.  Today's visit is a follow-up MTM visit from 2/23/2021.    Reason for visit: blood sugar check, med review.  Surgery March 2nd - decompression of L4-L5 to help with back pain. Feels ok - check up on Monday and everything looking good: Xray and incision. Notes pain has really subsided, but moving around is still difficult.  Hasn't had much of an appetite post-op, so has also missed or forgotten some    Allergies/ADRs: Reviewed in chart  Tobacco: She reports that she quit smoking about 7 years ago. Her smoking use included cigarettes. She has a 1.00 pack-year smoking history. She has never used smokeless tobacco.  Alcohol: Less than 1 beverages / week    Medication Adherence/Access: Patient uses pill box(es).  Patient takes medications 1time(s) per day in the  morning.  Per patient, misses medication 2 or more times per week, especially post-op     Type 2 Diabetes: Currently taking metformin XR 500mg QAM (timing moved from PM dose last visit)  Novolin 70/30 56units twice daily before meals.   No side effects noted with recent metformin start.  SMBG Ranges (patient reported):   Date FBG/ 2hours post Lunch/2hours post Dinner /2hours post   3/18 150     3/17      3/16      3/15 103  203   3/14 98  213   3/13 103  213   3/12      Symptoms of low blood sugar? shaky, dizzy, weak one time in the last few weeks with a reading of 71mg/dL upon waking  Symptoms of high blood sugar? None  Diet: not much of an appetite post-op  Aspirin: Not taking - will wait to start until post-op  Statin: Yes: atorvastatin  ACEi/ARB: Yes: losartan 100mg daily. Urine albumin is   Lab Results   Component Value Date    UMALCR 100.72 (H) 01/08/2021       Lab Results   Component Value Date    A1C 6.7 02/01/2021    A1C 7.7 01/08/2021    A1C 10.0 11/04/2020    A1C 9.5 08/20/2020    A1C 12.4 12/02/2019        Hypertension: Current medications include amlodipine 10mg daily, losartan 100mg daily.  Patient does not self-monitor blood pressure.  Patient reports no current medication side effects.  BP Readings from Last 3 Encounters:   02/01/21 134/60   01/08/21 (!) 142/78   11/04/20 (!) 150/82       Post-op Pain: Taking oxycodone 5mg twice daily, which is helpful, helps her feel relaxed for a little while. She has been able to reduce her dose from four times daily. She did have strange dreams when she first started taking this, but this has resolved. No issues with constipation and she is taking Senna-docusate daily. She is also taking hydroxyzine twice daily with oxycodone. She has tramadol at home but not currently using. We discussed avoiding tramadol in combination with oxycodone.    Today's Vitals: LMP  (LMP Unknown)   ----------------      I spent 20 minutes with this patient today. All changes were made  via collaborative practice agreement with Alejandra Bal. A copy of the visit note was provided to the patient's primary care provider.    The patient declined a summary of these recommendations.     Nereida Perrin, Pharm.D., [facility]  Medication Therapy Management Pharmacist  [phone]    Telemedicine Visit Details  Type of service:  Telephone visit  Start Time: 1:00 PM  End Time: 1:20 PM  Originating Location (patient location): Home  Distant Location (provider location):  [facility] MTM       Medication Therapy Recommendations Needing Review  No medication therapy recommendations to display

## 2021-03-18 NOTE — PATIENT INSTRUCTIONS
1. No changes to medications at this time.     2. Make sure to get back to filling pill box and taking all chronic medications every morning.    Follow-up: early May - A1c and follow-up phone visit    It was great to speak with you today.  I value your experience and would be very thankful for your time with providing feedback on our clinic survey. You may receive a survey via email or text message in the next few days. Please feel free to contact me with any questions or concerns you have.       Nereida Perrin, Pharm.D., [facility]   Medication Therapy Management Pharmacist   Voicemail: [phone]   Appointments: [phone]

## 2021-03-27 ENCOUNTER — TELEPHONE (OUTPATIENT)
Dept: FAMILY MEDICINE | Facility: CLINIC | Age: 66
End: 2021-03-27

## 2021-03-27 NOTE — TELEPHONE ENCOUNTER
Pharmacy is requesting refill for    methocarbamol (ROBAXIN) 750 MG tablet (Discontinued) 45 tablet 1 12/2/2019 4/8/2020           Arnel Faarax  [facility]

## 2021-03-29 RX ORDER — METHOCARBAMOL 750 MG/1
750 TABLET, FILM COATED ORAL 3 TIMES DAILY PRN
Qty: 45 TABLET | Refills: 1 | OUTPATIENT
Start: 2021-03-29

## 2021-03-30 ENCOUNTER — TELEPHONE (OUTPATIENT)
Dept: FAMILY MEDICINE | Facility: CLINIC | Age: 66
End: 2021-03-30

## 2021-03-30 NOTE — LETTER
Ellen Hill  [facility] [place] [address]          04/02/21      Dear Ellen Hill        At [facility] we care about your health and are committed to providing quality patient care. Regular appointments are a vital part of the care and management of your health and can help prevent many of the complications that can occur.      We are unable to refill your medication(s)  We will need you to schedule an appointment before refills can be given.  Please call [phone] to schedule this appointment.      Thank you,    [facility]

## 2021-03-30 NOTE — TELEPHONE ENCOUNTER
Routing refill request to provider for review/approval because:  Drug not active on patient's medication list  Kathy Flood RN

## 2021-03-31 RX ORDER — METHOCARBAMOL 750 MG/1
TABLET, FILM COATED ORAL
Qty: 45 TABLET | Refills: 0 | OUTPATIENT
Start: 2021-03-31

## 2021-03-31 NOTE — TELEPHONE ENCOUNTER
Called and left a voicemail message to return our call to schedule an appointment for the refill request.  Ruth Ann Jarrell MA  [facility]  2nd Floor  Primary Care

## 2021-04-02 NOTE — TELEPHONE ENCOUNTER
This writer attempted to contact Patient on 04/02/21      Reason for call needs a visit and left message and sent letter.      If patient calls back:   Schedule an appointment within 1 week with primary care, document that pt called and close encounter         Ruth Ann Jarrell MA

## 2021-04-12 ENCOUNTER — TRANSFERRED RECORDS (OUTPATIENT)
Dept: HEALTH INFORMATION MANAGEMENT | Facility: CLINIC | Age: 66
End: 2021-04-12

## 2021-04-24 ENCOUNTER — DOCUMENTATION ONLY (OUTPATIENT)
Dept: LAB | Facility: CLINIC | Age: 66
End: 2021-04-24

## 2021-04-24 DIAGNOSIS — E11.29 TYPE 2 DIABETES MELLITUS WITH MICROALBUMINURIA, WITH LONG-TERM CURRENT USE OF INSULIN (H): ICD-10-CM

## 2021-04-24 DIAGNOSIS — Z79.4 TYPE 2 DIABETES MELLITUS WITH MICROALBUMINURIA, WITH LONG-TERM CURRENT USE OF INSULIN (H): ICD-10-CM

## 2021-04-24 DIAGNOSIS — E78.5 HYPERLIPIDEMIA LDL GOAL <100: Primary | ICD-10-CM

## 2021-04-24 DIAGNOSIS — R80.9 MICROALBUMINURIA: ICD-10-CM

## 2021-04-24 DIAGNOSIS — R80.9 TYPE 2 DIABETES MELLITUS WITH MICROALBUMINURIA, WITH LONG-TERM CURRENT USE OF INSULIN (H): ICD-10-CM

## 2021-04-24 NOTE — PROGRESS NOTES
Patient has an up coming lab appointment on 4.27.2021. Please review and place future orders that may be needed.     Thank you  Shirin Granda MLT ([facility])

## 2021-04-26 NOTE — PROGRESS NOTES
Please call to notify patient that labs tomorrow should be fasting as we will include her cholesterol levels.  We will also collect a urine sample to recheck the abnormal protein levels.  Thanks!  Marcella

## 2021-04-26 NOTE — PROGRESS NOTES
Called and spoke to the patient and gave her Marcella's message. Patient understands.  Ruth Ann Jarrell MA  [facility]  2nd Floor  Primary Care

## 2021-04-27 DIAGNOSIS — R80.9 MICROALBUMINURIA: ICD-10-CM

## 2021-04-27 DIAGNOSIS — Z79.4 TYPE 2 DIABETES MELLITUS WITH MICROALBUMINURIA, WITH LONG-TERM CURRENT USE OF INSULIN (H): ICD-10-CM

## 2021-04-27 DIAGNOSIS — R80.9 ALBUMINURIA: ICD-10-CM

## 2021-04-27 DIAGNOSIS — E78.5 HYPERLIPIDEMIA LDL GOAL <100: ICD-10-CM

## 2021-04-27 DIAGNOSIS — R80.9 TYPE 2 DIABETES MELLITUS WITH MICROALBUMINURIA, WITH LONG-TERM CURRENT USE OF INSULIN (H): ICD-10-CM

## 2021-04-27 DIAGNOSIS — E11.29 TYPE 2 DIABETES MELLITUS WITH MICROALBUMINURIA, WITH LONG-TERM CURRENT USE OF INSULIN (H): ICD-10-CM

## 2021-04-27 LAB
CHOLEST SERPL-MCNC: 266 MG/DL
CREAT UR-MCNC: 165 MG/DL
HBA1C MFR BLD: 6.5 % (ref 0–5.6)
HDLC SERPL-MCNC: 44 MG/DL
LDLC SERPL CALC-MCNC: 170 MG/DL
MICROALBUMIN UR-MCNC: 147 MG/L
MICROALBUMIN/CREAT UR: 89.09 MG/G CR (ref 0–25)
NONHDLC SERPL-MCNC: 222 MG/DL
TRIGL SERPL-MCNC: 258 MG/DL

## 2021-04-27 PROCEDURE — 83036 HEMOGLOBIN GLYCOSYLATED A1C: CPT | Performed by: NURSE PRACTITIONER

## 2021-04-27 PROCEDURE — 36415 COLL VENOUS BLD VENIPUNCTURE: CPT | Performed by: NURSE PRACTITIONER

## 2021-04-27 PROCEDURE — 80061 LIPID PANEL: CPT | Performed by: NURSE PRACTITIONER

## 2021-04-27 PROCEDURE — 82043 UR ALBUMIN QUANTITATIVE: CPT | Performed by: NURSE PRACTITIONER

## 2021-05-03 ENCOUNTER — TRANSFERRED RECORDS (OUTPATIENT)
Dept: HEALTH INFORMATION MANAGEMENT | Facility: CLINIC | Age: 66
End: 2021-05-03

## 2021-05-27 ENCOUNTER — TRANSFERRED RECORDS (OUTPATIENT)
Dept: HEALTH INFORMATION MANAGEMENT | Facility: CLINIC | Age: 66
End: 2021-05-27

## 2021-06-28 ENCOUNTER — TRANSFERRED RECORDS (OUTPATIENT)
Dept: HEALTH INFORMATION MANAGEMENT | Facility: CLINIC | Age: 66
End: 2021-06-28

## 2021-09-03 ENCOUNTER — TELEPHONE (OUTPATIENT)
Dept: PHARMACY | Facility: CLINIC | Age: 66
End: 2021-09-03

## 2021-09-03 NOTE — TELEPHONE ENCOUNTER
We have been unable to reach this patient for MTM follow-up after several attempts. We will stop reaching out to the patient at this time. Please let us know if we can assist in this patient's care in the future.    Routing to PCP as DEAN Perrin, Pharm.D., BCACP  Medication Therapy Management Pharmacist  [phone]

## 2021-09-13 ENCOUNTER — TRANSFERRED RECORDS (OUTPATIENT)
Dept: HEALTH INFORMATION MANAGEMENT | Facility: CLINIC | Age: 66
End: 2021-09-13

## 2021-09-15 ENCOUNTER — VIRTUAL VISIT (OUTPATIENT)
Dept: FAMILY MEDICINE | Facility: CLINIC | Age: 66
End: 2021-09-15
Payer: COMMERCIAL

## 2021-09-15 DIAGNOSIS — Z20.822 ENCOUNTER FOR LABORATORY TESTING FOR COVID-19 VIRUS: Primary | ICD-10-CM

## 2021-09-15 NOTE — PROGRESS NOTES
"Ellen is a 65 year old who is being evaluated via a billable telephone visit.      What phone number would you like to be contacted at? [phone]  How would you like to obtain your AVS? Mail a copy    Assessment & Plan     Encounter for laboratory testing for COVID-19 virus  She works at medical assembly  She was exposed to someone at work who was tested positive for Covid  She would like to have Covid test  Exposure happened last week  They work in close proximity  Work wants her to have a negative Covid test before she can come back to work  - Asymptomatic COVID-19 Virus (Coronavirus) by PCR Nasopharyngeal; Future      20 minutes spent on the date of the encounter doing chart review, history and exam, documentation and further activities per the note       BMI:   Estimated body mass index is 30.47 kg/m  as calculated from the following:    Height as of 1/8/21: 1.6 m (5' 3\").    Weight as of 2/1/21: 78 kg (172 lb).           Return if symptoms worsen or fail to improve.    Durga Retana MD  [facility]   Ellen is a 65 year old who presents for the following health issues     HPI   She is requesting a Covid test  She needs this before she can go back to work  She was exposed to someone at work with whom she works in close proximity and the person tested positive for Covid  She already had first dose of Covid vaccine  Going to get the second dose soon          Review of Systems   Constitutional, HEENT, cardiovascular, pulmonary, gi and gu systems are negative, except as otherwise noted.      Objective           Vitals:  No vitals were obtained today due to virtual visit.    Physical Exam   healthy, alert and no distress  PSYCH: Alert and oriented times 3; coherent speech, normal   rate and volume, able to articulate logical thoughts, able   to abstract reason, no tangential thoughts, no hallucinations   or delusions  Her affect is normal  RESP: No cough, no audible wheezing, " able to talk in full sentences  Remainder of exam unable to be completed due to telephone visits                Phone call duration: 7 minutes

## 2021-09-15 NOTE — PATIENT INSTRUCTIONS
"  Patient Education   After Your COVID-19 (Coronavirus) Test  You have been tested for COVID-19 (coronavirus).   If you'll have surgery in the next few days, we'll let you know ahead of time if you have the virus. Please call your surgeon's office with any questions.  For all other patients: Results are usually available in TraceLink within 2 to 3 days.   If you do not have a TraceLink account, you'll get a letter in the mail in about 7 to 10 days.   [employer] is often the fastest way to get test results. Please sign up if you do not already have a TraceLink account. See the handout Getting COVID-19 Test Results in TraceLink for help.  What if my test result is positive?  If your test is positive and you have not viewed your result in TraceLink, you'll get a phone call with your result. (A positive test means that you have the virus.)     Follow the tips under \"How do I self-isolate?\" below for 10 days (20 days if you have a weak immune system).    You don't need to be retested for COVID-19 before going back to school or work. As long as you're fever-free and feeling better, you can go back to school, work and other activities after waiting the 10 or 20 days.  What if I have questions after I get my results?  If you have questions about your results, please visit our testing website at [email or web address].   After 7 to 10 days, if you have not gotten your results:     Call [phone] ([phone]) and ask to speak with our COVID-19 results team.    If you're being treated at an infusion center: Call your infusion center directly.  What are the symptoms of COVID-19?  Cough, fever and trouble breathing are the most common signs of COVID-19.  Other symptoms can include new headaches, new muscle or body aches, new and unexplained fatigue (feeling very tired), chills, sore throat, congestion (stuffy or runny nose), diarrhea (loose poop), loss of taste or smell, belly pain, and nausea or vomiting " "(feeling sick to your stomach or throwing up).  You may already have symptoms of COVID-19, or they may show up later.  What should I do if I have symptoms?  If you're having surgery: Call your surgeon's office.  For all other patients: Stay home and away from others (self-isolate) until ...    You've had no fever--and no medicine that reduces fever--for 1 full day (24 hours), AND    Other symptoms have gotten better. For example, your cough or breathing has improved, AND    At least 10 days have passed since your symptoms first started.  How do I self-isolate?    Stay in your own room, even for meals. Use your own bathroom if you can.    Stay away from others in your home. No hugging, kissing or shaking hands. No visitors.    Don't go to work, school or anywhere else.    Clean \"high touch\" surfaces often (doorknobs, counters, handles). Use household cleaning spray or wipes. You'll find a full list of  on the EPA website: www.epa.gov/pesticide-registration/list-n-disinfectants-use-against-sars-cov-2.    Cover your mouth and nose with a mask or other face covering to avoid spreading germs.    Wash your hands and face often. Use soap and water.    Caregivers in these groups are at risk for severe illness due to COVID-19:  ? People 65 years and older  ? People who live in a nursing home or long-term care facility  ? People with chronic disease (lung, heart, cancer, diabetes, kidney, liver, immunologic)  ? People who have a weakened immune system, including those who:    Are in cancer treatment    Take medicine that weakens the immune system, such as corticosteroids    Had a bone marrow or organ transplant    Have an immune deficiency    Have poorly controlled HIV or AIDS    Are obese (body mass index of 40 or higher)    Smoke regularly    Caregivers should wear gloves while washing dishes, handling laundry and cleaning bedrooms and bathrooms.    Use caution when washing and drying laundry: Don't shake dirty " laundry and use the warmest water setting that you can.    For more tips on managing your health at home, go to www.cdc.gov/coronavirus/2019-ncov/downloads/10Things.pdf.  How can I take care of myself at home?  1. Get lots of rest. Drink extra fluids (unless a doctor has told you not to).  2. Take Tylenol (acetaminophen) for fever or pain. If you have liver or kidney problems, ask your family doctor if it's OK to take Tylenol.   Adults can take either:  ? 650 mg (two 325 mg pills) every 4 to 6 hours, or   ? 1,000 mg (two 500 mg pills) every 8 hours as needed.  ? Note: Don't take more than 3,000 mg in one day. Acetaminophen is found in many medicines (both prescribed and over-the-counter medicines). Read all labels to be sure you don't take too much.   For children, check the Tylenol bottle for the right dose. The dose is based on the child's age or weight.  3. If you have other health problems (like cancer, heart failure, an organ transplant or severe kidney disease): Call your specialty clinic if you don't feel better in the next 2 days.  4. Know when to call 911. Emergency warning signs include:  ? Trouble breathing or shortness of breath  ? Chest pain or pressure that doesn't go away  ? Feeling confused like you haven't felt before, or not being able to wake up  ? Bluish-colored lips or face  5. If your doctor prescribed a blood thinner medicine: Follow their instructions.  Where can I get more information?    [facility] - About COVID-19:   [email or web address]    CDC - If You're Sick: cdc.gov/coronavirus/2019-ncov/about/steps-when-sick.html    CDC - Ending Home Isolation: www.cdc.gov/coronavirus/2019-ncov/hcp/disposition-in-home-patients.html    CDC - Caring for Someone: www.cdc.gov/coronavirus/2019-ncov/if-you-are-sick/care-for-someone.html    [facility] - Interim Guidance for Hospital Discharge to Home: www.health.[facility].[place]/diseases/coronavirus/hcp/hospdischarge.pdf    [facility]  clinical trials (COVID-19 research studies): [email or web address].[email or web address]-clinical-trials    Below are the COVID-19 hotlines at the [place] Department of Health ([facility]). Interpreters are available.  ? For health questions: Call 538-393-3803 or 1-353.379.9950 (7 a.m. to 7 p.m.)  ? For questions about schools and childcare: Call 656-688-3478 or 1-565.302.7585 (7 a.m. to 7 p.m.)    For informational purposes only. Not to replace the advice of your health care provider. Clinically reviewed by Infection Prevention and the [facility] COVID-19 Clinical Team. Copyright   2020 [facility]. All rights reserved. [employer] 462006 - Rev 11/11/20.

## 2021-10-08 NOTE — TELEPHONE ENCOUNTER
October 8, 2021  The envelope was not picked up from the . Placed in shred box.  Ruth Ann Jarrell [facility]  2nd Floor  Primary Care

## 2021-11-08 ENCOUNTER — TRANSFERRED RECORDS (OUTPATIENT)
Dept: HEALTH INFORMATION MANAGEMENT | Facility: CLINIC | Age: 66
End: 2021-11-08
Payer: COMMERCIAL

## 2022-01-12 ENCOUNTER — OFFICE VISIT (OUTPATIENT)
Dept: FAMILY MEDICINE | Facility: CLINIC | Age: 67
End: 2022-01-12
Payer: COMMERCIAL

## 2022-01-12 VITALS
BODY MASS INDEX: 30.58 KG/M2 | HEIGHT: 63 IN | OXYGEN SATURATION: 97 % | TEMPERATURE: 97.2 F | WEIGHT: 172.6 LBS | HEART RATE: 72 BPM | DIASTOLIC BLOOD PRESSURE: 79 MMHG | SYSTOLIC BLOOD PRESSURE: 167 MMHG

## 2022-01-12 DIAGNOSIS — F32.1 MODERATE MAJOR DEPRESSION (H): ICD-10-CM

## 2022-01-12 DIAGNOSIS — E11.9 TYPE 2 DIABETES MELLITUS TREATED WITH INSULIN (H): Primary | ICD-10-CM

## 2022-01-12 DIAGNOSIS — F10.20 ALCOHOLIC (H): ICD-10-CM

## 2022-01-12 DIAGNOSIS — Z79.4 TYPE 2 DIABETES MELLITUS TREATED WITH INSULIN (H): Primary | ICD-10-CM

## 2022-01-12 DIAGNOSIS — I10 ESSENTIAL HYPERTENSION WITH GOAL BLOOD PRESSURE LESS THAN 130/80: ICD-10-CM

## 2022-01-12 DIAGNOSIS — E78.5 HYPERLIPIDEMIA LDL GOAL <100: ICD-10-CM

## 2022-01-12 LAB
ALBUMIN SERPL-MCNC: 3.9 G/DL (ref 3.4–5)
ALP SERPL-CCNC: 85 U/L (ref 40–150)
ALT SERPL W P-5'-P-CCNC: 23 U/L (ref 0–50)
ANION GAP SERPL CALCULATED.3IONS-SCNC: 5 MMOL/L (ref 3–14)
AST SERPL W P-5'-P-CCNC: 13 U/L (ref 0–45)
BILIRUB SERPL-MCNC: 0.5 MG/DL (ref 0.2–1.3)
BUN SERPL-MCNC: 21 MG/DL (ref 7–30)
CALCIUM SERPL-MCNC: 9.8 MG/DL (ref 8.5–10.1)
CHLORIDE BLD-SCNC: 109 MMOL/L (ref 94–109)
CO2 SERPL-SCNC: 26 MMOL/L (ref 20–32)
CREAT SERPL-MCNC: 0.66 MG/DL (ref 0.52–1.04)
ERYTHROCYTE [DISTWIDTH] IN BLOOD BY AUTOMATED COUNT: 13 % (ref 10–15)
GFR SERPL CREATININE-BSD FRML MDRD: >90 ML/MIN/1.73M2
GLUCOSE BLD-MCNC: 151 MG/DL (ref 70–99)
HBA1C MFR BLD: 10.2 % (ref 0–5.6)
HCT VFR BLD AUTO: 42.5 % (ref 35–47)
HGB BLD-MCNC: 14.1 G/DL (ref 11.7–15.7)
LDLC SERPL CALC-MCNC: 184 MG/DL
MCH RBC QN AUTO: 29 PG (ref 26.5–33)
MCHC RBC AUTO-ENTMCNC: 33.2 G/DL (ref 31.5–36.5)
MCV RBC AUTO: 87 FL (ref 78–100)
PLATELET # BLD AUTO: 255 10E3/UL (ref 150–450)
POTASSIUM BLD-SCNC: 3.9 MMOL/L (ref 3.4–5.3)
PROT SERPL-MCNC: 7.5 G/DL (ref 6.8–8.8)
RBC # BLD AUTO: 4.87 10E6/UL (ref 3.8–5.2)
SODIUM SERPL-SCNC: 140 MMOL/L (ref 133–144)
WBC # BLD AUTO: 7.2 10E3/UL (ref 4–11)

## 2022-01-12 PROCEDURE — 83036 HEMOGLOBIN GLYCOSYLATED A1C: CPT | Performed by: NURSE PRACTITIONER

## 2022-01-12 PROCEDURE — 82043 UR ALBUMIN QUANTITATIVE: CPT | Performed by: NURSE PRACTITIONER

## 2022-01-12 PROCEDURE — 85027 COMPLETE CBC AUTOMATED: CPT | Performed by: NURSE PRACTITIONER

## 2022-01-12 PROCEDURE — 83721 ASSAY OF BLOOD LIPOPROTEIN: CPT | Performed by: NURSE PRACTITIONER

## 2022-01-12 PROCEDURE — 36415 COLL VENOUS BLD VENIPUNCTURE: CPT | Performed by: NURSE PRACTITIONER

## 2022-01-12 PROCEDURE — 80053 COMPREHEN METABOLIC PANEL: CPT | Performed by: NURSE PRACTITIONER

## 2022-01-12 PROCEDURE — 99214 OFFICE O/P EST MOD 30 MIN: CPT | Performed by: NURSE PRACTITIONER

## 2022-01-12 RX ORDER — ATORVASTATIN CALCIUM 40 MG/1
40 TABLET, FILM COATED ORAL DAILY
Qty: 90 TABLET | Refills: 1 | Status: SHIPPED | OUTPATIENT
Start: 2022-01-12 | End: 2023-05-24

## 2022-01-12 RX ORDER — HUMAN INSULIN 100 [IU]/ML
INJECTION, SUSPENSION SUBCUTANEOUS
Qty: 30 ML | Refills: 3 | Status: SHIPPED | OUTPATIENT
Start: 2022-01-12 | End: 2022-10-25

## 2022-01-12 RX ORDER — GABAPENTIN 100 MG/1
100 CAPSULE ORAL 3 TIMES DAILY
COMMUNITY
End: 2022-04-04

## 2022-01-12 RX ORDER — MELOXICAM 7.5 MG/1
7.5 TABLET ORAL DAILY
COMMUNITY
End: 2022-10-25

## 2022-01-12 RX ORDER — AMLODIPINE BESYLATE 10 MG/1
10 TABLET ORAL DAILY
Qty: 90 TABLET | Refills: 1 | Status: SHIPPED | OUTPATIENT
Start: 2022-01-12 | End: 2023-02-01

## 2022-01-12 ASSESSMENT — PATIENT HEALTH QUESTIONNAIRE - PHQ9
SUM OF ALL RESPONSES TO PHQ QUESTIONS 1-9: 5
SUM OF ALL RESPONSES TO PHQ QUESTIONS 1-9: 5

## 2022-01-12 ASSESSMENT — PAIN SCALES - GENERAL: PAINLEVEL: MODERATE PAIN (4)

## 2022-01-12 ASSESSMENT — MIFFLIN-ST. JEOR: SCORE: 1302.04

## 2022-01-12 NOTE — PROGRESS NOTES
Assessment & Plan     Type 2 diabetes mellitus treated with insulin (H)  Uncontrolled. She has stopped her metformin. Discussed restarting it. Continue insulin. Watch diet/exercise.  - Comprehensive metabolic panel (BMP + Alb, Alk Phos, ALT, AST, Total. Bili, TP); Future  - Hemoglobin A1c; Future  - Albumin Random Urine Quantitative with Creat Ratio; Future  - insulin NPH-Regular (NOVOLIN 70/30 FLEXPEN) susp; Inject 56units twice a day before breakfast and evening meal. Dose as of 3/18/21  - Comprehensive metabolic panel (BMP + Alb, Alk Phos, ALT, AST, Total. Bili, TP)  - Hemoglobin A1c  - Albumin Random Urine Quantitative with Creat Ratio    Essential hypertension with goal blood pressure less than 130/80  Uncontrolled. Patient taking only amlodipine. Asked her to restart her losartan and hydrochlorothiazide as well. Needs repeat BMP in 10-14 days.  - Comprehensive metabolic panel (BMP + Alb, Alk Phos, ALT, AST, Total. Bili, TP); Future  - Albumin Random Urine Quantitative with Creat Ratio; Future  - CBC with platelets; Future  - amLODIPine (NORVASC) 10 MG tablet; Take 1 tablet (10 mg) by mouth daily  - Comprehensive metabolic panel (BMP + Alb, Alk Phos, ALT, AST, Total. Bili, TP)  - Albumin Random Urine Quantitative with Creat Ratio  - CBC with platelets  - hydrochlorothiazide (HYDRODIURIL) 25 MG tablet; Take 1 tablet (25 mg) by mouth daily  - losartan (COZAAR) 100 MG tablet; Take 1 tablet (100 mg) by mouth daily    Hyperlipidemia LDL goal <100  Uncontrolled. Discussed importance of taking medication as prescribed. Recheck 3 months.  - Comprehensive metabolic panel (BMP + Alb, Alk Phos, ALT, AST, Total. Bili, TP); Future  - LDL cholesterol direct; Future  - atorvastatin (LIPITOR) 40 MG tablet; Take 1 tablet (40 mg) by mouth daily  - Comprehensive metabolic panel (BMP + Alb, Alk Phos, ALT, AST, Total. Bili, TP)  - LDL cholesterol direct    Uncontrolled type 2 diabetes mellitus with microalbuminuria, with  "long-term current use of insulin (H)  Restarting - see above.  - metFORMIN (GLUCOPHAGE-XR) 500 MG 24 hr tablet; Take 1 tablet by mouth once daily with evening meal x2 weeks; if tolerated, may increase to 2 tabs/day.    Alcoholic (H)  Last drink several years ago.    Moderate major depression (H)  Stable. No thoughts to harm self/others.          BMI:   Estimated body mass index is 30.57 kg/m  as calculated from the following:    Height as of this encounter: 1.6 m (5' 3\").    Weight as of this encounter: 78.3 kg (172 lb 9.6 oz).       See Patient Instructions    Return in about 6 weeks (around 2/23/2022).     The benefits, risks and potential side effects were discussed in detail. Black box warnings discussed as relevant. All patient questions were answered. The patient was instructed to follow up immediately if any adverse reactions develop.    Return precautions discussed, including when to seek urgent/emergent care.    Patient verbalizes understanding and agrees with plan of care. Patient stable for discharge.      YVON Huddleston CNP  [facility]    Mary Ellen Hughes is a 66 year old who presents for the following health issues     History of Present Illness       She eats 0-1 servings of fruits and vegetables daily.She consumes 1 sweetened beverage(s) daily.She exercises with enough effort to increase her heart rate 9 or less minutes per day.  She exercises with enough effort to increase her heart rate 3 or less days per week.   She is taking medications regularly.       Diabetes Follow-up    How often are you checking your blood sugar? A few times a month  What time of day are you checking your blood sugars (select all that apply)?  Before meals  Have you had any blood sugars above 200?  Yes 224  Have you had any blood sugars below 70?  No    What symptoms do you notice when your blood sugar is low?  None    What concerns do you have today about your diabetes? None     Do you " "have any of these symptoms? (Select all that apply)  No numbness or tingling in feet.  No redness, sores or blisters on feet.  No complaints of excessive thirst.  No reports of blurry vision.  No significant changes to weight.    Have you had a diabetic eye exam in the last 12 months? Yes        BP Readings from Last 2 Encounters:   01/12/22 (!) 157/86   02/01/21 134/60     Hemoglobin A1C POCT (%)   Date Value   04/27/2021 6.5 (H)   02/01/2021 6.7 (H)     LDL Cholesterol Calculated (mg/dL)   Date Value   04/27/2021 170 (H)   08/20/2020 188 (H)           How many servings of fruits and vegetables do you eat daily?  0-1    On average, how many sweetened beverages do you drink each day (Examples: soda, juice, sweet tea, etc.  Do NOT count diet or artificially sweetened beverages)?   1    How many days per week do you exercise enough to make your heart beat faster?none     How many minutes a day do you exercise enough to make your heart beat faster? None     How many days per week do you miss taking your medication? 0      Seeing [facility] for pain    Review of Systems   Constitutional, HEENT, cardiovascular, pulmonary, gi and gu systems are negative, except as otherwise noted.      Objective    BP (!) 157/86   Pulse 72   Temp 97.2  F (36.2  C) (Tympanic)   Ht 1.6 m (5' 3\")   Wt 78.3 kg (172 lb 9.6 oz)   LMP  (LMP Unknown)   SpO2 97%   Breastfeeding No   BMI 30.57 kg/m    Body mass index is 30.57 kg/m .  Physical Exam   GENERAL: healthy, alert and no distress  NECK: no adenopathy, no asymmetry, masses, or scars and thyroid normal to palpation  RESP: lungs clear to auscultation - no rales, rhonchi or wheezes  CV: regular rate and rhythm, normal S1 S2, no S3 or S4, no murmur, click or rub, no peripheral edema and peripheral pulses strong  MS: no gross musculoskeletal defects noted, no edema  PSYCH: mentation appears normal, affect normal/bright    Results for orders placed or performed in visit on " 01/12/22   Comprehensive metabolic panel (BMP + Alb, Alk Phos, ALT, AST, Total. Bili, TP)     Status: Abnormal   Result Value Ref Range    Sodium 140 133 - 144 mmol/L    Potassium 3.9 3.4 - 5.3 mmol/L    Chloride 109 94 - 109 mmol/L    Carbon Dioxide (CO2) 26 20 - 32 mmol/L    Anion Gap 5 3 - 14 mmol/L    Urea Nitrogen 21 7 - 30 mg/dL    Creatinine 0.66 0.52 - 1.04 mg/dL    Calcium 9.8 8.5 - 10.1 mg/dL    Glucose 151 (H) 70 - 99 mg/dL    Alkaline Phosphatase 85 40 - 150 U/L    AST 13 0 - 45 U/L    ALT 23 0 - 50 U/L    Protein Total 7.5 6.8 - 8.8 g/dL    Albumin 3.9 3.4 - 5.0 g/dL    Bilirubin Total 0.5 0.2 - 1.3 mg/dL    GFR Estimate >90 >60 mL/min/1.73m2   Hemoglobin A1c     Status: Abnormal   Result Value Ref Range    Hemoglobin A1C 10.2 (H) 0.0 - 5.6 %   Albumin Random Urine Quantitative with Creat Ratio     Status: Abnormal   Result Value Ref Range    Creatinine Urine mg/dL 201 mg/dL    Albumin Urine mg/L 496 mg/L    Albumin Urine mg/g Cr 246.77 (H) 0.00 - 25.00 mg/g Cr   LDL cholesterol direct     Status: Abnormal   Result Value Ref Range    LDL Cholesterol Direct 184 (H) <100 mg/dL   CBC with platelets     Status: Normal   Result Value Ref Range    WBC Count 7.2 4.0 - 11.0 10e3/uL    RBC Count 4.87 3.80 - 5.20 10e6/uL    Hemoglobin 14.1 11.7 - 15.7 g/dL    Hematocrit 42.5 35.0 - 47.0 %    MCV 87 78 - 100 fL    MCH 29.0 26.5 - 33.0 pg    MCHC 33.2 31.5 - 36.5 g/dL    RDW 13.0 10.0 - 15.0 %    Platelet Count 255 150 - 450 10e3/uL               Answers for HPI/ROS submitted by the patient on 1/12/2022  PHQ9 TOTAL SCORE: 5

## 2022-01-12 NOTE — LETTER
January 14, 2022      Ellen Hill  [address] [place] [place] [address]        Ellen,     Your labs are attached. It shows your diabetes is out of control. I recommend restarting your metformin and take it as well as the insulin. Also, you have protein in your urine which is likely from high blood pressure and uncontrolled diabetes. I recommend restarting the losartan and hydrochlorothiazide and taking it as well as the amlodipine. Please follow up in 6 weeks or so.       Resulted Orders   Comprehensive metabolic panel (BMP + Alb, Alk Phos, ALT, AST, Total. Bili, TP)   Result Value Ref Range    Sodium 140 133 - 144 mmol/L    Potassium 3.9 3.4 - 5.3 mmol/L    Chloride 109 94 - 109 mmol/L    Carbon Dioxide (CO2) 26 20 - 32 mmol/L    Anion Gap 5 3 - 14 mmol/L    Urea Nitrogen 21 7 - 30 mg/dL    Creatinine 0.66 0.52 - 1.04 mg/dL    Calcium 9.8 8.5 - 10.1 mg/dL    Glucose 151 (H) 70 - 99 mg/dL    Alkaline Phosphatase 85 40 - 150 U/L    AST 13 0 - 45 U/L    ALT 23 0 - 50 U/L    Protein Total 7.5 6.8 - 8.8 g/dL    Albumin 3.9 3.4 - 5.0 g/dL    Bilirubin Total 0.5 0.2 - 1.3 mg/dL    GFR Estimate >90 >60 mL/min/1.73m2      Comment:      Effective December 21, 2021 eGFRcr in adults is calculated using the 2021 CKD-EPI creatinine equation which includes age and gender (Tashi et al., [facility], DOI: 10.1056/UPUTpd0944651)   Hemoglobin A1c   Result Value Ref Range    Hemoglobin A1C 10.2 (H) 0.0 - 5.6 %      Comment:      Normal <5.7%   Prediabetes 5.7-6.4%    Diabetes 6.5% or higher     Note: Adopted from ADA consensus guidelines.   Albumin Random Urine Quantitative with Creat Ratio   Result Value Ref Range    Creatinine Urine mg/dL 201 mg/dL    Albumin Urine mg/L 496 mg/L    Albumin Urine mg/g Cr 246.77 (H) 0.00 - 25.00 mg/g Cr   LDL cholesterol direct   Result Value Ref Range    LDL Cholesterol Direct 184 (H) <100 mg/dL      Comment:      Age 0-19 years:  Desirable: 0-110 mg/dL   Borderline high: 110-129 mg/dL    High: >= 130 mg/dL    Age 20 years and older:  Desirable: <100mg/dL  Above desirable: 100-129 mg/dL   Borderline high: 130-159 mg/dL   High: 160-189 mg/dL   Very high: >= 190 mg/dL   CBC with platelets   Result Value Ref Range    WBC Count 7.2 4.0 - 11.0 10e3/uL    RBC Count 4.87 3.80 - 5.20 10e6/uL    Hemoglobin 14.1 11.7 - 15.7 g/dL    Hematocrit 42.5 35.0 - 47.0 %    MCV 87 78 - 100 fL    MCH 29.0 26.5 - 33.0 pg    MCHC 33.2 31.5 - 36.5 g/dL    RDW 13.0 10.0 - 15.0 %    Platelet Count 255 150 - 450 10e3/uL       If you have any questions or concerns, please call the clinic at the number listed above. Thank you for allowing us to participate in your care.          Sincerely,      YVON Huddleston CNP

## 2022-01-13 LAB
CREAT UR-MCNC: 201 MG/DL
MICROALBUMIN UR-MCNC: 496 MG/L
MICROALBUMIN/CREAT UR: 246.77 MG/G CR (ref 0–25)

## 2022-01-13 ASSESSMENT — PATIENT HEALTH QUESTIONNAIRE - PHQ9: SUM OF ALL RESPONSES TO PHQ QUESTIONS 1-9: 5

## 2022-01-14 DIAGNOSIS — I10 ESSENTIAL HYPERTENSION WITH GOAL BLOOD PRESSURE LESS THAN 130/80: ICD-10-CM

## 2022-01-14 RX ORDER — METFORMIN HCL 500 MG
TABLET, EXTENDED RELEASE 24 HR ORAL
Qty: 180 TABLET | Refills: 1 | Status: SHIPPED | OUTPATIENT
Start: 2022-01-14 | End: 2022-10-25

## 2022-01-14 RX ORDER — LOSARTAN POTASSIUM 100 MG/1
100 TABLET ORAL DAILY
Qty: 90 TABLET | Refills: 1 | Status: SHIPPED | OUTPATIENT
Start: 2022-01-14 | End: 2023-05-24

## 2022-01-14 RX ORDER — HYDROCHLOROTHIAZIDE 25 MG/1
25 TABLET ORAL DAILY
Qty: 30 TABLET | Refills: 1 | Status: SHIPPED | OUTPATIENT
Start: 2022-01-14 | End: 2022-01-17 | Stop reason: DRUGHIGH

## 2022-01-17 ENCOUNTER — TELEPHONE (OUTPATIENT)
Dept: FAMILY MEDICINE | Facility: CLINIC | Age: 67
End: 2022-01-17
Payer: COMMERCIAL

## 2022-01-17 RX ORDER — HYDROCHLOROTHIAZIDE 12.5 MG/1
12.5 TABLET ORAL DAILY
Qty: 30 TABLET | Refills: 1 | Status: SHIPPED | OUTPATIENT
Start: 2022-01-17 | End: 2022-03-18

## 2022-01-17 NOTE — TELEPHONE ENCOUNTER
I called patient to discuss recent labs and discuss medication changes. We sent letter last week with results and this info as well but I wanted to make sure patient didn't have any questions. She reports she hasn't gotten the letter yet. Patient disappointment to hear about worsening labs. Discussed taking meds as prescribed and close follow up. We will send med list so she has most recent record of what she should be taking as she didn't have AVS handy.

## 2022-03-14 ENCOUNTER — TELEPHONE (OUTPATIENT)
Dept: FAMILY MEDICINE | Facility: CLINIC | Age: 67
End: 2022-03-14
Payer: COMMERCIAL

## 2022-03-14 NOTE — TELEPHONE ENCOUNTER
Pt called because she needs to go back on FMLA.    Scheduled virtual visit with provider to discuss questions.    Will drop off form at clinic when she gets them.      Minnie Romero RN  [facility]

## 2022-03-18 DIAGNOSIS — I10 ESSENTIAL HYPERTENSION WITH GOAL BLOOD PRESSURE LESS THAN 130/80: ICD-10-CM

## 2022-03-18 RX ORDER — HYDROCHLOROTHIAZIDE 12.5 MG/1
12.5 TABLET ORAL DAILY
Qty: 30 TABLET | Refills: 1 | Status: SHIPPED | OUTPATIENT
Start: 2022-03-18 | End: 2023-02-01

## 2022-03-18 NOTE — TELEPHONE ENCOUNTER
Routing refill request to provider for review/approval because:        Blood pressure under 140/90 in past 12 months        BP Readings from Last 3 Encounters:   01/12/22 (!) 167/79   02/01/21 134/60   01/08/21 (!) 142/78     Latoya Mercer RN, BSN   [facility]

## 2022-04-01 ENCOUNTER — TELEPHONE (OUTPATIENT)
Dept: FAMILY MEDICINE | Facility: CLINIC | Age: 67
End: 2022-04-01

## 2022-04-01 ENCOUNTER — OFFICE VISIT (OUTPATIENT)
Dept: FAMILY MEDICINE | Facility: CLINIC | Age: 67
End: 2022-04-01
Payer: COMMERCIAL

## 2022-04-01 VITALS
WEIGHT: 169 LBS | HEART RATE: 88 BPM | DIASTOLIC BLOOD PRESSURE: 80 MMHG | SYSTOLIC BLOOD PRESSURE: 130 MMHG | OXYGEN SATURATION: 98 % | RESPIRATION RATE: 18 BRPM | TEMPERATURE: 97.5 F | BODY MASS INDEX: 29.94 KG/M2

## 2022-04-01 DIAGNOSIS — F10.20 ALCOHOLIC (H): ICD-10-CM

## 2022-04-01 DIAGNOSIS — Z01.818 PREOP GENERAL PHYSICAL EXAM: Primary | ICD-10-CM

## 2022-04-01 DIAGNOSIS — E78.5 HYPERLIPIDEMIA LDL GOAL <100: ICD-10-CM

## 2022-04-01 DIAGNOSIS — I10 ESSENTIAL HYPERTENSION WITH GOAL BLOOD PRESSURE LESS THAN 130/80: ICD-10-CM

## 2022-04-01 DIAGNOSIS — M54.16 LUMBAR RADICULOPATHY: ICD-10-CM

## 2022-04-01 LAB
ALBUMIN SERPL-MCNC: 3.7 G/DL (ref 3.4–5)
ALP SERPL-CCNC: 71 U/L (ref 40–150)
ALT SERPL W P-5'-P-CCNC: 25 U/L (ref 0–50)
ANION GAP SERPL CALCULATED.3IONS-SCNC: 9 MMOL/L (ref 3–14)
AST SERPL W P-5'-P-CCNC: 17 U/L (ref 0–45)
BILIRUB SERPL-MCNC: 0.7 MG/DL (ref 0.2–1.3)
BUN SERPL-MCNC: 13 MG/DL (ref 7–30)
CALCIUM SERPL-MCNC: 9.3 MG/DL (ref 8.5–10.1)
CHLORIDE BLD-SCNC: 107 MMOL/L (ref 94–109)
CHOLEST SERPL-MCNC: 241 MG/DL
CO2 SERPL-SCNC: 24 MMOL/L (ref 20–32)
CREAT SERPL-MCNC: 0.73 MG/DL (ref 0.52–1.04)
FASTING STATUS PATIENT QL REPORTED: YES
GFR SERPL CREATININE-BSD FRML MDRD: >90 ML/MIN/1.73M2
GLUCOSE BLD-MCNC: 140 MG/DL (ref 70–99)
HBA1C MFR BLD: 8.5 % (ref 0–5.6)
HDLC SERPL-MCNC: 46 MG/DL
LDLC SERPL CALC-MCNC: 158 MG/DL
NONHDLC SERPL-MCNC: 195 MG/DL
POTASSIUM BLD-SCNC: 3.6 MMOL/L (ref 3.4–5.3)
PROT SERPL-MCNC: 7.2 G/DL (ref 6.8–8.8)
SODIUM SERPL-SCNC: 140 MMOL/L (ref 133–144)
TRIGL SERPL-MCNC: 184 MG/DL

## 2022-04-01 PROCEDURE — 80061 LIPID PANEL: CPT | Performed by: PHYSICIAN ASSISTANT

## 2022-04-01 PROCEDURE — 80053 COMPREHEN METABOLIC PANEL: CPT | Performed by: PHYSICIAN ASSISTANT

## 2022-04-01 PROCEDURE — 99214 OFFICE O/P EST MOD 30 MIN: CPT | Performed by: PHYSICIAN ASSISTANT

## 2022-04-01 PROCEDURE — 83036 HEMOGLOBIN GLYCOSYLATED A1C: CPT | Performed by: PHYSICIAN ASSISTANT

## 2022-04-01 PROCEDURE — 36415 COLL VENOUS BLD VENIPUNCTURE: CPT | Performed by: PHYSICIAN ASSISTANT

## 2022-04-01 ASSESSMENT — PAIN SCALES - GENERAL: PAINLEVEL: NO PAIN (0)

## 2022-04-01 NOTE — PATIENT INSTRUCTIONS
"You may continue with your surgery as scheduled.  In regards to your medications:    Please take all scheduled medications on the day of surgery EXCEPT for modifications listed below:     - Do not take hydrochlorothiazide on the day of surgery.  You may restart this after surgery   - Intermediate acting insulin-Novolin (NPH): Take 80% of usual dose the night before surgery.  Take 50% of the usual dose on the morning of surgery.    - meloxicam (Mobic): Stop this medication 10 days before surgery. You may restart this as recommended by your surgeon.     Please reach out with questions or concerns.  Before Your Procedure or Hospital Admission  Testing for COVID-19 (Coronavirus)  Thank you for choosing [facility] for your health care needs. The COVID-19 pandemic is a very challenging time for everyone.   Our goal is to keep you and our team here at [facility] safe and healthy. We've taken many steps to make this happen. For example:    We test and screen our staff, care teams and patients for COVID-19.    Everyone at [facility] must wear a mask and stay 6 feet apart.    We are limiting hospital and clinic visitors.  Before you come in  If you test COVID-19 positive with any kind of test before your surgery date, call your surgeon's office. We need to know the date of your positive test. We may need to re-schedule your surgery.  Unless you've had a positive COVID-19 test within the past 90 days, you must get tested for COVID-19 even if you've been vaccinated. Your test needs to happen 2 to 4 days before you check in to the hospital or surgery site.  A clinic scheduler will call you about a week in advance to set up a testing time at one of our labs.  Note: If you have a test anywhere but [facility], be sure you get an RT-PCR or an NAAT (Nucleic Acid Amplification Test) test.  Do NOT get a \"rapid antigen\" test. Negative results from \"rapid antigen\" tests are NOT accepted before your " surgery.  After the test, please stay at home and out of contact with other people. This will help prevent possible COVID-19 exposure before your treatment. Please follow all current safety guidelines, including:    Limit trips outside your home.    Limit the number of people you see.    Always wear a mask outside your home.    Use social distancing. Stay 6 feet away from others whenever you can.    Wash your hands often.  If your test shows you have COVID-19  If your test is positive, we'll let you know. A positive test means that you have the virus.   We'll probably have to postpone your admission, surgery or procedure. Your doctor will discuss this with you. After that, we'll let you know what to do and when you can re-schedule.   We may need to cancel your treatment on short notice for other reasons, too.  If your test shows you DON'T have COVID-19  Even if your test is negative, you can still get COVID-19. It's rare but, sometimes, the test result is wrong. You could also catch the virus after taking the test.   There's a very small chance that you could catch COVID-19 in the hospital or surgery center. [facility] has taken many steps to prevent this from happening.   Day of your surgery or procedure    Please come wearing a face covering that covers both your nose and mouth.    When you arrive, we'll ask you some questions to find out if you've had any exposures to COVID-19, or have any signs of COVID-19.    Ask your care team if you can have visitors. All visitors must wear face coverings and will be screened for exposure to, or signs of, COVID-19.  ? Even if no visitors are allowed, you can still have with you:    Your legal guardian or legal decision maker    Someone to help you, if you are disabled    A parent and one other visitor, if you are younger than 18 years old    A partner and a , if you are in labor  ? We might need to teach you about taking care of yourself after surgery. If so, a  "visitor can come into the hospital to learn about it, too.  ? The rules for visitors change often, depending on how much the virus is spreading. To learn more, see Visiting a Loved One in the Hospital during the COVID-19 Outbreak.  Please call your care team, hospital or surgery center if you have any questions. We thank you for your understanding and for choosing [facility] for your care.   Possible surgery delay    Like you, we want your surgery to happen when it's scheduled. But sometimes the hospital is so full that it's not safe for you to have your surgery. This is especially true during the pandemic. Your surgery may need to be re-scheduled at a later date. If this happens, we will call and tell you.  Questions and answers  Does it matter where I get tested for COVID-19?  Yes. We urge you to get tested at one of our [facility] COVID-19 testing sites. These tests will be either RT-PCR or NAAT, and are accepted. We process these tests in our lab and can get the results quickly. Your [facility] care team needs to get your results before you check in.  What should I do if I can't get tested at [facility]?  You can get tested somewhere else, but you'll need to take these extra steps:   1. Contact your family doctor or clinic to arrange your test.  2. Take the test within 4 days of your surgery or procedure. We can't accept tests older than 4 days.  3. Make sure you're getting an RT-PCR test, or a NAAT. Some places use \"rapid antigen\" tests, but we do NOT accept negative results from those tests before your surgery.  4. Make sure your doctor or clinic faxes your results to [facility] at [phone]. Or take a photo of the results and upload it into [employer].  If we don't get your results in time, we may have to delay or cancel your treatment.  For informational purposes only. Not to replace the advice of your health care provider. Copyright   2020 [facility]. All " rights reserved. Clinically reviewed by Infection Prevention and [facility] COVID-19 Clinical Team. [employer] [id number] - Rev 02/01/22.

## 2022-04-01 NOTE — PROGRESS NOTES
[facility]  [address]  [place] [address]  Phone: [phone]  Primary Provider: Alejandra Bal  Pre-op Performing Provider: TU SILVA      PREOPERATIVE EVALUATION:  Today's date: 4/1/2022    Ellen Hill is a 66 year old female who presents for a preoperative evaluation.    Surgical Information:  Surgery/Procedure: Spine stimulator   Surgery Location: [facility] in [place]  Surgeon: Ana  Surgery Date: 4/14/2022  Time of Surgery: 9:00AM  Where patient plans to recover: At home with family  Fax number for surgical facility: [phone]    Type of Anesthesia Anticipated: General    Assessment & Plan     The proposed surgical procedure is considered INTERMEDIATE risk.    Preop general physical exam  Lumbar radiculopathy  Patient is a 66-year-old female who presents to clinic for preoperative evaluation.  Patient has scheduled Spine stimulator placement  4/14/22.  Vital signs normal.  Physical exam without acute abnormalities.  - Hemoglobin A1c; Future  - Lipid panel reflex to direct LDL Fasting; Future  - Comprehensive metabolic panel (BMP + Alb, Alk Phos, ALT, AST, Total. Bili, TP); Future  - Hemoglobin A1c  - Lipid panel reflex to direct LDL Fasting  - Comprehensive metabolic panel (BMP + Alb, Alk Phos, ALT, AST, Total. Bili, TP)    Alcoholic (H)  Patient notes history of alcohol abuse, but states she has not used alcohol in an extended period of time.  Unable to quantify how long.     Hyperlipidemia LDL goal <100  History of hyperlipidemia.  Patient compliant with Lipitor.  Despite being continually elevated, lipids appear to be improving since last evaluation approximately 11 months ago.    Uncontrolled type 2 diabetes mellitus with microalbuminuria, with long-term current use of insulin (H)  Patient has history of uncontrolled type 2 diabetes.  A1c was significantly elevated 1/12/2022 at 10.2.  Patient notes she has been compliant  with prescribed medications as well as insulin.  A1c has significantly improved and is now 8.5.   - [facility] Adult Diabetes Educator Referral; Future    Essential hypertension with goal blood pressure less than 130/80  History of hypertension.  Patient is compliant with prescribed medications.  Blood pressure is within recommended range today.    Risks and Recommendations:  The patient has the following additional risks and recommendations for perioperative complications:   - No identified additional risk factors other than previously addressed    Medication Instructions:  Patient is to take all scheduled medications on the day of surgery EXCEPT for modifications listed below:   - ACE/ARB: May be continued on the day of surgery.    - Diuretics: HOLD on the day of surgery.   - Intermediate acting insulin (NPH): Take 80% of usual dose the night before surgery.  Take 50% of the usual dose on the morning of surgery.    - meloxicam (Mobic): HOLD 10 days before surgery.     RECOMMENDATION:  APPROVAL GIVEN to proceed with proposed procedure, without further diagnostic evaluation.    Subjective     HPI related to upcoming procedure: Patient notes low back pain ongoing for 2 years with radiation to RLE. Patient has scheduled Spine stimulator implantation 4/14/22.     Preop Questions 4/1/2022   1. Have you ever had a heart attack or stroke? No   2. Have you ever had surgery on your heart or blood vessels, such as a stent placement, a coronary artery bypass, or surgery on an artery in your head, neck, heart, or legs? No   3. Do you have chest pain with activity? No   4. Do you have a history of  heart failure? No   5. Do you currently have a cold, bronchitis or symptoms of other infection? No   6. Do you have a cough, shortness of breath, or wheezing? No   7. Do you or anyone in your family have previous history of blood clots? No   8. Do you or does anyone in your family have a serious bleeding problem such as prolonged bleeding  following surgeries or cuts? No   9. Have you ever had problems with anemia or been told to take iron pills? No   10. Have you had any abnormal blood loss such as black, tarry or bloody stools, or abnormal vaginal bleeding? No   11. Have you ever had a blood transfusion? No   12. Are you willing to have a blood transfusion if it is medically needed before, during, or after your surgery? Yes   13. Have you or any of your relatives ever had problems with anesthesia? No   14. Do you have sleep apnea, excessive snoring or daytime drowsiness? No   15. Do you have any artifical heart valves or other implanted medical devices like a pacemaker, defibrillator, or continuous glucose monitor? No   16. Do you have artificial joints? No   17. Are you allergic to latex? No       Health Care Directive:  Patient does not have a Health Care Directive or Living Will: Discussed advance care planning with patient; however, patient declined at this time.    Preoperative Review of : Patient compliant with Tramadol     Status of Chronic Conditions:  DIABETES - Patient has a longstanding history of DiabetesType Type II . Patient is being treated with oral agents and insulin injections and denies significant side effects. Control has been poor. A1C 10.2 1/12/22. Complicating factors include but are not limited to: hypertension and hyperlipidemia.     HYPERLIPIDEMIA - Patient has a long history of significant Hyperlipidemia requiring medication for treatment with recent poor control. Patient reports no problems or side effects with the medication.     HYPERTENSION - Patient has longstanding history of HTN , currently denies any symptoms referable to elevated blood pressure. Specifically denies chest pain, palpitations, dyspnea, orthopnea, PND or peripheral edema. Blood pressure readings have been in normal range. Current medication regimen is as listed below. Patient denies any side effects of medication.     Alcoholism: Patient notes  "history of alcohol abuse, but states she quit drinking. She is unsure of when she quit.     Review of Systems  CONSTITUTIONAL: NEGATIVE for fever, chills, change in weight  INTEGUMENTARY/SKIN: NEGATIVE for worrisome rashes, moles or lesions  EYES: NEGATIVE for vision changes or irritation  ENT/MOUTH: NEGATIVE for ear, mouth and throat problems  RESP: NEGATIVE for significant cough or SOB  CV: NEGATIVE for chest pain, palpitations or peripheral edema  GI: NEGATIVE for nausea, abdominal pain, heartburn, or change in bowel habits  : NEGATIVE for frequency, dysuria, or hematuria  MUSCULOSKELETAL: NEGATIVE for significant arthralgias or myalgia  NEURO: NEGATIVE for weakness, dizziness or paresthesias  ENDOCRINE: NEGATIVE for temperature intolerance, skin/hair changes  HEME: NEGATIVE for bleeding problems  PSYCHIATRIC: NEGATIVE for changes in mood or affect    Patient Active Problem List    Diagnosis Date Noted     Albuminuria 01/16/2019     Priority: Medium     Noncompliance with medication regimen 12/29/2017     Priority: Medium     Uncontrolled type 2 diabetes mellitus with microalbuminuria, with long-term current use of insulin (H) 06/19/2017     Priority: Medium     Type 2 diabetes, HbA1c goal < 7%       Leblanc's esophagus without dysplasia 05/10/2017     Priority: Medium     Obesity, Class I, BMI 30-34.9 05/15/2014     Priority: Medium     Alcoholic (H) 03/28/2013     Priority: Medium     Advanced directives, counseling/discussion 03/28/2013     Priority: Medium     Patient does not have an Advance/Health Care Directive (HCD), given \"What is Advance Care Planning?\" flyer.    Nola Lopez  March 28, 2013         History of adenomatous polyp of colon 12/03/2012     Priority: Medium     Repeat colonoscopy in 2017       Vulvar pruritus 03/10/2011     Priority: Medium     Moderate major depression (H) 01/17/2011     Priority: Medium     Essential hypertension with goal blood pressure less than 130/80 12/21/2010 " "    Priority: Medium     Hyperlipidemia LDL goal <100 10/27/2010     Priority: Medium     Post herpetic neuralgia 06/03/2010     Priority: Medium      Past Medical History:   Diagnosis Date     Advanced directives, counseling/discussion 3/28/2013    Patient does not have an Advance/Health Care Directive (HCD), given \"What is Advance Care Planning?\" flyer.  Nola Vergarachilo March 28, 2013      Hyperlipidemia LDL goal <100 10/27/2010     Hypertension goal BP (blood pressure) < 130/80 12/21/2010     Microalbuminuria 2/12/2015     Shingles 2009    Right posterior shoulder as per patient     Type 2 diabetes, HbA1c goal < 7% (H) 10/31/2010     Past Surgical History:   Procedure Laterality Date     BIOPSY Right 1989    Breast-Benign lesion as per patient     Current Outpatient Medications   Medication Sig Dispense Refill     amLODIPine (NORVASC) 10 MG tablet Take 1 tablet (10 mg) by mouth daily 90 tablet 1     atorvastatin (LIPITOR) 40 MG tablet Take 1 tablet (40 mg) by mouth daily 90 tablet 1     gabapentin (NEURONTIN) 100 MG capsule Take 100 mg by mouth 3 times daily Takes 1 tablet when she gets home from work and 2 before bed       hydrochlorothiazide (HYDRODIURIL) 12.5 MG tablet Take 1 tablet (12.5 mg) by mouth daily NOTE DOSE ADJUSTMENT 30 tablet 1     losartan (COZAAR) 100 MG tablet Take 1 tablet (100 mg) by mouth daily 90 tablet 1     meloxicam (MOBIC) 7.5 MG tablet Take 7.5 mg by mouth daily       metFORMIN (GLUCOPHAGE-XR) 500 MG 24 hr tablet Take 1 tablet by mouth once daily with evening meal x2 weeks; if tolerated, may increase to 2 tabs/day. 180 tablet 1     traMADol (ULTRAM) 50 MG tablet Take 50 mg by mouth every 6 hours as needed       acetaminophen (TYLENOL) 500 MG tablet Take 500-1,000 mg by mouth every 4 hours as needed for mild pain Max of 3000 mg/day (6 tabs)       blood glucose (NO BRAND SPECIFIED) test strip Use to test blood sugar 2 times daily or as directed. 100 each 11     blood glucose monitoring " (AL MICROLET) lancets Use to test blood sugar 2 times daily or as directed. 100 each 11     insulin NPH-Regular (NOVOLIN 70/30 FLEXPEN) susp Inject 56units twice a day before breakfast and evening meal. Dose as of 3/18/21 30 mL 3     insulin pen needle (ULTICARE MICRO) 32G X 4 MM miscellaneous Use 2 pen needles daily or as directed. 100 each 11     senna-docusate (SENNA S) 8.6-50 MG tablet Take 1-2 tablets by mouth daily as needed for constipation         Allergies   Allergen Reactions     Insulin Detemir Hives     Alfredo products cause hives**       Liraglutide Hives     Alfredo Nordisk product     Victoza Hives     Alfredo Nordisk product     Enalapril Cough     Lisinopril Cough     Wellbutrin [Bupropion Hcl] Hives     hives        Social History     Tobacco Use     Smoking status: Former Smoker     Packs/day: 0.10     Years: 10.00     Pack years: 1.00     Types: Cigarettes     Quit date: 9/10/2013     Years since quittin.5     Smokeless tobacco: Never Used   Substance Use Topics     Alcohol use: Yes     Alcohol/week: 1.0 standard drink     Types: 1 Cans of beer per week     Comment: drinks 1-2 times a month     Family History   Problem Relation Age of Onset     Alcohol/Drug Mother      Heart Disease Mother      Alcohol/Drug Father      Diabetes Brother      History   Drug Use No         Objective     /80   Pulse 88   Temp 97.5  F (36.4  C) (Tympanic)   Resp 18   Wt 85.7 kg (189 lb)   LMP  (LMP Unknown)   SpO2 98%   BMI 33.48 kg/m      Physical Exam    GENERAL APPEARANCE: healthy, alert and no distress     EYES: EOMI, PERRL     HENT: nose and mouth without ulcers or lesions     NECK: no adenopathy, no asymmetry, masses, or scars and thyroid normal to palpation     RESP: lungs clear to auscultation - no rales, rhonchi or wheezes     CV: regular rates and rhythm, normal S1 S2, no S3 or S4 and no murmur, click or rub     ABDOMEN:  soft, nontender, no HSM or masses and bowel sounds normal     MS:  extremities normal- no gross deformities noted, no evidence of inflammation in joints, FROM in all extremities.     SKIN: no suspicious lesions or rashes     NEURO: Normal strength and tone, sensory exam grossly normal, mentation intact and speech normal     PSYCH: mentation appears normal. and affect normal/bright     LYMPHATICS: No cervical adenopathy    Recent Labs   Lab Test 01/12/22  1559 04/27/21  0947 02/01/21  1707 01/08/21  1334 11/04/20  1639   HGB 14.1  --  14.0  --  14.1     --   --   --  262     --  140   < > 139   POTASSIUM 3.9  --  3.4   < > 3.6   CR 0.66  --  0.73   < > 0.56   A1C 10.2* 6.5* 6.7*   < > 10.0*    < > = values in this interval not displayed.        Diagnostics:  Recent Results (from the past 168 hour(s))   Hemoglobin A1c    Collection Time: 04/01/22 11:07 AM   Result Value Ref Range    Hemoglobin A1C 8.5 (H) 0.0 - 5.6 %   Lipid panel reflex to direct LDL Fasting    Collection Time: 04/01/22 11:07 AM   Result Value Ref Range    Cholesterol 241 (H) <200 mg/dL    Triglycerides 184 (H) <150 mg/dL    Direct Measure HDL 46 (L) >=50 mg/dL    LDL Cholesterol Calculated 158 (H) <=100 mg/dL    Non HDL Cholesterol 195 (H) <130 mg/dL    Patient Fasting > 8hrs? Yes    Comprehensive metabolic panel (BMP + Alb, Alk Phos, ALT, AST, Total. Bili, TP)    Collection Time: 04/01/22 11:07 AM   Result Value Ref Range    Sodium 140 133 - 144 mmol/L    Potassium 3.6 3.4 - 5.3 mmol/L    Chloride 107 94 - 109 mmol/L    Carbon Dioxide (CO2) 24 20 - 32 mmol/L    Anion Gap 9 3 - 14 mmol/L    Urea Nitrogen 13 7 - 30 mg/dL    Creatinine 0.73 0.52 - 1.04 mg/dL    Calcium 9.3 8.5 - 10.1 mg/dL    Glucose 140 (H) 70 - 99 mg/dL    Alkaline Phosphatase 71 40 - 150 U/L    AST 17 0 - 45 U/L    ALT 25 0 - 50 U/L    Protein Total 7.2 6.8 - 8.8 g/dL    Albumin 3.7 3.4 - 5.0 g/dL    Bilirubin Total 0.7 0.2 - 1.3 mg/dL    GFR Estimate >90 >60 mL/min/1.73m2      No EKG required, no history of coronary heart disease,  significant arrhythmia, peripheral arterial disease or other structural heart disease.    Revised Cardiac Risk Index (RCRI):  The patient has the following serious cardiovascular risks for perioperative complications:   - Diabetes Mellitus (on Insulin) = 1 point     RCRI Interpretation: 0 points: Class I (very low risk - 0.4% complication rate)         Signed Electronically by: Taryn Patel PA-C  Copy of this evaluation report is provided to requesting physician.

## 2022-04-01 NOTE — TELEPHONE ENCOUNTER
----- Message from Taryn Patel PA-C sent at 4/1/2022  4:27 PM CDT -----  Team - please call patient with results.    Good news, patient's A1c has gone from 10.2 all the way to 8.5 which is fantastic!  I will wait for the remainder of her lab work to come in, but as long as the rest of the labs look good, I will be able to clear her for surgery.  Our team will follow up early next week once we can clear her with additional recommendations.    Taryn Patel PA-C on 4/1/2022 at 4:27 PM

## 2022-04-01 NOTE — TELEPHONE ENCOUNTER
"Patient was seen for pre-op today.    I called and spoke to patient, relayed the A1C result note.   She will wait for remainder of labs and plan next week, but so far surgery is a \"go\".    Patient verbalized understanding of and agreement with plan.    Evi Webb RN  [facility]      "

## 2022-04-01 NOTE — TELEPHONE ENCOUNTER
Patient Quality Outreach    Patient is due for the following:   Diabetes -  Diabetic Follow-Up Visit    NEXT STEPS:   Patient was scheduled for an appointment.     Alejandra wanted pt to return in about 6 weeks (2/23/2022)    Type of outreach:    Phone, spoke to patient/parent. Was able to schedule virtual visit for pt on Monday for diabetic check       Questions for provider review:    None     Livia Colmenares

## 2022-04-01 NOTE — LETTER
April 5, 2022      Ellen Hill  [address] [place] [address]        Dear ,    Your cholesterol appears to have improving since last year, but is still high.  I would recommend continuing your medication, Lipitor, as prescribed.  Please follow-up with your primary care provider for ongoing evaluation and treatment.       Resulted Orders   Hemoglobin A1c   Result Value Ref Range    Hemoglobin A1C 8.5 (H) 0.0 - 5.6 %      Comment:      Normal <5.7%   Prediabetes 5.7-6.4%    Diabetes 6.5% or higher     Note: Adopted from ADA consensus guidelines.   Lipid panel reflex to direct LDL Fasting   Result Value Ref Range    Cholesterol 241 (H) <200 mg/dL    Triglycerides 184 (H) <150 mg/dL    Direct Measure HDL 46 (L) >=50 mg/dL    LDL Cholesterol Calculated 158 (H) <=100 mg/dL    Non HDL Cholesterol 195 (H) <130 mg/dL    Patient Fasting > 8hrs? Yes     Narrative    Cholesterol  Desirable:  <200 mg/dL    Triglycerides  Normal:  Less than 150 mg/dL  Borderline High:  150-199 mg/dL  High:  200-499 mg/dL  Very High:  Greater than or equal to 500 mg/dL    Direct Measure HDL  Female:  Greater than or equal to 50 mg/dL   Male:  Greater than or equal to 40 mg/dL    LDL Cholesterol  Desirable:  <100mg/dL  Above Desirable:  100-129 mg/dL   Borderline High:  130-159 mg/dL   High:  160-189 mg/dL   Very High:  >= 190 mg/dL    Non HDL Cholesterol  Desirable:  130 mg/dL  Above Desirable:  130-159 mg/dL  Borderline High:  160-189 mg/dL  High:  190-219 mg/dL  Very High:  Greater than or equal to 220 mg/dL   Comprehensive metabolic panel (BMP + Alb, Alk Phos, ALT, AST, Total. Bili, TP)   Result Value Ref Range    Sodium 140 133 - 144 mmol/L    Potassium 3.6 3.4 - 5.3 mmol/L    Chloride 107 94 - 109 mmol/L    Carbon Dioxide (CO2) 24 20 - 32 mmol/L    Anion Gap 9 3 - 14 mmol/L    Urea Nitrogen 13 7 - 30 mg/dL    Creatinine 0.73 0.52 - 1.04 mg/dL    Calcium 9.3 8.5 - 10.1 mg/dL    Glucose 140 (H) 70 - 99 mg/dL     Alkaline Phosphatase 71 40 - 150 U/L    AST 17 0 - 45 U/L    ALT 25 0 - 50 U/L    Protein Total 7.2 6.8 - 8.8 g/dL    Albumin 3.7 3.4 - 5.0 g/dL    Bilirubin Total 0.7 0.2 - 1.3 mg/dL    GFR Estimate >90 >60 mL/min/1.73m2      Comment:      Effective December 21, 2021 eGFRcr in adults is calculated using the 2021 CKD-EPI creatinine equation which includes age and gender (Tashi et al., NEJM, DOI: 10.1056/FYSJrb0192544)       If you have any questions or concerns, please call the clinic at the number listed above.       Sincerely,      Taryn Patel PA-C

## 2022-04-04 ENCOUNTER — VIRTUAL VISIT (OUTPATIENT)
Dept: FAMILY MEDICINE | Facility: CLINIC | Age: 67
End: 2022-04-04
Payer: COMMERCIAL

## 2022-04-04 ENCOUNTER — TELEPHONE (OUTPATIENT)
Dept: FAMILY MEDICINE | Facility: CLINIC | Age: 67
End: 2022-04-04

## 2022-04-04 DIAGNOSIS — M54.16 LUMBAR RADICULOPATHY: ICD-10-CM

## 2022-04-04 DIAGNOSIS — Z79.4 TYPE 2 DIABETES MELLITUS TREATED WITH INSULIN (H): Primary | ICD-10-CM

## 2022-04-04 DIAGNOSIS — E11.9 TYPE 2 DIABETES MELLITUS TREATED WITH INSULIN (H): Primary | ICD-10-CM

## 2022-04-04 PROCEDURE — 99207 PR NO BILLABLE SERVICE THIS VISIT: CPT | Performed by: NURSE PRACTITIONER

## 2022-04-04 RX ORDER — GABAPENTIN 300 MG/1
300 CAPSULE ORAL
COMMUNITY
End: 2022-04-04

## 2022-04-04 RX ORDER — GABAPENTIN 100 MG/1
CAPSULE ORAL
Qty: 90 CAPSULE | Refills: 1 | Status: SHIPPED | OUTPATIENT
Start: 2022-04-04 | End: 2022-06-13

## 2022-04-04 RX ORDER — GABAPENTIN 100 MG/1
100 CAPSULE ORAL 3 TIMES DAILY
COMMUNITY
End: 2022-04-04

## 2022-04-04 NOTE — TELEPHONE ENCOUNTER
Please call and let her know I approved her surgery.  Her diabetes has improved significantly!!  Can you review her medication recommendations prior to surgery with her? We will also send this in the mail.    Please take all scheduled medications on the day of surgery EXCEPT for modifications listed below:     - Do not take hydrochlorothiazide on the day of surgery.  You may restart this after surgery   - Intermediate acting insulin-Novolin (NPH): Take 80% of usual dose the night before surgery.  Take 50% of the usual dose on the morning of surgery.    - meloxicam (Mobic): Stop this medication 10 days before surgery. You may restart this as recommended by your surgeon.     Taryn Patel PA-C on 4/4/2022 at 4:17 PM

## 2022-04-04 NOTE — PROGRESS NOTES
"Ellen is a 66 year old who is being evaluated via a billable telephone visit.      What phone number would you like to be contacted at? [phone]  How would you like to obtain your AVS? Mail a copy    Assessment & Plan     Type 2 diabetes mellitus treated with insulin (H)  Much improved since Jan. Continue current interventions.    Lumbar radiculopathy  Refilled. Has procedure next week.  - gabapentin (NEURONTIN) 100 MG capsule; Take 1 capsule (100 mg) in the morning and 2 capsules (200 mg) at bedtime             BMI:   Estimated body mass index is 29.94 kg/m  as calculated from the following:    Height as of 1/12/22: 1.6 m (5' 3\").    Weight as of 4/1/22: 76.7 kg (169 lb).       See Patient Instructions    Return in about 3 months (around 7/4/2022).     The benefits, risks and potential side effects were discussed in detail. Black box warnings discussed as relevant. All patient questions were answered. The patient was instructed to follow up immediately if any adverse reactions develop.    Return precautions discussed, including when to seek urgent/emergent care.    Patient verbalizes understanding and agrees with plan of care.       YVON Huddleston CNP  [facility]    Mary Ellen Hughes is a 66 year old who presents for the following health issues     HPI     Diabetes Follow-up      How often are you checking your blood sugar? Couple times per week    What concerns do you have today about your diabetes? None     Do you have any of these symptoms? (Select all that apply)  No numbness or tingling in feet.  No redness, sores or blisters on feet.  No complaints of excessive thirst.  No reports of blurry vision.  No significant changes to weight.                Hyperlipidemia Follow-Up      Are you regularly taking any medication or supplement to lower your cholesterol?   Yes- atorvastatin    Are you having muscle aches or other side effects that you think could be caused by your " cholesterol lowering medication?  No    Hypertension Follow-up      Do you check your blood pressure regularly outside of the clinic? Yes     Are you following a low salt diet? No    Are your blood pressures ever more than 140 on the top number (systolic) OR more   than 90 on the bottom number (diastolic), for example 140/90? No    BP Readings from Last 2 Encounters:   04/01/22 130/80   01/12/22 (!) 167/79     Hemoglobin A1C POCT (%)   Date Value   04/27/2021 6.5 (H)   02/01/2021 6.7 (H)     Hemoglobin A1C (%)   Date Value   04/01/2022 8.5 (H)   01/12/2022 10.2 (H)     LDL Cholesterol Calculated (mg/dL)   Date Value   04/01/2022 158 (H)   04/27/2021 170 (H)   08/20/2020 188 (H)     LDL Cholesterol Direct (mg/dL)   Date Value   01/12/2022 184 (H)       Needs refill of gabapentin - originally prescribed by spine doctor  Has procedure on 4/14 for pain    Review of Systems   Constitutional, HEENT, cardiovascular, pulmonary, GI, , musculoskeletal, neuro, skin, endocrine and psych systems are negative, except as otherwise noted.      Objective           Vitals:  No vitals were obtained today due to virtual visit.    Physical Exam   healthy, alert and no distress  PSYCH: Alert and oriented times 3; coherent speech, normal   rate and volume, able to articulate logical thoughts, able   to abstract reason, no tangential thoughts, no hallucinations   or delusions  Her affect is normal  RESP: No cough, no audible wheezing, able to talk in full sentences  Remainder of exam unable to be completed due to telephone visits                Phone call duration: 14:42 minutes

## 2022-04-04 NOTE — TELEPHONE ENCOUNTER
Attempted to call patient at home number [phone].  Phone rang and rang multiple times no answer or voice mail.    Left message on voice mail [phone]  for patient to call clinic. [phone] see message below per Yaima Patel PA-C.

## 2022-04-04 NOTE — TELEPHONE ENCOUNTER
Diabetes Education Scheduling Outreach #1:    Call to patient to schedule. Left message with phone number to call to schedule.    Plan for 2nd outreach attempt within 1 week.    Brittny Waller  [facility]  Diabetes and Nutrition Scheduling

## 2022-04-05 NOTE — TELEPHONE ENCOUNTER
Left message on voice mail for patient to call clinic  On both numbers.   [phone]  Kelly Bustamante RN  [facility]

## 2022-04-06 NOTE — TELEPHONE ENCOUNTER
Notified patient of the message/orders/results below.    Read word for word what Jorge Centeno PA-C advises for meds.  Patient stated understanding and agreeable with the plan of care.       Patient is wondering if she can still take her tramadol up until the day of surgery.      Lydia RN,BSN  Triage Nurse  [facility]: [facility]

## 2022-04-06 NOTE — TELEPHONE ENCOUNTER
Yes, Tramadol and all other prescribed medication can be taken as prescribed. The only medication adjustments needed are these:       - Do not take hydrochlorothiazide on the day of surgery.  You may restart this after surgery   - Intermediate acting insulin-Novolin (NPH): Take 80% of usual dose the night before surgery.  Take 50% of the usual dose on the morning of surgery.    - meloxicam (Mobic): Stop this medication 10 days before surgery. You may restart this as recommended by your surgeon.     I also mailed her a copy of this so that she will have it on hand to look at.    Taryn Patel PA-C on 4/6/2022 at 3:14 PM

## 2022-04-06 NOTE — TELEPHONE ENCOUNTER
Left message on voice mail for patient to call clinic.   [phone]    Zulema Artis RN BSN  [facility]

## 2022-04-06 NOTE — TELEPHONE ENCOUNTER
Notified patient of the information below per Taryn Patel PA-C.    Patient stated understanding and agreeable with the plan of care.     Lydia RN,BSN  Triage Nurse  [facility]

## 2022-04-12 NOTE — TELEPHONE ENCOUNTER
Diabetes Education Scheduling Outreach #2:    Call to patient to schedule. Left message with phone number to call to schedule.    Brittny Waller  [place] [facility]  Diabetes and Nutrition Scheduling       Strong peripheral pulses

## 2022-05-31 ENCOUNTER — TELEPHONE (OUTPATIENT)
Dept: FAMILY MEDICINE | Facility: CLINIC | Age: 67
End: 2022-05-31
Payer: COMMERCIAL

## 2022-05-31 NOTE — TELEPHONE ENCOUNTER
Is this r/t back surgery? If so, needs to go to back surgeon.  If not, patient needs visit because I'm not sure what it's about  Marlen Roberts

## 2022-05-31 NOTE — TELEPHONE ENCOUNTER
Received [employer] [place] Attending Physician's Statement form via fax. Placed in Alejandra's box.  Ruth Ann Jarrell [facility]  2nd Floor  Primary Care

## 2022-05-31 NOTE — TELEPHONE ENCOUNTER
Called and spoke to the patient and gave her Alejandra's response. Patient states that this is regarding the permanent stimulator and to advise.  Ruth Ann Jarrell [facility]  2nd Floor  Primary Care

## 2022-06-01 NOTE — TELEPHONE ENCOUNTER
This needs to be completed by whoever is placing and managing the stimulator. I will place in TC basket

## 2022-06-02 NOTE — TELEPHONE ENCOUNTER
Called pt and lvm to call  Back in regards to form. Form placed in tc hold bin.     Please ask pt if she needs this form, she can pick this up from the the  if needed.

## 2022-06-03 NOTE — TELEPHONE ENCOUNTER
Called pt and relayed message per provider. Pt understands and will have the [place] fax over forms to her Surgeon's office. Forms placed in tc bin.

## 2022-06-13 DIAGNOSIS — M54.16 LUMBAR RADICULOPATHY: ICD-10-CM

## 2022-06-13 RX ORDER — GABAPENTIN 100 MG/1
CAPSULE ORAL
Qty: 90 CAPSULE | Refills: 1 | Status: SHIPPED | OUTPATIENT
Start: 2022-06-13 | End: 2022-10-25

## 2022-06-13 NOTE — TELEPHONE ENCOUNTER
Routing refill request to provider for review/approval because:  Drug not on the [facility] or [facility] refill protocol or controlled substance

## 2022-08-31 NOTE — TELEPHONE ENCOUNTER
No patient does not have any paperwork to be picked up.  Gonzalez Arredondo,  For 1st Floor Primary Care    Called patient is notified of the above info.  Gonzalez Arredondo,  For 1st Floor Primary Care   Mirvaso Counseling: Mirvaso is a topical medication which can decrease superficial blood flow where applied. Side effects are uncommon and include stinging, redness and allergic reactions.

## 2022-09-14 ENCOUNTER — TELEPHONE (OUTPATIENT)
Dept: FAMILY MEDICINE | Facility: CLINIC | Age: 67
End: 2022-09-14

## 2022-09-14 NOTE — TELEPHONE ENCOUNTER
Patient Quality Outreach    Patient is due for the following:   Breast Cancer Screening - Mammogram    Next Steps:   Schedule a office visit for mammogram    Type of outreach:    Phone, left message for patient/parent to call back.    Next Steps:  Reach out within 90 days via Phone.    Max number of attempts reached: Yes. Will try again in 90 days if patient still on fail list.    Questions for provider review:    None     Manju Funes

## 2022-10-01 ENCOUNTER — TRANSFERRED RECORDS (OUTPATIENT)
Dept: MULTI SPECIALTY CLINIC | Facility: CLINIC | Age: 67
End: 2022-10-01

## 2022-10-01 LAB — RETINOPATHY: NORMAL

## 2022-10-06 ENCOUNTER — TELEPHONE (OUTPATIENT)
Dept: FAMILY MEDICINE | Facility: CLINIC | Age: 67
End: 2022-10-06

## 2022-10-06 NOTE — TELEPHONE ENCOUNTER
Patient Quality Outreach    Patient is due for the following:   Colon Cancer Screening  Breast Cancer Screening - Mammogram  Cervical Cancer Screening - PAP Needed      Topic Date Due     Pneumococcal Vaccine (1 - PCV) Never done     Zoster (Shingles) Vaccine (1 of 2) Never done     Diptheria Tetanus Pertussis (DTAP/TDAP/TD) Vaccine (2 - Td or Tdap) 10/20/2020     COVID-19 Vaccine (3 - Booster for Pfizer series) 11/12/2021     Flu Vaccine (1) 09/01/2022       Next Steps:   Patient has upcoming appointment, these items will be addressed at that time.    Type of outreach:    Sent letter.    Next Steps:  Reach out within 90 days via Letter.    Max number of attempts reached: Yes. Will try again in 90 days if patient still on fail list.    Questions for provider review:    None     Hayden Rivera MA  Chart routed to Provider.

## 2022-10-06 NOTE — LETTER
[address] [facility] [address]  [phone]  Dept: [phone]    October 6, 2022    Ellen Hill  [facility] [address]  [place] [address]    Dear Ellen Hill,     At [facility] we care about your health and are committed to providing quality patient care.    Which includes staying current on preventive cancer screenings.  You can increase your chances of finding and treating cancers through regular screenings.      Our records indicate you may be due for the following preventive screening(s):    Colonoscopy    Colonoscopy is recommended every ten years for everyone age 50 and older. Please take a moment to read over the enclosed information packet about colon cancer screening. We strongly urge our patient's to consider having a colonoscopy done, which is the best screening test available and only needs to be done every 10 years if normal. If you are unwilling or unable to have a colonoscopy then we recommend the annual stool testing for blood. This test is called a FIT test and it looks for blood in the stool.     To schedule an appointment for your colonoscopy, please see the attached referral.     Mammogram    Mammogram for breast cancer   Recommended every 1-2 years for women age 50 and older  Mammograms help detect breast cancer, which is the most common cancer among women in the [place].  You may need to start having mammograms earlier and more often if you have had breast cancer, breast problems, or a family history of breast cancer.     Cervical Cancer Screening    Pap smear is a screening test used to detect cervical cancer. It is recommended every three years for women 21 and older. The test should be done at least once every three years but women who are at greater risk for cervical cancer may need to have the test more often.  Immunizations    To schedule an appointment or discuss this  screening further, you may contact us by phone at the [facility] at [phone] or online through the patient [email or web address]    If you have had any of the screenings listed above at another facility, please call us so that we may update your chart.      Your partners in health,      Quality Committee at [facility]

## 2022-10-25 ENCOUNTER — OFFICE VISIT (OUTPATIENT)
Dept: FAMILY MEDICINE | Facility: CLINIC | Age: 67
End: 2022-10-25
Payer: COMMERCIAL

## 2022-10-25 VITALS
WEIGHT: 164.2 LBS | HEART RATE: 81 BPM | HEIGHT: 63 IN | OXYGEN SATURATION: 99 % | TEMPERATURE: 97.6 F | DIASTOLIC BLOOD PRESSURE: 81 MMHG | RESPIRATION RATE: 18 BRPM | BODY MASS INDEX: 29.09 KG/M2 | SYSTOLIC BLOOD PRESSURE: 184 MMHG

## 2022-10-25 DIAGNOSIS — E11.9 TYPE 2 DIABETES MELLITUS TREATED WITH INSULIN (H): Primary | ICD-10-CM

## 2022-10-25 DIAGNOSIS — Z12.11 SCREEN FOR COLON CANCER: ICD-10-CM

## 2022-10-25 DIAGNOSIS — Z12.31 VISIT FOR SCREENING MAMMOGRAM: ICD-10-CM

## 2022-10-25 DIAGNOSIS — Z12.4 CERVICAL CANCER SCREENING: ICD-10-CM

## 2022-10-25 DIAGNOSIS — I10 ESSENTIAL HYPERTENSION WITH GOAL BLOOD PRESSURE LESS THAN 130/80: ICD-10-CM

## 2022-10-25 DIAGNOSIS — F32.1 MODERATE MAJOR DEPRESSION (H): ICD-10-CM

## 2022-10-25 DIAGNOSIS — F10.20 ALCOHOLIC (H): ICD-10-CM

## 2022-10-25 DIAGNOSIS — Z79.4 TYPE 2 DIABETES MELLITUS TREATED WITH INSULIN (H): Primary | ICD-10-CM

## 2022-10-25 DIAGNOSIS — N18.2 CHRONIC KIDNEY DISEASE, STAGE 2 (MILD): ICD-10-CM

## 2022-10-25 LAB
ALBUMIN SERPL-MCNC: 3.9 G/DL (ref 3.4–5)
ALP SERPL-CCNC: 89 U/L (ref 40–150)
ALT SERPL W P-5'-P-CCNC: 22 U/L (ref 0–50)
ANION GAP SERPL CALCULATED.3IONS-SCNC: 5 MMOL/L (ref 3–14)
AST SERPL W P-5'-P-CCNC: 13 U/L (ref 0–45)
BILIRUB SERPL-MCNC: 0.5 MG/DL (ref 0.2–1.3)
BUN SERPL-MCNC: 12 MG/DL (ref 7–30)
CALCIUM SERPL-MCNC: 9.4 MG/DL (ref 8.5–10.1)
CHLORIDE BLD-SCNC: 105 MMOL/L (ref 94–109)
CO2 SERPL-SCNC: 29 MMOL/L (ref 20–32)
CREAT SERPL-MCNC: 0.69 MG/DL (ref 0.52–1.04)
GFR SERPL CREATININE-BSD FRML MDRD: >90 ML/MIN/1.73M2
GLUCOSE BLD-MCNC: 221 MG/DL (ref 70–99)
HBA1C MFR BLD: 9.4 % (ref 0–5.6)
POTASSIUM BLD-SCNC: 3.5 MMOL/L (ref 3.4–5.3)
PROT SERPL-MCNC: 7.5 G/DL (ref 6.8–8.8)
SODIUM SERPL-SCNC: 139 MMOL/L (ref 133–144)

## 2022-10-25 PROCEDURE — 36415 COLL VENOUS BLD VENIPUNCTURE: CPT | Performed by: NURSE PRACTITIONER

## 2022-10-25 PROCEDURE — 99214 OFFICE O/P EST MOD 30 MIN: CPT | Performed by: NURSE PRACTITIONER

## 2022-10-25 PROCEDURE — 83036 HEMOGLOBIN GLYCOSYLATED A1C: CPT | Performed by: NURSE PRACTITIONER

## 2022-10-25 PROCEDURE — 80053 COMPREHEN METABOLIC PANEL: CPT | Performed by: NURSE PRACTITIONER

## 2022-10-25 RX ORDER — PEN NEEDLE, DIABETIC 32GX 5/32"
NEEDLE, DISPOSABLE MISCELLANEOUS
Qty: 100 EACH | Refills: 11 | Status: SHIPPED | OUTPATIENT
Start: 2022-10-25

## 2022-10-25 RX ORDER — CYCLOBENZAPRINE HCL 5 MG
5 TABLET ORAL AT BEDTIME
COMMUNITY
Start: 2022-10-21

## 2022-10-25 RX ORDER — HUMAN INSULIN 100 [IU]/ML
INJECTION, SUSPENSION SUBCUTANEOUS
Qty: 30 ML | Refills: 3 | Status: SHIPPED | OUTPATIENT
Start: 2022-10-25 | End: 2023-01-11

## 2022-10-25 RX ORDER — SERTRALINE HYDROCHLORIDE 25 MG/1
25 TABLET, FILM COATED ORAL DAILY
Qty: 30 TABLET | Refills: 1 | Status: SHIPPED | OUTPATIENT
Start: 2022-10-25 | End: 2023-05-24

## 2022-10-25 ASSESSMENT — ANXIETY QUESTIONNAIRES
GAD7 TOTAL SCORE: 4
GAD7 TOTAL SCORE: 4
1. FEELING NERVOUS, ANXIOUS, OR ON EDGE: SEVERAL DAYS
2. NOT BEING ABLE TO STOP OR CONTROL WORRYING: NOT AT ALL
5. BEING SO RESTLESS THAT IT IS HARD TO SIT STILL: NOT AT ALL
IF YOU CHECKED OFF ANY PROBLEMS ON THIS QUESTIONNAIRE, HOW DIFFICULT HAVE THESE PROBLEMS MADE IT FOR YOU TO DO YOUR WORK, TAKE CARE OF THINGS AT HOME, OR GET ALONG WITH OTHER PEOPLE: SOMEWHAT DIFFICULT
3. WORRYING TOO MUCH ABOUT DIFFERENT THINGS: SEVERAL DAYS
7. FEELING AFRAID AS IF SOMETHING AWFUL MIGHT HAPPEN: NOT AT ALL
6. BECOMING EASILY ANNOYED OR IRRITABLE: NOT AT ALL

## 2022-10-25 ASSESSMENT — PATIENT HEALTH QUESTIONNAIRE - PHQ9
10. IF YOU CHECKED OFF ANY PROBLEMS, HOW DIFFICULT HAVE THESE PROBLEMS MADE IT FOR YOU TO DO YOUR WORK, TAKE CARE OF THINGS AT HOME, OR GET ALONG WITH OTHER PEOPLE: NOT DIFFICULT AT ALL
5. POOR APPETITE OR OVEREATING: MORE THAN HALF THE DAYS
SUM OF ALL RESPONSES TO PHQ QUESTIONS 1-9: 6
SUM OF ALL RESPONSES TO PHQ QUESTIONS 1-9: 6

## 2022-10-25 ASSESSMENT — PAIN SCALES - GENERAL: PAINLEVEL: MODERATE PAIN (4)

## 2022-10-25 NOTE — PATIENT INSTRUCTIONS
To schedule your imaging exam at any of the [facility] imaging locations, please call [phone] for mammogram    Serotonin syndrome symptoms usually occur within several hours of taking a new drug or increasing the dose of a drug you're already taking.   Signs and symptoms include:  Agitation or restlessness  Confusion  Rapid heart rate and high blood pressure  Dilated pupils  Loss of muscle coordination or twitching muscles  Muscle rigidity  Heavy sweating  Diarrhea  Headache  Shivering  Goose bumps    Severe serotonin syndrome can be life-threatening. Signs and symptoms include:  High fever  Seizures  Irregular heartbeat  Unconsciousness    If you suspect you might have serotonin syndrome after starting a new drug or increasing the dose of a drug you're already taking, call your doctor right away or go to the emergency room. If you have severe or rapidly worsening symptoms, seek emergency treatment immediately.

## 2022-10-25 NOTE — PROGRESS NOTES
"  Assessment & Plan     Type 2 diabetes mellitus treated with insulin (H)  Uncontrolled. She stopped her metformin d/t GI upset. I asked her to consider taking just 1 tablet per day. Other injectables have not been covered by her insurance. Diet/exercise encouraged.  - HEMOGLOBIN A1C; Future  - insulin NPH-Regular (NOVOLIN 70/30 FLEXPEN) susp; Inject 54 units twice a day before breakfast and evening meal  - insulin pen needle (ULTICARE MICRO) 32G X 4 MM miscellaneous; Use 2 pen needles daily or as directed.  - Comprehensive metabolic panel (BMP + Alb, Alk Phos, ALT, AST, Total. Bili, TP); Future  - HEMOGLOBIN A1C  - Comprehensive metabolic panel (BMP + Alb, Alk Phos, ALT, AST, Total. Bili, TP)    Moderate major depression (H)  Restarting medication. No thoughts of harm. Frustrated with pain. Follow up 1 month.  - sertraline (ZOLOFT) 25 MG tablet; Take 1 tablet (25 mg) by mouth daily    Chronic kidney disease, stage 2 (mild)  Avoid NSAIDs. Stay well hydrated.    Screen for colon cancer  - COLOGUARD(EXACT SCIENCES)    Cervical cancer screening  Declines- risk of not screening discussed. Patient verbalizes understanding.    Visit for screening mammogram  - MA SCREENING DIGITAL BILAT - Future  (s+30); Future    Alcoholic (H)  Denies any recent use.    HTN  She has not been taking her BP medication. Risks of taking medication as prescribed discussed. I explained I'm worried about cardiovascular event such as stroke with uncontrolled BP. Patient verbalizes understanding. She will restart her meds. She has plenty at home and doesn't need refills.    Declined flu vaccine     BMI:   Estimated body mass index is 29.09 kg/m  as calculated from the following:    Height as of this encounter: 1.6 m (5' 3\").    Weight as of this encounter: 74.5 kg (164 lb 3.2 oz).   Weight management plan: Discussed healthy diet and exercise guidelines    See Patient Instructions    Return in about 4 weeks (around 11/22/2022).     The benefits, " risks and potential side effects were discussed in detail. Black box warnings discussed as relevant. All patient questions were answered. The patient was instructed to follow up immediately if any adverse reactions develop.    Return precautions discussed, including when to seek urgent/emergent care.    Patient verbalizes understanding and agrees with plan of care. Patient stable for discharge.      YVON BAKER CNP [facility] [place] is a 66 year old, presenting for the following health issues:  Refill Request (Pended refills- patient needs a new Blood Glucose Monitor) and Depression (Patient stating she needs something for this)      History of Present Illness       Diabetes:   She presents for follow up of diabetes.  She is checking home blood glucose a few times a month. She checks blood glucose before meals.  Blood glucose is never over 200 and never under 70. When her blood glucose is low, the patient is asymptomatic for confusion, blurred vision, lethargy and reports not feeling dizzy, shaky, or weak.  She has no concerns regarding her diabetes at this time.  She is not experiencing numbness or burning in feet, excessive thirst, blurry vision, weight changes or redness, sores or blisters on feet. The patient has not had a diabetic eye exam in the last 12 months.         Reason for visit:  Diebetes    She eats 0-1 servings of fruits and vegetables daily.She consumes 0 sweetened beverage(s) daily.She exercises with enough effort to increase her heart rate 9 or less minutes per day.  She exercises with enough effort to increase her heart rate 3 or less days per week. She is missing 3 dose(s) of medications per week.    Today's PHQ-9         PHQ-9 Total Score: 6    PHQ-9 Q9 Thoughts of better off dead/self-harm past 2 weeks :   Not at all    How difficult have these problems made it for you to do your work, take care of things at home, or get along with other  "people: Not difficult at all       Abnormal Mood Symptoms  Onset/Duration: \"year\" or so  Description: Feeling more depressed lately because of pain  Depression (if yes, do PHQ-9): YES  Anxiety (if yes, do LUIZA-7): YES  Accompanying Signs & Symptoms:  Still participating in activities that you used to enjoy: No  Fatigue: YES  Irritability: No  Difficulty concentrating: No  Changes in appetite: No  Problems with sleep: YES  Heart racing/beating fast: No  Abnormally elevated, expansive, or irritable mood: No  Persistently increased activity or energy: No  Thoughts of hurting yourself or others: No  History:  Recent stress or major life event: YES- retired and pain  Prior depression or anxiety: yes- was on depression meds before  Family history of depression or anxiety: No  Alcohol/drug use: No  Difficulty sleeping: YES  Precipitating or alleviating factors: Pain is making it all worse.   Therapies tried and outcome: none  PHQ 1/11/2021 1/12/2022 10/25/2022   PHQ-9 Total Score 8 5 6   Q9: Thoughts of better off dead/self-harm past 2 weeks Not at all Not at all Not at all     LUIZA-7 SCORE 4/19/2019 1/11/2021 10/25/2022   Total Score - - -   Total Score 0 2 4           Recently retired  Has severe leg pain  Stimulator in back not helping  Surgery helped but not resolved pain  They are considering doing trigger injection  On oxycodone - was getting nausea, etc. Now off oxy and on muscle relaxer and tramadol    Due for eye exam  Declines pap smear    Up to 2-3 am   Start to go to bed at 10-11 pm  Drinks 1/2 pepsi occasionally otherwise no caffeine  Taking nyquil or citlaly selzer PM  Takes tramadol and muscle relaxer in the evening    \"I'm not really depressed. I'm more angry.\" missing out on life - can't walk, sit, stand too long  Previously took citalopram    She has not been taking her BP medication - losartan, hydrochlorothiazide, or amlodipine. She denies symptoms including chest pain, shortness of breath, headaches, edema, " "palpitations    Denies any alcohol use    Review of Systems   Constitutional, HEENT, cardiovascular, pulmonary, GI, , musculoskeletal, neuro, skin, endocrine and psych systems are negative, except as otherwise noted.      Objective    BP (!) 184/81 (BP Location: Right arm, Patient Position: Sitting, Cuff Size: Adult Regular)   Pulse 81   Temp 97.6  F (36.4  C) (Tympanic)   Resp 18   Ht 1.6 m (5' 3\")   Wt 74.5 kg (164 lb 3.2 oz)   LMP  (LMP Unknown)   SpO2 99%   BMI 29.09 kg/m    Body mass index is 29.09 kg/m .  Physical Exam   GENERAL: healthy, alert and no distress  RESP: lungs clear to auscultation - no rales, rhonchi or wheezes  CV: regular rate and rhythm, normal S1 S2, no S3 or S4, no murmur, click or rub, no peripheral edema and peripheral pulses strong  MS: no gross musculoskeletal defects noted, no edema  PSYCH: mentation appears normal, affect normal/bright    Results for orders placed or performed in visit on 10/25/22   HEMOGLOBIN A1C     Status: Abnormal   Result Value Ref Range    Hemoglobin A1C 9.4 (H) 0.0 - 5.6 %   Comprehensive metabolic panel (BMP + Alb, Alk Phos, ALT, AST, Total. Bili, TP)     Status: Abnormal   Result Value Ref Range    Sodium 139 133 - 144 mmol/L    Potassium 3.5 3.4 - 5.3 mmol/L    Chloride 105 94 - 109 mmol/L    Carbon Dioxide (CO2) 29 20 - 32 mmol/L    Anion Gap 5 3 - 14 mmol/L    Urea Nitrogen 12 7 - 30 mg/dL    Creatinine 0.69 0.52 - 1.04 mg/dL    Calcium 9.4 8.5 - 10.1 mg/dL    Glucose 221 (H) 70 - 99 mg/dL    Alkaline Phosphatase 89 40 - 150 U/L    AST 13 0 - 45 U/L    ALT 22 0 - 50 U/L    Protein Total 7.5 6.8 - 8.8 g/dL    Albumin 3.9 3.4 - 5.0 g/dL    Bilirubin Total 0.5 0.2 - 1.3 mg/dL    GFR Estimate >90 >60 mL/min/1.73m2                   "

## 2022-10-25 NOTE — LETTER
October 26, 2022      Ellen Hill  [address] [place] [address]        Dear ,    We are writing to inform you of your test results.    Your lab results show a1c of 9.4. This is up from 8.5 6 months ago. Please be sure to take your insulin everyday. Also, please consider taking just 1 tablet of the metformin. Please follow up in clinic in 3 months. Please let us know if you have any questions.   Thank you for allowing us to participate in your care.    Resulted Orders   HEMOGLOBIN A1C   Result Value Ref Range    Hemoglobin A1C 9.4 (H) 0.0 - 5.6 %   Comprehensive metabolic panel (BMP + Alb, Alk Phos, ALT, AST, Total. Bili, TP)   Result Value Ref Range    Sodium 139 133 - 144 mmol/L    Potassium 3.5 3.4 - 5.3 mmol/L    Chloride 105 94 - 109 mmol/L    Carbon Dioxide (CO2) 29 20 - 32 mmol/L    Anion Gap 5 3 - 14 mmol/L    Urea Nitrogen 12 7 - 30 mg/dL    Creatinine 0.69 0.52 - 1.04 mg/dL    Calcium 9.4 8.5 - 10.1 mg/dL    Glucose 221 (H) 70 - 99 mg/dL    Alkaline Phosphatase 89 40 - 150 U/L    AST 13 0 - 45 U/L    ALT 22 0 - 50 U/L    Protein Total 7.5 6.8 - 8.8 g/dL    Albumin 3.9 3.4 - 5.0 g/dL    Bilirubin Total 0.5 0.2 - 1.3 mg/dL    GFR Estimate >90 >60 mL/min/1.73m2      Comment:      Effective December 21, 2021 eGFRcr in adults is calculated using the 2021 CKD-EPI creatinine equation which includes age and gender (Tashi et al., NEJM, DOI: 10.1056/BHWPzo8628867)       If you have any questions or concerns, please call the clinic at the number listed above.       Sincerely,      YVON Aguirre CNP

## 2022-10-25 NOTE — LETTER
November 21, 2022      Ellen Hill  [address] [place] [address]        Hi Ellen,   Your lab results were normal for colon cancer screening. Please repeat in 3 years. Please let us know if you have any questions.   Thank you for allowing us to participate in your care.   YVON Crisostomo CNP      Resulted Orders   COLOGUARD(EXACT SCIENCES)   Result Value Ref Range    COLOGUARD-ABSTRACT Negative Negative      Comment:        NEGATIVE TEST RESULT. A negative Cologuard result indicates a low likelihood that a colorectal cancer (CRC) or advanced adenoma (adenomatous polyps with more advanced pre-malignant features)  is present. The chance that a person with a negative Cologuard test has a colorectal cancer is less than 1 in 1500 (negative predictive value >99.9%) or has an  advanced adenoma is less than  5.3% (negative predictive value 94.7%). These data are based on a prospective cross-sectional study of 10,000 individuals at average risk for colorectal cancer who were screened with both Cologuard and colonoscopy. (Freida GONZALEZ et al, N Engl J Med 2014;370(14):9459-7552) The normal value (reference range) for this assay is negative.    COLOGUARD RE-SCREENING RECOMMENDATION: Periodic colorectal cancer screening is an important part of preventive healthcare for asymptomatic individuals at average risk for colorectal cancer.  Following a negative Cologuard result, the American Cancer Society and U.S.  Multi-Society Task Force screening guidelines recommend a Cologuard re-screening interval of 3 years.   References: American Cancer Society Guideline for Colorectal Cancer Screening: https://www.cancer.org/cancer/colon-rectal-cancer/sfqoywier-bcescceks-vfebfme/acs-recommendations.html.; Franky LILLY, Sommer CARDONA, Ryan COLIN, Colorectal Cancer Screening: Recommendations for Physicians and Patients from the U.S. Multi-Society Task Force on Colorectal Cancer Screening , Am J Gastroenterology 2017;  112:4567-8328.    TEST DESCRIPTION: Composite algorithmic analysis of stool DNA-biomarkers with hemoglobin immunoassay.   Quantitative values of individual biomarkers are not reportable and are not associated with individual biomarker result reference ranges. Cologuard is intended for colorectal cancer screening of adults of either sex, 45 years or older, who are at average-risk for colorectal cancer (CRC). Cologuard has been approved for use by the U.S. FDA. The performance of Cologuard was  established in a cross sectional study of average-risk adults aged 50-84. Cologuard performance in patients ages 45 to 49 years was estimated by sub-group analysis of near-age groups. Colonoscopies performed for a positive result may find as the most clinically significant lesion: colorectal cancer [4.0%], advanced adenoma (including sessile serrated polyps greater than or equal to 1cm diameter) [20%] or non- advanced adenoma [31%]; or no colorectal neoplasia [45%]. These estimates are derived from a prospective cross-sectional screening study of 10,000 individuals at average risk for colorectal cancer who were screened with both Cologuard and colonoscopy. (Freida GONZALEZ et al, N Engl J Med 2014;370(14):2585-2739.) Cologuard may produce a false negative or false positive result (no colorectal cancer or precancerous polyp present at colonoscopy follow up). A negative Cologuard test result does not guarantee the absence of CRC or advanced adenoma (pre-cancer). The current Cologuard  screening interval is every 3 years. (American Cancer Society and U.S. Multi-Society Task Force). Cologuard performance data in a 10,000 patient pivotal study using colonoscopy as the reference method can be accessed at the following location: [email or web address].com/results. Additional description of the Cologuard test process, warnings and precautions can be found at [email or web address].     HEMOGLOBIN A1C   Result Value Ref Range    Hemoglobin A1C 9.4 (H)  0.0 - 5.6 %   Comprehensive metabolic panel (BMP + Alb, Alk Phos, ALT, AST, Total. Bili, TP)   Result Value Ref Range    Sodium 139 133 - 144 mmol/L    Potassium 3.5 3.4 - 5.3 mmol/L    Chloride 105 94 - 109 mmol/L    Carbon Dioxide (CO2) 29 20 - 32 mmol/L    Anion Gap 5 3 - 14 mmol/L    Urea Nitrogen 12 7 - 30 mg/dL    Creatinine 0.69 0.52 - 1.04 mg/dL    Calcium 9.4 8.5 - 10.1 mg/dL    Glucose 221 (H) 70 - 99 mg/dL    Alkaline Phosphatase 89 40 - 150 U/L    AST 13 0 - 45 U/L    ALT 22 0 - 50 U/L    Protein Total 7.5 6.8 - 8.8 g/dL    Albumin 3.9 3.4 - 5.0 g/dL    Bilirubin Total 0.5 0.2 - 1.3 mg/dL    GFR Estimate >90 >60 mL/min/1.73m2      Comment:      Effective December 21, 2021 eGFRcr in adults is calculated using the 2021 CKD-EPI creatinine equation which includes age and gender (Tashi et al., NEJM, DOI: 10.1056/[id number])

## 2022-11-18 LAB — NONINV COLON CA DNA+OCC BLD SCRN STL QL: NEGATIVE

## 2022-11-21 NOTE — RESULT ENCOUNTER NOTE
Please send letter:    Rizwan Hughes,  Your lab results were normal for colon cancer screening. Please repeat in 3 years. Please let us know if you have any questions.  Thank you for allowing us to participate in your care.  YVON Crisostomo CNP

## 2022-12-12 ENCOUNTER — TELEPHONE (OUTPATIENT)
Dept: FAMILY MEDICINE | Facility: CLINIC | Age: 67
End: 2022-12-12

## 2022-12-12 DIAGNOSIS — E11.9 TYPE 2 DIABETES MELLITUS TREATED WITH INSULIN (H): Primary | ICD-10-CM

## 2022-12-12 DIAGNOSIS — Z79.4 TYPE 2 DIABETES MELLITUS TREATED WITH INSULIN (H): Primary | ICD-10-CM

## 2022-12-12 NOTE — TELEPHONE ENCOUNTER
Pt calling because she's on medicare and the health care wont cover her other insulin, she needs to get on Tier ONE OR Tier 2 for her insulin. Once done these needs to be sent over to pharmacy pt only has three pens left of her Novolin. She also wanted to add that her pain meds for sleeping was not working the Hydrochlorothiazide and wanted to be switch over to Hydro.

## 2022-12-13 NOTE — TELEPHONE ENCOUNTER
Does she have enough now to get through to appt in 2 weeks?  I think she would benefit from seeing [facility] pharmacist - please let her know I will place referral and I'd like her to see them

## 2022-12-13 NOTE — TELEPHONE ENCOUNTER
This writer attempted to contact patient on 12/13/22      Reason for call relay provider message and left message.      If patient calls back:   Registered Nurse called. Relay provider message below, [facility] pharmacist scheduling number is [phone] and she herberth need to bring all prescription and non-prescription meds and/or detailed list of meds to appt, along with diabetes testing supplies if applicable.      Nuzhat Aly, MAURON, RN  [facility] Primary Care [facility]

## 2022-12-13 NOTE — TELEPHONE ENCOUNTER
Patient calling because she found out what type of insulin is better covered:    - humulin  - humalog  - Lispro  - lyumjev    She would like one of these sent to pharmacy since her Novolin is no longer covered very well.    Judi WADEN, RN

## 2022-12-13 NOTE — TELEPHONE ENCOUNTER
Called and spoke with patient, she states she contacted her insurance and they told her primary care provider could recommend a tier one or tier two insulin (should be located under formulary option when order pulled up) that would be good for her, as Novolog is 70-30 is tier 3.     Patient states she used to be on novolog, this was okay. It took longer to get longer into her system.     Patient states she is also having difficulty sleeping, not going to bed until 4-5am in the morning.      Was started on Sertraline 10/25, hasn't really noticed anything change. Writer advised virtual visit to discuss with primary care provider further. Assisted to schedule 10/26.    Yolande Farias RN    [facility]- Primary Care

## 2022-12-13 NOTE — TELEPHONE ENCOUNTER
Patient called back, writer relayed provider message below, patient verbalized understanding. Patient states that she DOES have enough insulin to get through until her appt with Alejandra in 2 weeks. Patient also states that she would be interested in meeting with [facility] pharmacist, scheduling number given to patient.     Patient states that she will contact [facility] to make appt, will also try [place] PM to see if that helps with her sleep. No further questions or concerns.      MAURO FoxN, RN  [facility] Primary Care [facility]

## 2022-12-14 ENCOUNTER — TELEPHONE (OUTPATIENT)
Dept: FAMILY MEDICINE | Facility: CLINIC | Age: 67
End: 2022-12-14

## 2022-12-14 NOTE — TELEPHONE ENCOUNTER
Patient Quality Outreach    Patient is due for the following:   Diabetes -  Eye Exam, Microalbumin, BP Check, Diabetic Follow-Up Visit and Foot Exam    Next Steps:   Patient has upcoming appointment, these items will be addressed at that time.    Type of outreach:    Chart review performed, no outreach needed.    Questions for provider review:    None     Lionel Venegas

## 2022-12-19 ENCOUNTER — TELEPHONE (OUTPATIENT)
Dept: FAMILY MEDICINE | Facility: CLINIC | Age: 67
End: 2022-12-19

## 2022-12-19 NOTE — TELEPHONE ENCOUNTER
Patient Quality Outreach    Patient is due for the following:   Hypertension -  BP check    Next Steps:   Patient has upcoming appointment, these items will be addressed at that time. 12/26/2022 appointment    Type of outreach:    did not contact      Questions for provider review:    None     Diane L. Schoenherr, RN

## 2022-12-26 ENCOUNTER — VIRTUAL VISIT (OUTPATIENT)
Dept: FAMILY MEDICINE | Facility: CLINIC | Age: 67
End: 2022-12-26
Payer: COMMERCIAL

## 2022-12-26 DIAGNOSIS — Z00.00 MEDICARE ANNUAL WELLNESS VISIT, SUBSEQUENT: ICD-10-CM

## 2022-12-26 DIAGNOSIS — E78.5 HYPERLIPIDEMIA LDL GOAL <100: ICD-10-CM

## 2022-12-26 DIAGNOSIS — G47.00 INSOMNIA, UNSPECIFIED TYPE: Primary | ICD-10-CM

## 2022-12-26 DIAGNOSIS — E11.29 TYPE 2 DIABETES MELLITUS WITH OTHER DIABETIC KIDNEY COMPLICATION (H): ICD-10-CM

## 2022-12-26 PROCEDURE — 99213 OFFICE O/P EST LOW 20 MIN: CPT | Mod: 25 | Performed by: NURSE PRACTITIONER

## 2022-12-26 PROCEDURE — G0438 PPPS, INITIAL VISIT: HCPCS | Mod: TEL | Performed by: NURSE PRACTITIONER

## 2022-12-26 RX ORDER — GABAPENTIN 100 MG/1
100 CAPSULE ORAL
COMMUNITY
End: 2023-05-24

## 2022-12-26 ASSESSMENT — PATIENT HEALTH QUESTIONNAIRE - PHQ9
10. IF YOU CHECKED OFF ANY PROBLEMS, HOW DIFFICULT HAVE THESE PROBLEMS MADE IT FOR YOU TO DO YOUR WORK, TAKE CARE OF THINGS AT HOME, OR GET ALONG WITH OTHER PEOPLE: NOT DIFFICULT AT ALL
SUM OF ALL RESPONSES TO PHQ QUESTIONS 1-9: 3
SUM OF ALL RESPONSES TO PHQ QUESTIONS 1-9: 3

## 2022-12-26 NOTE — PROGRESS NOTES
Ellen is a 67 year old who is being evaluated via a billable telephone visit.      What phone number would you like to be contacted at? [phone]  How would you like to obtain your AVS? Tim    Distant Location (provider location):  On-site    Assessment & Plan       Needs help simplifying medications and ones she can afford    Insomnia, unspecified type  Patient requesting sleep medicine. I recommend she sees sleep specialist for further evaluation.   - Adult Sleep Eval & Management  Referral; Future    Type 2 diabetes mellitus with other diabetic kidney complication (H)  Last a1c 9.4 two months ago. She recently had a change in insurance and needs to switch insulins but isn't sure what is on her formulary. She has follow up with MTM pharmacist in 2 weeks. I advised she bring her list with her to that visit or if she can find it before then let me know and we'll switch it. We do need to keep her regimen as simple as possible as patient doesn't always like to take her medication.  - Basic metabolic panel  (Ca, Cl, CO2, Creat, Gluc, K, Na, BUN); Future  - Hemoglobin A1c; Future  - Albumin Random Urine Quantitative with Creat Ratio; Future    Medicare annual wellness visit, subsequent      Hyperlipidemia LDL goal <100  - ALT; Future             See Patient Instructions    Return in about 4 weeks (around 1/23/2023), or if symptoms worsen or fail to improve.     The benefits, risks and potential side effects were discussed in detail. Black box warnings discussed as relevant. All patient questions were answered. The patient was instructed to follow up immediately if any adverse reactions develop.    Return precautions discussed, including when to seek urgent/emergent care.    Patient verbalizes understanding and agrees with plan of care.     YVON BAKER [facility]    Mary Ellen Hughes is a 67 year old, presenting for the following health  issues:  Diabetes      History of Present Illness       Diabetes:   She presents for follow up of diabetes.  She is checking home blood glucose a few times a month. She checks blood glucose before meals and at bedtime.  Blood glucose is never over 200 and never under 70. She is aware of hypoglycemia symptoms including shakiness. She has no concerns regarding her diabetes at this time.  She is not experiencing numbness or burning in feet, excessive thirst, blurry vision, weight changes or redness, sores or blisters on feet. The patient has had a diabetic eye exam in the last 12 months. Eye exam performed on 10/2022. Location of last eye exam [facility].        She eats 2-3 servings of fruits and vegetables daily.She consumes 0 sweetened beverage(s) daily.She exercises with enough effort to increase her heart rate 10 to 19 minutes per day.  She exercises with enough effort to increase her heart rate 3 or less days per week.   She is taking medications regularly.    Today's PHQ-9         PHQ-9 Total Score: 3    PHQ-9 Q9 Thoughts of better off dead/self-harm past 2 weeks :   Not at all    How difficult have these problems made it for you to do your work, take care of things at home, or get along with other people: Not difficult at all       Annual Wellness Visit    Patient has been advised of split billing requirements and indicates understanding: Yes     Are you in the first 12 months of your Medicare Part B coverage?  No    Physical Health:    In general, how would you rate your overall physical health? fair    Outside of work, how many days during the week do you exercise?2-3 days/week    Outside of work, approximately how many minutes a day do you exercise?30-45 minutes    If you drink alcohol do you typically have >3 drinks per day or >7 drinks per week? Not Applicable    Do you usually eat at least 4 servings of fruit and vegetables a day, include whole grains & fiber and avoid regularly eating high fat or  "\"junk\" foods? Yes    Do you have any problems taking medications regularly? No    Do you have any side effects from medications? none    Needs assistance for the following daily activities: no assistance needed    Which of the following safety concerns are present in your home?  none identified     Hearing impairment: No    In the past 6 months, have you been bothered by leaking of urine? no    LMP  (LMP Unknown)   Weight: Unable to obtain due to video visit  Height: Unable to obtain due to video visit  BMI: Unable to obtain due to video visit  Blood Pressure: Unable to obtain due to video visit    Mental Health:    In general, how would you rate your overall mental or emotional health? fair  PHQ-2 Score:      Do you feel safe in your environment? Yes    Have you ever done Advance Care Planning? (For example, a Health Directive, POLST, or a discussion with a medical provider or your loved ones about your wishes)? No, advance care planning information given to patient to review.  Patient declined advance care planning discussion at this time.    Fall risk:  Fallen 2 or more times in the past year?: No  Any fall with injury in the past year?: No    Cognitive Screening: Unable to complete due to virtual visit; need for additional assessment in future face-to-face visit        Current providers sharing in care for this patient include:   Patient Care Team:  Alejandra Aguilera APRN CNP as PCP - General (Nurse Practitioner)  Carrie Castro RD as Diabetes Educator (Dietitian, Registered)  Alejandra Aguilera APRN CNP as Assigned PCP    Patient has been advised of split billing requirements and indicates understanding: Yes      Doesn't take BP medication daily  Doesn't check home BP    Has been in lots of pain    Doesn't need refills except for insulin. Now has Medicare and knows her current medication isn't on formulary but doesn't know formulary. She has appointment with [facility] pharmacist in 2 weeks and has enough " insulin to get through until then    Having trouble sleeping at night  Doesn't go to bed until 4am  Can't sleep, sometimes it's r/t pain but oftentimes not  Has tried heat and cold    Sees heather in [place] for lumbar radicupathy    Review of Systems   Constitutional, HEENT, cardiovascular, pulmonary, GI, , musculoskeletal, neuro, skin, endocrine and psych systems are negative, except as otherwise noted.      Objective           Vitals:  No vitals were obtained today due to virtual visit.    Physical Exam   healthy, alert and no distress  PSYCH: Alert and oriented times 3; coherent speech, normal   rate and volume, able to articulate logical thoughts, able   to abstract reason, no tangential thoughts, no hallucinations   or delusions  Her affect is normal  RESP: No cough, no audible wheezing, able to talk in full sentences  Remainder of exam unable to be completed due to telephone visits                Phone call duration: 14 minutes (5:25-5:39)

## 2022-12-26 NOTE — Clinical Note
Ellen Hart is seeing you in a couple of weeks. She takes her medications intermittently although she reports she takes them consistently. Her med list dates/refills, etc do not add up. I'm hoping you can help simplify her medication regimen. Also, she needs to switch insulin due to insurance change but she isn't sure what is covered. She is going to bring her book with her when she sees you. If she can find it before then she will let me know and I'll switch it. Thank you so much for seeing her! YVON Crisostomo CNP

## 2023-01-11 ENCOUNTER — OFFICE VISIT (OUTPATIENT)
Dept: PHARMACY | Facility: CLINIC | Age: 68
End: 2023-01-11
Attending: NURSE PRACTITIONER
Payer: COMMERCIAL

## 2023-01-11 VITALS
BODY MASS INDEX: 30.08 KG/M2 | DIASTOLIC BLOOD PRESSURE: 86 MMHG | OXYGEN SATURATION: 96 % | HEART RATE: 85 BPM | WEIGHT: 169.8 LBS | SYSTOLIC BLOOD PRESSURE: 188 MMHG

## 2023-01-11 DIAGNOSIS — E11.9 TYPE 2 DIABETES MELLITUS TREATED WITH INSULIN (H): Primary | ICD-10-CM

## 2023-01-11 DIAGNOSIS — F41.9 ANXIETY: ICD-10-CM

## 2023-01-11 DIAGNOSIS — I10 ESSENTIAL HYPERTENSION WITH GOAL BLOOD PRESSURE LESS THAN 130/80: ICD-10-CM

## 2023-01-11 DIAGNOSIS — G47.00 INSOMNIA, UNSPECIFIED TYPE: ICD-10-CM

## 2023-01-11 DIAGNOSIS — Z79.4 TYPE 2 DIABETES MELLITUS TREATED WITH INSULIN (H): Primary | ICD-10-CM

## 2023-01-11 LAB — HBA1C MFR BLD: 8.4 % (ref 0–5.6)

## 2023-01-11 PROCEDURE — 83036 HEMOGLOBIN GLYCOSYLATED A1C: CPT | Performed by: PHARMACIST

## 2023-01-11 PROCEDURE — 99207 PR NO CHARGE LOS: CPT | Performed by: PHARMACIST

## 2023-01-11 PROCEDURE — 36415 COLL VENOUS BLD VENIPUNCTURE: CPT | Performed by: PHARMACIST

## 2023-01-11 RX ORDER — LANCETS 33 GAUGE
1 EACH MISCELLANEOUS 2 TIMES DAILY
Qty: 100 EACH | Refills: 11 | Status: SHIPPED | OUTPATIENT
Start: 2023-01-11

## 2023-01-11 RX ORDER — BLOOD-GLUCOSE METER
EACH MISCELLANEOUS
Qty: 1 KIT | Refills: 0 | Status: SHIPPED | OUTPATIENT
Start: 2023-01-11

## 2023-01-11 RX ORDER — BLOOD SUGAR DIAGNOSTIC
STRIP MISCELLANEOUS
Qty: 100 STRIP | Refills: 11 | Status: SHIPPED | OUTPATIENT
Start: 2023-01-11 | End: 2023-02-10

## 2023-01-11 NOTE — LETTER
January 11, 2023      Ellen Hill  [address] [place] [address]        Dear ,    We are writing to inform you of your test results.    Your a1c is down to 8.4. This is an improvement from 2 months ago. Please let us know if you have any questions.    Resulted Orders   Hemoglobin A1c   Result Value Ref Range    Hemoglobin A1C 8.4 (H) 0.0 - 5.6 %      Comment:      Normal <5.7%   Prediabetes 5.7-6.4%    Diabetes 6.5% or higher     Note: Adopted from ADA consensus guidelines.       If you have any questions or concerns, please call the clinic at the number listed above.       Sincerely,      YVON Aguirre CNP

## 2023-01-11 NOTE — PATIENT INSTRUCTIONS
"Recommendations from today's MTM visit:                                                    MTM (medication therapy management) is a service provided by a clinical pharmacist designed to help you get the most of out of your medicines.   Today we reviewed what your medicines are for, how to know if they are working, that your medicines are safe and how to make your medicine regimen as easy as possible.      1. We are changing your insulin to Humulin 70/30, which is covered under your 2023 plan.    2. I sent a new prescription for OneTouch meter and testing supplies. Test your blood sugar in the morning before breakfast (goal of 80-130mg/dL), 2 hours after a meal (goal less than 180mg/dL), and anytime you feel it may be low (feeling shaky, dizzy, sweaty, weak). If your blood sugar is below 70mg/dL, have something to eat to bring it up. If your blood sugar is NOT below 70mg/dL, take a 5 minute break, have a glass water, and let the feeling pass. This is your body adjusting to lower and more normal blood sugar levels.    3. We talked about ways to help with your sleep:   - Work on improving your \"sleep hygeine\" -- no TV, no screens as you are trying to fall asleep, try the sleep meditations on the [employer] Timer Luciana   - Try to go to bed and wake up at the same time each day   - You could incorporate a sleepy time tea into your evening routine   - Continue to use the fan for white noise   - You could try a week or two of Tylenol PM -- this medication may help with the pain and make you feel tired. I wouldn't recommend this long term, because you can develop tolerance to it.   - Keep taking the sertraline -- this medication can help take the edge off of some the anxiety you feel    Follow-up: Return in 3 weeks (on 2/1/2023) for Blood sugar recheck, Medication dose check, by phone, at 2pm.    It was great to speak with you today.  I value your experience and would be very thankful for your time with providing feedback on our " clinic survey. You may receive a survey via email or text message in the next few days.     To schedule another MTM appointment, please call the clinic directly or you may call the MTM scheduling line at [phone] or toll-free at [phone].     My Clinical Pharmacist's contact information:                                                      Please feel free to contact me with any questions or concerns you have.      Nereida Perrin, Pharm.D., [facility]  Medication Therapy Management Pharmacist  [phone]

## 2023-01-11 NOTE — PROGRESS NOTES
"Medication Therapy Management (MTM) Encounter    ASSESSMENT:                            Medication Adherence/Access: Ok to take all daily meds in the AM to avoid missing doses    Type 2 Diabetes: Patient is not meeting A1c goal of < 7% upon recheck today, slightly improved from previous.  Self monitoring of blood glucose is not at goal of being checked.  She would benefit from checking blood sugars on a regular basis to ensure appropriate and safe insulin dose.      Hypertension: Patient is not meeting blood pressure goal of < 130/80mmHg.  It is unclear if patient is taking her medications regularly, need to review this in more detail at upcoming visit.    Insomnia/anxiety: Does not appear well controlled at this time.  Encouraged her to follow through with sleep study.  She may also benefit from improved sleep hygiene.  She could also try short-term use of Tylenol PM to help with pain that keeps her awake and diphenhydramine to make her drowsy at bedtime.  It also appears she is not compliant with the sertraline, improved compliance may help with anxiety that keeps her awake at night.      PLAN:                            1. We are changing your insulin to Humulin 70/30, which is covered under your 2023 plan.    2. I sent a new prescription for OneTouch meter and testing supplies. Test your blood sugar in the morning before breakfast (goal of 80-130mg/dL), 2 hours after a meal (goal less than 180mg/dL), and anytime you feel it may be low (feeling shaky, dizzy, sweaty, weak). If your blood sugar is below 70mg/dL, have something to eat to bring it up. If your blood sugar is NOT below 70mg/dL, take a 5 minute break, have a glass water, and let the feeling pass. This is your body adjusting to lower and more normal blood sugar levels.    3. We talked about ways to help with your sleep:   - Work on improving your \"sleep hygeine\" -- no TV, no screens as you are trying to fall asleep, try the sleep meditations on the " [employer] Timer Luciana   - Try to go to bed and wake up at the same time each day   - You could incorporate a sleepy time tea into your evening routine   - Continue to use the fan for white noise   - You could try a week or two of Tylenol PM -- this medication may help with the pain and make you feel tired. I wouldn't recommend this long term, because you can develop tolerance to it.   - Keep taking the sertraline -- this medication can help take the edge off of some the anxiety you feel    Follow-up: Return in 3 weeks (on 2/1/2023) for Blood sugar recheck, Medication dose check, by phone, at 2pm.      SUBJECTIVE/OBJECTIVE:                          Ellen Hill is a 65 year old female coming for an initial visit. She was referred to me from Alejandra Aguilera. Last visit with me was 2/23/2021.    Reason for visit: diabetes and allergies to insulin glp1 - current insulin is not covered under her 2023 plan.   Brings in her 2023 formulary    Allergies/ADRs: Reviewed in chart  Tobacco: She reports that she quit smoking about 9 years ago. Her smoking use included cigarettes. She has a 1.00 pack-year smoking history. She has never used smokeless tobacco.  Alcohol: Less than 1 beverages / week    Medication Adherence/Access: Not discussed in detail today.  Per chart review it appears she does not refill her medications on a regular basis.  She did bring all of her medicines in with her today and does currently have her medicines at home.     Type 2 Diabetes:   Novolin 70/30 54units twice daily before meals.     Medication Hx:  Metformin - diarrhea  Allergic reactions to Levemir and Victoza    SMBG Ranges (patient reported): not checking currently; checked last in December and reading was 160mg/dL  Symptoms of low blood sugar? none  Symptoms of high blood sugar? Fatigue, but also difficulty sleeping    Lab Results   Component Value Date    UMALCR 246.77 (H) 01/12/2022       Lab Results   Component Value Date    A1C 8.4  01/11/2023    A1C 9.4 10/25/2022    A1C 8.5 04/01/2022    A1C 10.2 01/12/2022    A1C 6.5 04/27/2021    A1C 6.7 02/01/2021    A1C 7.7 01/08/2021    A1C 10.0 11/04/2020    A1C 9.5 08/20/2020     Hypertension:   amlodipine 10mg daily  losartan 100mg daily  Hydrochlorothiazide 12.5 mg daily    Patient does not self-monitor blood pressure.   Patient reports no current medication side effects.  Reports she did take her medications this morning before our appointment  BP Readings from Last 3 Encounters:   01/11/23 (!) 188/86   10/25/22 (!) 184/81   04/01/22 130/80     Insomnia/anxiety:   Sertraline 25mg daily -originally prescribed in October for a 30-day supply with 1 refill.  She brings in a bottle of this medication today.  We did not discuss compliance, but it appears she has not been taking this regularly  Has been referred for a sleep study, but has not scheduled.  Tells me today she does not think she will follow through with this.    Medication Hx:  Melatonin - did not work  Oxycodone knocked her out when she took post-op  Lois Bucio PM was somewhat helpful    Patient reports trouble falling asleep. Gets into bed around 10:30/11pm and doesn't actually fall asleep until 4am. Partner works 3rd shift, and she doesn't like being alone at night. Doesn't feel unsafe, but maybe anxious about being alone. Difficulty falling asleep due to some pain, worries. Leaves TV on in bedroom. Used to have cats, but does not have anymore. Also retired in October so is struggling with not having routine.    LUIZA-7 SCORE 4/19/2019 1/11/2021 10/25/2022   Total Score - - -   Total Score 0 2 4           Today's Vitals: BP (!) 188/86   Pulse 85   Wt 169 lb 12.8 oz (77 kg)   LMP  (LMP Unknown)   SpO2 96%   BMI 30.08 kg/m    ----------------      I spent 20 minutes with this patient today. All changes were made via collaborative practice agreement with Alejandra Bal. A copy of the visit note was provided to the patient's primary  care provider.    The patient declined a summary of these recommendations.     Nereida Perrin, Pharm.D., [facility]  Medication Therapy Management Pharmacist  [phone]         Medication Therapy Recommendations Needing Review  No medication therapy recommendations to display

## 2023-01-16 ENCOUNTER — TELEPHONE (OUTPATIENT)
Dept: FAMILY MEDICINE | Facility: CLINIC | Age: 68
End: 2023-01-16
Payer: MEDICARE

## 2023-01-16 DIAGNOSIS — E11.29 TYPE 2 DIABETES MELLITUS WITH OTHER DIABETIC KIDNEY COMPLICATION (H): ICD-10-CM

## 2023-01-16 DIAGNOSIS — Z79.4 TYPE 2 DIABETES MELLITUS TREATED WITH INSULIN (H): Primary | ICD-10-CM

## 2023-01-16 DIAGNOSIS — E11.9 TYPE 2 DIABETES MELLITUS TREATED WITH INSULIN (H): Primary | ICD-10-CM

## 2023-01-16 NOTE — TELEPHONE ENCOUNTER
Pt calling because she picked up the insulin that was prescribed on 1/11/23.    States that it comes in a vial and pt does not have any needles or supplies to administer it.   States that she normally gets a pen.    States that she does not know how to administer insulin from a vial.       Pt is requesting for insulin to be changed to a pen because it will be easier for her.     States that she does not have any insulin for tonight.      Minnie Romero RN  [facility]

## 2023-01-17 ENCOUNTER — TELEPHONE (OUTPATIENT)
Dept: FAMILY MEDICINE | Facility: CLINIC | Age: 68
End: 2023-01-17
Payer: MEDICARE

## 2023-01-17 DIAGNOSIS — E11.9 TYPE 2 DIABETES MELLITUS TREATED WITH INSULIN (H): ICD-10-CM

## 2023-01-17 DIAGNOSIS — Z79.4 TYPE 2 DIABETES MELLITUS TREATED WITH INSULIN (H): ICD-10-CM

## 2023-01-17 RX ORDER — INSULIN HUMAN 100 [IU]/ML
54 INJECTION, SUSPENSION SUBCUTANEOUS 2 TIMES DAILY WITH MEALS
Qty: 30 ML | Refills: 11 | Status: SHIPPED | OUTPATIENT
Start: 2023-01-17 | End: 2023-01-26

## 2023-01-17 RX ORDER — SYRINGE-NEEDLE,INSULIN,0.5 ML 27GX1/2"
SYRINGE, EMPTY DISPOSABLE MISCELLANEOUS
Qty: 60 EACH | Refills: 0 | Status: SHIPPED | OUTPATIENT
Start: 2023-01-17 | End: 2023-01-17

## 2023-01-17 RX ORDER — SYRINGE-NEEDLE,INSULIN,0.5 ML 27GX1/2"
SYRINGE, EMPTY DISPOSABLE MISCELLANEOUS
Qty: 60 EACH | Refills: 0 | Status: SHIPPED | OUTPATIENT
Start: 2023-01-17 | End: 2023-02-01

## 2023-01-17 NOTE — TELEPHONE ENCOUNTER
RN notes encounter from Nereida.     RN calling pharmacy to follow up on prescriptions for insulin pen and syringes sent to pharmacy today, 1/17/23.     Pharmacist states he sees insulin pens but does not see syringes. RN notes it was sent today. He states prescriptions get lost all the time and is requesting we resend the prescription.     Routing to provider to please resend the prescription for syringes.     Kimberly Cowan, MAURON, RN

## 2023-01-17 NOTE — TELEPHONE ENCOUNTER
Jaguar, pharmacist with [facility] calling in regards to Humulin 70/30; Novolin 70:30 order that was sent in. Jaguar wanted to report that order was sent in for vials and not for pens. Patient unfortunately already picked up the vials and they are unable to do a return for it. Jaguar requesting two things.    1) order for syringes to be sent so patient can utilize vials for this month  2) future order for Humulin 70/30; Novolin 70/30 pens to be sent.     Routing to provider to review and advise.     Leta Zelaya RN    [facility]

## 2023-01-17 NOTE — TELEPHONE ENCOUNTER
That was a mistake on my end. I meant to order the insulin pens. New Rx sent for insulin pens.     I also sent a 1 month supply of insulin syringes if it is necessary for her to use the Humulin 70/30 vials she has at home.    Nereida Perrin, Pharm.D., [facility]  Medication Therapy Management Pharmacist  [phone]

## 2023-01-17 NOTE — TELEPHONE ENCOUNTER
Writer called pharmacy to ensure they received syringe prescription, pharmacy tech stated that they have received it and will contact patient when it is available to be picked up. No further questions or concerns.      MAURO FoxN, RN  [facility] Care [facility]

## 2023-01-25 ENCOUNTER — TELEPHONE (OUTPATIENT)
Dept: FAMILY MEDICINE | Facility: CLINIC | Age: 68
End: 2023-01-25
Payer: MEDICARE

## 2023-01-25 NOTE — TELEPHONE ENCOUNTER
"Patient calling to report that the change in insulin has caused a \"concerning change in my blood sugars\". Patient states in the afternoons before she gives herself insulin she will have a BG ranging between 94 to 158 and by the evening before she goes to bed her BG will drop to the 50's. Yesterday BG at lunchtime was 118 and by bedtime BG was 50. Patient states she was shaky, disoriented, and experienced an elevated heart rate. She drank orange juice at that time and was able to get her BG up to 118.     Patient requesting to know if there needs to be an adjustment on dose for HUMULIN MIX 70/30.     At this time patient is asymptomatic and is not experiencing signs or symptoms of hyper/hypoglycemia.    Routing to provider to review and advise.    Leta Zelaya RN    [facility]    "

## 2023-01-25 NOTE — TELEPHONE ENCOUNTER
This writer attempted to contact patient on 01/25/23      Reason for call provider message and left detailed message.      If patient calls back:   Registered Nurse called. Route to BK RN line, RN already relayed provider message below in detailed VM, but we do need patient's responses and ensuring that she knows to decrease her units/day of insulin. Route patient responses back to provider when patient calls back      SAMI Fox, RN  [facility] Primary Care [facility]

## 2023-01-25 NOTE — TELEPHONE ENCOUNTER
Pt called back.    Understands to decrease insulin to 48 units BID.  Pt states that she does knows how to use the needles and to draw up the med.   States that she eats to meals a day. In the morning and dinner around 4pm. A few snacks throughout the day.    States that she will try the 48 units to see if it helps. Will call back if blood sugar is not improving.     Pt states that she is going to need a refill of the insulin needles.      Minnie Romero RN  [facility]

## 2023-01-25 NOTE — TELEPHONE ENCOUNTER
I talked with our [facility] pharmacist with whom she has been working.  We decided to have her decrease to 48 units twice daily.  Does she understand how to use the vial/syringe?  Is she eating regular meals?

## 2023-01-25 NOTE — TELEPHONE ENCOUNTER
I called patient to talk about insulin administration  She has been using her PEN and not vial/syringe    Recently, she is not eating first meal of the day until 2 pm  Takes first dose of insulin right before that    Eats second meal of the day at 7 pm and snacks in between      Last night ate dinner at 8 pm and took insulin at 9 pm  Checked BG at 2 am and was 50. Drank orange juice       today before ate first meal at 3:30. Took 54 units of insulin at that time    Last night BG was 133. She took 27 units of insulin and ate around 9 pm. She ate ice cream bar, 4 piece of chocolate, chips and dropped to 50 overnight.    It sounds like she's stacking insulin because she has been only eating in the afternoons/evenings. I discussed this with her.      Plan for this evening: take 10 units this evening.   Plans to have a bagel or can of soup    She will call in the morning with update and BG before first meal of the day    Consider endocrinology and nutrition referrals

## 2023-01-26 ENCOUNTER — TELEPHONE (OUTPATIENT)
Dept: FAMILY MEDICINE | Facility: CLINIC | Age: 68
End: 2023-01-26
Payer: MEDICARE

## 2023-01-26 DIAGNOSIS — E11.9 TYPE 2 DIABETES MELLITUS TREATED WITH INSULIN (H): ICD-10-CM

## 2023-01-26 DIAGNOSIS — Z79.4 TYPE 2 DIABETES MELLITUS TREATED WITH INSULIN (H): ICD-10-CM

## 2023-01-26 RX ORDER — INSULIN HUMAN 100 [IU]/ML
INJECTION, SUSPENSION SUBCUTANEOUS
Qty: 30 ML | Refills: 11 | Status: SHIPPED | OUTPATIENT
Start: 2023-01-26 | End: 2023-05-19

## 2023-01-26 NOTE — TELEPHONE ENCOUNTER
Patient calling to update on her blood sugar after provider recommendation yesterday.    Upon waking up today she took her first blood glucose and it was 124 around 1:30 pm.    She took 48 units of insulin yesterday with her meal instead of 54 units. At bedtime last night, she took 10 units of insulin. She would like to know if she should do 48 units of insulin at her second meal/evening meal or less insulin than 48 units at her evening dose?     She reports feeling a lot better today with glucose of 124 instead of 50.     Routing to provider to review and advise.     Kevan Lyon, MAURON, RN, PHN  [facility]

## 2023-01-26 NOTE — TELEPHONE ENCOUNTER
"I tried to call Ellen to discuss further but got her voicemail.    When she calls back, lets cut \"morning\" insulin to 40 units - she should take this with her first meal of the day. Lets do 10 units for now before \"dinner\". She needs to be sure to spread out the dosing because I think she could be stacking insulin with her current schedule. She needs to continue to check BG before meals. If low and not eating full meal she should not take whole amount to avoid being low. This just an interim plan    Please readdress with Nereida at appointment next week  Let me know if issues before then  I will also refer to endocrinology and nutrition  "

## 2023-01-27 NOTE — TELEPHONE ENCOUNTER
Called patient and relayed provider message below, patient verbalized understanding. Did do teach back to ensure patient understood insulin instructions and to check BGs prior to giving insulin.     Patient was unaware she had an appt with MTM next week, gave patient appt information and that it is a telephone appt so she does not have to come into the clinic. Patient verbalized understanding.    Scheduling numbers for nutritionist and endo given to patient as well. No further questions or concerns.      MAURO FoxN, RN  [facility]

## 2023-02-01 ENCOUNTER — VIRTUAL VISIT (OUTPATIENT)
Dept: PHARMACY | Facility: CLINIC | Age: 68
End: 2023-02-01
Payer: COMMERCIAL

## 2023-02-01 DIAGNOSIS — Z79.4 TYPE 2 DIABETES MELLITUS TREATED WITH INSULIN (H): Primary | ICD-10-CM

## 2023-02-01 DIAGNOSIS — E11.9 TYPE 2 DIABETES MELLITUS TREATED WITH INSULIN (H): Primary | ICD-10-CM

## 2023-02-01 DIAGNOSIS — I10 ESSENTIAL HYPERTENSION WITH GOAL BLOOD PRESSURE LESS THAN 130/80: ICD-10-CM

## 2023-02-01 DIAGNOSIS — E78.5 HYPERLIPIDEMIA LDL GOAL <100: ICD-10-CM

## 2023-02-01 PROCEDURE — 99207 PR NO CHARGE LOS: CPT | Performed by: PHARMACIST

## 2023-02-01 RX ORDER — HYDROCHLOROTHIAZIDE 12.5 MG/1
12.5 TABLET ORAL DAILY
Qty: 30 TABLET | Refills: 1 | Status: SHIPPED | OUTPATIENT
Start: 2023-02-01 | End: 2023-04-10

## 2023-02-01 RX ORDER — AMLODIPINE BESYLATE 10 MG/1
10 TABLET ORAL DAILY
Qty: 90 TABLET | Refills: 1 | Status: SHIPPED | OUTPATIENT
Start: 2023-02-01 | End: 2023-05-24

## 2023-02-01 NOTE — PROGRESS NOTES
Medication Therapy Management (MTM) Encounter    ASSESSMENT:                            Medication Adherence/Access: Ok to take all daily meds in the AM to avoid missing doses    Type 2 Diabetes: Patient is not meeting A1c goal of < 7%.  Self monitoring of blood glucose is not at goal of fasting 80 to 130 mg/dL.  Her postprandial blood sugar readings are not always 2 hours after eating but they have been elevated above 200 mg/dL.   The premixed insulin may not be the best fit for her given she eats twice a day approximately 4 to 5 hours apart.  She is likely having overlap with the long-acting insulin in the evenings and overnight, with the insulin completely wearing off between about 8 AM and noon or 3 PM when she takes her first insulin shot of the day.  A basal bolus insulin regimen may fit better for her current lifestyle.  She wants to test her blood sugar and discuss insulin regimens further at her upcoming Endo appointment.    Hypertension: Patient is not meeting blood pressure goal of < 130/80mmHg likely due to noncompliance.  She would benefit from further education and restarting her blood pressure medications.     Hyperlipidemia: Patient is on high intensity statin which is indicated based on 2019 ACC/AHA guidelines for lipid management, however all of her cholesterol values remain above goal due to noncompliance.  She would benefit from further education and restarting her statin.      PLAN:                            1. No changes to Humulin 70/30 dose today.    2. Test your blood sugar in the morning before breakfast (goal of 80-130mg/dL), 2 hours after a meal (goal less than 180mg/dL), and anytime you feel it may be low (feeling shaky, dizzy, sweaty, weak). If your blood sugar is below 70mg/dL, have something to eat to bring it up. If your blood sugar is NOT below 70mg/dL, take a 5 minute break, have a glass water, and let the feeling pass. This is your body adjusting to lower and more normal blood  sugar levels.    3.  Bring your blood sugar meter into your upcoming endocrinology appointment.  We discussed that an alternative insulin regimen including a once daily long-acting insulin and a mealtime insulin may be a better fit for you.  You can discuss this further at your upcoming appointment.    4.  Please restart amlodipine, losartan hydrochlorothiazide, and atorvastatin.  These medications work to help lower your blood pressure and your cholesterol levels which reduces the chances that she would have a stroke or heart attack in the next 10 years.  Please make sure you are taking these medicines every day to help keep you safe and healthy in the long-term.    Follow-up: Return for medication questions or concerns.      SUBJECTIVE/OBJECTIVE:                          Ellen Hill is a 65 year old female called for a follow-up visit from 1/11/2023.    Reason for visit: Blood sugar check; recently changed to Humulin 70/30 per insurance coverage    Allergies/ADRs: Reviewed in chart  Tobacco: She reports that she quit smoking about 9 years ago. Her smoking use included cigarettes. She has a 1.00 pack-year smoking history. She has never used smokeless tobacco.  Alcohol: Less than 1 beverages / week    Medication Adherence/Access: Admits she has not been compliant with most of her oral medications.  Requests a refill of amlodipine and hydrochlorothiazide which were last filled for a 30-day supply in March and February 2022.  States she has a supply of atorvastatin and losartan which were last filled for 30-day supply in March 2022.    -Refilled today   -Declines refill today  -Refilled today   -Declines refill today  -Not discussed       Type 2 Diabetes:   Humulin 70/30 40units before first meal of the day and 10 units before second meal of the day - this is a reduction from previously prescribed 54 units twice a day due to issues she was experiencing with hypoglycemia also related to erratic and poor dietary  habits.     Medication Hx:  Metformin - diarrhea  Allergic reactions to Levemir and Victoza    SMBG Ranges (patient reported):   Date FBG/ 2hours post Lunch/2hours post Dinner /2hours post   2/1 132     1/31 161     1/30 160 /148 /178   1/29 1/28 1/27 124  238   1/26 124  310   1/25 50 (2am) 193 250   1/24 133     1/23 94     1/22 118     1/21 58/88 after OJ/110 after more OJ       Symptoms of low blood sugar? Shaky, dizzy, sweaty, weak with blood sugar <70mg/dL  Symptoms of high blood sugar? Fatigue, but also difficulty sleeping    Diet: Gets up around 11am-12pm and doesn't eat for a few hours - maybe around 3pm is first meal. Sometimes will eat another meal later in the night around 7pm.  She has been taking her insulin prior to these meals which are generally about 4-5 hours apart    Lab Results   Component Value Date    UMALCR 246.77 (H) 01/12/2022    UMALCR 89.09 (H) 04/27/2021    UMALCR 100.72 (H) 01/08/2021       Lab Results   Component Value Date    A1C 8.4 01/11/2023    A1C 9.4 10/25/2022    A1C 8.5 04/01/2022    A1C 10.2 01/12/2022    A1C 6.5 04/27/2021    A1C 6.7 02/01/2021    A1C 7.7 01/08/2021    A1C 10.0 11/04/2020    A1C 9.5 08/20/2020     Hypertension:   amlodipine 10mg daily  losartan 100mg daily  Hydrochlorothiazide 12.5 mg daily    As above admits she has not been taking these every day, was somewhat unclear about what these medicines are for  Patient does not self-monitor blood pressure.  BP Readings from Last 3 Encounters:   01/11/23 (!) 188/86   10/25/22 (!) 184/81   04/01/22 130/80       Hyperlipidemia:   Atorvastatin 40 mg daily    As above admits she has not been taking this regularly.  Recent Labs   Lab Test 04/01/22  1107 01/12/22  1559 04/27/21  0947   CHOL 241*  --  266*   HDL 46*  --  44*   * 184* 170*   TRIG 184*  --  258*     The 10-year ASCVD risk score (Simone LILLY, et al., 2019) is: 32.3%    Values used to calculate the score:      Age: 67 years      Sex: Female       Is Non- : No      Diabetic: Yes      Tobacco smoker: No      Systolic Blood Pressure: 188 mmHg      Is BP treated: Yes      HDL Cholesterol: 46 mg/dL      Total Cholesterol: 241 mg/dL      Today's Vitals: LMP  (LMP Unknown)   ----------------      I spent 51 minutes with this patient today. All changes were made via collaborative practice agreement with Alejandra Bal. A copy of the visit note was provided to the patient's primary care provider.    The patient declined a summary of these recommendations.     Nereida Perrin, Pharm.D., [facility]  Medication Therapy Management Pharmacist  [phone]    Telemedicine Visit Details  Type of service:  Telephone visit  Start Time: 2:01 PM  End Time: 2:52 PM       Medication Therapy Recommendations Needing Review  No medication therapy recommendations to display

## 2023-02-01 NOTE — PATIENT INSTRUCTIONS
Recommendations from today's MTM visit:                                                         1. No changes to Humulin 70/30 dose today.    2. Test your blood sugar in the morning before breakfast (goal of 80-130mg/dL), 2 hours after a meal (goal less than 180mg/dL), and anytime you feel it may be low (feeling shaky, dizzy, sweaty, weak). If your blood sugar is below 70mg/dL, have something to eat to bring it up. If your blood sugar is NOT below 70mg/dL, take a 5 minute break, have a glass water, and let the feeling pass. This is your body adjusting to lower and more normal blood sugar levels.    3.  Bring your blood sugar meter into your upcoming endocrinology appointment.  We discussed that an alternative insulin regimen including a once daily long-acting insulin and a mealtime insulin may be a better fit for you.  You can discuss this further at your upcoming appointment.    4.  Please restart amlodipine, losartan hydrochlorothiazide, and atorvastatin.  These medications work to help lower your blood pressure and your cholesterol levels which reduces the chances that she would have a stroke or heart attack in the next 10 years.  Please make sure you are taking these medicines every day to help keep you safe and healthy in the long-term.    Follow-up: Return for medication questions or concerns.    It was great to speak with you today.  I value your experience and would be very thankful for your time with providing feedback on our clinic survey. You may receive a survey via email or text message in the next few days.     To schedule another MTM appointment, please call the clinic directly or you may call the MTM scheduling line at [phone] or toll-free at [phone].     My Clinical Pharmacist's contact information:                                                      Please feel free to contact me with any questions or concerns you have.      Nereida Perrin, Pharm.D., [facility]  Medication Therapy  Management Pharmacist  [phone]

## 2023-02-07 NOTE — PATIENT INSTRUCTIONS
[facility]-Department of Endocrinology  Diabetes Educators:   Arlet Skelton, RN and Alejandra Dorado RN  Clinic Nurse: CORNELIA Santos  CMA's: Antonio LOPEZN: Lilian  Scheduling/Clinic phone number : [phone]   Clinic Fax: [phone]  On-Call Endocrine at the [place] (after hours/weekends): [phone] option 4    Please call the number below to schedule your labs.    [facility] [phone]   [facility] [phone]   [place] [phone]   [facility]  [phone]   [facility] [phone]   [place] [phone]   [facility]) [phone]   [facility]   [place] [phone]   [place] [phone]   [place] [phone]   [place] [phone]     Please reach out to the following centers to schedule your imaging appointment:       Imaging (DEXA, CT, MRI, XRAY)    [facility] ([facility], [facility], [place]) [phone]   [facility] ([place], [place]) [phone]   [facility] ([facility]) [phone]   [facility] ([facility]) [phone]     Appointment Reminders:  * Please bring meter with for staff to download  * If you are due ONLY for an A1C, it is scheduled with the nurse and will be done in clinic. You do not need to schedule a lab appointment. Fasting is not required for an A1C.  * Refill request should be submitted to your pharmacy. They will contact clinic for approval.

## 2023-02-07 NOTE — PROGRESS NOTES
Outcome for 02/07/23 11:22 AM: Left Voicemail for the patient to call with blood sugar readings and reminder for appointment.  Adilene Kee CMA  Adult Endocrinology  [facility] [place]

## 2023-02-10 ENCOUNTER — OFFICE VISIT (OUTPATIENT)
Dept: ENDOCRINOLOGY | Facility: CLINIC | Age: 68
End: 2023-02-10
Attending: NURSE PRACTITIONER
Payer: COMMERCIAL

## 2023-02-10 ENCOUNTER — APPOINTMENT (OUTPATIENT)
Dept: ENDOCRINOLOGY | Facility: CLINIC | Age: 68
End: 2023-02-10
Payer: COMMERCIAL

## 2023-02-10 ENCOUNTER — TELEPHONE (OUTPATIENT)
Dept: ENDOCRINOLOGY | Facility: CLINIC | Age: 68
End: 2023-02-10

## 2023-02-10 VITALS
DIASTOLIC BLOOD PRESSURE: 78 MMHG | OXYGEN SATURATION: 100 % | SYSTOLIC BLOOD PRESSURE: 146 MMHG | HEART RATE: 94 BPM | BODY MASS INDEX: 30.24 KG/M2 | WEIGHT: 170.7 LBS

## 2023-02-10 DIAGNOSIS — Z79.4 TYPE 2 DIABETES MELLITUS TREATED WITH INSULIN (H): ICD-10-CM

## 2023-02-10 DIAGNOSIS — E11.9 TYPE 2 DIABETES MELLITUS TREATED WITH INSULIN (H): ICD-10-CM

## 2023-02-10 DIAGNOSIS — E11.29 TYPE 2 DIABETES MELLITUS WITH OTHER DIABETIC KIDNEY COMPLICATION (H): ICD-10-CM

## 2023-02-10 LAB
ANION GAP SERPL CALCULATED.3IONS-SCNC: 8 MMOL/L (ref 3–14)
BUN SERPL-MCNC: 28 MG/DL (ref 7–30)
CALCIUM SERPL-MCNC: 10.3 MG/DL (ref 8.5–10.1)
CHLORIDE BLD-SCNC: 104 MMOL/L (ref 94–109)
CO2 SERPL-SCNC: 26 MMOL/L (ref 20–32)
CREAT SERPL-MCNC: 0.79 MG/DL (ref 0.52–1.04)
GFR SERPL CREATININE-BSD FRML MDRD: 83 ML/MIN/1.73M2
GLUCOSE BLD-MCNC: 140 MG/DL (ref 70–99)
POTASSIUM BLD-SCNC: 3.6 MMOL/L (ref 3.4–5.3)
SODIUM SERPL-SCNC: 138 MMOL/L (ref 133–144)

## 2023-02-10 PROCEDURE — 99204 OFFICE O/P NEW MOD 45 MIN: CPT | Performed by: PHYSICIAN ASSISTANT

## 2023-02-10 PROCEDURE — 36415 COLL VENOUS BLD VENIPUNCTURE: CPT | Performed by: PHYSICIAN ASSISTANT

## 2023-02-10 PROCEDURE — 80048 BASIC METABOLIC PNL TOTAL CA: CPT | Performed by: PHYSICIAN ASSISTANT

## 2023-02-10 RX ORDER — BLOOD SUGAR DIAGNOSTIC
STRIP MISCELLANEOUS
Qty: 100 STRIP | Refills: 11 | Status: SHIPPED | OUTPATIENT
Start: 2023-02-10 | End: 2023-10-26

## 2023-02-10 NOTE — PROGRESS NOTES
Assessment/Plan :   1. Type 2 DM with long term use of insulin and complications. We discussed Ellen's recent glucometer readings. Her morning blood sugars look great. Her readings are consistently under 160 mg/dl. However, her blood sugars increase around the late afternoon into the evening. This afternoon spike has increased since she decreased her second 70/30 dose. I agree that she needs to increase the afternoon dose. We will increase the afternoon dose to 15 units. We reviewed what to do in case of a low blood sugar. If she has any problems, she can contact our office.     I reassured her that, overall, her blood sugars are looking good and that her A1C is improving. We will follow-up in 2 mos. She will be due for a repeat A1C at that time and we will repeat a random urinary microalbumin.      I have independently reviewed and interpreted labs, imaging as indicated.      Chief complaint:  Ellen is a 67 year old female who comes to our office to establish care for the treatment of Type 2 DM with long term insulin use.    I have reviewed Care Everywhere including [facility], [facility], [facility], [facility] , [facility] lab reports, imaging reports and provider notes as indicated.      HISTORY OF PRESENT ILLNESS  Ellen was diagnosed with Type 2 DM around 2009 or 2010. She was originally started on metformin but it cause a lot of GI discomfort. She discontinued the metformin and was started on insulin. A review of her history indicates that she has taken Basaglar and Humalog in the past. She also has a history of diabetic nephropathy with albuminuria. She denies any worsening neuropathy in her feet but she does have a lot of dryness with callous formation on her heels and the base of her great toe.    Most recently she switched from commercial insurance to Medicare. She has a history of back surgery with ongoing back pain. She is unable to work due to chronic back  "pain. Medicare no longer covered the combination of Basaglar and Humalog, so her PCP switched her to Humulin 70/30. She was started on 48 units in the morning and 25 units at night. She started to experience some low blood sugars in the late evening and early morning hours with the lowest being 50 mg/dl. She contacted her PCP and they decreased the dose to 40 and 10 units. Her blood sugars in the morning are great but her evening readings are consistently over 200 mg/dl.    Endocrine relevant labs are as follows:   Latest Reference Range & Units 01/11/23 12:08   Hemoglobin A1C 0.0 - 5.6 % 8.4 (H)   (H): Data is abnormally high   Latest Reference Range & Units 01/12/22 15:59   Albumin Urine mg/L mg/L 496      Latest Reference Range & Units 01/12/22 15:59   Albumin Urine mg/g Cr 0.00 - 25.00 mg/g Cr 246.77 (H)   (H): Data is abnormally high    REVIEW OF SYSTEMS    Endocrine: positive for diabetes  Skin: negative for, hyperpigmentation  Eyes: negative for, visual blurring  Ears/Nose/Throat: negative  Respiratory: No shortness of breath, dyspnea on exertion, cough, or hemoptysis  Cardiovascular: negative for, irregular heart beat and chest pain  Gastrointestinal: negative for, nausea, vomiting, constipation and diarrhea  Genitourinary: negative for, dysuria, frequency and urgency  Musculoskeletal: positive for nocturnal cramping  Neurologic: positive for tremor, negative for and numbness or tingling of feet  Psychiatric: negative  Hematologic/Lymphatic/Immunologic: negative    Past Medical History  Past Medical History:   Diagnosis Date     Advanced directives, counseling/discussion 3/28/2013    Patient does not have an Advance/Health Care Directive (HCD), given \"What is Advance Care Planning?\" flyer.  Nola Lopez March 28, 2013      Hyperlipidemia LDL goal <100 10/27/2010     Hypertension goal BP (blood pressure) < 130/80 12/21/2010     Microalbuminuria 2/12/2015     Shingles 2009    Right posterior shoulder as " per patient     Type 2 diabetes, HbA1c goal < 7% (H) 10/31/2010       Medications  Current Outpatient Medications   Medication Sig Dispense Refill     amLODIPine (NORVASC) 10 MG tablet Take 1 tablet (10 mg) by mouth daily 90 tablet 1     atorvastatin (LIPITOR) 40 MG tablet Take 1 tablet (40 mg) by mouth daily 90 tablet 1     blood glucose (ONETOUCH ULTRA) test strip Use to test blood sugars twice daily as directed. 100 strip 11     blood glucose monitoring (ONE TOUCH ULTRA 2) meter device kit Use to test blood sugars twice daily as directed. 1 kit 0     cyclobenzaprine (FLEXERIL) 5 MG tablet Take 5 mg by mouth At Bedtime 1-2 tabs at bedtime as needed       hydrochlorothiazide (HYDRODIURIL) 12.5 MG tablet Take 1 tablet (12.5 mg) by mouth daily NOTE DOSE ADJUSTMENT 30 tablet 1     insulin NPH-Regular (HUMULIN MIX 70/30 KWIKPEN) susp Inject 40 units with breakfast and 10 units with dinner 30 mL 11     insulin pen needle (ULTICARE MICRO) 32G X 4 MM miscellaneous Use 2 pen needles daily or as directed. 100 each 11     losartan (COZAAR) 100 MG tablet Take 1 tablet (100 mg) by mouth daily 90 tablet 1     OneTouch Delica Lancets 33G MISC 1 lancet 2 times daily Use to test blood sugars twice daily as directed. 100 each 11     sertraline (ZOLOFT) 25 MG tablet Take 1 tablet (25 mg) by mouth daily 30 tablet 1     traMADol (ULTRAM) 50 MG tablet Take 50 mg by mouth every 6 hours as needed       gabapentin (NEURONTIN) 100 MG capsule Take 100 mg by mouth Take 1 tablet in the morning and 2 tablets at night (Patient not taking: Reported on 2/10/2023)       senna-docusate (SENOKOT-S/PERICOLACE) 8.6-50 MG tablet Take 1-2 tablets by mouth daily as needed for constipation (Patient not taking: Reported on 2/10/2023)         Allergies  Allergies   Allergen Reactions     Insulin Detemir Hives     Alfredo products cause hives**       Liraglutide Hives     Alfredo Nordisk product     Victoza Hives     Alfredo Nordisk product     Enalapril Cough      Lisinopril Cough     Wellbutrin [Bupropion Hcl] Hives     hives         Family History  family history includes Alcohol/Drug in her father and mother; Diabetes in her brother; Heart Disease in her mother.    Social History  Social History     Tobacco Use     Smoking status: Former     Packs/day: 0.10     Years: 10.00     Pack years: 1.00     Types: Cigarettes     Quit date: 9/10/2013     Years since quittin.4     Smokeless tobacco: Never   Vaping Use     Vaping Use: Never used   Substance Use Topics     Alcohol use: Not Currently     Alcohol/week: 1.0 standard drink     Types: 1 Cans of beer per week     Drug use: No       Physical Exam  BP (!) 146/78 (BP Location: Left arm, Patient Position: Sitting, Cuff Size: Adult Regular)   Pulse 94   Wt 77.4 kg (170 lb 11.2 oz)   LMP  (LMP Unknown)   SpO2 100%   BMI 30.24 kg/m    Body mass index is 30.24 kg/m .  GENERAL :  In no apparent distress. Patient appears slightly anxious at start of visit  SKIN: Normal color, normal temperature, texture.  No hirsutism, alopecia or purple striae.     EYES: PERRL, EOMI, No scleral icterus,  No proptosis, conjunctival redness, stare, retraction  NECK: No visible masses. No palpable adenopathy, or masses. No carotid bruits.   THYROID:  Normal, nontender, smooth / firm texture,  no nodules, no Bruit   RESP: Lungs clear to auscultation bilaterally  CARDIAC: Regular rate and rhythm, normal S1 S2, without murmurs, rubs or gallops   NEURO: awake, alert, responds appropriately to questions.  Cranial nerves intact.   Moves all extremities; Gait normal.  Slight tremor of the outstretched hand.   EXTREMITIES: No clubbing, cyanosis or edema.  FOOT EXAM: Pedal pulses are strong bilaterally. Sensation intact to monofilament. Dry skin on toes and heels. Callous formation on heel and base of great toe. Toenail fungus present in great toe    DATA REVIEW  [facility] report shows ave BG of 184 mg/dl with 69% TIR

## 2023-02-10 NOTE — TELEPHONE ENCOUNTER
Left Voicemail (1st Attempt) for the patient to call back and schedule the following:    Appointment type: Return Diabetes  Provider: URIAH Rodriguez  Return date: 4/10/2023  Specialty phone number: [phone]  Additional appointment(s) needed:   Additonal Notes:       Return in about 2 months (around 4/10/2023).      Sent an appointment reminder letter.    Britt RIVERS/Procedure    [facility]   Neurology, NeuroSurgery, NeuroPsychology and Pain Management Specialties  Medical/Surgical Adult Specialties

## 2023-02-10 NOTE — LETTER
2/10/2023         RE: Ellen Hill  [address] [place] [address]        Dear Colleague,    Thank you for referring your patient, Ellen Hill, to the [facility]. Please see a copy of my visit note below.    Outcome for 02/07/23 11:22 AM: Left Voicemail for the patient to call with blood sugar readings and reminder for appointment.  Adilene Kee, [facility]  Adult Endocrinology  [facility], [place], [place]      Assessment/Plan :   1. Type 2 DM with long term use of insulin and complications. We discussed Ellen's recent glucometer readings. Her morning blood sugars look great. Her readings are consistently under 160 mg/dl. However, her blood sugars increase around the late afternoon into the evening. This afternoon spike has increased since she decreased her second 70/30 dose. I agree that she needs to increase the afternoon dose. We will increase the afternoon dose to 15 units. We reviewed what to do in case of a low blood sugar. If she has any problems, she can contact our office.     I reassured her that, overall, her blood sugars are looking good and that her A1C is improving. We will follow-up in 2 mos. She will be due for a repeat A1C at that time and we will repeat a random urinary microalbumin.      I have independently reviewed and interpreted labs, imaging as indicated.      Chief complaint:  Ellen is a 67 year old female who comes to our office to establish care for the treatment of Type 2 DM with long term insulin use.    I have reviewed Care Everywhere including [facility], [facility], [facility], [facility], [facility] , [facility], [place] lab reports, imaging reports and provider notes as indicated.      HISTORY OF PRESENT ILLNESS  Ellen was diagnosed with Type 2 DM around 2009 or 2010. She was originally started on metformin but it cause a lot of GI discomfort. She discontinued the metformin and was started on insulin. A review of her  history indicates that she has taken Basaglar and Humalog in the past. She also has a history of diabetic nephropathy with albuminuria. She denies any worsening neuropathy in her feet but she does have a lot of dryness with callous formation on her heels and the base of her great toe.    Most recently she switched from commercial insurance to Medicare. She has a history of back surgery with ongoing back pain. She is unable to work due to chronic back pain. Medicare no longer covered the combination of Basaglar and Humalog, so her PCP switched her to Humulin 70/30. She was started on 48 units in the morning and 25 units at night. She started to experience some low blood sugars in the late evening and early morning hours with the lowest being 50 mg/dl. She contacted her PCP and they decreased the dose to 40 and 10 units. Her blood sugars in the morning are great but her evening readings are consistently over 200 mg/dl.    Endocrine relevant labs are as follows:   Latest Reference Range & Units 01/11/23 12:08   Hemoglobin A1C 0.0 - 5.6 % 8.4 (H)   (H): Data is abnormally high   Latest Reference Range & Units 01/12/22 15:59   Albumin Urine mg/L mg/L 496      Latest Reference Range & Units 01/12/22 15:59   Albumin Urine mg/g Cr 0.00 - 25.00 mg/g Cr 246.77 (H)   (H): Data is abnormally high    REVIEW OF SYSTEMS    Endocrine: positive for diabetes  Skin: negative for, hyperpigmentation  Eyes: negative for, visual blurring  Ears/Nose/Throat: negative  Respiratory: No shortness of breath, dyspnea on exertion, cough, or hemoptysis  Cardiovascular: negative for, irregular heart beat and chest pain  Gastrointestinal: negative for, nausea, vomiting, constipation and diarrhea  Genitourinary: negative for, dysuria, frequency and urgency  Musculoskeletal: positive for nocturnal cramping  Neurologic: positive for tremor, negative for and numbness or tingling of feet  Psychiatric: negative  Hematologic/Lymphatic/Immunologic:  "negative    Past Medical History  Past Medical History:   Diagnosis Date     Advanced directives, counseling/discussion 3/28/2013    Patient does not have an Advance/Health Care Directive (HCD), given \"What is Advance Care Planning?\" flyer.  Nola Lopez March 28, 2013      Hyperlipidemia LDL goal <100 10/27/2010     Hypertension goal BP (blood pressure) < 130/80 12/21/2010     Microalbuminuria 2/12/2015     Shingles 2009    Right posterior shoulder as per patient     Type 2 diabetes, HbA1c goal < 7% (H) 10/31/2010       Medications  Current Outpatient Medications   Medication Sig Dispense Refill     amLODIPine (NORVASC) 10 MG tablet Take 1 tablet (10 mg) by mouth daily 90 tablet 1     atorvastatin (LIPITOR) 40 MG tablet Take 1 tablet (40 mg) by mouth daily 90 tablet 1     blood glucose (ONETOUCH ULTRA) test strip Use to test blood sugars twice daily as directed. 100 strip 11     blood glucose monitoring (ONE TOUCH ULTRA 2) meter device kit Use to test blood sugars twice daily as directed. 1 kit 0     cyclobenzaprine (FLEXERIL) 5 MG tablet Take 5 mg by mouth At Bedtime 1-2 tabs at bedtime as needed       hydrochlorothiazide (HYDRODIURIL) 12.5 MG tablet Take 1 tablet (12.5 mg) by mouth daily NOTE DOSE ADJUSTMENT 30 tablet 1     insulin NPH-Regular (HUMULIN MIX 70/30 KWIKPEN) susp Inject 40 units with breakfast and 10 units with dinner 30 mL 11     insulin pen needle (ULTICARE MICRO) 32G X 4 MM miscellaneous Use 2 pen needles daily or as directed. 100 each 11     losartan (COZAAR) 100 MG tablet Take 1 tablet (100 mg) by mouth daily 90 tablet 1     OneTouch Delica Lancets 33G MISC 1 lancet 2 times daily Use to test blood sugars twice daily as directed. 100 each 11     sertraline (ZOLOFT) 25 MG tablet Take 1 tablet (25 mg) by mouth daily 30 tablet 1     traMADol (ULTRAM) 50 MG tablet Take 50 mg by mouth every 6 hours as needed       gabapentin (NEURONTIN) 100 MG capsule Take 100 mg by mouth Take 1 tablet in the " morning and 2 tablets at night (Patient not taking: Reported on 2/10/2023)       senna-docusate (SENOKOT-S/PERICOLACE) 8.6-50 MG tablet Take 1-2 tablets by mouth daily as needed for constipation (Patient not taking: Reported on 2/10/2023)         Allergies  Allergies   Allergen Reactions     Insulin Detemir Hives     Alfredo products cause hives**       Liraglutide Hives     Alfredo Nordisk product     Victoza Hives     Alfredo Nordisk product     Enalapril Cough     Lisinopril Cough     Wellbutrin [Bupropion Hcl] Hives     hives         Family History  family history includes Alcohol/Drug in her father and mother; Diabetes in her brother; Heart Disease in her mother.    Social History  Social History     Tobacco Use     Smoking status: Former     Packs/day: 0.10     Years: 10.00     Pack years: 1.00     Types: Cigarettes     Quit date: 9/10/2013     Years since quittin.4     Smokeless tobacco: Never   Vaping Use     Vaping Use: Never used   Substance Use Topics     Alcohol use: Not Currently     Alcohol/week: 1.0 standard drink     Types: 1 Cans of beer per week     Drug use: No       Physical Exam  BP (!) 146/78 (BP Location: Left arm, Patient Position: Sitting, Cuff Size: Adult Regular)   Pulse 94   Wt 77.4 kg (170 lb 11.2 oz)   LMP  (LMP Unknown)   SpO2 100%   BMI 30.24 kg/m    Body mass index is 30.24 kg/m .  GENERAL :  In no apparent distress. Patient appears slightly anxious at start of visit  SKIN: Normal color, normal temperature, texture.  No hirsutism, alopecia or purple striae.     EYES: PERRL, EOMI, No scleral icterus,  No proptosis, conjunctival redness, stare, retraction  NECK: No visible masses. No palpable adenopathy, or masses. No carotid bruits.   THYROID:  Normal, nontender, smooth / firm texture,  no nodules, no Bruit   RESP: Lungs clear to auscultation bilaterally  CARDIAC: Regular rate and rhythm, normal S1 S2, without murmurs, rubs or gallops   NEURO: awake, alert, responds appropriately to  questions.  Cranial nerves intact.   Moves all extremities; Gait normal.  Slight tremor of the outstretched hand.   EXTREMITIES: No clubbing, cyanosis or edema.  FOOT EXAM: Pedal pulses are strong bilaterally. Sensation intact to monofilament. Dry skin on toes and heels. Callous formation on heel and base of great toe. Toenail fungus present in great toe    DATA REVIEW  Glooko report shows ave BG of 184 mg/dl with 69% TIR            Again, thank you for allowing me to participate in the care of your patient.        Sincerely,        Aga Delvalle PA-C

## 2023-02-10 NOTE — NURSING NOTE
Ellen Hill's goals for this visit include:   Chief Complaint   Patient presents with     New Patient     Type 2 Diabetes     She requests these members of her care team be copied on today's visit information: Yes    PCP: Alejandra Aguilera    Referring Provider:  YVON Aguirre CNP  [id number] [place] [address]    BP (!) 146/78 (BP Location: Left arm, Patient Position: Sitting, Cuff Size: Adult Regular)   Pulse 94   Wt 77.4 kg (170 lb 11.2 oz)   LMP  (LMP Unknown)   SpO2 100%   BMI 30.24 kg/m      Do you need any medication refills at today's visit? No

## 2023-04-10 DIAGNOSIS — I10 ESSENTIAL HYPERTENSION WITH GOAL BLOOD PRESSURE LESS THAN 130/80: ICD-10-CM

## 2023-04-10 RX ORDER — HYDROCHLOROTHIAZIDE 12.5 MG/1
12.5 TABLET ORAL DAILY
Qty: 30 TABLET | Refills: 1 | Status: SHIPPED | OUTPATIENT
Start: 2023-04-10 | End: 2023-04-12

## 2023-04-10 NOTE — TELEPHONE ENCOUNTER
Pharmacy requesting this be changed to a 90 day supply, please send new script if appropriate.    hydrochlorothiazide (HYDRODIURIL) 12.5 MG tablet 30 tablet 1 4/10/2023

## 2023-04-11 ENCOUNTER — TELEPHONE (OUTPATIENT)
Dept: ENDOCRINOLOGY | Facility: CLINIC | Age: 68
End: 2023-04-11
Payer: COMMERCIAL

## 2023-04-11 NOTE — TELEPHONE ENCOUNTER
Reason for Call:  Appointment Request    Patient requesting this type of appt:  Diabetes appt    Requested provider: URIAH Rodriguez     Reason patient unable to be scheduled: Pt called and would like to r/s diabetes appt w/ URIAH Rodriguez. Pls call and advise. Ty/kt    When does patient want to be seen/preferred time: 3-7 days    Comments:   Pt called and would like to r/s diabetes appt w/ URIAH Rodriguez. Pls call and advise. Ty/kt    Okay to leave a detailed message?: Yes at Home number on file [phone] (home)    Call taken on 4/11/2023 at 6:25 PM by Lisa Lucero

## 2023-04-12 ENCOUNTER — TELEPHONE (OUTPATIENT)
Dept: ENDOCRINOLOGY | Facility: CLINIC | Age: 68
End: 2023-04-12
Payer: COMMERCIAL

## 2023-04-12 RX ORDER — HYDROCHLOROTHIAZIDE 12.5 MG/1
12.5 TABLET ORAL DAILY
Qty: 90 TABLET | Refills: 1 | Status: SHIPPED | OUTPATIENT
Start: 2023-04-12 | End: 2023-05-24

## 2023-04-12 NOTE — TELEPHONE ENCOUNTER
Advised patient to check with pharmacy since refills should be available from previous prescription.    Arlet Skelton RN, Diabetes Educator  Diabetes Education Department  [facility], [facility] and [place]  [phone]

## 2023-04-12 NOTE — TELEPHONE ENCOUNTER
[facility] Call Center    Phone Message    May a detailed message be left on voicemail: yes     Reason for Call: Medication Refill Request    Has the patient contacted the pharmacy for the refill? Yes   Name of medication being requested: insulin pen needle (ULTICARE MICRO) 32G X 4 MM miscellaneous  Provider who prescribed the medication:     Per pt would like Aga Delvalle to RX this medication now.     Pharmacy: [id number] [place], [place] - [address] [facility] [address]  Date medication is needed: ASAP      Per pt only has 2 pens left. Per pt would like Aga Delvalle to RX the medication now. Thank you!         Action Taken: Message routed to:  [facility] ENDO    Travel Screening: Not Applicable

## 2023-05-16 ENCOUNTER — TELEPHONE (OUTPATIENT)
Dept: ENDOCRINOLOGY | Facility: CLINIC | Age: 68
End: 2023-05-16
Payer: COMMERCIAL

## 2023-05-16 DIAGNOSIS — E11.9 TYPE 2 DIABETES MELLITUS TREATED WITH INSULIN (H): Primary | ICD-10-CM

## 2023-05-16 DIAGNOSIS — Z79.4 TYPE 2 DIABETES MELLITUS TREATED WITH INSULIN (H): Primary | ICD-10-CM

## 2023-05-16 NOTE — TELEPHONE ENCOUNTER
Please sign pended A1C order to be completed at the time of upcoming appointment.    Adilene Kee, [facility]  Adult Endocrinology  [facility], [place]

## 2023-05-19 ENCOUNTER — OFFICE VISIT (OUTPATIENT)
Dept: ENDOCRINOLOGY | Facility: CLINIC | Age: 68
End: 2023-05-19
Payer: COMMERCIAL

## 2023-05-19 VITALS
SYSTOLIC BLOOD PRESSURE: 170 MMHG | BODY MASS INDEX: 31.42 KG/M2 | RESPIRATION RATE: 16 BRPM | WEIGHT: 177.4 LBS | OXYGEN SATURATION: 98 % | HEART RATE: 85 BPM | DIASTOLIC BLOOD PRESSURE: 85 MMHG

## 2023-05-19 DIAGNOSIS — E11.9 TYPE 2 DIABETES MELLITUS TREATED WITH INSULIN (H): ICD-10-CM

## 2023-05-19 DIAGNOSIS — N18.2 CHRONIC KIDNEY DISEASE, STAGE 2 (MILD): ICD-10-CM

## 2023-05-19 DIAGNOSIS — I10 ESSENTIAL HYPERTENSION WITH GOAL BLOOD PRESSURE LESS THAN 130/80: ICD-10-CM

## 2023-05-19 DIAGNOSIS — E66.811 OBESITY, CLASS I, BMI 30-34.9: ICD-10-CM

## 2023-05-19 DIAGNOSIS — E11.29 TYPE 2 DIABETES MELLITUS WITH OTHER DIABETIC KIDNEY COMPLICATION (H): Primary | ICD-10-CM

## 2023-05-19 DIAGNOSIS — Z79.4 TYPE 2 DIABETES MELLITUS TREATED WITH INSULIN (H): ICD-10-CM

## 2023-05-19 LAB — HBA1C MFR BLD: 7.4 % (ref 4.3–?)

## 2023-05-19 PROCEDURE — 83036 HEMOGLOBIN GLYCOSYLATED A1C: CPT | Performed by: PHYSICIAN ASSISTANT

## 2023-05-19 PROCEDURE — 99213 OFFICE O/P EST LOW 20 MIN: CPT | Performed by: PHYSICIAN ASSISTANT

## 2023-05-19 RX ORDER — METFORMIN HCL 500 MG
500 TABLET, EXTENDED RELEASE 24 HR ORAL
Qty: 90 TABLET | Refills: 0 | Status: SHIPPED | OUTPATIENT
Start: 2023-05-19 | End: 2023-10-26 | Stop reason: SINTOL

## 2023-05-19 RX ORDER — GABAPENTIN 300 MG/1
CAPSULE ORAL
COMMUNITY
Start: 2023-04-06 | End: 2024-09-12

## 2023-05-19 RX ORDER — INSULIN HUMAN 100 [IU]/ML
INJECTION, SUSPENSION SUBCUTANEOUS
Qty: 75 ML | Refills: 3 | Status: SHIPPED | OUTPATIENT
Start: 2023-05-19 | End: 2023-08-24

## 2023-05-19 ASSESSMENT — PAIN SCALES - GENERAL: PAINLEVEL: MODERATE PAIN (4)

## 2023-05-19 NOTE — LETTER
5/19/2023         RE: Ellen Hill  [address] [place] [address]        Dear Colleague,    Thank you for referring your patient, Ellen Hill, to the [facility]. Please see a copy of my visit note below.    Outcome for 05/17/23 2:55 PM: Down load in clinic  Sarahy Dukes CMA  Adult Endocrinology  [facility]           Assessment/Plan :   1. Type 2 DM. Ellen continues to work on stabilizing her blood sugars. She is concerned about the possibility of needing another surgery but she was glad to hear that her A1C is down to 7.4%. She is not happy about her recent weight gain and we discussed the possible connection to insulin. We also discussed medication options that may help with weight loss, such as metformin. She has metformin at home but she stopped it because she felt like it was making her urinate. We discussed how the metformin may help the insulin work better and it may also help prevent further weight gain. She is wiling to restart at 1 500 mg tablet daily.    She will keep us updated regarding surgery. Otherwise we will follow-up in 3 mos.      I have independently reviewed and interpreted labs, imaging as indicated.      Chief complaint:  Ellen is a 67 year old female who returns for follow-up of Type 2 DM with long term use of insulin.    I have reviewed Care Everywhere including [facility], [facility], [place], [facility], [facility], [facility] , [facility], [place] lab reports, imaging reports and provider notes as indicated.      HISTORY OF PRESENT ILLNESS  Ellen continues to work on improving her blood sugars. She was told that she will need another surgery to fix her current spinal cord stimulator implant. She has had an increase in pain over the last few months. She saw her pain specialist and she was told that one of the SCS leads is no longer working. Ellen understands the importance of getting her A1C under 8% to help  "in the healing process, so she has been working on getting her blood sugars down and stable.    She is currently taking 40 unit(s) of Humulin Mix 70/30 in the morning and in the evening. She has not had any problems with severe hyperglycemia and/or hypoglycemia. She continues to monitor her blood sugars with fingerstick testing. She is concerned about weight gain. She estimates that she has gained about 20 lbs since the first of the year.     Her diabetes is complicated by chronic pain, HTN, CKD stage 2, Leblanc's esophagus and history of alcohol abuse. She has been working to take her medications daily, as directed. She has not had any problems with worsening vision or numbness/tingling in her lower extremities. She is in a lot of pain, which makes it difficult to sleep at night.    Endocrine relevant labs are as follows:   Latest Reference Range & Units 05/19/23 13:45   Hemoglobin A1C POCT 4.3 - <5.7 % 7.4 !   !: Data is abnormal    REVIEW OF SYSTEMS    Endocrine: positive for diabetes  Skin: negative  Eyes: negative for, visual blurring  Ears/Nose/Throat: negative  Respiratory: No shortness of breath, dyspnea on exertion, cough, or hemoptysis  Cardiovascular: negative for, chest pain and exercise intolerance  Gastrointestinal: negative for, nausea, vomiting, constipation and diarrhea  Genitourinary: negative for, nocturia, dysuria, frequency and urgency  Musculoskeletal: positive for back pain and muscular weakness  Neurologic: positive for local weakness, negative for and numbness or tingling of feet  Psychiatric: negative  Hematologic/Lymphatic/Immunologic: negative    Past Medical History  Past Medical History:   Diagnosis Date     Advanced directives, counseling/discussion 3/28/2013    Patient does not have an Advance/Health Care Directive (HCD), given \"What is Advance Care Planning?\" flyer.  Nola Lopez March 28, 2013      Hyperlipidemia LDL goal <100 10/27/2010     Hypertension goal BP (blood " pressure) < 130/80 12/21/2010     Microalbuminuria 2/12/2015     Shingles 2009    Right posterior shoulder as per patient     Type 2 diabetes, HbA1c goal < 7% (H) 10/31/2010       Medications  Current Outpatient Medications   Medication Sig Dispense Refill     amLODIPine (NORVASC) 10 MG tablet Take 1 tablet (10 mg) by mouth daily 90 tablet 1     atorvastatin (LIPITOR) 40 MG tablet Take 1 tablet (40 mg) by mouth daily 90 tablet 1     blood glucose (ONETOUCH ULTRA) test strip Use to test blood sugars twice daily as directed. 100 strip 11     blood glucose monitoring (ONE TOUCH ULTRA 2) meter device kit Use to test blood sugars twice daily as directed. 1 kit 0     cyclobenzaprine (FLEXERIL) 5 MG tablet Take 5 mg by mouth At Bedtime 1-2 tabs at bedtime as needed       gabapentin (NEURONTIN) 100 MG capsule Take 100 mg by mouth Take 1 tablet in the morning and 2 tablets at night       hydrochlorothiazide (HYDRODIURIL) 12.5 MG tablet Take 1 tablet (12.5 mg) by mouth daily 90 tablet 1     insulin NPH-Regular (HUMULIN MIX 70/30 KWIKPEN) susp Inject 40 units with breakfast and 10 units with dinner 30 mL 11     insulin pen needle (ULTICARE MICRO) 32G X 4 MM miscellaneous Use 2 pen needles daily or as directed. 100 each 11     losartan (COZAAR) 100 MG tablet Take 1 tablet (100 mg) by mouth daily 90 tablet 1     OneTouch Delica Lancets 33G MISC 1 lancet 2 times daily Use to test blood sugars twice daily as directed. 100 each 11     sertraline (ZOLOFT) 25 MG tablet Take 1 tablet (25 mg) by mouth daily 30 tablet 1     traMADol (ULTRAM) 50 MG tablet Take 50 mg by mouth every 6 hours as needed       senna-docusate (SENOKOT-S/PERICOLACE) 8.6-50 MG tablet Take 1-2 tablets by mouth daily as needed for constipation (Patient not taking: Reported on 2/10/2023)         Allergies  Allergies   Allergen Reactions     Insulin Detemir Hives     Alfredo products cause hives**       Liraglutide Hives     Alfredo Nordisk product     Liraglutide Hives      Alfredo Nordisk product     Enalapril Cough     Lisinopril Cough     Wellbutrin [Bupropion Hcl] Hives     hives         Family History  family history includes Alcohol/Drug in her father and mother; Diabetes in her brother; Heart Disease in her mother.    Social History  Social History     Tobacco Use     Smoking status: Former     Packs/day: 0.10     Years: 10.00     Pack years: 1.00     Types: Cigarettes     Quit date: 9/10/2013     Years since quittin.6     Smokeless tobacco: Never   Vaping Use     Vaping status: Never Used   Substance Use Topics     Alcohol use: Not Currently     Alcohol/week: 1.0 standard drink of alcohol     Types: 1 Cans of beer per week     Drug use: No       Physical Exam  BP (!) 190/92 (BP Location: Left arm, Patient Position: Sitting, Cuff Size: Adult Regular)   Pulse 85   Resp 16   Wt 80.5 kg (177 lb 6.4 oz)   LMP  (LMP Unknown)   SpO2 98%   BMI 31.42 kg/m    Body mass index is 31.42 kg/m .  GENERAL :  In distress secondary to pain  SKIN: Normal color, normal temperature, texture.  No hirsutism, alopecia or purple striae.     EYES: PERRL, EOMI, No scleral icterus,  No proptosis, conjunctival redness, stare, retraction  NECK: No visible masses. No palpable adenopathy, or masses. No carotid bruits.   THYROID:  Normal, nontender, smooth / firm texture,  no nodules, no Bruit   RESP: Lungs clear to auscultation bilaterally  CARDIAC: Regular rate and rhythm, normal S1 S2, without murmurs, rubs or gallops    NEURO: awake, alert, responds appropriately to questions.  Cranial nerves intact.   Moves all extremities; Gait normal.  No tremor of the outstretched hand.    EXTREMITIES: No clubbing, cyanosis or edema.    DATA REVIEW  Glooko report  Ave BG is 184 mg/dl        Again, thank you for allowing me to participate in the care of your patient.        Sincerely,        gAa Delvalle PA-C

## 2023-05-19 NOTE — NURSING NOTE
Ellen Hlil's goals for this visit include: NONE  She requests these members of her care team be copied on today's visit information: YES    PCP: Alejandra Aguilera    Referring Provider:  No referring provider defined for this encounter.    BP (!) 190/92 (BP Location: Left arm, Patient Position: Sitting, Cuff Size: Adult Regular)   Pulse 85   Resp 16   Wt 80.5 kg (177 lb 6.4 oz)   LMP  (LMP Unknown)   SpO2 98%   BMI 31.42 kg/m      Do you need any medication refills at today's visit? NONE    Harjit Shehean, EMT

## 2023-05-19 NOTE — PROGRESS NOTES
Assessment/Plan :   1. Type 2 DM. Ellen continues to work on stabilizing her blood sugars. She is concerned about the possibility of needing another surgery but she was glad to hear that her A1C is down to 7.4%. She is not happy about her recent weight gain and we discussed the possible connection to insulin. We also discussed medication options that may help with weight loss, such as metformin. She has metformin at home but she stopped it because she felt like it was making her urinate. We discussed how the metformin may help the insulin work better and it may also help prevent further weight gain. She is wiling to restart at 1 500 mg tablet daily.    She will keep us updated regarding surgery. Otherwise we will follow-up in 3 mos.      I have independently reviewed and interpreted labs, imaging as indicated.      Chief complaint:  Ellen is a 67 year old female who returns for follow-up of Type 2 DM with long term use of insulin.    I have reviewed Care Everywhere including [facility], [facility], [facility], [facility], [facility], [facility] , [facility], [place] lab reports, imaging reports and provider notes as indicated.      HISTORY OF PRESENT ILLNESS  Ellen continues to work on improving her blood sugars. She was told that she will need another surgery to fix her current spinal cord stimulator implant. She has had an increase in pain over the last few months. She saw her pain specialist and she was told that one of the SCS leads is no longer working. Ellen understands the importance of getting her A1C under 8% to help in the healing process, so she has been working on getting her blood sugars down and stable.    She is currently taking 40 unit(s) of Humulin Mix 70/30 in the morning and in the evening. She has not had any problems with severe hyperglycemia and/or hypoglycemia. She continues to monitor her blood sugars with fingerstick testing. She is concerned about weight gain. She estimates that  "she has gained about 20 lbs since the first of the year.     Her diabetes is complicated by chronic pain, HTN, CKD stage 2, Leblanc's esophagus and history of alcohol abuse. She has been working to take her medications daily, as directed. She has not had any problems with worsening vision or numbness/tingling in her lower extremities. She is in a lot of pain, which makes it difficult to sleep at night.    Endocrine relevant labs are as follows:   Latest Reference Range & Units 05/19/23 13:45   Hemoglobin A1C POCT 4.3 - <5.7 % 7.4 !   !: Data is abnormal    REVIEW OF SYSTEMS    Endocrine: positive for diabetes  Skin: negative  Eyes: negative for, visual blurring  Ears/Nose/Throat: negative  Respiratory: No shortness of breath, dyspnea on exertion, cough, or hemoptysis  Cardiovascular: negative for, chest pain and exercise intolerance  Gastrointestinal: negative for, nausea, vomiting, constipation and diarrhea  Genitourinary: negative for, nocturia, dysuria, frequency and urgency  Musculoskeletal: positive for back pain and muscular weakness  Neurologic: positive for local weakness, negative for and numbness or tingling of feet  Psychiatric: negative  Hematologic/Lymphatic/Immunologic: negative    Past Medical History  Past Medical History:   Diagnosis Date     Advanced directives, counseling/discussion 3/28/2013    Patient does not have an Advance/Health Care Directive (HCD), given \"What is Advance Care Planning?\" flyer.  Nola Lopez March 28, 2013      Hyperlipidemia LDL goal <100 10/27/2010     Hypertension goal BP (blood pressure) < 130/80 12/21/2010     Microalbuminuria 2/12/2015     Shingles 2009    Right posterior shoulder as per patient     Type 2 diabetes, HbA1c goal < 7% (H) 10/31/2010       Medications  Current Outpatient Medications   Medication Sig Dispense Refill     amLODIPine (NORVASC) 10 MG tablet Take 1 tablet (10 mg) by mouth daily 90 tablet 1     atorvastatin (LIPITOR) 40 MG tablet Take 1 " tablet (40 mg) by mouth daily 90 tablet 1     blood glucose (ONETOUCH ULTRA) test strip Use to test blood sugars twice daily as directed. 100 strip 11     blood glucose monitoring (ONE TOUCH ULTRA 2) meter device kit Use to test blood sugars twice daily as directed. 1 kit 0     cyclobenzaprine (FLEXERIL) 5 MG tablet Take 5 mg by mouth At Bedtime 1-2 tabs at bedtime as needed       gabapentin (NEURONTIN) 100 MG capsule Take 100 mg by mouth Take 1 tablet in the morning and 2 tablets at night       hydrochlorothiazide (HYDRODIURIL) 12.5 MG tablet Take 1 tablet (12.5 mg) by mouth daily 90 tablet 1     insulin NPH-Regular (HUMULIN MIX 70/30 KWIKPEN) susp Inject 40 units with breakfast and 10 units with dinner 30 mL 11     insulin pen needle (ULTICARE MICRO) 32G X 4 MM miscellaneous Use 2 pen needles daily or as directed. 100 each 11     losartan (COZAAR) 100 MG tablet Take 1 tablet (100 mg) by mouth daily 90 tablet 1     OneTouch Delica Lancets 33G MISC 1 lancet 2 times daily Use to test blood sugars twice daily as directed. 100 each 11     sertraline (ZOLOFT) 25 MG tablet Take 1 tablet (25 mg) by mouth daily 30 tablet 1     traMADol (ULTRAM) 50 MG tablet Take 50 mg by mouth every 6 hours as needed       senna-docusate (SENOKOT-S/PERICOLACE) 8.6-50 MG tablet Take 1-2 tablets by mouth daily as needed for constipation (Patient not taking: Reported on 2/10/2023)         Allergies  Allergies   Allergen Reactions     Insulin Detemir Hives     Alfredo products cause hives**       Liraglutide Hives     Alfredo Nordisk product     Liraglutide Hives     Alfredo Nordisk product     Enalapril Cough     Lisinopril Cough     Wellbutrin [Bupropion Hcl] Hives     hives         Family History  family history includes Alcohol/Drug in her father and mother; Diabetes in her brother; Heart Disease in her mother.    Social History  Social History     Tobacco Use     Smoking status: Former     Packs/day: 0.10     Years: 10.00     Pack years: 1.00      Types: Cigarettes     Quit date: 9/10/2013     Years since quittin.6     Smokeless tobacco: Never   Vaping Use     Vaping status: Never Used   Substance Use Topics     Alcohol use: Not Currently     Alcohol/week: 1.0 standard drink of alcohol     Types: 1 Cans of beer per week     Drug use: No       Physical Exam  BP (!) 190/92 (BP Location: Left arm, Patient Position: Sitting, Cuff Size: Adult Regular)   Pulse 85   Resp 16   Wt 80.5 kg (177 lb 6.4 oz)   LMP  (LMP Unknown)   SpO2 98%   BMI 31.42 kg/m    Body mass index is 31.42 kg/m .  GENERAL :  In distress secondary to pain  SKIN: Normal color, normal temperature, texture.  No hirsutism, alopecia or purple striae.     EYES: PERRL, EOMI, No scleral icterus,  No proptosis, conjunctival redness, stare, retraction  NECK: No visible masses. No palpable adenopathy, or masses. No carotid bruits.   THYROID:  Normal, nontender, smooth / firm texture,  no nodules, no Bruit   RESP: Lungs clear to auscultation bilaterally  CARDIAC: Regular rate and rhythm, normal S1 S2, without murmurs, rubs or gallops    NEURO: awake, alert, responds appropriately to questions.  Cranial nerves intact.   Moves all extremities; Gait normal.  No tremor of the outstretched hand.    EXTREMITIES: No clubbing, cyanosis or edema.    DATA REVIEW  [facility] report  Ave BG is 184 mg/dl

## 2023-05-24 ENCOUNTER — OFFICE VISIT (OUTPATIENT)
Dept: FAMILY MEDICINE | Facility: CLINIC | Age: 68
End: 2023-05-24
Payer: COMMERCIAL

## 2023-05-24 VITALS
HEIGHT: 63 IN | DIASTOLIC BLOOD PRESSURE: 83 MMHG | WEIGHT: 175 LBS | RESPIRATION RATE: 20 BRPM | HEART RATE: 90 BPM | SYSTOLIC BLOOD PRESSURE: 169 MMHG | TEMPERATURE: 97.6 F | BODY MASS INDEX: 31.01 KG/M2 | OXYGEN SATURATION: 98 %

## 2023-05-24 DIAGNOSIS — E11.29 TYPE 2 DIABETES MELLITUS WITH OTHER DIABETIC KIDNEY COMPLICATION (H): ICD-10-CM

## 2023-05-24 DIAGNOSIS — I10 ESSENTIAL HYPERTENSION WITH GOAL BLOOD PRESSURE LESS THAN 130/80: ICD-10-CM

## 2023-05-24 DIAGNOSIS — Z01.818 PRE-OP EXAM: Primary | ICD-10-CM

## 2023-05-24 DIAGNOSIS — M54.41 CHRONIC MIDLINE LOW BACK PAIN WITH RIGHT-SIDED SCIATICA: ICD-10-CM

## 2023-05-24 DIAGNOSIS — G89.29 CHRONIC MIDLINE LOW BACK PAIN WITH RIGHT-SIDED SCIATICA: ICD-10-CM

## 2023-05-24 DIAGNOSIS — E78.5 HYPERLIPIDEMIA LDL GOAL <100: ICD-10-CM

## 2023-05-24 DIAGNOSIS — F32.1 MODERATE MAJOR DEPRESSION (H): ICD-10-CM

## 2023-05-24 PROCEDURE — 80048 BASIC METABOLIC PNL TOTAL CA: CPT | Performed by: NURSE PRACTITIONER

## 2023-05-24 PROCEDURE — 36415 COLL VENOUS BLD VENIPUNCTURE: CPT | Performed by: NURSE PRACTITIONER

## 2023-05-24 PROCEDURE — 99214 OFFICE O/P EST MOD 30 MIN: CPT | Performed by: NURSE PRACTITIONER

## 2023-05-24 PROCEDURE — 80061 LIPID PANEL: CPT | Performed by: NURSE PRACTITIONER

## 2023-05-24 PROCEDURE — 93000 ELECTROCARDIOGRAM COMPLETE: CPT | Performed by: NURSE PRACTITIONER

## 2023-05-24 PROCEDURE — 84460 ALANINE AMINO (ALT) (SGPT): CPT | Performed by: NURSE PRACTITIONER

## 2023-05-24 RX ORDER — ATORVASTATIN CALCIUM 40 MG/1
40 TABLET, FILM COATED ORAL DAILY
Qty: 90 TABLET | Refills: 1 | Status: SHIPPED | OUTPATIENT
Start: 2023-05-24 | End: 2023-11-13

## 2023-05-24 RX ORDER — AMLODIPINE BESYLATE 10 MG/1
10 TABLET ORAL DAILY
Qty: 90 TABLET | Refills: 1 | Status: SHIPPED | OUTPATIENT
Start: 2023-05-24 | End: 2024-02-09

## 2023-05-24 RX ORDER — HYDROCHLOROTHIAZIDE 12.5 MG/1
12.5 TABLET ORAL DAILY
Qty: 90 TABLET | Refills: 1 | Status: SHIPPED | OUTPATIENT
Start: 2023-05-24 | End: 2023-11-17 | Stop reason: DRUGHIGH

## 2023-05-24 RX ORDER — MELOXICAM 7.5 MG/1
7.5 TABLET ORAL DAILY
COMMUNITY

## 2023-05-24 RX ORDER — LOSARTAN POTASSIUM 100 MG/1
100 TABLET ORAL DAILY
Qty: 90 TABLET | Refills: 1 | Status: SHIPPED | OUTPATIENT
Start: 2023-05-24 | End: 2023-11-13

## 2023-05-24 RX ORDER — SERTRALINE HYDROCHLORIDE 25 MG/1
25 TABLET, FILM COATED ORAL DAILY
Qty: 90 TABLET | Refills: 1 | Status: SHIPPED | OUTPATIENT
Start: 2023-05-24 | End: 2023-11-13

## 2023-05-24 ASSESSMENT — PAIN SCALES - GENERAL: PAINLEVEL: NO PAIN (0)

## 2023-05-24 NOTE — PROGRESS NOTES
[address] [address]  Phone: [phone]  Primary Provider: Alejandra Aguilera  Pre-op Performing Provider: ALEJANDRA AGUILERA      PREOPERATIVE EVALUATION:  Today's date: 5/24/2023    Ellen Hill is a 67 year old female who presents for a preoperative evaluation.      5/24/2023     1:28 PM   Additional Questions   Roomed by david         5/24/2023     1:28 PM   Patient Reported Additional Medications   Patient reports taking the following new medications none     Surgical Information:  Surgery/Procedure: 2 leads replaced in back  Surgery Location: [facility]  Surgeon: Dr. Pito Perez  Surgery Date: 6/13/23  Time of Surgery: 9am  Where patient plans to recover: At home with family  Fax number for surgical facility: [phone]    Assessment & Plan     The proposed surgical procedure is considered LOW risk.    Pre-op exam  - EKG 12-lead complete w/read - Clinics  - Basic metabolic panel  (Ca, Cl, CO2, Creat, Gluc, K, Na, BUN); Future  - Basic metabolic panel  (Ca, Cl, CO2, Creat, Gluc, K, Na, BUN)    Chronic midline low back pain with right-sided sciatica    Type 2 diabetes mellitus with other diabetic kidney complication (H)  Improving. Working with endocrinology.  - Basic metabolic panel  (Ca, Cl, CO2, Creat, Gluc, K, Na, BUN); Future  - Basic metabolic panel  (Ca, Cl, CO2, Creat, Gluc, K, Na, BUN)    Essential hypertension with goal blood pressure less than 130/80  Hasn't been taking amlodipine. Reminded her to take all her medications as prescribed.  - Basic metabolic panel  (Ca, Cl, CO2, Creat, Gluc, K, Na, BUN); Future  - amLODIPine (NORVASC) 10 MG tablet; Take 1 tablet (10 mg) by mouth daily For blood pressure  - hydrochlorothiazide (HYDRODIURIL) 12.5 MG tablet; Take 1 tablet (12.5 mg) by mouth daily For blood pressure  - losartan (COZAAR) 100 MG tablet; Take 1 tablet (100 mg) by mouth daily  For blood pressure  - Basic metabolic panel  (Ca, Cl, CO2, Creat, Gluc, K, Na, BUN)    Hyperlipidemia LDL goal <100  Stable. Diet/exercise. Continue atorvastatin.  - Lipid panel reflex to direct LDL Fasting; Future  - ALT; Future  - atorvastatin (LIPITOR) 40 MG tablet; Take 1 tablet (40 mg) by mouth daily For cholesterol  - Lipid panel reflex to direct LDL Fasting  - ALT    Moderate major depression (H)  Continue. No thoughts of harm.   - sertraline (ZOLOFT) 25 MG tablet; Take 1 tablet (25 mg) by mouth daily For mood           Risks and Recommendations:  The patient has the following additional risks and recommendations for perioperative complications:  Diabetes:  - Patient is on insulin therapy; diabetic NPO guidelines provided and discussed.    Antiplatelet or Anticoagulation Medication Instructions:   - Patient is on no antiplatelet or anticoagulation medications.    Additional Medication Instructions:  Patient is to take all scheduled medications on the day of surgery EXCEPT for modifications listed below:   - ACE/ARB: HOLD on day of surgery (minimum 11 hours for general anesthesia).   - Calcium Channel Blockers: May be continued on the day of surgery.   - Diuretics: HOLD on the day of surgery.   - Statins: Continue taking on the day of surgery.    - mixed insulin (70/30m 75/25, 50/50): HOLD day of surgery (she only takes if she is eating)   - meloxicam (Mobic): HOLD 10 days before surgery.     RECOMMENDATION:  APPROVAL GIVEN to proceed with proposed procedure, without further diagnostic evaluation.            Subjective       HPI related to upcoming procedure: Patient with chronic back pain. S/p surgery that was not successful in relieving her pain. Now has stimulator in. Needs to have 2 leads replaced. Hasn't been taking amlodipine or sertraline recently. She will restart.  today. Not currently drinking alcohol. Last drink last year.        5/24/2023     1:23 PM   Preop Questions   1. Have you ever had a  heart attack or stroke? No   2. Have you ever had surgery on your heart or blood vessels, such as a stent placement, a coronary artery bypass, or surgery on an artery in your head, neck, heart, or legs? No   3. Do you have chest pain with activity? No   4. Do you have a history of  heart failure? No   5. Do you currently have a cold, bronchitis or symptoms of other infection? No   6. Do you have a cough, shortness of breath, or wheezing? No   7. Do you or anyone in your family have previous history of blood clots? No   8. Do you or does anyone in your family have a serious bleeding problem such as prolonged bleeding following surgeries or cuts? No   9. Have you ever had problems with anemia or been told to take iron pills? No   10. Have you had any abnormal blood loss such as black, tarry or bloody stools, or abnormal vaginal bleeding? No   11. Have you ever had a blood transfusion? No   12. Are you willing to have a blood transfusion if it is medically needed before, during, or after your surgery? Yes   13. Have you or any of your relatives ever had problems with anesthesia? No   14. Do you have sleep apnea, excessive snoring or daytime drowsiness? No   15. Do you have any artifical heart valves or other implanted medical devices like a pacemaker, defibrillator, or continuous glucose monitor? No   16. Do you have artificial joints? No   17. Are you allergic to latex? No       Health Care Directive:  Patient does not have a Health Care Directive or Living Will: Discussed advance care planning with patient; however, patient declined at this time.    Preoperative Review of :   reviewed - controlled substances reflected in medication list.      Status of Chronic Conditions:  DEPRESSION - Patient has a long history of Depression of moderate severity requiring medication for control with recent symptoms being stable..Current symptoms of depression include depressed mood due to pain.     DIABETES - Patient has a  "longstanding history of DiabetesType Type II . Patient is being treated with diet, oral agents and insulin injections and denies significant side effects. Control has been fair. Complicating factors include but are not limited to: hypertension and hyperlipidemia.     HYPERLIPIDEMIA - Patient has a long history of significant Hyperlipidemia requiring medication for treatment with recent good control. Patient reports no problems or side effects with the medication.     HYPERTENSION - Patient has longstanding history of HTN , currently denies any symptoms referable to elevated blood pressure. Specifically denies chest pain, palpitations, dyspnea, orthopnea, PND or peripheral edema. Blood pressure readings have been in normal range. Current medication regimen is as listed below. Patient denies any side effects of medication.       Review of Systems  Constitutional, neuro, ENT, endocrine, pulmonary, cardiac, gastrointestinal, genitourinary, musculoskeletal, integument and psychiatric systems are negative, except as otherwise noted.    Patient Active Problem List    Diagnosis Date Noted     Chronic kidney disease, stage 2 (mild) 10/25/2022     Priority: Medium     Albuminuria 01/16/2019     Priority: Medium     Noncompliance with medication regimen 12/29/2017     Priority: Medium     Uncontrolled type 2 diabetes mellitus with microalbuminuria, with long-term current use of insulin 06/19/2017     Priority: Medium     Type 2 diabetes, HbA1c goal < 7%       Leblanc's esophagus without dysplasia 05/10/2017     Priority: Medium     Obesity, Class I, BMI 30-34.9 05/15/2014     Priority: Medium     Alcoholic (H) 03/28/2013     Priority: Medium     Advanced directives, counseling/discussion 03/28/2013     Priority: Medium     Patient does not have an Advance/Health Care Directive (HCD), given \"What is Advance Care Planning?\" flyer.    Nola Lopez  March 28, 2013         History of adenomatous polyp of colon 12/03/2012     " "Priority: Medium     Repeat colonoscopy in 2017       Vulvar pruritus 03/10/2011     Priority: Medium     Moderate major depression (H) 01/17/2011     Priority: Medium     Essential hypertension with goal blood pressure less than 130/80 12/21/2010     Priority: Medium     Hyperlipidemia LDL goal <100 10/27/2010     Priority: Medium     Post herpetic neuralgia 06/03/2010     Priority: Medium      Past Medical History:   Diagnosis Date     Advanced directives, counseling/discussion 3/28/2013    Patient does not have an Advance/Health Care Directive (HCD), given \"What is Advance Care Planning?\" flyer.  Nola Lopez March 28, 2013      Hyperlipidemia LDL goal <100 10/27/2010     Hypertension goal BP (blood pressure) < 130/80 12/21/2010     Microalbuminuria 2/12/2015     Shingles 2009    Right posterior shoulder as per patient     Type 2 diabetes, HbA1c goal < 7% (H) 10/31/2010     Past Surgical History:   Procedure Laterality Date     BACK SURGERY  2020     BIOPSY Right 1989    Breast-Benign lesion as per patient     Current Outpatient Medications   Medication Sig Dispense Refill     amLODIPine (NORVASC) 10 MG tablet Take 1 tablet (10 mg) by mouth daily 90 tablet 1     atorvastatin (LIPITOR) 40 MG tablet Take 1 tablet (40 mg) by mouth daily 90 tablet 1     blood glucose (ONETOUCH ULTRA) test strip Use to test blood sugars twice daily as directed. 100 strip 11     blood glucose monitoring (ONE TOUCH ULTRA 2) meter device kit Use to test blood sugars twice daily as directed. 1 kit 0     cyclobenzaprine (FLEXERIL) 5 MG tablet Take 5 mg by mouth At Bedtime 1-2 tabs at bedtime as needed       gabapentin (NEURONTIN) 300 MG capsule        hydrochlorothiazide (HYDRODIURIL) 12.5 MG tablet Take 1 tablet (12.5 mg) by mouth daily 90 tablet 1     insulin NPH-Regular (HUMULIN MIX 70/30 KWIKPEN) susp Inject 40 units twice daily 75 mL 3     insulin pen needle (ULTICARE MICRO) 32G X 4 MM miscellaneous Use 2 pen needles daily or as " "directed. 100 each 11     losartan (COZAAR) 100 MG tablet Take 1 tablet (100 mg) by mouth daily 90 tablet 1     metFORMIN (GLUCOPHAGE XR) 500 MG 24 hr tablet Take 1 tablet (500 mg) by mouth daily (with dinner) 90 tablet 0     OneTouch Delica Lancets 33G MISC 1 lancet 2 times daily Use to test blood sugars twice daily as directed. 100 each 11     sertraline (ZOLOFT) 25 MG tablet Take 1 tablet (25 mg) by mouth daily 30 tablet 1     traMADol (ULTRAM) 50 MG tablet Take 50 mg by mouth every 6 hours as needed       gabapentin (NEURONTIN) 100 MG capsule Take 100 mg by mouth Take 1 tablet in the morning and 2 tablets at night (Patient not taking: Reported on 2023)         Allergies   Allergen Reactions     Insulin Detemir Hives     Alfredo products cause hives**       Liraglutide Hives     Alfredo Nordisk product     Liraglutide Hives     Alfredo Nordisk product     Enalapril Cough     Lisinopril Cough     Wellbutrin [Bupropion Hcl] Hives     hives        Social History     Tobacco Use     Smoking status: Former     Packs/day: 0.10     Years: 10.00     Pack years: 1.00     Types: Cigarettes     Quit date: 9/10/2013     Years since quittin.7     Passive exposure: Current     Smokeless tobacco: Never   Vaping Use     Vaping status: Never Used   Substance Use Topics     Alcohol use: Not Currently     Alcohol/week: 1.0 standard drink of alcohol     Types: 1 Cans of beer per week     Family History   Problem Relation Age of Onset     Alcohol/Drug Mother      Heart Disease Mother      Alcohol/Drug Father      Diabetes Brother      History   Drug Use No         Objective     BP (!) 169/83 (BP Location: Right arm, Patient Position: Sitting, Cuff Size: Adult Regular)   Pulse 90   Temp 97.6  F (36.4  C) (Oral)   Resp 20   Ht 1.6 m (5' 3\")   Wt 79.4 kg (175 lb)   LMP  (LMP Unknown)   SpO2 98%   BMI 31.00 kg/m      Physical Exam    GENERAL APPEARANCE: healthy, alert and no distress     EYES: EOMI, PERRL     HENT: ear canals and " TM's normal and nose and mouth without ulcers or lesions     NECK: no adenopathy, no asymmetry, masses, or scars and thyroid normal to palpation     RESP: lungs clear to auscultation - no rales, rhonchi or wheezes     CV: regular rates and rhythm, normal S1 S2, no S3 or S4 and no murmur, click or rub     ABDOMEN:  soft, nontender, obese abdomen and bowel sounds normal     MS: extremities normal- no gross deformities noted, no evidence of inflammation in joints, FROM in all extremities.     SKIN: no suspicious lesions or rashes     NEURO: Normal strength and tone, sensory exam grossly normal, mentation intact and speech normal     PSYCH: mentation appears normal. and affect normal/bright     LYMPHATICS: No cervical adenopathy    Recent Labs   Lab Test 02/10/23  1329 01/11/23  1208 10/25/22  1609 04/01/22  1107 01/12/22  1559   HGB  --   --   --   --  14.1   PLT  --   --   --   --  255     --  139   < > 140   POTASSIUM 3.6  --  3.5   < > 3.9   CR 0.79  --  0.69   < > 0.66   A1C  --  8.4* 9.4*   < > 10.2*    < > = values in this interval not displayed.        Diagnostics:  Recent Results (from the past 24 hour(s))   Basic metabolic panel  (Ca, Cl, CO2, Creat, Gluc, K, Na, BUN)    Collection Time: 05/24/23  2:35 PM   Result Value Ref Range    Sodium 136 133 - 144 mmol/L    Potassium 3.9 3.4 - 5.3 mmol/L    Chloride 103 94 - 109 mmol/L    Carbon Dioxide (CO2) 27 20 - 32 mmol/L    Anion Gap 6 3 - 14 mmol/L    Urea Nitrogen 17 7 - 30 mg/dL    Creatinine 0.73 0.52 - 1.04 mg/dL    Calcium 9.5 8.5 - 10.1 mg/dL    Glucose 196 (H) 70 - 99 mg/dL    GFR Estimate >90 >60 mL/min/1.73m2   Lipid panel reflex to direct LDL Fasting    Collection Time: 05/24/23  2:35 PM   Result Value Ref Range    Cholesterol 254 (H) <200 mg/dL    Triglycerides 264 (H) <150 mg/dL    Direct Measure HDL 51 >=50 mg/dL    LDL Cholesterol Calculated 150 (H) <=100 mg/dL    Non HDL Cholesterol 203 (H) <130 mg/dL    Patient Fasting > 8hrs? Yes    ALT     Collection Time: 05/24/23  2:35 PM   Result Value Ref Range    ALT 33 0 - 50 U/L      EKG: appears normal, nonspecific T abnormality, NSR, normal axis, normal intervals, no acute ST/T changes c/w ischemia, no LVH by voltage criteria, unchanged from previous tracings from 2020    Revised Cardiac Risk Index (RCRI):  The patient has the following serious cardiovascular risks for perioperative complications:   - No serious cardiac risks = 0 points     RCRI Interpretation: 0 points: Class I (very low risk - 0.4% complication rate)           Signed Electronically by: YVON BAKER CNP  Copy of this evaluation report is provided to requesting physician.

## 2023-05-24 NOTE — LETTER
May 25, 2023      Ellen Hill  [address] [place] [address]        Dear ,    We are writing to inform you of your test results.    Your lab results show normal kidneys and electrolytes. Your glucose is a bit elevated but your a1c has been good. Your cholesterol is elevated.  I recommend watching diet: eat lots of fruits/vegetables and lean meats. Avoid processed foods and added sugars. Try to get at least 30 minutes of exercise 4-5 days per week.   Please let us know if you have any questions.   Best of luck with your procedure.    Resulted Orders   Basic metabolic panel  (Ca, Cl, CO2, Creat, Gluc, K, Na, BUN)   Result Value Ref Range    Sodium 136 133 - 144 mmol/L    Potassium 3.9 3.4 - 5.3 mmol/L    Chloride 103 94 - 109 mmol/L    Carbon Dioxide (CO2) 27 20 - 32 mmol/L    Anion Gap 6 3 - 14 mmol/L    Urea Nitrogen 17 7 - 30 mg/dL    Creatinine 0.73 0.52 - 1.04 mg/dL    Calcium 9.5 8.5 - 10.1 mg/dL    Glucose 196 (H) 70 - 99 mg/dL    GFR Estimate >90 >60 mL/min/1.73m2      Comment:      eGFR calculated using 2021 CKD-EPI equation.   Lipid panel reflex to direct LDL Fasting   Result Value Ref Range    Cholesterol 254 (H) <200 mg/dL    Triglycerides 264 (H) <150 mg/dL    Direct Measure HDL 51 >=50 mg/dL    LDL Cholesterol Calculated 150 (H) <=100 mg/dL    Non HDL Cholesterol 203 (H) <130 mg/dL    Patient Fasting > 8hrs? Yes     Narrative    Cholesterol  Desirable:  <200 mg/dL    Triglycerides  Normal:  Less than 150 mg/dL  Borderline High:  150-199 mg/dL  High:  200-499 mg/dL  Very High:  Greater than or equal to 500 mg/dL    Direct Measure HDL  Female:  Greater than or equal to 50 mg/dL   Male:  Greater than or equal to 40 mg/dL    LDL Cholesterol  Desirable:  <100mg/dL  Above Desirable:  100-129 mg/dL   Borderline High:  130-159 mg/dL   High:  160-189 mg/dL   Very High:  >= 190 mg/dL    Non HDL Cholesterol  Desirable:  130 mg/dL  Above Desirable:  130-159 mg/dL  Borderline High:   160-189 mg/dL  High:  190-219 mg/dL  Very High:  Greater than or equal to 220 mg/dL   ALT   Result Value Ref Range    ALT 33 0 - 50 U/L       If you have any questions or concerns, please call the clinic at the number listed above.       Sincerely,      YVON Aguirre CNP

## 2023-05-24 NOTE — PATIENT INSTRUCTIONS
How to Take Your Medication Before Surgery  Do not take hydrochlorothiazide, insulin, losartan or metformin morning of your surgery  Stop meloxiccam 10 days before surgery    Patient Education       At [facility], we strive to deliver an exceptional experience to you, every time we see you. If you receive a survey, please complete it as we do value your feedback.  If you have MyChart, you can expect to receive results automatically within 24 hours of their completion.  Your provider will send a note interpreting your results as well.   If you do not have MyChart, you should receive your results in about a week by mail.    Your care team:                            Family Medicine Internal Medicine   MD Jorge Alberto Ribeiro MD Shantel Branch-Fleming, MD Srinivasa Vaka, MD Katya Belousova, ESCOBAR TuckerHillYVON Saucedo CNP, MD Pediatrics   ESCOBAR Christie MD Paula Brito, MD Amelia Massimini APRN CNP   Marina Peterson APRN MD Isidra Mcgowan MD          Clinic hours: Monday - Thursday 7 am-6 pm; Fridays 7 am-5 pm.   Urgent care: Monday - Friday 10 am- 8 pm; Saturday and Sunday 9 am-5 pm.    Clinic: [phone]       [facility]: Monday - Thursday 8 am - 7 pm; Friday 8 am - 6 pm  [facility]: [phone]     For informational purposes only. Not to replace the advice of your health care provider. Copyright   2003, 2019 [facility]. All rights reserved. Clinically reviewed by Hanna Quintana MD. [employer] [id number] - REV 12/22.  Preparing for Your Surgery  Getting started  A nurse will call you to review your health history and instructions. They will give you an arrival time based on your scheduled surgery time. Please be ready to share:  Your doctor's clinic name and phone number  Your medical, surgical, and anesthesia history  A list of  allergies and sensitivities  A list of medicines, including herbal treatments and over-the-counter drugs  Whether the patient has a legal guardian (ask how to send us the papers in advance)  Please tell us if you're pregnant--or if there's any chance you might be pregnant. Some surgeries may injure a fetus (unborn baby), so they require a pregnancy test. Surgeries that are safe for a fetus don't always need a test, and you can choose whether to have one.   If you have a child who's having surgery, please ask for a copy of Preparing for Your Child's Surgery.    Preparing for surgery  Within 10 to 30 days of surgery: Have a pre-op exam (sometimes called an H&P, or History and Physical). This can be done at a clinic or pre-operative center.  If you're having a , you may not need this exam. Talk to your care team.  At your pre-op exam, talk to your care team about all medicines you take. If you need to stop any medicines before surgery, ask when to start taking them again.  We do this for your safety. Many medicines can make you bleed too much during surgery. Some change how well surgery (anesthesia) drugs work.  Call your insurance company to let them know you're having surgery. (If you don't have insurance, call [phone].)  Call your clinic if there's any change in your health. This includes signs of a cold or flu (sore throat, runny nose, cough, rash, fever). It also includes a scrape or scratch near the surgery site.  If you have questions on the day of surgery, call your hospital or surgery center.  Eating and drinking guidelines  For your safety: Unless your surgeon tells you otherwise, follow the guidelines below.  Eat and drink as usual until 8 hours before you arrive for surgery. After that, no food or milk.  Drink clear liquids until 2 hours before you arrive. These are liquids you can see through, like water, Gatorade, and Propel Water. They also include plain black coffee and tea (no cream or  milk), candy, and breath mints. You can spit out gum when you arrive.  If you drink alcohol: Stop drinking it the night before surgery.  If your care team tells you to take medicine on the morning of surgery, it's okay to take it with a sip of water.  Preventing infection  Shower or bathe the night before and morning of your surgery. Follow the instructions your clinic gave you. (If no instructions, use regular soap.)  Don't shave or clip hair near your surgery site. We'll remove the hair if needed.  Don't smoke or vape the morning of surgery. You may chew nicotine gum up to 2 hours before surgery. A nicotine patch is okay.  Note: Some surgeries require you to completely quit smoking and nicotine. Check with your surgeon.  Your care team will make every effort to keep you safe from infection. We will:  Clean our hands often with soap and water (or an alcohol-based hand rub).  Clean the skin at your surgery site with a special soap that kills germs.  Give you a special gown to keep you warm. (Cold raises the risk of infection.)  Wear special hair covers, masks, gowns and gloves during surgery.  Give antibiotic medicine, if prescribed. Not all surgeries need antibiotics.  What to bring on the day of surgery  Photo ID and insurance card  Copy of your health care directive, if you have one  Glasses and hearing aids (bring cases)  You can't wear contacts during surgery  Inhaler and eye drops, if you use them (tell us about these when you arrive)  CPAP machine or breathing device, if you use them  A few personal items, if spending the night  If you have . . .  A pacemaker, ICD (cardiac defibrillator) or other implant: Bring the ID card.  An implanted stimulator: Bring the remote control.  A legal guardian: Bring a copy of the certified (court-stamped) guardianship papers.  Please remove any jewelry, including body piercings. Leave jewelry and other valuables at home.  If you're going home the day of surgery  You must have  a responsible adult drive you home. They should stay with you overnight as well.  If you don't have someone to stay with you, and you aren't safe to go home alone, we may keep you overnight. Insurance often won't pay for this.  After surgery  If it's hard to control your pain or you need more pain medicine, please call your surgeon's office.  Questions?   If you have any questions for your care team, list them here: _________________________________________________________________________________________________________________________________________________________________________ ____________________________________ ____________________________________ ____________________________________

## 2023-05-25 LAB
ALT SERPL W P-5'-P-CCNC: 33 U/L (ref 0–50)
ANION GAP SERPL CALCULATED.3IONS-SCNC: 6 MMOL/L (ref 3–14)
BUN SERPL-MCNC: 17 MG/DL (ref 7–30)
CALCIUM SERPL-MCNC: 9.5 MG/DL (ref 8.5–10.1)
CHLORIDE BLD-SCNC: 103 MMOL/L (ref 94–109)
CHOLEST SERPL-MCNC: 254 MG/DL
CO2 SERPL-SCNC: 27 MMOL/L (ref 20–32)
CREAT SERPL-MCNC: 0.73 MG/DL (ref 0.52–1.04)
FASTING STATUS PATIENT QL REPORTED: YES
GFR SERPL CREATININE-BSD FRML MDRD: >90 ML/MIN/1.73M2
GLUCOSE BLD-MCNC: 196 MG/DL (ref 70–99)
HDLC SERPL-MCNC: 51 MG/DL
LDLC SERPL CALC-MCNC: 150 MG/DL
NONHDLC SERPL-MCNC: 203 MG/DL
POTASSIUM BLD-SCNC: 3.9 MMOL/L (ref 3.4–5.3)
SODIUM SERPL-SCNC: 136 MMOL/L (ref 133–144)
TRIGL SERPL-MCNC: 264 MG/DL

## 2023-06-29 ENCOUNTER — TELEPHONE (OUTPATIENT)
Dept: FAMILY MEDICINE | Facility: CLINIC | Age: 68
End: 2023-06-29
Payer: COMMERCIAL

## 2023-06-29 NOTE — TELEPHONE ENCOUNTER
Patient Quality Outreach    Patient is due for the following:   Colon Cancer Screening  Breast Cancer Screening - Mammogram      Topic Date Due     Pneumococcal Vaccine (1 - PCV) Never done     Zoster (Shingles) Vaccine (1 of 2) Never done     Diptheria Tetanus Pertussis (DTAP/TDAP/TD) Vaccine (2 - Td or Tdap) 10/20/2020     COVID-19 Vaccine (3 - Pfizer series) 11/12/2021       Type of outreach:    Sent letter.    Next Steps:  Reach out within 90 days via Letter.    Max number of attempts reached: Yes. Will try again in 90 days if patient still on fail list.    Questions for provider review:    None           Hayden Rivera MA  Chart routed to Care Team.

## 2023-06-29 NOTE — LETTER
June 29, 2023    [facility]  [address] [place] [address]    Dear Ellen,    At [facility] we care about your health and are committed to providing quality patient care.     Here is a list of Health Maintenance topics that are due now or due soon:  Health Maintenance Due   Topic Date Due    DEXA  Never done    URINE DRUG SCREEN  Never done    ANNUAL REVIEW OF HM ORDERS  Never done    Pneumococcal Vaccine: 65+ Years (1 - PCV) Never done    HIV SCREENING  Never done    ZOSTER IMMUNIZATION (1 of 2) Never done    LUNG CANCER SCREENING  Never done    DTAP/TDAP/TD IMMUNIZATION (2 - Td or Tdap) 10/20/2020    MAMMO SCREENING  04/15/2021    COVID-19 Vaccine (3 - Pfizer series) 11/12/2021    COLORECTAL CANCER SCREENING  11/15/2022    MEDICARE ANNUAL WELLNESS VISIT  11/29/2022    MICROALBUMIN  01/12/2023    PHQ-9  06/26/2023        We are recommending that you:  Schedule a MAMMOGRAM which is due.    1 in 8 women will develop invasive breast cancer during her lifetime and it is the most common non-skin cancer in American women.  EARLY detection, new treatments, and a better understanding of the disease have increased survival rates - the 5 year survival rate in the 1960s was 63% and today it is close to 90%.    If you are under/uninsured, we recommend you contact the [facility]. They offer mammograms at no charge or on a sliding fee charge. You can schedule with them at [phone]. Please have them send us the results.      Please disregard this reminder if you have had this exam elsewhere within the last year.  It would be helpful for us to have a copy of your mammogram report in your file so that we can best coordinate your care - please contact us with when your test was done so we can update your record.    ,   Schedule a COLONOSCOPY to assess for colon cancer (due every 10 years or 5 years in higher risk situations.)       Colon cancer is now the second leading  cause of cancer-related deaths in the [place] for both men and women and there are over 130,000 new cases and 50,000 deaths per year from colon cancer.  Colonoscopies can prevent 90-95% of these deaths.  Problem lesions can be removed before they ever become cancer.  This test is not only looking for cancer, but also getting rid of precancerious lesions.    If you are under/uninsured, we recommend you contact the [facility] program. [facility] is a free colorectal cancer screening program that provides colonoscopies for eligible under/uninsured [place] men and women. If you are interested in receiving a free colonoscopy, please call [facility] at [phone] (mention code [employer]) to see if you re eligible.     If you do not wish to do a colonoscopy or cannot afford to do one, at this time, there is another option. It is called a FIT test or Fecal Immunochemical Occult Blood Test (take home stool sample kit).  It does not replace the colonoscopy for colorectal cancer screening, but it can detect hidden bleeding in the lower colon.  It does need to be repeated every year and if a positive result is obtained, you would be referred for a colonoscopy.    If you have completed either one of these tests at another facility, please call/respond to this message with the details of when and where the tests were done and if they were normal or not. Or have the records sent to our clinic so that we can best coordinate your care., and   Schedule a Nurse-Only appointment to update your immunizations: Your records indicate that you are not up to date with your immunizations, please schedule a nurse-only appointment to get these updated or update them at your next office visit. If this is incorrect, please disregard.    To schedule an appointment or discuss this further, you may contact us by phone at the [facility] at [phone] or online through the [email or web address]  [email or web address]    Thank you for trusting [facility] and we appreciate the opportunity to serve you.  We look forward to supporting your healthcare needs in the future.    Your partners in health,      Quality Committee at [facility]

## 2023-08-07 ENCOUNTER — TELEPHONE (OUTPATIENT)
Dept: FAMILY MEDICINE | Facility: CLINIC | Age: 68
End: 2023-08-07
Payer: COMMERCIAL

## 2023-08-07 NOTE — TELEPHONE ENCOUNTER
Patient calling in reporting weight gain in the last few months of about 20 pounds, concerned this is related to her insulin 70/30. Patient states that she has has issues with weight gain related to insulin use before, wondering if this is related.    Writer reviewed chart, noted endocrinologist note 5/19/23, patient also discussed weight gain at this visit and endo recommended metformin. Writer relayed this info to patient, patient states that they forgot about that appointment and haven't been taking the metformin. Patient states that she will call endo office to make sure still OK to start this (did inform patient that it is likely still OK to start but she would like to talk to endo first).     Patient also informed that PCP is no longer with our clinic, would recommend establishing care with another provider. Reviewed female providers available at  per patient preference, patient states she will make appt with another provider in the next month or so. No further questions or concerns.      Nuzhat Aly, MAURON, RN  [facility]

## 2023-08-17 NOTE — PROGRESS NOTES
Outcome for 08/17/23 10:00 AM: Left Voicemail   Sarahy Dukes [facility]  Adult Endocrinology  [facility]

## 2023-08-24 ENCOUNTER — OFFICE VISIT (OUTPATIENT)
Dept: ENDOCRINOLOGY | Facility: CLINIC | Age: 68
End: 2023-08-24
Payer: COMMERCIAL

## 2023-08-24 VITALS
OXYGEN SATURATION: 98 % | HEART RATE: 79 BPM | SYSTOLIC BLOOD PRESSURE: 144 MMHG | RESPIRATION RATE: 18 BRPM | DIASTOLIC BLOOD PRESSURE: 82 MMHG | WEIGHT: 175.9 LBS | BODY MASS INDEX: 31.16 KG/M2

## 2023-08-24 DIAGNOSIS — E11.9 TYPE 2 DIABETES MELLITUS TREATED WITH INSULIN (H): ICD-10-CM

## 2023-08-24 DIAGNOSIS — Z79.4 TYPE 2 DIABETES MELLITUS TREATED WITH INSULIN (H): ICD-10-CM

## 2023-08-24 LAB — HBA1C MFR BLD: 8.2 % (ref 4.3–?)

## 2023-08-24 PROCEDURE — 83036 HEMOGLOBIN GLYCOSYLATED A1C: CPT | Performed by: PHYSICIAN ASSISTANT

## 2023-08-24 PROCEDURE — 99214 OFFICE O/P EST MOD 30 MIN: CPT | Performed by: PHYSICIAN ASSISTANT

## 2023-08-24 RX ORDER — INSULIN GLARGINE 100 [IU]/ML
50 INJECTION, SOLUTION SUBCUTANEOUS EVERY MORNING
Qty: 60 ML | Refills: 0 | Status: SHIPPED | OUTPATIENT
Start: 2023-08-24 | End: 2024-01-05

## 2023-08-24 RX ORDER — GLIMEPIRIDE 2 MG/1
2 TABLET ORAL
Qty: 60 TABLET | Refills: 3 | Status: SHIPPED | OUTPATIENT
Start: 2023-08-24 | End: 2024-01-27

## 2023-08-24 NOTE — PROGRESS NOTES
Assessment/Plan :   Type 2 DM with long term use of insulin. Ellen is frustrated by ongoing weight gain. She does not want to continue with the 70/30 mix insulin. She doesn't feel like it works well and she knows that it is causing weight gain. We discussed metformin and she would also like to avoid taking metformin. She had a lot of diarrhea and abdominal cramping while on metformin. We reviewed her previous medications and discussed options. Ellen does not have great health insurance and she needs to find medications that are affordable, as well. She has taken insulin glargine in the past, so we will restart that. She is to take 50 unit(s) once daily. We will also restart glimepiride at 2 mg with meals, twice daily. Ellen will also work on increasing fingerstick testing to 3x daily. We will follow-up in 6 wks. If she has any problems, she is to contact my office.       I have independently reviewed and interpreted labs, imaging as indicated.      Chief complaint:  Ellen is a 67 year old female who returns for follow-up of Type 2 DM with long term use of insulin.    I have reviewed Care Everywhere including [facility], [facility], [facility], [facility], [facility], [facility] , [facility], [place] lab reports, imaging reports and provider notes as indicated.      HISTORY OF PRESENT ILLNESS  Ellen continues to work on managing her blood sugars. She has continued to take Humulin Mix 70/30 40 unit(s) twice daily and she continues to gain weight. She is really frustrated by the weight gain and she would like to switch to something else. We had previously discussed restarting metformin but she had horrible GI side effects while on metformin and she would prefer to stay off of the medication.     Ellen has had diabetes since around 2009 or 2010. She was started on metformin at the time of diagnosis but she could not tolerate it due to GI side effects. Her blood sugars remained elevated, so she was started on  insulin. She has taken insulin glargine and insulin detemir in the past. She could not tolerate the insulin detemir due to hives and skin irritation. She does not recall any adverse effects related to insulin glargine. She has also taken other oral medications in the past. She remembers trying glimepiride and glyburide. She does not recall any adverse reactions to those medications.     Ellen denies any problems with severe hyperglycemia and/or hypoglycemia. However, she has not been great about monitoring her blood sugars. She currently uses fingerstick testing to monitor her blood sugars and she has checked a few times in the last month. She also denies any problems with worsening vision or an increase in numbness/tingling in her feet. She recently recently underwent surgery to replace the SCS leads in her pain pump and she feels like the procedure has helped with back pain and muscle weakness.    Endocrine relevant labs are as follows:     Latest Reference Range & Units 08/24/23 11:01   Hemoglobin A1C POCT 4.3 - <5.7 % 8.2 !   !: Data is abnormal    REVIEW OF SYSTEMS    Endocrine: positive for diabetes  Skin: negative  Eyes: negative for, visual blurring, redness, tearing  Ears/Nose/Throat: negative  Respiratory: No shortness of breath, dyspnea on exertion, cough, or hemoptysis  Cardiovascular: negative for, chest pain, lower extremity edema, and exercise intolerance  Gastrointestinal: negative for, nausea, vomiting, constipation, and diarrhea  Genitourinary: negative for, nocturia, dysuria, frequency, and urgency  Musculoskeletal: negative for, muscular weakness, and nocturnal cramping  Neurologic: positive for local weakness and numbness or tingling of feet  Psychiatric: positive for sleep disturbance  Hematologic/Lymphatic/Immunologic: negative    Past Medical History  Past Medical History:   Diagnosis Date    Advanced directives, counseling/discussion 3/28/2013    Patient does not have an Advance/Health Care  "Directive (HCD), given \"What is Advance Care Planning?\" flyer.  Nolarula Lopez March 28, 2013     Hyperlipidemia LDL goal <100 10/27/2010    Hypertension goal BP (blood pressure) < 130/80 12/21/2010    Microalbuminuria 2/12/2015    Shingles 2009    Right posterior shoulder as per patient    Type 2 diabetes, HbA1c goal < 7% (H) 10/31/2010       Medications  Current Outpatient Medications   Medication Sig Dispense Refill    amLODIPine (NORVASC) 10 MG tablet Take 1 tablet (10 mg) by mouth daily For blood pressure 90 tablet 1    atorvastatin (LIPITOR) 40 MG tablet Take 1 tablet (40 mg) by mouth daily For cholesterol 90 tablet 1    blood glucose (ONETOUCH ULTRA) test strip Use to test blood sugars twice daily as directed. 100 strip 11    blood glucose monitoring (ONE TOUCH ULTRA 2) meter device kit Use to test blood sugars twice daily as directed. 1 kit 0    cyclobenzaprine (FLEXERIL) 5 MG tablet Take 5 mg by mouth At Bedtime 1-2 tabs at bedtime as needed      gabapentin (NEURONTIN) 300 MG capsule Take 300 mg in the AM and 600 mg qPM      glimepiride (AMARYL) 2 MG tablet Take 1 tablet (2 mg) by mouth 2 times daily (before meals) 60 tablet 3    hydrochlorothiazide (HYDRODIURIL) 12.5 MG tablet Take 1 tablet (12.5 mg) by mouth daily For blood pressure 90 tablet 1    insulin glargine (LANTUS SOLOSTAR) 100 UNIT/ML pen Inject 50 Units Subcutaneous every morning 60 mL 0    insulin pen needle (ULTICARE MICRO) 32G X 4 MM miscellaneous Use 2 pen needles daily or as directed. 100 each 11    losartan (COZAAR) 100 MG tablet Take 1 tablet (100 mg) by mouth daily For blood pressure 90 tablet 1    meloxicam (MOBIC) 7.5 MG tablet Take 7.5 mg by mouth daily      metFORMIN (GLUCOPHAGE XR) 500 MG 24 hr tablet Take 1 tablet (500 mg) by mouth daily (with dinner) 90 tablet 0    OneTouch Delica Lancets 33G MISC 1 lancet 2 times daily Use to test blood sugars twice daily as directed. 100 each 11    sertraline (ZOLOFT) 25 MG tablet Take 1 " tablet (25 mg) by mouth daily For mood 90 tablet 1    traMADol (ULTRAM) 50 MG tablet Take 50 mg by mouth every 6 hours as needed         Allergies  Allergies   Allergen Reactions    Insulin Detemir Hives     Alfredo products cause hives**      Liraglutide Hives     Alfredo Nordisk product    Liraglutide Hives     Alfredo Nordisk product    Enalapril Cough    Lisinopril Cough    Wellbutrin [Bupropion Hcl] Hives     hives         Family History  family history includes Alcohol/Drug in her father and mother; Diabetes in her brother; Heart Disease in her mother.    Social History  Social History     Tobacco Use    Smoking status: Former     Packs/day: 0.10     Years: 10.00     Pack years: 1.00     Types: Cigarettes     Quit date: 9/10/2013     Years since quittin.9     Passive exposure: Current    Smokeless tobacco: Never   Vaping Use    Vaping Use: Never used   Substance Use Topics    Alcohol use: Not Currently     Alcohol/week: 1.0 standard drink of alcohol     Types: 1 Cans of beer per week    Drug use: No       Physical Exam  BP (!) 144/82 (BP Location: Left arm, Patient Position: Sitting, Cuff Size: Adult Large)   Pulse 79   Resp 18   Wt 79.8 kg (175 lb 14.4 oz)   LMP  (LMP Unknown)   SpO2 98%   BMI 31.16 kg/m    Body mass index is 31.16 kg/m .  GENERAL :  In no apparent distress  SKIN: Normal color, normal temperature, texture.  No hirsutism, alopecia or purple striae.     EYES: PERRL, EOMI, No scleral icterus,  No proptosis, conjunctival redness, stare, retraction  RESP: Lungs clear to auscultation bilaterally  CARDIAC: Regular rate and rhythm, normal S1 S2, without murmurs, rubs or gallops    NEURO: awake, alert, responds appropriately to questions.  Cranial nerves intact.   Moves all extremities; Gait normal.  No tremor of the outstretched hand.   EXTREMITIES: No clubbing, cyanosis or edema.    DATA REVIEW   197 mg/dl   187 mg/dl   204 mg/dl   178 mg/dl

## 2023-08-24 NOTE — NURSING NOTE
Ellen Hill's goals for this visit include: INSULIN/WEIGHT GAIN   She requests these members of her care team be copied on today's visit information: YES    PCP: Alejandra Aguilera    Referring Provider:  No referring provider defined for this encounter.    BP (!) 176/86 (BP Location: Left arm, Patient Position: Sitting, Cuff Size: Adult Regular)   Pulse 79   Resp 18   Wt 79.8 kg (175 lb 14.4 oz)   LMP  (LMP Unknown)   SpO2 98%   BMI 31.16 kg/m      Do you need any medication refills at today's visit? NONE    [facility]-Department of Endocrinology  Diabetes Educators:   Arlet Skelton RN and Alejandra Dorado RN  Clinic Nurse: CORNELIA Santos  CMA's: Sarahy Head and Eleazar   EMT: Harjit  Scheduling/Clinic phone number : [phone]   Clinic Fax: [phone]  On-Call Endocrine at the [facility] (after hours/weekends): [phone] option 4    Please call the number below to schedule your labs.  Lab    General [phone]   [facility] [phone]   [place] [phone]   [facility]  [phone]   [facility] [phone]   [place] [phone]   [facility]) [phone]   [facility]   [place] [phone]   [place] [phone]   [place] [phone]   [place] [phone]     Please reach out to the following centers to schedule your imaging appointment:  Imaging (DEXA, CT, MRI, XRAY)    [facility] ([facility], [facility] [place]) [phone]   [facility] ([place], [place]) [phone]   [facility] ([facility]) [phone]   [facility] ([facility]) [phone]     Appointment Reminders:  * Please bring meter with for staff to download  * If you are due ONLY for an A1C, it is scheduled with the nurse and will be done in clinic. You do not need to schedule a lab appointment. Fasting is not required for an A1C.  * Refill request should be submitted to your pharmacy. They will contact clinic for  approval.    Harjit Sheehan, EMT       Detail Level: Generalized

## 2023-08-24 NOTE — LETTER
8/24/2023         RE: Ellen Hill  [address] [place] [address]        Dear Colleague,    Thank you for referring your patient, Ellen Hill, to the [facility]. Please see a copy of my visit note below.    Outcome for 08/17/23 10:00 AM: Left makendall Dukes, [facility]  Adult Endocrinology  [facility] [place]         Assessment/Plan :   Type 2 DM with long term use of insulin. Ellen is frustrated by ongoing weight gain. She does not want to continue with the 70/30 mix insulin. She doesn't feel like it works well and she knows that it is causing weight gain. We discussed metformin and she would also like to avoid taking metformin. She had a lot of diarrhea and abdominal cramping while on metformin. We reviewed her previous medications and discussed options. Ellen does not have great health insurance and she needs to find medications that are affordable, as well. She has taken insulin glargine in the past, so we will restart that. She is to take 50 unit(s) once daily. We will also restart glimepiride at 2 mg with meals, twice daily. Ellen will also work on increasing fingerstick testing to 3x daily. We will follow-up in 6 wks. If she has any problems, she is to contact my office.       I have independently reviewed and interpreted labs, imaging as indicated.      Chief complaint:  Ellen is a 67 year old female who returns for follow-up of Type 2 DM with long term use of insulin.    I have reviewed Care Everywhere including [facility], [facility], [facility], [facility], [facility], [facility], [facility] , [facility], [place] lab reports, imaging reports and provider notes as indicated.      HISTORY OF PRESENT ILLNESS  Ellen continues to work on managing her blood sugars. She has continued to take Humulin Mix 70/30 40 unit(s) twice daily and she continues to gain weight. She is really frustrated by the weight gain and she would like to switch to something  else. We had previously discussed restarting metformin but she had horrible GI side effects while on metformin and she would prefer to stay off of the medication.     Ellen has had diabetes since around 2009 or 2010. She was started on metformin at the time of diagnosis but she could not tolerate it due to GI side effects. Her blood sugars remained elevated, so she was started on insulin. She has taken insulin glargine and insulin detemir in the past. She could not tolerate the insulin detemir due to hives and skin irritation. She does not recall any adverse effects related to insulin glargine. She has also taken other oral medications in the past. She remembers trying glimepiride and glyburide. She does not recall any adverse reactions to those medications.     Ellen denies any problems with severe hyperglycemia and/or hypoglycemia. However, she has not been great about monitoring her blood sugars. She currently uses fingerstick testing to monitor her blood sugars and she has checked a few times in the last month. She also denies any problems with worsening vision or an increase in numbness/tingling in her feet. She recently recently underwent surgery to replace the SCS leads in her pain pump and she feels like the procedure has helped with back pain and muscle weakness.    Endocrine relevant labs are as follows:     Latest Reference Range & Units 08/24/23 11:01   Hemoglobin A1C POCT 4.3 - <5.7 % 8.2 !   !: Data is abnormal    REVIEW OF SYSTEMS    Endocrine: positive for diabetes  Skin: negative  Eyes: negative for, visual blurring, redness, tearing  Ears/Nose/Throat: negative  Respiratory: No shortness of breath, dyspnea on exertion, cough, or hemoptysis  Cardiovascular: negative for, chest pain, lower extremity edema, and exercise intolerance  Gastrointestinal: negative for, nausea, vomiting, constipation, and diarrhea  Genitourinary: negative for, nocturia, dysuria, frequency, and urgency  Musculoskeletal:  "negative for, muscular weakness, and nocturnal cramping  Neurologic: positive for local weakness and numbness or tingling of feet  Psychiatric: positive for sleep disturbance  Hematologic/Lymphatic/Immunologic: negative    Past Medical History  Past Medical History:   Diagnosis Date     Advanced directives, counseling/discussion 3/28/2013    Patient does not have an Advance/Health Care Directive (HCD), given \"What is Advance Care Planning?\" flyer.  Nola Lopez March 28, 2013      Hyperlipidemia LDL goal <100 10/27/2010     Hypertension goal BP (blood pressure) < 130/80 12/21/2010     Microalbuminuria 2/12/2015     Shingles 2009    Right posterior shoulder as per patient     Type 2 diabetes, HbA1c goal < 7% (H) 10/31/2010       Medications  Current Outpatient Medications   Medication Sig Dispense Refill     amLODIPine (NORVASC) 10 MG tablet Take 1 tablet (10 mg) by mouth daily For blood pressure 90 tablet 1     atorvastatin (LIPITOR) 40 MG tablet Take 1 tablet (40 mg) by mouth daily For cholesterol 90 tablet 1     blood glucose (ONETOUCH ULTRA) test strip Use to test blood sugars twice daily as directed. 100 strip 11     blood glucose monitoring (ONE TOUCH ULTRA 2) meter device kit Use to test blood sugars twice daily as directed. 1 kit 0     cyclobenzaprine (FLEXERIL) 5 MG tablet Take 5 mg by mouth At Bedtime 1-2 tabs at bedtime as needed       gabapentin (NEURONTIN) 300 MG capsule Take 300 mg in the AM and 600 mg qPM       glimepiride (AMARYL) 2 MG tablet Take 1 tablet (2 mg) by mouth 2 times daily (before meals) 60 tablet 3     hydrochlorothiazide (HYDRODIURIL) 12.5 MG tablet Take 1 tablet (12.5 mg) by mouth daily For blood pressure 90 tablet 1     insulin glargine (LANTUS SOLOSTAR) 100 UNIT/ML pen Inject 50 Units Subcutaneous every morning 60 mL 0     insulin pen needle (ULTICARE MICRO) 32G X 4 MM miscellaneous Use 2 pen needles daily or as directed. 100 each 11     losartan (COZAAR) 100 MG tablet Take 1 " tablet (100 mg) by mouth daily For blood pressure 90 tablet 1     meloxicam (MOBIC) 7.5 MG tablet Take 7.5 mg by mouth daily       metFORMIN (GLUCOPHAGE XR) 500 MG 24 hr tablet Take 1 tablet (500 mg) by mouth daily (with dinner) 90 tablet 0     OneTouch Delica Lancets 33G MISC 1 lancet 2 times daily Use to test blood sugars twice daily as directed. 100 each 11     sertraline (ZOLOFT) 25 MG tablet Take 1 tablet (25 mg) by mouth daily For mood 90 tablet 1     traMADol (ULTRAM) 50 MG tablet Take 50 mg by mouth every 6 hours as needed         Allergies  Allergies   Allergen Reactions     Insulin Detemir Hives     Alfredo products cause hives**       Liraglutide Hives     Alfredo Nordisk product     Liraglutide Hives     Alfredo Nordisk product     Enalapril Cough     Lisinopril Cough     Wellbutrin [Bupropion Hcl] Hives     hives         Family History  family history includes Alcohol/Drug in her father and mother; Diabetes in her brother; Heart Disease in her mother.    Social History  Social History     Tobacco Use     Smoking status: Former     Packs/day: 0.10     Years: 10.00     Pack years: 1.00     Types: Cigarettes     Quit date: 9/10/2013     Years since quittin.9     Passive exposure: Current     Smokeless tobacco: Never   Vaping Use     Vaping Use: Never used   Substance Use Topics     Alcohol use: Not Currently     Alcohol/week: 1.0 standard drink of alcohol     Types: 1 Cans of beer per week     Drug use: No       Physical Exam  BP (!) 144/82 (BP Location: Left arm, Patient Position: Sitting, Cuff Size: Adult Large)   Pulse 79   Resp 18   Wt 79.8 kg (175 lb 14.4 oz)   LMP  (LMP Unknown)   SpO2 98%   BMI 31.16 kg/m    Body mass index is 31.16 kg/m .  GENERAL :  In no apparent distress  SKIN: Normal color, normal temperature, texture.  No hirsutism, alopecia or purple striae.     EYES: PERRL, EOMI, No scleral icterus,  No proptosis, conjunctival redness, stare, retraction  RESP: Lungs clear to auscultation  bilaterally  CARDIAC: Regular rate and rhythm, normal S1 S2, without murmurs, rubs or gallops    NEURO: awake, alert, responds appropriately to questions.  Cranial nerves intact.   Moves all extremities; Gait normal.  No tremor of the outstretched hand.   EXTREMITIES: No clubbing, cyanosis or edema.    DATA REVIEW  08/20 197 mg/dl  08/09 187 mg/dl  08/08 204 mg/dl  08/04 178 mg/dl        Again, thank you for allowing me to participate in the care of your patient.        Sincerely,        Aga Delvalle PA-C

## 2023-10-24 NOTE — PROGRESS NOTES
Outcome for 10/24/23 1:06 PM:  upload meter in clinic  Adilene Kee CMA  Adult Endocrinology  [facility] [place]

## 2023-10-26 ENCOUNTER — OFFICE VISIT (OUTPATIENT)
Dept: ENDOCRINOLOGY | Facility: CLINIC | Age: 68
End: 2023-10-26
Payer: COMMERCIAL

## 2023-10-26 VITALS
WEIGHT: 175 LBS | DIASTOLIC BLOOD PRESSURE: 71 MMHG | BODY MASS INDEX: 31 KG/M2 | HEART RATE: 78 BPM | OXYGEN SATURATION: 97 % | SYSTOLIC BLOOD PRESSURE: 166 MMHG | RESPIRATION RATE: 16 BRPM

## 2023-10-26 DIAGNOSIS — Z79.4 TYPE 2 DIABETES MELLITUS TREATED WITH INSULIN (H): ICD-10-CM

## 2023-10-26 DIAGNOSIS — E11.29 TYPE 2 DIABETES MELLITUS WITH OTHER DIABETIC KIDNEY COMPLICATION (H): Primary | ICD-10-CM

## 2023-10-26 DIAGNOSIS — E11.9 TYPE 2 DIABETES MELLITUS TREATED WITH INSULIN (H): ICD-10-CM

## 2023-10-26 PROCEDURE — 99213 OFFICE O/P EST LOW 20 MIN: CPT | Performed by: PHYSICIAN ASSISTANT

## 2023-10-26 RX ORDER — ACYCLOVIR 400 MG/1
TABLET ORAL
Qty: 1 EACH | Refills: 0 | Status: SHIPPED | OUTPATIENT
Start: 2023-10-26

## 2023-10-26 RX ORDER — BLOOD SUGAR DIAGNOSTIC
STRIP MISCELLANEOUS
Qty: 100 STRIP | Refills: 11 | Status: SHIPPED | OUTPATIENT
Start: 2023-10-26

## 2023-10-26 RX ORDER — ACYCLOVIR 400 MG/1
TABLET ORAL
Qty: 3 EACH | Refills: 5 | Status: SHIPPED | OUTPATIENT
Start: 2023-10-26 | End: 2024-07-23

## 2023-10-26 NOTE — PATIENT INSTRUCTIONS
[facility]-Department of Endocrinology  Diabetes Educators:   Arlet Skelton, RN and Alejandra Dorado RN  Clinic Nurse: CORNELIA Santos  CMA's: Sarahy Head and Eleazar   EMT: Harjit  Scheduling/Clinic phone number : [phone]   Clinic Fax: [phone]  On-Call Endocrine at the [place] (after hours/weekends): [phone] option 4    Please call the number below to schedule your labs.  [facility] [phone]   [facility] [phone]   [place] [phone]   [facility]  [phone]   [facility] [phone]   [place] [phone]   [facility]) [phone]   [facility] Walk-In Only   [place] [phone]   [place] [phone]   [place] [phone]   [place] [phone]     Please reach out to the following centers to schedule your imaging appointment:  Imaging (DEXA, CT, MRI, XRAY)    [facility] ([facility], [facility], [place]) [phone]   [facility] ([place], [place]) [phone]   [facility] ([facility]) [phone]   [facility] ([facility]) [phone]     Appointment Reminders:  * Please bring meter with for staff to download  * If you are due ONLY for an A1C, it is scheduled with the nurse and will be done in clinic. You do not need to schedule a lab appointment. Fasting is not required for an A1C.  * Refill request should be submitted to your pharmacy. They will contact clinic for approval.

## 2023-10-26 NOTE — NURSING NOTE
Ellen Hill's goals for this visit include:   Chief Complaint   Patient presents with    Follow Up     DMII     She requests these members of her care team be copied on today's visit information: YES    PCP: Tor    Referring Provider:  No referring provider defined for this encounter.    BP (!) 166/71 (BP Location: Left arm, Patient Position: Sitting, Cuff Size: Adult Regular)   Pulse 78   Resp 16   Wt 79.4 kg (175 lb)   LMP  (LMP Unknown)   SpO2 97%   BMI 31.00 kg/m      Do you need any medication refills at today's visit? NONE    Harjit Sheehan, EMT

## 2023-10-26 NOTE — LETTER
10/26/2023         RE: Ellen Hill  [address] [place] [address]        Dear Colleague,    Thank you for referring your patient, Ellen Hill, to the [facility]. Please see a copy of my visit note below.    Outcome for 10/24/23 1:06 PM:  upload meter in clinic  Adilene Kee CMA  Adult Endocrinology  [facility], [place]      Assessment/Plan :   Type 2 DM with long term use of insulin. Ellen feels like the combination of Lantus and glimepiride is working better than her previous mix insulin. Her numbers seem to be more stable and she is feeling better. She continues to monitor her blood sugars with fingerstick testing and she was discussing this with a coworker who recommended a CGMS sensor. We discussed options and I will send in a new prescription for the Dexcom G7 sensor and reader. We will follow-up in 3 mos. If she has any problems with placing the sensor, she is to contact our office.       I have independently reviewed and interpreted labs, imaging as indicated.      Chief complaint:  Ellen is a 67 year old female who returns for follow-up of Type 2 DM with long term use of insulin.    I have reviewed Care Everywhere including [facility], [facility], [facility] , [facility], [place] lab reports, imaging reports and provider notes as indicated.      HISTORY OF PRESENT ILLNESS  Ellen continues to work on improving her blood sugars. She feels like the current combination of insulin glargine 50 unit(s) daily and glimepiride 2 mg twice daily, is working well. Her blood sugars seem to be under better control and she feels better. She continues to monitor her blood sugars with fingerstick testing and she feels like her numbers are more consistently in target range.    She has not had any problems with severe hyperglycemia and/or hypoglycemia. She did have a blood sugar of 72 mg/dl, which felt low. She had a  "small snack and the symptoms resolved quickly. She denies any problems with blurred vision or worsening numbness/tingling in her feet. She still has not been able to lose any weight and she is trying to eat healthier. Her diabetes is complicated by chronic pain, HTN, CKD stage 2, Leblanc's esophagus and a history of alcohol abuse.    Endocrine relevant labs are as follows:   Latest Reference Range & Units 08/24/23 11:01   Hemoglobin A1C POCT 4.3 - <5.7 % 8.2 !   !: Data is abnormal   Latest Reference Range & Units 01/12/22 15:59   Albumin Urine mg/g Cr 0.00 - 25.00 mg/g Cr 246.77 (H)   (H): Data is abnormally high   Latest Reference Range & Units 01/12/22 15:59   Albumin Urine mg/L mg/L 496     REVIEW OF SYSTEMS    Endocrine: positive for diabetes  Skin: negative  Eyes: negative for, visual blurring, redness, tearing  Ears/Nose/Throat: negative  Respiratory: No shortness of breath, dyspnea on exertion, cough, or hemoptysis  Cardiovascular: negative for, irregular heart beat, chest pain, lower extremity edema, and exercise intolerance  Gastrointestinal: negative for, nausea, vomiting, constipation, and diarrhea  Genitourinary: negative for, nocturia, dysuria, frequency, and urgency  Musculoskeletal: negative for, muscular weakness, nocturnal cramping, and foot pain  Neurologic: negative for, local weakness, and numbness or tingling of feet  Psychiatric: negative  Hematologic/Lymphatic/Immunologic: negative    Past Medical History  Past Medical History:   Diagnosis Date     Advanced directives, counseling/discussion 3/28/2013    Patient does not have an Advance/Health Care Directive (HCD), given \"What is Advance Care Planning?\" flyer.  Nola Lopze March 28, 2013      Hyperlipidemia LDL goal <100 10/27/2010     Hypertension goal BP (blood pressure) < 130/80 12/21/2010     Microalbuminuria 2/12/2015     Shingles 2009    Right posterior shoulder as per patient     Type 2 diabetes, HbA1c goal < 7% (H) 10/31/2010 "       Medications  Current Outpatient Medications   Medication Sig Dispense Refill     amLODIPine (NORVASC) 10 MG tablet Take 1 tablet (10 mg) by mouth daily For blood pressure 90 tablet 1     atorvastatin (LIPITOR) 40 MG tablet Take 1 tablet (40 mg) by mouth daily For cholesterol 90 tablet 1     blood glucose (ONETOUCH ULTRA) test strip Use to test blood sugars twice daily as directed. 100 strip 11     blood glucose monitoring (ONE TOUCH ULTRA 2) meter device kit Use to test blood sugars twice daily as directed. 1 kit 0     Continuous Blood Gluc  (DEXCOM G7 ) WILIAM Use to read blood sugars as per 's instructions. 1 each 0     Continuous Blood Gluc Sensor (DEXCOM G7 SENSOR) MISC Change every 10 days. 3 each 5     cyclobenzaprine (FLEXERIL) 5 MG tablet Take 5 mg by mouth At Bedtime 1-2 tabs at bedtime as needed       gabapentin (NEURONTIN) 300 MG capsule Take 300 mg in the AM and 600 mg qPM       glimepiride (AMARYL) 2 MG tablet Take 1 tablet (2 mg) by mouth 2 times daily (before meals) 60 tablet 3     hydrochlorothiazide (HYDRODIURIL) 12.5 MG tablet Take 1 tablet (12.5 mg) by mouth daily For blood pressure 90 tablet 1     insulin glargine (LANTUS SOLOSTAR) 100 UNIT/ML pen Inject 50 Units Subcutaneous every morning 60 mL 0     insulin pen needle (ULTICARE MICRO) 32G X 4 MM miscellaneous Use 2 pen needles daily or as directed. 100 each 11     losartan (COZAAR) 100 MG tablet Take 1 tablet (100 mg) by mouth daily For blood pressure 90 tablet 1     meloxicam (MOBIC) 7.5 MG tablet Take 7.5 mg by mouth daily       OneTouch Delica Lancets 33G MISC 1 lancet 2 times daily Use to test blood sugars twice daily as directed. 100 each 11     sertraline (ZOLOFT) 25 MG tablet Take 1 tablet (25 mg) by mouth daily For mood 90 tablet 1     traMADol (ULTRAM) 50 MG tablet Take 50 mg by mouth every 6 hours as needed         Allergies  Allergies   Allergen Reactions     Insulin Detemir Hives     Alfredo products  cause hives**       Liraglutide Hives     Alfredo Nordisk product     Liraglutide Hives     Alfredo Nordisk product     Enalapril Cough     Lisinopril Cough     Wellbutrin [Bupropion Hcl] Hives     hives         Family History  family history includes Alcohol/Drug in her father and mother; Diabetes in her brother; Heart Disease in her mother.    Social History  Social History     Tobacco Use     Smoking status: Former     Packs/day: 0.10     Years: 10.00     Additional pack years: 0.00     Total pack years: 1.00     Types: Cigarettes     Quit date: 9/10/2013     Years since quitting: 10.1     Passive exposure: Current     Smokeless tobacco: Never   Vaping Use     Vaping Use: Never used   Substance Use Topics     Alcohol use: Not Currently     Alcohol/week: 1.0 standard drink of alcohol     Types: 1 Cans of beer per week     Drug use: No       Physical Exam  BP (!) 166/71 (BP Location: Left arm, Patient Position: Sitting, Cuff Size: Adult Regular)   Pulse 78   Resp 16   Wt 79.4 kg (175 lb)   LMP  (LMP Unknown)   SpO2 97%   BMI 31.00 kg/m    Body mass index is 31 kg/m .  GENERAL :  In no apparent distress  SKIN: Normal color, normal temperature, texture.  No hirsutism, alopecia or purple striae.     EYES: PERRL, EOMI, No scleral icterus,  No proptosis, conjunctival redness, stare, retraction  RESP: Lungs clear to auscultation bilaterally  CARDIAC: Regular rate and rhythm, normal S1 S2, without murmurs, rubs or gallops    NEURO: awake, alert, responds appropriately to questions.  Cranial nerves intact.   Moves all extremities; Gait normal.  No tremor of the outstretched hand.    EXTREMITIES: No clubbing, cyanosis or edema.    DATA REVIEW  10/26 72 mg/dl  10/25 134  10/23 232  10/22 124        Again, thank you for allowing me to participate in the care of your patient.        Sincerely,        Aga Delvalle PA-C

## 2023-10-26 NOTE — PROGRESS NOTES
Assessment/Plan :   Type 2 DM with long term use of insulin. Ellen feels like the combination of Lantus and glimepiride is working better than her previous mix insulin. Her numbers seem to be more stable and she is feeling better. She continues to monitor her blood sugars with fingerstick testing and she was discussing this with a coworker who recommended a CGMS sensor. We discussed options and I will send in a new prescription for the Dexcom G7 sensor and reader. We will follow-up in 3 mos. If she has any problems with placing the sensor, she is to contact our office.       I have independently reviewed and interpreted labs, imaging as indicated.      Chief complaint:  Ellen is a 67 year old female who returns for follow-up of Type 2 DM with long term use of insulin.    I have reviewed Care Everywhere including [facility], [facility], [facility], [facility], [facility], [facility] , [facility], [place] lab reports, imaging reports and provider notes as indicated.      HISTORY OF PRESENT ILLNESS  Ellen continues to work on improving her blood sugars. She feels like the current combination of insulin glargine 50 unit(s) daily and glimepiride 2 mg twice daily, is working well. Her blood sugars seem to be under better control and she feels better. She continues to monitor her blood sugars with fingerstick testing and she feels like her numbers are more consistently in target range.    She has not had any problems with severe hyperglycemia and/or hypoglycemia. She did have a blood sugar of 72 mg/dl, which felt low. She had a small snack and the symptoms resolved quickly. She denies any problems with blurred vision or worsening numbness/tingling in her feet. She still has not been able to lose any weight and she is trying to eat healthier. Her diabetes is complicated by chronic pain, HTN, CKD stage 2, Leblanc's esophagus and a history of alcohol abuse.    Endocrine relevant labs are as follows:   Latest  "Reference Range & Units 08/24/23 11:01   Hemoglobin A1C POCT 4.3 - <5.7 % 8.2 !   !: Data is abnormal   Latest Reference Range & Units 01/12/22 15:59   Albumin Urine mg/g Cr 0.00 - 25.00 mg/g Cr 246.77 (H)   (H): Data is abnormally high   Latest Reference Range & Units 01/12/22 15:59   Albumin Urine mg/L mg/L 496     REVIEW OF SYSTEMS    Endocrine: positive for diabetes  Skin: negative  Eyes: negative for, visual blurring, redness, tearing  Ears/Nose/Throat: negative  Respiratory: No shortness of breath, dyspnea on exertion, cough, or hemoptysis  Cardiovascular: negative for, irregular heart beat, chest pain, lower extremity edema, and exercise intolerance  Gastrointestinal: negative for, nausea, vomiting, constipation, and diarrhea  Genitourinary: negative for, nocturia, dysuria, frequency, and urgency  Musculoskeletal: negative for, muscular weakness, nocturnal cramping, and foot pain  Neurologic: negative for, local weakness, and numbness or tingling of feet  Psychiatric: negative  Hematologic/Lymphatic/Immunologic: negative    Past Medical History  Past Medical History:   Diagnosis Date    Advanced directives, counseling/discussion 3/28/2013    Patient does not have an Advance/Health Care Directive (HCD), given \"What is Advance Care Planning?\" flyer.  Nola Lopez March 28, 2013     Hyperlipidemia LDL goal <100 10/27/2010    Hypertension goal BP (blood pressure) < 130/80 12/21/2010    Microalbuminuria 2/12/2015    Shingles 2009    Right posterior shoulder as per patient    Type 2 diabetes, HbA1c goal < 7% (H) 10/31/2010       Medications  Current Outpatient Medications   Medication Sig Dispense Refill    amLODIPine (NORVASC) 10 MG tablet Take 1 tablet (10 mg) by mouth daily For blood pressure 90 tablet 1    atorvastatin (LIPITOR) 40 MG tablet Take 1 tablet (40 mg) by mouth daily For cholesterol 90 tablet 1    blood glucose (ONETOUCH ULTRA) test strip Use to test blood sugars twice daily as directed. 100 " strip 11    blood glucose monitoring (ONE TOUCH ULTRA 2) meter device kit Use to test blood sugars twice daily as directed. 1 kit 0    Continuous Blood Gluc  (DEXCOM G7 ) WILIAM Use to read blood sugars as per 's instructions. 1 each 0    Continuous Blood Gluc Sensor (DEXCOM G7 SENSOR) MISC Change every 10 days. 3 each 5    cyclobenzaprine (FLEXERIL) 5 MG tablet Take 5 mg by mouth At Bedtime 1-2 tabs at bedtime as needed      gabapentin (NEURONTIN) 300 MG capsule Take 300 mg in the AM and 600 mg qPM      glimepiride (AMARYL) 2 MG tablet Take 1 tablet (2 mg) by mouth 2 times daily (before meals) 60 tablet 3    hydrochlorothiazide (HYDRODIURIL) 12.5 MG tablet Take 1 tablet (12.5 mg) by mouth daily For blood pressure 90 tablet 1    insulin glargine (LANTUS SOLOSTAR) 100 UNIT/ML pen Inject 50 Units Subcutaneous every morning 60 mL 0    insulin pen needle (ULTICARE MICRO) 32G X 4 MM miscellaneous Use 2 pen needles daily or as directed. 100 each 11    losartan (COZAAR) 100 MG tablet Take 1 tablet (100 mg) by mouth daily For blood pressure 90 tablet 1    meloxicam (MOBIC) 7.5 MG tablet Take 7.5 mg by mouth daily      OneTouch Delica Lancets 33G MISC 1 lancet 2 times daily Use to test blood sugars twice daily as directed. 100 each 11    sertraline (ZOLOFT) 25 MG tablet Take 1 tablet (25 mg) by mouth daily For mood 90 tablet 1    traMADol (ULTRAM) 50 MG tablet Take 50 mg by mouth every 6 hours as needed         Allergies  Allergies   Allergen Reactions    Insulin Detemir Hives     Alfredo products cause hives**      Liraglutide Hives     Alfredo Nordisk product    Liraglutide Hives     Alfredo Nordisk product    Enalapril Cough    Lisinopril Cough    Wellbutrin [Bupropion Hcl] Hives     hives         Family History  family history includes Alcohol/Drug in her father and mother; Diabetes in her brother; Heart Disease in her mother.    Social History  Social History     Tobacco Use    Smoking status: Former      Packs/day: 0.10     Years: 10.00     Additional pack years: 0.00     Total pack years: 1.00     Types: Cigarettes     Quit date: 9/10/2013     Years since quitting: 10.1     Passive exposure: Current    Smokeless tobacco: Never   Vaping Use    Vaping Use: Never used   Substance Use Topics    Alcohol use: Not Currently     Alcohol/week: 1.0 standard drink of alcohol     Types: 1 Cans of beer per week    Drug use: No       Physical Exam  BP (!) 166/71 (BP Location: Left arm, Patient Position: Sitting, Cuff Size: Adult Regular)   Pulse 78   Resp 16   Wt 79.4 kg (175 lb)   LMP  (LMP Unknown)   SpO2 97%   BMI 31.00 kg/m    Body mass index is 31 kg/m .  GENERAL :  In no apparent distress  SKIN: Normal color, normal temperature, texture.  No hirsutism, alopecia or purple striae.     EYES: PERRL, EOMI, No scleral icterus,  No proptosis, conjunctival redness, stare, retraction  RESP: Lungs clear to auscultation bilaterally  CARDIAC: Regular rate and rhythm, normal S1 S2, without murmurs, rubs or gallops    NEURO: awake, alert, responds appropriately to questions.  Cranial nerves intact.   Moves all extremities; Gait normal.  No tremor of the outstretched hand.    EXTREMITIES: No clubbing, cyanosis or edema.    DATA REVIEW  10/26 72 mg/dl  10/25 134  10/23 232  10/22 124

## 2023-11-10 ENCOUNTER — PATIENT OUTREACH (OUTPATIENT)
Dept: GASTROENTEROLOGY | Facility: CLINIC | Age: 68
End: 2023-11-10
Payer: COMMERCIAL

## 2023-11-13 DIAGNOSIS — I10 ESSENTIAL HYPERTENSION WITH GOAL BLOOD PRESSURE LESS THAN 130/80: ICD-10-CM

## 2023-11-13 DIAGNOSIS — F32.1 MODERATE MAJOR DEPRESSION (H): ICD-10-CM

## 2023-11-13 DIAGNOSIS — E78.5 HYPERLIPIDEMIA LDL GOAL <100: ICD-10-CM

## 2023-11-14 DIAGNOSIS — I10 ESSENTIAL HYPERTENSION WITH GOAL BLOOD PRESSURE LESS THAN 130/80: ICD-10-CM

## 2023-11-14 DIAGNOSIS — E78.5 HYPERLIPIDEMIA LDL GOAL <100: ICD-10-CM

## 2023-11-14 RX ORDER — LOSARTAN POTASSIUM 100 MG/1
TABLET ORAL
Qty: 90 TABLET | OUTPATIENT
Start: 2023-11-14

## 2023-11-14 RX ORDER — SERTRALINE HYDROCHLORIDE 25 MG/1
25 TABLET, FILM COATED ORAL DAILY
Qty: 30 TABLET | Refills: 0 | Status: SHIPPED | OUTPATIENT
Start: 2023-11-14 | End: 2023-11-17

## 2023-11-14 RX ORDER — ATORVASTATIN CALCIUM 40 MG/1
40 TABLET, FILM COATED ORAL DAILY
Qty: 30 TABLET | Refills: 0 | Status: SHIPPED | OUTPATIENT
Start: 2023-11-14 | End: 2023-11-17

## 2023-11-14 RX ORDER — LOSARTAN POTASSIUM 100 MG/1
100 TABLET ORAL DAILY
Qty: 30 TABLET | Refills: 0 | Status: SHIPPED | OUTPATIENT
Start: 2023-11-14 | End: 2023-11-17

## 2023-11-14 RX ORDER — ATORVASTATIN CALCIUM 40 MG/1
TABLET, FILM COATED ORAL
Qty: 90 TABLET | OUTPATIENT
Start: 2023-11-14

## 2023-11-17 ENCOUNTER — OFFICE VISIT (OUTPATIENT)
Dept: FAMILY MEDICINE | Facility: CLINIC | Age: 68
End: 2023-11-17
Payer: COMMERCIAL

## 2023-11-17 VITALS
RESPIRATION RATE: 16 BRPM | SYSTOLIC BLOOD PRESSURE: 178 MMHG | WEIGHT: 180.4 LBS | HEIGHT: 63 IN | HEART RATE: 91 BPM | BODY MASS INDEX: 31.96 KG/M2 | TEMPERATURE: 98.2 F | DIASTOLIC BLOOD PRESSURE: 83 MMHG | OXYGEN SATURATION: 97 %

## 2023-11-17 DIAGNOSIS — I10 ESSENTIAL HYPERTENSION WITH GOAL BLOOD PRESSURE LESS THAN 130/80: ICD-10-CM

## 2023-11-17 DIAGNOSIS — G89.29 CHRONIC MIDLINE LOW BACK PAIN WITH RIGHT-SIDED SCIATICA: ICD-10-CM

## 2023-11-17 DIAGNOSIS — N18.2 CHRONIC KIDNEY DISEASE, STAGE 2 (MILD): ICD-10-CM

## 2023-11-17 DIAGNOSIS — M54.41 CHRONIC MIDLINE LOW BACK PAIN WITH RIGHT-SIDED SCIATICA: ICD-10-CM

## 2023-11-17 DIAGNOSIS — F32.1 MODERATE MAJOR DEPRESSION (H): ICD-10-CM

## 2023-11-17 DIAGNOSIS — E78.5 HYPERLIPIDEMIA LDL GOAL <100: ICD-10-CM

## 2023-11-17 DIAGNOSIS — F10.20 ALCOHOLIC (H): ICD-10-CM

## 2023-11-17 DIAGNOSIS — E11.29 TYPE 2 DIABETES MELLITUS WITH OTHER DIABETIC KIDNEY COMPLICATION (H): Primary | ICD-10-CM

## 2023-11-17 DIAGNOSIS — Z12.31 VISIT FOR SCREENING MAMMOGRAM: ICD-10-CM

## 2023-11-17 LAB
ANION GAP SERPL CALCULATED.3IONS-SCNC: 9 MMOL/L (ref 7–15)
BUN SERPL-MCNC: 11.9 MG/DL (ref 8–23)
CALCIUM SERPL-MCNC: 9.2 MG/DL (ref 8.8–10.2)
CHLORIDE SERPL-SCNC: 104 MMOL/L (ref 98–107)
CREAT SERPL-MCNC: 0.78 MG/DL (ref 0.51–0.95)
DEPRECATED HCO3 PLAS-SCNC: 26 MMOL/L (ref 22–29)
EGFRCR SERPLBLD CKD-EPI 2021: 84 ML/MIN/1.73M2
GLUCOSE SERPL-MCNC: 138 MG/DL (ref 70–99)
HBA1C MFR BLD: 7.5 % (ref 0–5.6)
POTASSIUM SERPL-SCNC: 4.1 MMOL/L (ref 3.4–5.3)
SODIUM SERPL-SCNC: 139 MMOL/L (ref 135–145)

## 2023-11-17 PROCEDURE — 82088 ASSAY OF ALDOSTERONE: CPT | Performed by: PREVENTIVE MEDICINE

## 2023-11-17 PROCEDURE — 99214 OFFICE O/P EST MOD 30 MIN: CPT | Performed by: PREVENTIVE MEDICINE

## 2023-11-17 PROCEDURE — 83036 HEMOGLOBIN GLYCOSYLATED A1C: CPT | Performed by: PREVENTIVE MEDICINE

## 2023-11-17 PROCEDURE — 96127 BRIEF EMOTIONAL/BEHAV ASSMT: CPT | Performed by: PREVENTIVE MEDICINE

## 2023-11-17 PROCEDURE — 36415 COLL VENOUS BLD VENIPUNCTURE: CPT | Performed by: PREVENTIVE MEDICINE

## 2023-11-17 PROCEDURE — 99000 SPECIMEN HANDLING OFFICE-LAB: CPT | Performed by: PREVENTIVE MEDICINE

## 2023-11-17 PROCEDURE — 84244 ASSAY OF RENIN: CPT | Mod: 90 | Performed by: PREVENTIVE MEDICINE

## 2023-11-17 PROCEDURE — 80048 BASIC METABOLIC PNL TOTAL CA: CPT | Performed by: PREVENTIVE MEDICINE

## 2023-11-17 RX ORDER — RESPIRATORY SYNCYTIAL VIRUS VACCINE 120MCG/0.5
0.5 KIT INTRAMUSCULAR ONCE
Qty: 1 EACH | Refills: 0 | Status: CANCELLED | OUTPATIENT
Start: 2023-11-17 | End: 2023-11-17

## 2023-11-17 RX ORDER — ATORVASTATIN CALCIUM 40 MG/1
40 TABLET, FILM COATED ORAL DAILY
Qty: 90 TABLET | Refills: 3 | Status: SHIPPED | OUTPATIENT
Start: 2023-11-17

## 2023-11-17 RX ORDER — LOSARTAN POTASSIUM AND HYDROCHLOROTHIAZIDE 25; 100 MG/1; MG/1
1 TABLET ORAL DAILY
Qty: 90 TABLET | Refills: 1 | Status: SHIPPED | OUTPATIENT
Start: 2023-11-17 | End: 2024-08-07

## 2023-11-17 RX ORDER — LOSARTAN POTASSIUM 100 MG/1
100 TABLET ORAL DAILY
Qty: 90 TABLET | Refills: 1 | Status: CANCELLED | OUTPATIENT
Start: 2023-11-17

## 2023-11-17 ASSESSMENT — PATIENT HEALTH QUESTIONNAIRE - PHQ9
SUM OF ALL RESPONSES TO PHQ QUESTIONS 1-9: 2
10. IF YOU CHECKED OFF ANY PROBLEMS, HOW DIFFICULT HAVE THESE PROBLEMS MADE IT FOR YOU TO DO YOUR WORK, TAKE CARE OF THINGS AT HOME, OR GET ALONG WITH OTHER PEOPLE: NOT DIFFICULT AT ALL
SUM OF ALL RESPONSES TO PHQ QUESTIONS 1-9: 2

## 2023-11-17 ASSESSMENT — PAIN SCALES - GENERAL: PAINLEVEL: MODERATE PAIN (5)

## 2023-11-17 NOTE — PROGRESS NOTES
Declined AWV just  wants to meet new provider   Assessment & Plan     Type 2 diabetes mellitus with other diabetic kidney complication (H)  -Managed by endocrine  - HEMOGLOBIN A1C    Lab Results   Component Value Date    A1C 8.4 01/11/2023    A1C 9.4 10/25/2022    A1C 8.5 04/01/2022    A1C 10.2 01/12/2022    A1C 6.5 04/27/2021    A1C 6.7 02/01/2021    A1C 7.7 01/08/2021    A1C 10.0 11/04/2020    A1C 9.5 08/20/2020       Essential hypertension with goal blood pressure less than 130/80  -Elevated blood pressure  -Stop losartan 100 mg and stop hydrochlorothiazide 12.5 mg  -Instead we will use Hyzaar 100-25 mg daily  -Continue amlodipine 10 mg daily  -If blood pressure remains elevated then consider adding a beta-blocker  - Aldosterone  - Renin activity  - Aldosterone Renin Ratio  - losartan-hydrochlorothiazide (HYZAAR) 100-25 MG tablet  Dispense: 90 tablet; Refill: 1  - Basic metabolic panel  (Ca, Cl, CO2, Creat, Gluc, K, Na, BUN)    Moderate major depression (H)  -Mood symptoms are improving  -Tolerating medication without side effects  -Increase dose of sertraline from 25 to 50 mg  - sertraline (ZOLOFT) 50 MG tablet  Dispense: 90 tablet; Refill: 0    Chronic kidney disease, stage 2 (mild)  -No consistent use of NSAIDs    Hyperlipidemia LDL goal <100  -Continue statin  - atorvastatin (LIPITOR) 40 MG tablet  Dispense: 90 tablet; Refill: 3    The 10-year ASCVD risk score (Simone DK, et al., 2019) is: 29.1%    Values used to calculate the score:      Age: 67 years      Sex: Female      Is Non- : No      Diabetic: Yes      Tobacco smoker: No      Systolic Blood Pressure: 178 mmHg      Is BP treated: Yes      HDL Cholesterol: 51 mg/dL      Total Cholesterol: 254 mg/dL      Alcoholic (H)  -Patient states she has not drank alcohol in several years    Chronic midline low back pain with right-sided sciatica  -Patient is followed at the spine clinic outside of [place]  -She has a spinal cord stimulator  "in place, recently the settings were adjusted  -Has a follow-up scheduled next month    Visit for screening mammogram  - MA SCREENING DIGITAL BILAT - Future  (s+30)      Ordering of each unique test  Prescription drug management  25 minutes spent by me on the date of the encounter doing chart review, history and exam, documentation and further activities per the note       BMI:   Estimated body mass index is 32.41 kg/m  as calculated from the following:    Height as of this encounter: 1.589 m (5' 2.56\").    Weight as of this encounter: 81.8 kg (180 lb 6.4 oz).   Weight management plan: Discussed healthy diet and exercise guidelines      Ave Lentz MD MPH    [facility] 67 [facility], presenting for the following health issues:  Establish Care        11/17/2023     2:28 PM   Additional Questions   Roomed by yahaira       History of Present Illness       Reason for visit:  Meet new doctor    She eats 0-1 servings of fruits and vegetables daily.She consumes 0 sweetened beverage(s) daily.She exercises with enough effort to increase her heart rate 9 or less minutes per day.  She exercises with enough effort to increase her heart rate 3 or less days per week. She is missing 1 dose(s) of medications per week.     Wt Readings from Last 2 Encounters:   11/17/23 81.8 kg (180 lb 6.4 oz)   10/26/23 79.4 kg (175 lb)      Diabetes is managed by Endo    Seen at Spine clinic, has a spinal cord stimulator    Mood:  -has been thinking positive  -No anhedonia      Hyperlipidemia Follow-Up    Are you regularly taking any medication or supplement to lower your cholesterol?   Yes- statin   Are you having muscle aches or other side effects that you think could be caused by your cholesterol lowering medication?  No    Hypertension Follow-up    Do you check your blood pressure regularly outside of the clinic? No   Are you following a low salt diet? Yes  Are your blood pressures ever more " "than 140 on the top number (systolic) OR more   than 90 on the bottom number (diastolic), for example 140/90? No    No missed doses    Review of Systems   Constitutional, HEENT, cardiovascular, pulmonary, gi and gu systems are negative, except as otherwise noted.      Objective    BP (!) 178/83   Pulse 91   Temp 98.2  F (36.8  C) (Oral)   Resp 16   Ht 1.589 m (5' 2.56\")   Wt 81.8 kg (180 lb 6.4 oz)   LMP  (LMP Unknown)   SpO2 97%   BMI 32.41 kg/m    Body mass index is 32.41 kg/m .  Physical Exam   GENERAL APPEARANCE: healthy, alert and no distress  EYES: Eyes grossly normal to inspection and conjunctivae and sclerae normal  NECK: no adenopathy and trachea midline and normal to palpation  RESP: lungs clear to auscultation - no rales, rhonchi or wheezes  CV: regular rates and rhythm, normal S1 S2  ABDOMEN: soft, non-tender and no rebound or guarding   MS: extremities normal- no gross deformities noted and peripheral pulses normal  SKIN: no suspicious lesions or rashes  NEURO: Normal strength and tone, mentation intact and speech normal  PSYCH: mentation appears normal      No results found for this or any previous visit (from the past 24 hour(s)).                  "

## 2023-11-17 NOTE — PATIENT INSTRUCTIONS
At [facility], we strive to deliver an exceptional experience to you, every time we see you. If you receive a survey, please complete it as we do value your feedback.  If you have MyChart, you can expect to receive results automatically within 24 hours of their completion.  Your provider will send a note interpreting your results as well.   If you do not have MyChart, you should receive your results in about a week by mail.    Your care team:                            Family Medicine Internal Medicine   MD Jorge Alberto Ribeiro MD Shantel Branch-Fleming, MD Srinivasa Vaka, MD Katya Belousova, PAAYSHA Juares, MD Pediatrics   Delta Berman, PAMD Fatoumata Garcia MD Amelia Massimini APRN CNP   YVON Ochoa CNP, MD Charanya Pasupathi, MD Kathleen Widmer, NP coming October 2023 Same-Day (No follow up visit)    ESCOBAR Glasgow PA coming Oct 2023     Clinic hours: Monday - Thursday 7 am-6 pm; Fridays 7 am-5 pm.   Urgent care: Monday - Friday 10 am- 8 pm; Saturday and Sunday 9 am-5 pm.    Clinic: [phone]       [facility]: Monday - Thursday 8 am - 7 pm; Friday 8 am - 6 pm  [facility]: [phone]

## 2023-11-17 NOTE — LETTER
2023      Ellen Hill  [address] [place] [address]        Dear MsRickey,    We are writing to inform you of your test results.    Dear Ellen Hill,     Electrolytes and kidney function are normal.   Three month glucose number Hemoglobin A1C has improved from 8.4 to 7.5 and is at goal.   Additional kidney tests (Aldosterone and renin) are normal.   Please let me know if you have any questions and thank you for choosing [facility].     Resulted Orders   HEMOGLOBIN A1C   Result Value Ref Range    Hemoglobin A1C 7.5 (H) 0.0 - 5.6 %      Comment:      Normal <5.7%   Prediabetes 5.7-6.4%    Diabetes 6.5% or higher     Note: Adopted from ADA consensus guidelines.   Basic metabolic panel  (Ca, Cl, CO2, Creat, Gluc, K, Na, BUN)   Result Value Ref Range    Sodium 139 135 - 145 mmol/L      Comment:      Reference intervals for this test were updated on 2023 to more accurately reflect our healthy population. There may be differences in the flagging of prior results with similar values performed with this method. Interpretation of those prior results can be made in the context of the updated reference intervals.     Potassium 4.1 3.4 - 5.3 mmol/L    Chloride 104 98 - 107 mmol/L    Carbon Dioxide (CO2) 26 22 - 29 mmol/L    Anion Gap 9 7 - 15 mmol/L    Urea Nitrogen 11.9 8.0 - 23.0 mg/dL    Creatinine 0.78 0.51 - 0.95 mg/dL    GFR Estimate 84 >60 mL/min/1.73m2    Calcium 9.2 8.8 - 10.2 mg/dL    Glucose 138 (H) 70 - 99 mg/dL   Aldosterone   Result Value Ref Range    Aldosterone 6.8 0.0 - 31.0 ng/dL   Renin activity   Result Value Ref Range    Renin Activity 0.6 ng/mL/hr      Comment:      INTERPRETIVE INFORMATION: Renin Activity    Adult, Normal sodium diet:    Supine ................. 0.2-1.6 ng/mL/hr    Upright ................ 0.5-4.0 ng/mL/hr    Children, Normal sodium diet, Supine:    [place] (1-7 days) ..... 2.0-35.0 ng/mL/hr    Cord blood ............. 4.0-32.0 ng/mL/hr     1-12 mos ............... 2.4-37.0 ng/mL/hr    13 mos-3 yrs ........... 1.7-11.2 ng/mL/hr    4-5 yrs ................ 1.0- 6.5 ng/mL/hr    6-10 yrs ............... 0.5- 5.9 ng/mL/hr    11-15 yrs .............. 0.5- 3.3 ng/mL/hr    Children, normal sodium diet, Upright:    0-3 yrs ................ Not Available    4-5 yrs ................ Less than or equal to 15 ng/mL/hr    6-10 yrs ............... Less than or equal to 17 ng/mL/hr    11-15 yrs .............. Less than or equal to 16 ng/mL/hr    Plasma renin activity measures enzyme ability to convert   angiotensinogen to angiotensin I and is limited by the   availability of angiotensinogen. Plasma renin activity is   not an accurate indicator  of enzyme activity when   angiotensinogen is decreased.    This test was developed and its performance characteristics   determined by [facility]. It has not been cleared or   approved by the US Food and Drug Administration. This test   was performed in a CLIA certified laboratory and is   intended for clinical purposes.  Performed By: [facility]  [address] [place] [id number]  : Luis Aden MD, PhD  CLIA Number: [id number]   Aldosterone Renin Ratio   Result Value Ref Range    Aldosterone Renin Ratio 11.3 0.0 - 25.0       If you have any questions or concerns, please call the clinic at the number listed above.       Sincerely,      Ave Lentz MD

## 2023-11-20 LAB — RENIN PLAS-CCNC: 0.6 NG/ML/HR

## 2023-11-21 LAB — ALDOST SERPL-MCNC: 6.8 NG/DL (ref 0–31)

## 2023-11-22 LAB — ALDOST/RENIN PLAS-RTO: 11.3 {RATIO} (ref 0–25)

## 2023-11-27 NOTE — RESULT ENCOUNTER NOTE
Please send a letter:    Dear Ellen Hill,    Electrolytes and kidney function are normal.  Three month glucose number Hemoglobin A1C has improved from 8.4 to 7.5 and is at goal.  Additional kidney tests (Aldosterone and renin) are normal.  Please let me know if you have any questions and thank you for choosing [facility].    Regards,    Ave Lentz MD MPH

## 2024-01-05 DIAGNOSIS — E11.9 TYPE 2 DIABETES MELLITUS TREATED WITH INSULIN (H): ICD-10-CM

## 2024-01-05 DIAGNOSIS — Z79.4 TYPE 2 DIABETES MELLITUS TREATED WITH INSULIN (H): ICD-10-CM

## 2024-01-05 RX ORDER — INSULIN GLARGINE 100 [IU]/ML
50 INJECTION, SOLUTION SUBCUTANEOUS EVERY MORNING
Qty: 45 ML | Refills: 3 | Status: SHIPPED | OUTPATIENT
Start: 2024-01-05

## 2024-01-05 NOTE — TELEPHONE ENCOUNTER
insulin glargine (LANTUS SOLOSTAR) 100 UNIT/ML pen         Last Written Prescription Date:  08/24/23  Last Fill Quantity: 60mL,   # refills: 0  Last Office Visit : 10/26/23  Future Office visit:  02/01/24    Routing refill request to provider for review/approval because:  Insulin - refilled per clinic

## 2024-01-24 DIAGNOSIS — Z79.4 TYPE 2 DIABETES MELLITUS TREATED WITH INSULIN (H): ICD-10-CM

## 2024-01-24 DIAGNOSIS — E11.9 TYPE 2 DIABETES MELLITUS TREATED WITH INSULIN (H): ICD-10-CM

## 2024-01-24 NOTE — PROGRESS NOTES
Outcome for 01/24/24 12:50 PM:  patient requests to upload meter in clinic  Adilene Kee CMA  Adult Endocrinology  [facility] [place]

## 2024-01-26 ENCOUNTER — TRANSFERRED RECORDS (OUTPATIENT)
Dept: HEALTH INFORMATION MANAGEMENT | Facility: CLINIC | Age: 69
End: 2024-01-26
Payer: COMMERCIAL

## 2024-01-29 RX ORDER — GLIMEPIRIDE 2 MG/1
2 TABLET ORAL
Qty: 180 TABLET | Refills: 0 | Status: SHIPPED | OUTPATIENT
Start: 2024-01-29 | End: 2024-05-07

## 2024-01-29 NOTE — TELEPHONE ENCOUNTER
glimepiride (AMARYL) 2 MG tablet 60 tablet 3 8/24/2023     Last Office Visit: 10/26/23    Future Office visit:   2/1/24    Component      Latest Ref Rng 11/17/2023  3:23 PM   Sodium      135 - 145 mmol/L 139    Potassium      3.4 - 5.3 mmol/L 4.1    Chloride      98 - 107 mmol/L 104    Carbon Dioxide (CO2)      22 - 29 mmol/L 26    Anion Gap      7 - 15 mmol/L 9    Urea Nitrogen      8.0 - 23.0 mg/dL 11.9    Creatinine      0.51 - 0.95 mg/dL 0.78    GFR Estimate      >60 mL/min/1.73m2 84    Calcium      8.8 - 10.2 mg/dL 9.2    Glucose      70 - 99 mg/dL 138 (H)         Refill protocol passed  Judi Walker RN

## 2024-01-31 NOTE — PATIENT INSTRUCTIONS
[facility] Endocrinology and Diabetes Clinics     Our Endocrinology Clinics are here to provide you with a team-based, collaborative approach in the diagnosis and treatment of patients with diabetes and endocrine disorders. The team is made up of Physicians, Physician Assistants, Certified Diabetes Educators, Registered Nurses, Medical Assistants, Emergency Medical Technicians, and many others, all of whom have the unified goal of providing our patients with high quality care.     Please see below for some helpful tips to best navigate and use the [facility] Endocrinology clinic:     [place] Respect: At [facility], we are committed to a respectful and safe space for all patients, visitors, and staff.  We believe that mutual respect between patients and their care team is the foundation of quality care.  It is our expectation that you will be treated with respect by your care team.  In turn, we ask that all communication with the care team (written and verbal) be respectful and free from profanity, threatening, or abusive language.  Disrespectful communication undermines our therapeutic relationship with you and may result in us being unable to continue to provide your care.    Refills: A provider must see you at least annually to prescribe and refill medications. This is to ensure your safety as well as meet insurance and compliance regulations.    Scheduling: Many of our Providers offer both in-person or video visits. Please call to schedule any needed follow ups as soon as possible because our provider schedules fill up very quickly. Our care team has the right to require an in-person visit when they believe that it is medically necessary. Please remember that for any virtual visits, you must be in the [facility] at the time of the visit, otherwise we are unable to see you and you will need to be rescheduled.    Missed Appointments: If you need to cancel or miss your scheduled  appointment, please call the clinic at [phone] to reschedule.  Please note if you repeatedly miss appointments or repeatedly miss appointments without calling to inform us ahead of time (no-show), the clinic may elect to not allow you to reschedule without speaking to a manager, may require a Partnership In Care Agreement prior to rescheduling, or could result in you no longer being able to receive care from the clinic. Providing the clinic with timely notification if you have to miss an appointment, allows us to better serve the needs of all of our patients.    Primary Care Provider: Our Endocrinologists are Specialists in their field. We expect you to have a Primary Care Provider established to handle any needs outside of your diabetes and endocrine care.  We would be happy to assist you find a Primary Care Provider, if you do not have one.    [employer]: [employer] is a wonderful resource that allows you access to your Care Team via online or the nona. Please ask a member of the team if you would like help creating an account. Please note that it may take up to 2 business days for a response. [employer] messages are not reviewed on weekends or after business hours.  Emergent or urgent care needs should never be communicated via [employer].  If you experience a medical emergency call 911 or go to the nearest emergency room.    Labs: It is recommended that you stay within the [facility] for labs but you are welcome to obtain ordered labs (with some exceptions) from any location of your choice as long as they are able to complete and process the needed labs. If you need us to fax orders to your preferred lab, please provide us the name and fax number of the lab you would like to go to so we can fax the orders. If your labs are drawn outside of the [facility], please have them fax the results to [phone] ([place]) or [phone] ([place]) or via [facility]. It is your responsibility  to ensure that outside lab results are sent to us.    We look forward to working with you. Please do not hesitate to reach out with any questions.    Thank you,    The Endocrine Team    [facility] Address:   [place] Address:     [address] [place] [address]    Phone: [phone]  Fax: [phone] [address] [place] [address]    Phone: [phone]  Fax: [phone]     [facility] [place] Cost Estimate Phone Number: [phone]    General Lab and Imaging Scheduling Phone Number: [phone]

## 2024-02-01 ENCOUNTER — OFFICE VISIT (OUTPATIENT)
Dept: ENDOCRINOLOGY | Facility: CLINIC | Age: 69
End: 2024-02-01
Payer: COMMERCIAL

## 2024-02-01 VITALS
WEIGHT: 179 LBS | BODY MASS INDEX: 32.16 KG/M2 | DIASTOLIC BLOOD PRESSURE: 81 MMHG | RESPIRATION RATE: 18 BRPM | OXYGEN SATURATION: 97 % | HEART RATE: 87 BPM | SYSTOLIC BLOOD PRESSURE: 157 MMHG

## 2024-02-01 DIAGNOSIS — Z79.4 TYPE 2 DIABETES MELLITUS TREATED WITH INSULIN (H): Primary | ICD-10-CM

## 2024-02-01 DIAGNOSIS — E11.9 TYPE 2 DIABETES MELLITUS TREATED WITH INSULIN (H): Primary | ICD-10-CM

## 2024-02-01 PROCEDURE — 99213 OFFICE O/P EST LOW 20 MIN: CPT | Performed by: PHYSICIAN ASSISTANT

## 2024-02-01 NOTE — LETTER
2/1/2024         RE: Ellen Hill  [address] [place] [address]        Dear Colleague,    Thank you for referring your patient, Ellen Hill, to the [facility]. Please see a copy of my visit note below.    Outcome for 01/24/24 12:50 PM:  patient requests to upload meter in clinic  Adilene Kee CMA  Adult Endocrinology  [facility], [place]      Assessment/Plan :   Type 2 DM. Ellen continues to work on managing her blood sugars. She has been happy with the readings and she feels like things are well controlled. However, she is very frustrated with her ongoing weight gain. We reviewed her current medications and discussed how insulin and glimepiride may contribute to weight gain. We discussed options for medications that will manage her blood sugars and may help with weight loss. She would like to avoid going back on a GLP1 due to her previous allergy to Victoza. She is open to try Jardiance, in place of the glimepiride, if it is not too expensive. I will send in a new prescription today. We also helped her place the Dexcom G7 sensor. This should continue to help with overall glucose control. We will follow-up in 3 mos.      I have independently reviewed and interpreted labs, imaging as indicated.      Chief complaint:  Ellen is a 68 year old female who returns for follow-up of Type 2 DM.    I have reviewed Care Everywhere including [facility], [facility], [place], [place], [facility], [facility] , [facility], [place] lab reports, imaging reports and provider notes as indicated.      HISTORY OF PRESENT ILLNESS  Ellen has continued to work on managing her blood sugars. She feels like things are going well. She continues to take 50 unit(s) of Lantus every morning along with glimepiride 2 mg twice daily. She has been monitoring her blood sugars with fingerstick testing 2-3x daily. She was able to  the Dexcom G7 sensor but she has not  started using it, yet. She has brought the sensor and  to the visit, today.    Ellen has not had any problems with severe hyperglycemia and/or hypoglycemia. She also denies any problems with blurred vision. She does struggle with peripheral neuropathy but she thinks it has more to do with her back than diabetes. She is also concerned with her ongoing weight gain. She feels like she eats healthy but she has continued to gain weight.     Ellen was diagnosed with diabetes about 15 yrs ago. She was started on metformin at the time of diagnosis but was unable to tolerate the medication due to GI adverse effects. She tried to manage her blood sugars with diet but her numbers remained elevated and she was started on insulin. She has a lot of medication sensitivities and she was not able to tolerate Levemir due to a skin rash. She also developed a rash with Victoza. Her diabetes is complicated by CKD stage 2 with significant microalbuminuria, hyperlipidemia, Leblanc's esophagus and HTN.    Endocrine relevant labs are as follows:   Latest Reference Range & Units 11/17/23 15:23   Hemoglobin A1C 0.0 - 5.6 % 7.5 (H)   (H): Data is abnormally high   Latest Reference Range & Units 01/12/22 15:59   Albumin Urine mg/g Cr 0.00 - 25.00 mg/g Cr 246.77 (H)   (H): Data is abnormally high   Latest Reference Range & Units 01/12/22 15:59   Albumin Urine mg/L mg/L 496     REVIEW OF SYSTEMS    Endocrine: positive for diabetes  Skin: negative  Eyes: negative for, visual blurring, redness, tearing  Ears/Nose/Throat: negative  Respiratory: No shortness of breath, dyspnea on exertion, cough, or hemoptysis  Cardiovascular: negative for, chest pain, dyspnea on exertion, lower extremity edema, and exercise intolerance  Gastrointestinal: negative for, nausea, vomiting, constipation, and diarrhea  Genitourinary: negative for, nocturia, dysuria, frequency, and urgency  Musculoskeletal: negative for, muscular weakness, nocturnal cramping, and  "foot pain  Neurologic: positive for local weakness and numbness or tingling of feet, negative for, and numbness or tingling of hands  Psychiatric: negative  Hematologic/Lymphatic/Immunologic: negative    Past Medical History  Past Medical History:   Diagnosis Date     Advanced directives, counseling/discussion 3/28/2013    Patient does not have an Advance/Health Care Directive (HCD), given \"What is Advance Care Planning?\" flyer.  Nola Lopez March 28, 2013      Hyperlipidemia LDL goal <100 10/27/2010     Hypertension goal BP (blood pressure) < 130/80 12/21/2010     Microalbuminuria 2/12/2015     Shingles 2009    Right posterior shoulder as per patient     Type 2 diabetes, HbA1c goal < 7% (H) 10/31/2010       Medications  Current Outpatient Medications   Medication Sig Dispense Refill     amLODIPine (NORVASC) 10 MG tablet Take 1 tablet (10 mg) by mouth daily For blood pressure 90 tablet 1     atorvastatin (LIPITOR) 40 MG tablet Take 1 tablet (40 mg) by mouth daily For cholesterol 90 tablet 3     blood glucose (ONETOUCH ULTRA) test strip Use to test blood sugars twice daily as directed. 100 strip 11     blood glucose monitoring (ONE TOUCH ULTRA 2) meter device kit Use to test blood sugars twice daily as directed. 1 kit 0     Continuous Blood Gluc  (DEXCOM G7 ) WILIAM Use to read blood sugars as per 's instructions. 1 each 0     Continuous Blood Gluc Sensor (DEXCOM G7 SENSOR) MISC Change every 10 days. 3 each 5     cyclobenzaprine (FLEXERIL) 5 MG tablet Take 5 mg by mouth At Bedtime 1-2 tabs at bedtime as needed       gabapentin (NEURONTIN) 300 MG capsule Take 300 mg in the AM and 600 mg qPM       glimepiride (AMARYL) 2 MG tablet Take 1 tablet (2 mg) by mouth 2 times daily (before meals) 180 tablet 0     insulin glargine (LANTUS SOLOSTAR) 100 UNIT/ML pen Inject 50 Units Subcutaneous every morning 45 mL 3     insulin pen needle (ULTICARE MICRO) 32G X 4 MM miscellaneous Use 2 pen needles " daily or as directed. 100 each 11     losartan-hydrochlorothiazide (HYZAAR) 100-25 MG tablet Take 1 tablet by mouth daily 90 tablet 1     meloxicam (MOBIC) 7.5 MG tablet Take 7.5 mg by mouth daily       OneTouch Delica Lancets 33G MISC 1 lancet 2 times daily Use to test blood sugars twice daily as directed. 100 each 11     sertraline (ZOLOFT) 50 MG tablet Take 1 tablet (50 mg) by mouth daily For mood 90 tablet 0     traMADol (ULTRAM) 50 MG tablet Take 50 mg by mouth every 6 hours as needed         Allergies  Allergies   Allergen Reactions     Insulin Detemir Hives     Alfredo products cause hives**       Liraglutide Hives     Alfredo Nordisk product     Liraglutide Hives     Alfredo Nordisk product     Enalapril Cough     Lisinopril Cough     Wellbutrin [Bupropion Hcl] Hives     hives         Family History  family history includes Alcohol/Drug in her father and mother; Diabetes in her brother; Heart Disease in her mother.    Social History  Social History     Tobacco Use     Smoking status: Former     Packs/day: 0.10     Years: 10.00     Additional pack years: 0.00     Total pack years: 1.00     Types: Cigarettes     Quit date: 9/10/2013     Years since quitting: 10.4     Passive exposure: Current     Smokeless tobacco: Never   Vaping Use     Vaping Use: Never used   Substance Use Topics     Alcohol use: Not Currently     Alcohol/week: 1.0 standard drink of alcohol     Types: 1 Cans of beer per week     Drug use: No       Physical Exam  BP (!) 157/81 (BP Location: Left arm, Patient Position: Sitting, Cuff Size: Adult Regular)   Pulse 87   Resp 18   Wt 81.2 kg (179 lb)   LMP  (LMP Unknown)   SpO2 97%   BMI 32.16 kg/m    Body mass index is 32.16 kg/m .  GENERAL :  In no apparent distress  SKIN: Normal color, normal temperature, texture.  No hirsutism, alopecia or purple striae.     EYES: PERRL, EOMI, No scleral icterus,  No proptosis, conjunctival redness, stare, retraction  RESP: Lungs clear to auscultation  bilaterally  CARDIAC: Regular rate and rhythm, normal S1 S2, without murmurs, rubs or gallops    NEURO: awake, alert, responds appropriately to questions.  Cranial nerves intact.   Moves all extremities; Gait normal.  No tremor of the outstretched hand.    EXTREMITIES: No clubbing, cyanosis or edema.    DATA REVIEW  Pt did not bring her glucometer to the visit.        Again, thank you for allowing me to participate in the care of your patient.        Sincerely,        Aga Delvalle PA-C

## 2024-02-01 NOTE — NURSING NOTE
Ellen Hill's goals for this visit include:   Chief Complaint   Patient presents with    Follow Up     DM       She requests these members of her care team be copied on today's visit information: yes    PCP: [facility] - [facility]    Referring Provider:  No referring provider defined for this encounter.    BP (!) 157/81 (BP Location: Left arm, Patient Position: Sitting, Cuff Size: Adult Regular)   Pulse 87   Resp 18   Wt 81.2 kg (179 lb)   LMP  (LMP Unknown)   SpO2 97%   BMI 32.16 kg/m      Do you need any medication refills at today's visit? yes    Harjit Sheehan, EMT

## 2024-02-01 NOTE — PROGRESS NOTES
Assessment/Plan :   Type 2 DM. Ellen continues to work on managing her blood sugars. She has been happy with the readings and she feels like things are well controlled. However, she is very frustrated with her ongoing weight gain. We reviewed her current medications and discussed how insulin and glimepiride may contribute to weight gain. We discussed options for medications that will manage her blood sugars and may help with weight loss. She would like to avoid going back on a GLP1 due to her previous allergy to Victoza. She is open to try Jardiance, in place of the glimepiride, if it is not too expensive. I will send in a new prescription today. We also helped her place the Dexcom G7 sensor. This should continue to help with overall glucose control. We will follow-up in 3 mos.      I have independently reviewed and interpreted labs, imaging as indicated.      Chief complaint:  Ellen is a 68 year old female who returns for follow-up of Type 2 DM.    I have reviewed Care Everywhere including [facility], [facility], [facility], [facility], [facility] , [facility], [place] lab reports, imaging reports and provider notes as indicated.      HISTORY OF PRESENT ILLNESS  Ellen has continued to work on managing her blood sugars. She feels like things are going well. She continues to take 50 unit(s) of Lantus every morning along with glimepiride 2 mg twice daily. She has been monitoring her blood sugars with fingerstick testing 2-3x daily. She was able to  the Dexcom G7 sensor but she has not started using it, yet. She has brought the sensor and  to the visit, today.    Ellen has not had any problems with severe hyperglycemia and/or hypoglycemia. She also denies any problems with blurred vision. She does struggle with peripheral neuropathy but she thinks it has more to do with her back than diabetes. She is also concerned with her ongoing weight gain. She feels like she eats healthy but  "she has continued to gain weight.     Ellen was diagnosed with diabetes about 15 yrs ago. She was started on metformin at the time of diagnosis but was unable to tolerate the medication due to GI adverse effects. She tried to manage her blood sugars with diet but her numbers remained elevated and she was started on insulin. She has a lot of medication sensitivities and she was not able to tolerate Levemir due to a skin rash. She also developed a rash with Victoza. Her diabetes is complicated by CKD stage 2 with significant microalbuminuria, hyperlipidemia, Leblanc's esophagus and HTN.    Endocrine relevant labs are as follows:   Latest Reference Range & Units 11/17/23 15:23   Hemoglobin A1C 0.0 - 5.6 % 7.5 (H)   (H): Data is abnormally high   Latest Reference Range & Units 01/12/22 15:59   Albumin Urine mg/g Cr 0.00 - 25.00 mg/g Cr 246.77 (H)   (H): Data is abnormally high   Latest Reference Range & Units 01/12/22 15:59   Albumin Urine mg/L mg/L 496     REVIEW OF SYSTEMS    Endocrine: positive for diabetes  Skin: negative  Eyes: negative for, visual blurring, redness, tearing  Ears/Nose/Throat: negative  Respiratory: No shortness of breath, dyspnea on exertion, cough, or hemoptysis  Cardiovascular: negative for, chest pain, dyspnea on exertion, lower extremity edema, and exercise intolerance  Gastrointestinal: negative for, nausea, vomiting, constipation, and diarrhea  Genitourinary: negative for, nocturia, dysuria, frequency, and urgency  Musculoskeletal: negative for, muscular weakness, nocturnal cramping, and foot pain  Neurologic: positive for local weakness and numbness or tingling of feet, negative for, and numbness or tingling of hands  Psychiatric: negative  Hematologic/Lymphatic/Immunologic: negative    Past Medical History  Past Medical History:   Diagnosis Date    Advanced directives, counseling/discussion 3/28/2013    Patient does not have an Advance/Health Care Directive (HCD), given \"What is Advance " "Care Planning?\" flyer.  Nola Vergarachilo March 28, 2013     Hyperlipidemia LDL goal <100 10/27/2010    Hypertension goal BP (blood pressure) < 130/80 12/21/2010    Microalbuminuria 2/12/2015    Shingles 2009    Right posterior shoulder as per patient    Type 2 diabetes, HbA1c goal < 7% (H) 10/31/2010       Medications  Current Outpatient Medications   Medication Sig Dispense Refill    amLODIPine (NORVASC) 10 MG tablet Take 1 tablet (10 mg) by mouth daily For blood pressure 90 tablet 1    atorvastatin (LIPITOR) 40 MG tablet Take 1 tablet (40 mg) by mouth daily For cholesterol 90 tablet 3    blood glucose (ONETOUCH ULTRA) test strip Use to test blood sugars twice daily as directed. 100 strip 11    blood glucose monitoring (ONE TOUCH ULTRA 2) meter device kit Use to test blood sugars twice daily as directed. 1 kit 0    Continuous Blood Gluc  (DEXCOM G7 ) WILIAM Use to read blood sugars as per 's instructions. 1 each 0    Continuous Blood Gluc Sensor (DEXCOM G7 SENSOR) MISC Change every 10 days. 3 each 5    cyclobenzaprine (FLEXERIL) 5 MG tablet Take 5 mg by mouth At Bedtime 1-2 tabs at bedtime as needed      gabapentin (NEURONTIN) 300 MG capsule Take 300 mg in the AM and 600 mg qPM      glimepiride (AMARYL) 2 MG tablet Take 1 tablet (2 mg) by mouth 2 times daily (before meals) 180 tablet 0    insulin glargine (LANTUS SOLOSTAR) 100 UNIT/ML pen Inject 50 Units Subcutaneous every morning 45 mL 3    insulin pen needle (ULTICARE MICRO) 32G X 4 MM miscellaneous Use 2 pen needles daily or as directed. 100 each 11    losartan-hydrochlorothiazide (HYZAAR) 100-25 MG tablet Take 1 tablet by mouth daily 90 tablet 1    meloxicam (MOBIC) 7.5 MG tablet Take 7.5 mg by mouth daily      OneTouch Delica Lancets 33G MISC 1 lancet 2 times daily Use to test blood sugars twice daily as directed. 100 each 11    sertraline (ZOLOFT) 50 MG tablet Take 1 tablet (50 mg) by mouth daily For mood 90 tablet 0    traMADol " (ULTRAM) 50 MG tablet Take 50 mg by mouth every 6 hours as needed         Allergies  Allergies   Allergen Reactions    Insulin Detemir Hives     Alfredo products cause hives**      Liraglutide Hives     Alfredo Nordisk product    Liraglutide Hives     Alfredo Nordisk product    Enalapril Cough    Lisinopril Cough    Wellbutrin [Bupropion Hcl] Hives     hives         Family History  family history includes Alcohol/Drug in her father and mother; Diabetes in her brother; Heart Disease in her mother.    Social History  Social History     Tobacco Use    Smoking status: Former     Packs/day: 0.10     Years: 10.00     Additional pack years: 0.00     Total pack years: 1.00     Types: Cigarettes     Quit date: 9/10/2013     Years since quitting: 10.4     Passive exposure: Current    Smokeless tobacco: Never   Vaping Use    Vaping Use: Never used   Substance Use Topics    Alcohol use: Not Currently     Alcohol/week: 1.0 standard drink of alcohol     Types: 1 Cans of beer per week    Drug use: No       Physical Exam  BP (!) 157/81 (BP Location: Left arm, Patient Position: Sitting, Cuff Size: Adult Regular)   Pulse 87   Resp 18   Wt 81.2 kg (179 lb)   LMP  (LMP Unknown)   SpO2 97%   BMI 32.16 kg/m    Body mass index is 32.16 kg/m .  GENERAL :  In no apparent distress  SKIN: Normal color, normal temperature, texture.  No hirsutism, alopecia or purple striae.     EYES: PERRL, EOMI, No scleral icterus,  No proptosis, conjunctival redness, stare, retraction  RESP: Lungs clear to auscultation bilaterally  CARDIAC: Regular rate and rhythm, normal S1 S2, without murmurs, rubs or gallops    NEURO: awake, alert, responds appropriately to questions.  Cranial nerves intact.   Moves all extremities; Gait normal.  No tremor of the outstretched hand.    EXTREMITIES: No clubbing, cyanosis or edema.    DATA REVIEW  Pt did not bring her glucometer to the visit.

## 2024-02-09 DIAGNOSIS — I10 ESSENTIAL HYPERTENSION WITH GOAL BLOOD PRESSURE LESS THAN 130/80: ICD-10-CM

## 2024-02-09 RX ORDER — AMLODIPINE BESYLATE 10 MG/1
10 TABLET ORAL DAILY
Qty: 90 TABLET | Refills: 0 | Status: SHIPPED | OUTPATIENT
Start: 2024-02-09 | End: 2024-08-07

## 2024-02-09 NOTE — TELEPHONE ENCOUNTER
LVM for pt that Rx was sent to pharmacy and that she needs to send recent Bp readings to provider through [employer].

## 2024-02-09 NOTE — TELEPHONE ENCOUNTER
90 day refill provided. If patient could please update her recent blood pressure readings via My Chart. Thank you, Ave Lentz MD MPH

## 2024-02-15 DIAGNOSIS — F32.1 MODERATE MAJOR DEPRESSION (H): ICD-10-CM

## 2024-03-06 ENCOUNTER — NURSE TRIAGE (OUTPATIENT)
Dept: NURSING | Facility: CLINIC | Age: 69
End: 2024-03-06
Payer: COMMERCIAL

## 2024-03-07 NOTE — TELEPHONE ENCOUNTER
"Pt have back surgery on march 19th for sciatica pain, pt states she is unable to sleep. Throbbing pain, part of sciatica. Unable to relief pain. Tried cold packs. Pain will not go away. Pt denies other symptoms  Takes tramadol for pain and is not helping, done tylenol and ibuprofen, naproxen. Pt states she just ran out and will refill with care team    Advise pt may need to be seen in ED for further evaluation. Pt states she does not have a ride, will try heating pad to see if it will help with symptoms.    Diane Villagomez, RN, BSN  3/6/2024 at 6:42 PM  [facility]      Reason for Disposition   [1] SEVERE pain (e.g., excruciating, unable to do any normal activities) AND [2] not improved after 2 hours of pain medicine    Additional Information   Negative: Looks like a broken bone or dislocated joint (e.g., crooked or deformed)   Negative: Sounds like a life-threatening emergency to the triager   Negative: Chest pain   Negative: Difficulty breathing   Negative: Entire foot is cool or blue in comparison to other side   Negative: Unable to walk   Negative: [1] Red area or streak AND [2] fever   Negative: [1] Swollen joint AND [2] fever   Negative: [1] Cast on leg or ankle AND [2] now increased pain   Negative: Patient sounds very sick or weak to the triager   Negative: History of prior \"blood clot\" in leg or lungs (i.e., deep vein thrombosis, pulmonary embolism)   Negative: History of inherited increased risk of blood clots (e.g., Factor 5 Leiden, Anti-thrombin 3, Protein C or Protein S deficiency, Prothrombin mutation)   Negative: [1] Red area or streak AND [2] large (> 2 in. or 5 cm)   Negative: [1] Thigh or calf pain AND [2] only 1 side AND [3] present > 1 hour (Exception: Chronic unchanged pain.)   Negative: [1] Thigh, calf, or ankle swelling AND [2] only 1 side   Negative: [1] Thigh, calf, or ankle swelling AND [2] bilateral AND [3] 1 side is more swollen   Negative: Major surgery in past month   Negative: " Hip or leg fracture (broken bone) in past month (or had cast on leg or ankle in past month)    Protocols used: Leg Pain-A-AH

## 2024-03-12 ENCOUNTER — OFFICE VISIT (OUTPATIENT)
Dept: FAMILY MEDICINE | Facility: CLINIC | Age: 69
End: 2024-03-12
Payer: COMMERCIAL

## 2024-03-12 VITALS
DIASTOLIC BLOOD PRESSURE: 80 MMHG | BODY MASS INDEX: 31.21 KG/M2 | RESPIRATION RATE: 18 BRPM | SYSTOLIC BLOOD PRESSURE: 163 MMHG | TEMPERATURE: 97.7 F | HEIGHT: 63 IN | WEIGHT: 176.13 LBS | OXYGEN SATURATION: 98 % | HEART RATE: 94 BPM

## 2024-03-12 DIAGNOSIS — M54.16 LUMBAR RADICULOPATHY: ICD-10-CM

## 2024-03-12 DIAGNOSIS — N18.2 CHRONIC KIDNEY DISEASE, STAGE 2 (MILD): ICD-10-CM

## 2024-03-12 DIAGNOSIS — F10.20 ALCOHOLIC (H): ICD-10-CM

## 2024-03-12 DIAGNOSIS — E11.9 TYPE 2 DIABETES MELLITUS TREATED WITH INSULIN (H): ICD-10-CM

## 2024-03-12 DIAGNOSIS — E11.29 TYPE 2 DIABETES MELLITUS WITH OTHER DIABETIC KIDNEY COMPLICATION (H): ICD-10-CM

## 2024-03-12 DIAGNOSIS — Z01.818 PREOP GENERAL PHYSICAL EXAM: Primary | ICD-10-CM

## 2024-03-12 DIAGNOSIS — I10 ESSENTIAL HYPERTENSION WITH GOAL BLOOD PRESSURE LESS THAN 130/80: ICD-10-CM

## 2024-03-12 DIAGNOSIS — F32.1 MODERATE MAJOR DEPRESSION (H): ICD-10-CM

## 2024-03-12 DIAGNOSIS — Z79.4 TYPE 2 DIABETES MELLITUS TREATED WITH INSULIN (H): ICD-10-CM

## 2024-03-12 DIAGNOSIS — E78.5 HYPERLIPIDEMIA LDL GOAL <100: ICD-10-CM

## 2024-03-12 LAB
ANION GAP SERPL CALCULATED.3IONS-SCNC: 11 MMOL/L (ref 7–15)
BUN SERPL-MCNC: 16 MG/DL (ref 8–23)
CALCIUM SERPL-MCNC: 9.9 MG/DL (ref 8.8–10.2)
CHLORIDE SERPL-SCNC: 102 MMOL/L (ref 98–107)
CREAT SERPL-MCNC: 0.79 MG/DL (ref 0.51–0.95)
CREAT UR-MCNC: 178 MG/DL
DEPRECATED HCO3 PLAS-SCNC: 24 MMOL/L (ref 22–29)
EGFRCR SERPLBLD CKD-EPI 2021: 82 ML/MIN/1.73M2
ERYTHROCYTE [DISTWIDTH] IN BLOOD BY AUTOMATED COUNT: 13.1 % (ref 10–15)
GLUCOSE SERPL-MCNC: 227 MG/DL (ref 70–99)
HBA1C MFR BLD: 7.7 % (ref 0–5.6)
HCT VFR BLD AUTO: 45.5 % (ref 35–47)
HGB BLD-MCNC: 15.3 G/DL (ref 11.7–15.7)
MCH RBC QN AUTO: 28.3 PG (ref 26.5–33)
MCHC RBC AUTO-ENTMCNC: 33.6 G/DL (ref 31.5–36.5)
MCV RBC AUTO: 84 FL (ref 78–100)
MICROALBUMIN UR-MCNC: 1394 MG/L
MICROALBUMIN/CREAT UR: 783.15 MG/G CR (ref 0–25)
PLATELET # BLD AUTO: 273 10E3/UL (ref 150–450)
POTASSIUM SERPL-SCNC: 3.8 MMOL/L (ref 3.4–5.3)
RBC # BLD AUTO: 5.41 10E6/UL (ref 3.8–5.2)
SODIUM SERPL-SCNC: 137 MMOL/L (ref 135–145)
WBC # BLD AUTO: 6.4 10E3/UL (ref 4–11)

## 2024-03-12 PROCEDURE — 80048 BASIC METABOLIC PNL TOTAL CA: CPT | Performed by: PREVENTIVE MEDICINE

## 2024-03-12 PROCEDURE — 99214 OFFICE O/P EST MOD 30 MIN: CPT | Performed by: PREVENTIVE MEDICINE

## 2024-03-12 PROCEDURE — 83036 HEMOGLOBIN GLYCOSYLATED A1C: CPT | Performed by: PREVENTIVE MEDICINE

## 2024-03-12 PROCEDURE — 82043 UR ALBUMIN QUANTITATIVE: CPT | Performed by: PREVENTIVE MEDICINE

## 2024-03-12 PROCEDURE — 36415 COLL VENOUS BLD VENIPUNCTURE: CPT | Performed by: PREVENTIVE MEDICINE

## 2024-03-12 PROCEDURE — 82570 ASSAY OF URINE CREATININE: CPT | Performed by: PREVENTIVE MEDICINE

## 2024-03-12 PROCEDURE — 85027 COMPLETE CBC AUTOMATED: CPT | Performed by: PREVENTIVE MEDICINE

## 2024-03-12 ASSESSMENT — PAIN SCALES - GENERAL: PAINLEVEL: NO PAIN (0)

## 2024-03-12 NOTE — PROGRESS NOTES
Preoperative Evaluation  [address] [address]  Phone: [phone]  Primary Provider: Penelope - MARCIA [facility]  Pre-op Performing Provider: DEBBI IZQUIERDO  Mar 12, 2024       Ellen is a 68 year old, presenting for the following:  Pre-Op Exam        3/12/2024     9:06 AM   Additional Questions   Roomed by Crystal   Accompanied by self   Failed to redirect to the Timeline version of the REVFS SmartLink.      3/12/2024     9:06 AM   Patient Reported Additional Medications   Patient reports taking the following new medications self     Surgical Information  Surgery/Procedure: ANTERIOR FUSION L4-L5 AND L5-S1 WITH REMOVAL OF CAGE, HARDWARE REMOVAL L4-L5 WITH REINSTRUMENTATION L4- PELVIS, POSTERIOR FUSION L4-S1 WITH NEUROMONITORING   Surgery Location: [facility]   Surgeon: Loc Stephenson MD   Surgery Date: 3/19/2024   Time of Surgery: unknown  Where patient plans to recover: Other: overnight  Fax number for surgical facility: 1 [phone]     Assessment & Plan     The proposed surgical procedure is considered INTERMEDIATE risk.    Preop general physical exam  -procedure is on 3/19/24  - CBC with platelets  - Basic metabolic panel  (Ca, Cl, CO2, Creat, Gluc, K, Na, BUN)    Lumbar radiculopathy  -associated with chronic low back pain     Type 2 diabetes mellitus with other diabetic kidney complication (H)  -followed by Endocrine   - Basic metabolic panel  (Ca, Cl, CO2, Creat, Gluc, K, Na, BUN)  - Hemoglobin A1c  -labs from today are pending     Lab Results   Component Value Date    A1C 7.5 11/17/2023    A1C 8.4 01/11/2023    A1C 9.4 10/25/2022    A1C 8.5 04/01/2022    A1C 10.2 01/12/2022    A1C 6.5 04/27/2021    A1C 6.7 02/01/2021    A1C 7.7 01/08/2021    A1C 10.0 11/04/2020    A1C 9.5 08/20/2020     Type 2 diabetes mellitus treated with insulin (H)  - Basic metabolic panel  (Ca, Cl, CO2, Creat, Gluc, K, Na,  BUN)    Chronic kidney disease, stage 2 (mild)  -await labs  -has been on Naprosyn   - Albumin Random Urine Quantitative with Creat Ratio  - Basic metabolic panel  (Ca, Cl, CO2, Creat, Gluc, K, Na, BUN)    Essential hypertension with goal blood pressure less than 130/80  -elevated reading in clinic  -will check at home and update    Hyperlipidemia LDL goal <100  -on statin      The 10-year ASCVD risk score (Simone LILLY, et al., 2019) is: 27.1%    Values used to calculate the score:      Age: 68 years      Sex: Female      Is Non- : No      Diabetic: Yes      Tobacco smoker: No      Systolic Blood Pressure: 163 mmHg      Is BP treated: Yes      HDL Cholesterol: 51 mg/dL      Total Cholesterol: 254 mg/dL      Moderate major depression (H)  -stable on Sertraline  -no self harm thoughts     Alcoholic (H)  -past history              Risks and Recommendations  The patient has the following additional risks and recommendations for perioperative complications:  Diabetes:  - Patient is on insulin therapy; diabetic NPO guidelines provided and discussed.  Social and Substance:    - High tolerance to opioid analgesics due to chronic use of opioids    - Patient is taking medications for chronic pain    Antiplatelet or Anticoagulation Medication Instructions   - Patient is on no antiplatelet or anticoagulation medications.    Additional Medication Instructions  Patient is to take all scheduled medications on the day of surgery EXCEPT for modifications listed below:   - ACE/ARB: Continue without modification (e.g., MAC anesthesia, neurosurgery, spine surgery, heart failure, or labile hypertension with risk of hypertension).   - Calcium Channel Blockers: May be continued on the day of surgery.   - Diuretics: HOLD on the day of surgery.   - Statins: Continue taking on the day of surgery.    - Long acting insulin (e.g. glargine, detemir): Take 80% of the usual evening or morning dose before surgery.    -  sulfonylurea (e.g. glyburide, glipizide): HOLD day of surgery   - SGLT2 Inhibitor (canagliflozin, dapagliflozin, or empagliflozin): HOLD 3 days before surgery.    - Continuous Glucose Monitor (CGM): Patient was made aware on the day of surgery, they should be prepared to remove the Continuous Glucose Monitor (CGM) prior to the operation in order to avoid damage to the equipment during the procedure. The CGM will not be the source of glucose monitoring during the operation.   - pregabalin, gabapentin: Continue without modification.   - meloxicam (Mobic): HOLD 10 days before surgery.    - naproxen (Aleve, Naprosyn): HOLD 4 days before surgery.    - SSRIs, SNRIs, TCAs, Antipsychotics: Continue without modification.    - Topicals: HOLD day of surgery.    Recommendation  APPROVAL GIVEN to proceed with proposed procedure, without further diagnostic evaluation.    Review of external notes as documented elsewhere in note  30 minutes spent by me on the date of the encounter doing chart review, history and exam, documentation and further activities per the note    Subjective       HPI related to upcoming procedure: 68 year old female with chronic low back pain, undergoing a lumbar revision.           3/12/2024     8:55 AM   Preop Questions   1. Have you ever had a heart attack or stroke? No   2. Have you ever had surgery on your heart or blood vessels, such as a stent placement, a coronary artery bypass, or surgery on an artery in your head, neck, heart, or legs? No   3. Do you have chest pain with activity? No   4. Do you have a history of  heart failure? No   5. Do you currently have a cold, bronchitis or symptoms of other infection? No   6. Do you have a cough, shortness of breath, or wheezing? No   7. Do you or anyone in your family have previous history of blood clots? No   8. Do you or does anyone in your family have a serious bleeding problem such as prolonged bleeding following surgeries or cuts? No   9. Have you ever  had problems with anemia or been told to take iron pills? No   10. Have you had any abnormal blood loss such as black, tarry or bloody stools, or abnormal vaginal bleeding? No   11. Have you ever had a blood transfusion? No   12. Are you willing to have a blood transfusion if it is medically needed before, during, or after your surgery? Yes   13. Have you or any of your relatives ever had problems with anesthesia? No   14. Do you have sleep apnea, excessive snoring or daytime drowsiness? No   15. Do you have any artifical heart valves or other implanted medical devices like a pacemaker, defibrillator, or continuous glucose monitor? No   16. Do you have artificial joints? No   17. Are you allergic to latex? No     Able to do 4 METS with household chores  No chest pain  No pedal edema  No syncope   No cough  No shortness of breath   No snoring or use of CPAP     Glucose being checked with CGM.  Blood pressure will be checked at home     Health Care Directive  Patient does not have a Health Care Directive or Living Will: Discussed advance care planning with patient; however, patient declined at this time.    Preoperative Review of    reviewed - controlled substances reflected in medication list.      Status of Chronic Conditions:  See problem list for active medical problems.  Problems all longstanding and stable, except as noted/documented.  See ROS for pertinent symptoms related to these conditions.      DEPRESSION - Patient has a long history of Depression of moderate severity requiring medication for control with recent symptoms being stable.Current symptoms of depression include depressed mood due to pain.      DIABETES - Patient has a longstanding history of DiabetesType Type II . Patient is being treated with diet, oral agents and insulin injections and denies significant side effects. Control has been fair. Complicating factors include but are not limited to: hypertension and hyperlipidemia. Followed by  "Endocrine.      HYPERLIPIDEMIA - Patient has a long history of significant Hyperlipidemia requiring medication for treatment with recent good control. Patient reports no problems or side effects with the medication. Is on Atorvastatin      HYPERTENSION - Patient has longstanding history of HTN , currently denies any symptoms referable to elevated blood pressure. Specifically denies chest pain, palpitations, dyspnea, orthopnea, PND or peripheral edema. Blood pressure readings have been in normal range. Current medication regimen is as listed below. Patient denies any side effects of medication. Reading is elevated in clinic today, patient states will monitor at home.       Patient Active Problem List    Diagnosis Date Noted    Chronic kidney disease, stage 2 (mild) 10/25/2022     Priority: Medium    Albuminuria 01/16/2019     Priority: Medium    Noncompliance with medication regimen 12/29/2017     Priority: Medium    Uncontrolled type 2 diabetes mellitus with microalbuminuria, with long-term current use of insulin 06/19/2017     Priority: Medium     Type 2 diabetes, HbA1c goal < 7%      Leblanc's esophagus without dysplasia 05/10/2017     Priority: Medium    Obesity, Class I, BMI 30-34.9 05/15/2014     Priority: Medium    Advanced directives, counseling/discussion 03/28/2013     Priority: Medium     Patient does not have an Advance/Health Care Directive (HCD), given \"What is Advance Care Planning?\" flyer.    Nola Lopez  March 28, 2013        History of adenomatous polyp of colon 12/03/2012     Priority: Medium     Repeat colonoscopy in 2017      Vulvar pruritus 03/10/2011     Priority: Medium    Moderate major depression (H) 01/17/2011     Priority: Medium    Essential hypertension with goal blood pressure less than 130/80 12/21/2010     Priority: Medium    Hyperlipidemia LDL goal <100 10/27/2010     Priority: Medium    Post herpetic neuralgia 06/03/2010     Priority: Medium      Past Medical History: " "  Diagnosis Date    Advanced directives, counseling/discussion 3/28/2013    Patient does not have an Advance/Health Care Directive (HCD), given \"What is Advance Care Planning?\" flyer.  Nola Lopez March 28, 2013     Hyperlipidemia LDL goal <100 10/27/2010    Hypertension goal BP (blood pressure) < 130/80 12/21/2010    Microalbuminuria 2/12/2015    Shingles 2009    Right posterior shoulder as per patient    Type 2 diabetes, HbA1c goal < 7% (H) 10/31/2010     Past Surgical History:   Procedure Laterality Date    BACK SURGERY  2020    BIOPSY Right 1989    Breast-Benign lesion as per patient     Current Outpatient Medications   Medication Sig Dispense Refill    amLODIPine (NORVASC) 10 MG tablet Take 1 tablet (10 mg) by mouth daily For blood pressure 90 tablet 0    atorvastatin (LIPITOR) 40 MG tablet Take 1 tablet (40 mg) by mouth daily For cholesterol 90 tablet 3    blood glucose (ONETOUCH ULTRA) test strip Use to test blood sugars twice daily as directed. 100 strip 11    blood glucose monitoring (ONE TOUCH ULTRA 2) meter device kit Use to test blood sugars twice daily as directed. 1 kit 0    Continuous Blood Gluc  (DEXCOM G7 ) WILIAM Use to read blood sugars as per 's instructions. 1 each 0    Continuous Blood Gluc Sensor (DEXCOM G7 SENSOR) MISC Change every 10 days. 3 each 5    cyclobenzaprine (FLEXERIL) 5 MG tablet Take 5 mg by mouth At Bedtime 1-2 tabs at bedtime as needed      empagliflozin (JARDIANCE) 10 MG TABS tablet Take 1 tablet (10 mg) by mouth daily 90 tablet 1    gabapentin (NEURONTIN) 300 MG capsule Take 300 mg in the AM and 600 mg qPM      glimepiride (AMARYL) 2 MG tablet Take 1 tablet (2 mg) by mouth 2 times daily (before meals) 180 tablet 0    insulin glargine (LANTUS SOLOSTAR) 100 UNIT/ML pen Inject 50 Units Subcutaneous every morning 45 mL 3    insulin pen needle (ULTICARE MICRO) 32G X 4 MM miscellaneous Use 2 pen needles daily or as directed. 100 each 11    " "losartan-hydrochlorothiazide (HYZAAR) 100-25 MG tablet Take 1 tablet by mouth daily 90 tablet 1    meloxicam (MOBIC) 7.5 MG tablet Take 7.5 mg by mouth daily      OneTouch Delica Lancets 33G MISC 1 lancet 2 times daily Use to test blood sugars twice daily as directed. 100 each 11    sertraline (ZOLOFT) 50 MG tablet TAKE 1 TABLET(50 MG) BY MOUTH DAILY FOR MOOD 90 tablet 0    traMADol (ULTRAM) 50 MG tablet Take 50 mg by mouth every 6 hours as needed         Allergies   Allergen Reactions    Insulin Detemir Hives     Alfredo products cause hives**      Liraglutide Hives     Alfredo Nordisk product    Liraglutide Hives     Alfredo Nordisk product    Enalapril Cough    Lisinopril Cough    Wellbutrin [Bupropion Hcl] Hives     hives        Social History     Tobacco Use    Smoking status: Former     Packs/day: 0.10     Years: 10.00     Additional pack years: 0.00     Total pack years: 1.00     Types: Cigarettes     Quit date: 9/10/2013     Years since quitting: 10.5     Passive exposure: Current    Smokeless tobacco: Never   Substance Use Topics    Alcohol use: Not Currently     Alcohol/week: 1.0 standard drink of alcohol     Types: 1 Cans of beer per week     Family History   Problem Relation Age of Onset    Alcohol/Drug Mother     Heart Disease Mother     Alcohol/Drug Father     Diabetes Brother      History   Drug Use No         Review of Systems    Review of Systems  Constitutional, HEENT, cardiovascular, pulmonary, gi and gu systems are negative, except as otherwise noted.    Objective    BP (!) 163/80 (BP Location: Right arm, Patient Position: Sitting, Cuff Size: Adult Large)   Pulse 94   Temp 97.7  F (36.5  C) (Oral)   Resp 18   Ht 1.61 m (5' 3.39\")   Wt 79.9 kg (176 lb 2 oz)   LMP  (LMP Unknown)   SpO2 98%   BMI 30.82 kg/m     Estimated body mass index is 30.82 kg/m  as calculated from the following:    Height as of this encounter: 1.61 m (5' 3.39\").    Weight as of this encounter: 79.9 kg (176 lb 2 oz).  Physical " Exam  GENERAL APPEARANCE: healthy, alert and no distress  EYES: Eyes grossly normal to inspection and conjunctivae and sclerae normal  HENT: mouth without ulcers or lesions, upper dentures+   NECK: no adenopathy and trachea midline and normal to palpation  RESP: lungs clear to auscultation - no rales, rhonchi or wheezes  CV: regular rates and rhythm, normal S1 S2  ABDOMEN: soft, non-tender and no rebound or guarding   MS: extremities normal- no gross deformities noted and peripheral pulses normal  SKIN: no suspicious lesions or rashes  NEURO: Normal strength and tone, mentation intact and speech normal  PSYCH: mentation appears normal      Recent Labs   Lab Test 11/17/23  1523 05/24/23  1435 02/10/23  1329 01/11/23  1208    136   < >  --    POTASSIUM 4.1 3.9   < >  --    CR 0.78 0.73   < >  --    A1C 7.5*  --   --  8.4*    < > = values in this interval not displayed.        Diagnostics  Labs pending at this time.  Results will be reviewed when available.   No EKG required, no history of coronary heart disease, significant arrhythmia, peripheral arterial disease or other structural heart disease.    Revised Cardiac Risk Index (RCRI)  The patient has the following serious cardiovascular risks for perioperative complications:   - Diabetes Mellitus (on Insulin) = 1 point     RCRI Interpretation: 1 point: Class II (low risk - 0.9% complication rate)         Signed Electronically by: Ave Lentz MD MPH    Copy of this evaluation report is provided to requesting physician.

## 2024-03-12 NOTE — LETTER
March 13, 2024      Ellen Hill  [address] [place] [address]        Dear Ellen Hill,     Pre op labs are stable for you.   Please let me know if you have any questions and thank you for choosing [facility].     Regards,     Ave Lentz MD MPH     Resulted Orders   Albumin Random Urine Quantitative with Creat Ratio   Result Value Ref Range    Creatinine Urine mg/dL 178.0 mg/dL      Comment:      The reference ranges have not been established in urine creatinine. The results should be integrated into the clinical context for interpretation.    Albumin Urine mg/L 1,394.0 mg/L      Comment:      The reference ranges have not been established in urine albumin. The results should be integrated into the clinical context for interpretation.    Albumin Urine mg/g Cr 783.15 (H) 0.00 - 25.00 mg/g Cr      Comment:      Microalbuminuria is defined as an albumin:creatinine ratio of 17 to 299 for males and 25 to 299 for females. A ratio of albumin:creatinine of 300 or higher is indicative of overt proteinuria.  Due to biologic variability, positive results should be confirmed by a second, first-morning random or 24-hour timed urine specimen. If there is discrepancy, a third specimen is recommended. When 2 out of 3 results are in the microalbuminuria range, this is evidence for incipient nephropathy and warrants increased efforts at glucose control, blood pressure control, and institution of therapy with an angiotensin-converting-enzyme (ACE) inhibitor (if the patient can tolerate it).     CBC with platelets   Result Value Ref Range    WBC Count 6.4 4.0 - 11.0 10e3/uL    RBC Count 5.41 (H) 3.80 - 5.20 10e6/uL    Hemoglobin 15.3 11.7 - 15.7 g/dL    Hematocrit 45.5 35.0 - 47.0 %    MCV 84 78 - 100 fL    MCH 28.3 26.5 - 33.0 pg    MCHC 33.6 31.5 - 36.5 g/dL    RDW 13.1 10.0 - 15.0 %    Platelet Count 273 150 - 450 10e3/uL   Basic metabolic panel  (Ca, Cl, CO2, Creat, Gluc, K, Na, BUN)   Result Value  Ref Range    Sodium 137 135 - 145 mmol/L      Comment:      Reference intervals for this test were updated on 09/26/2023 to more accurately reflect our healthy population. There may be differences in the flagging of prior results with similar values performed with this method. Interpretation of those prior results can be made in the context of the updated reference intervals.     Potassium 3.8 3.4 - 5.3 mmol/L    Chloride 102 98 - 107 mmol/L    Carbon Dioxide (CO2) 24 22 - 29 mmol/L    Anion Gap 11 7 - 15 mmol/L    Urea Nitrogen 16.0 8.0 - 23.0 mg/dL    Creatinine 0.79 0.51 - 0.95 mg/dL    GFR Estimate 82 >60 mL/min/1.73m2    Calcium 9.9 8.8 - 10.2 mg/dL    Glucose 227 (H) 70 - 99 mg/dL   Hemoglobin A1c   Result Value Ref Range    Hemoglobin A1C 7.7 (H) 0.0 - 5.6 %      Comment:      Normal <5.7%   Prediabetes 5.7-6.4%    Diabetes 6.5% or higher     Note: Adopted from ADA consensus guidelines.

## 2024-03-12 NOTE — PATIENT INSTRUCTIONS
Antiplatelet or Anticoagulation Medication Instructions   - Patient is on no antiplatelet or anticoagulation medications.    Additional Medication Instructions   - ACE/ARB: Continue without modification (e.g., MAC anesthesia, neurosurgery, spine surgery, heart failure, or labile hypertension with risk of hypertension). Take Hyzaar    - Calcium Channel Blockers: May be continued on the day of surgery. Amlodipine to continue    - Diuretics: HOLD on the day of surgery.   - Statins: Continue taking on the day of surgery. Lipitor (Atorvastatin)    - Long acting insulin (e.g. glargine, detemir): Take 80% of the usual evening or morning dose before surgery. Take 40 units the night before surgery    - sulfonylurea (e.g. glyburide, glipizide): HOLD day of surgery, Glimepiride    - SGLT2 Inhibitor (canagliflozin, dapagliflozin, or empagliflozin): HOLD 3 days before surgery. HOLD Jardiance for 3 days    - Continuous Glucose Monitor (CGM): Patient was made aware on the day of surgery, they should be prepared to remove the Continuous Glucose Monitor (CGM) prior to the operation in order to avoid damage to the equipment during the procedure. The CGM will not be the source of glucose monitoring during the operation.     - pregabalin, gabapentin: Continue without modification.     - SSRIs, SNRIs, TCAs, Antipsychotics: Continue without modification. Continue Sertraline     -Meloxicam and Naprosyn hold for 7 days

## 2024-03-13 NOTE — RESULT ENCOUNTER NOTE
Please send a letter:    Dear Ellen Hill,    Pre op labs are stable for you.  Please let me know if you have any questions and thank you for choosing [facility].    Regards,    Ave Lentz MD MPH

## 2024-03-13 NOTE — PROGRESS NOTES
Printed results and included provider message. Placed results in outgoing mail on 03/13/2024 to be mailed to patients home.    Manju Funes

## 2024-03-14 NOTE — TELEPHONE ENCOUNTER
This writer attempted to contact Patient on 02/27/18      Reason for call Patient is due for a Diabetes follow up appt. and left message to return call.      If patient calls back:   Schedule Office Visit appointment within 1 week with primary care (Lluvia).  Postponing message.         Ruth Ann Jarrell MA     agree with urine testing for unclear acute vs chronic symptoms- and can see Dr Burks who did her sling if concern about her sling. she should be taking the myrbetriq for her urgency again

## 2024-03-20 ENCOUNTER — TRANSFERRED RECORDS (OUTPATIENT)
Dept: MULTI SPECIALTY CLINIC | Facility: CLINIC | Age: 69
End: 2024-03-20

## 2024-04-05 ENCOUNTER — MEDICAL CORRESPONDENCE (OUTPATIENT)
Dept: HEALTH INFORMATION MANAGEMENT | Facility: CLINIC | Age: 69
End: 2024-04-05

## 2024-04-05 ENCOUNTER — TELEPHONE (OUTPATIENT)
Dept: FAMILY MEDICINE | Facility: CLINIC | Age: 69
End: 2024-04-05

## 2024-04-05 NOTE — TELEPHONE ENCOUNTER
Home Care is calling regarding an established patient with [facility].        4/5/2024     3:03 PM   Home Care Information   Date of Home Care episode start 4/5/2024    Name/Phone Number Cholo CORNELIA - [phone]   Home Care agency [facility]     Requesting orders from:  Dr. Lentz  Provider is following patient: No       Orders Requested    Skilled Nursing  Request for initial certification (first set of orders)   Frequency: 1x/week for 3 weeks, then off one week (RN on vacation), then 1x/week for 2 weeks    - Also needs confirmation that provider is following for [facility] orders      Information was gathered and will be sent to provider for review.  RN will contact Home Care with information after provider review.    Confirmed ok to leave a detailed message with call back.  Contact information confirmed and updated as needed.      SAMI Fox, RN  [facility]

## 2024-04-08 ENCOUNTER — TELEPHONE (OUTPATIENT)
Dept: FAMILY MEDICINE | Facility: CLINIC | Age: 69
End: 2024-04-08
Payer: COMMERCIAL

## 2024-04-08 NOTE — TELEPHONE ENCOUNTER
Forms/Letter Request    Type of form/letter: Home Health Certification order #[id number]      Do we have the form/letter: Yes: Placed in Dr Lentz's box    Who is the form from? [facility]    Where did/will the form come from? form was faxed in    When is form/letter needed by: Not indicated    How would you like the form/letter returned: Fax : [phone]    [facility] phone # [phone]    Ruth Ann Jarrell MA  [facility]   Primary Care

## 2024-04-08 NOTE — TELEPHONE ENCOUNTER
Home Care is calling regarding an established patient with [facility].        4/5/2024     3:03 PM   Home Care Information   Date of Home Care episode start 4/5/2024    Name/Phone Number Cholo CORNELIA - [phone]   Home Care agency [facility]     Requesting orders from: Dr. Lentz  Provider is following patient: No  (see TE 4/5/24, waiting for provider to review [facility] request if OK to follow for [facility] orders)      Orders Requested    Physical Therapy  Request for initial set of orders:  - 1x/week for 4 weeks then 2x/month for 1 month      Information was gathered and will be sent to provider for review.  RN will contact Home Care with information after provider review.    RN will contact Home Care with information after provider review.  Confirmed ok to leave a detailed message with call back.  Contact information confirmed and updated as needed.      SAMI Fox, RN  [facility] Primary Care Clinic

## 2024-04-10 ENCOUNTER — TELEPHONE (OUTPATIENT)
Dept: FAMILY MEDICINE | Facility: CLINIC | Age: 69
End: 2024-04-10

## 2024-04-10 DIAGNOSIS — Z53.9 DIAGNOSIS NOT YET DEFINED: Primary | ICD-10-CM

## 2024-04-10 PROCEDURE — G0180 MD CERTIFICATION HHA PATIENT: HCPCS | Performed by: PREVENTIVE MEDICINE

## 2024-04-10 NOTE — TELEPHONE ENCOUNTER
Ashli calling to check on the form.  Ruth Ann Jarrell MA  [facility]   Primary Care

## 2024-04-10 NOTE — TELEPHONE ENCOUNTER
Erendira calling from [facility] with a patient update / FYI. Patient Completed OT eval, no further OT needed at this time. Patient started taking dulcolax 600mg due to not having BM since 4/5/24. No interactions noted with Med profile.    Routing to provider as CAREN Cho RN  [facility]

## 2024-04-11 NOTE — TELEPHONE ENCOUNTER
Form completed and found in tc bin. Form faxed to [facility] [phone] on 4/11/24 at 7:30am. Copy placed in abstract and original in tc bin.

## 2024-04-16 ENCOUNTER — TELEPHONE (OUTPATIENT)
Dept: FAMILY MEDICINE | Facility: CLINIC | Age: 69
End: 2024-04-16
Payer: COMMERCIAL

## 2024-04-16 ENCOUNTER — MEDICAL CORRESPONDENCE (OUTPATIENT)
Dept: HEALTH INFORMATION MANAGEMENT | Facility: CLINIC | Age: 69
End: 2024-04-16
Payer: COMMERCIAL

## 2024-04-16 NOTE — TELEPHONE ENCOUNTER
Order received and faxed to [facility] at [phone].  A copy placed in TC bin and Abstract.

## 2024-04-17 NOTE — PATIENT INSTRUCTIONS
Ambulatory Care Coordination Note     4/17/2024 2:25 PM     Patient Current Location:    Home: [address] [facility] [address]     Outreach for High Risk Case Management  Patient reports continuing stress r/t legal issues.  However finds that his BS running WNL - this morning 94  States he is feeling well physically but mentally overwhelmed    Follow up 7-10 days    ACM: Heather Byers RN       Method of communication with provider: chart routing.      Offered patient enrollment in the Remote Patient Monitoring (RPM) program for in-home monitoring: Patient is not eligible for RPM program.      PCP/Specialist follow up:   Future Appointments         Provider Specialty Dept Phone    5/15/2024 3:00 PM Parul Perez PA-C Endocrinology [phone]    5/22/2024 11:15 AM Aric Shafer MD Neurology [phone]    8/12/2024 3:00 PM Parul Perez PA-C Endocrinology [phone]           Insulin:  Take 45 units of Basaglar and 10 units of Novolog right before your 8:00 am meal  Take 45 units of Basaglar and 30 units of Novolog right before your dinner    Test your blood sugars 2 times/day- first thing in the morning and right before dinner OR 2 hours after dinner.    Carrie Castro RD, LD, CDE

## 2024-04-17 NOTE — PROGRESS NOTES
Outcome for 05/17/23 2:55 PM: Down load in clinic  Sarahy Dukes CMA  Adult Endocrinology  [facility]          independent

## 2024-04-25 ENCOUNTER — TELEPHONE (OUTPATIENT)
Dept: FAMILY MEDICINE | Facility: CLINIC | Age: 69
End: 2024-04-25
Payer: COMMERCIAL

## 2024-04-25 NOTE — TELEPHONE ENCOUNTER
"  Medication Question or Refill    Contacts         Type Contact Phone/Fax    04/25/2024 11:30 AM CDT Phone (Incoming) Ellen Hill (Self) [phone] (H)            What medication are you calling about (include dose and sig)?: \"Gabapentin 300 mg take 2 capsules by mouth 3 times daily\"    **unable to find these directions on med list    Preferred Pharmacy:    [id number] - [place], [place] - [address] AT [facility] [address]  [facility] [address]  Phone: [phone] Fax: [phone]      Controlled Substance Agreement on file:   CSA -- Patient Level:    CSA: None found at the patient level.       Who prescribed the medication?: Tor is her primary but the name on the bottle was a different provider     Do you need a refill? Yes    Patient offered an appointment? No    Do you have any questions or concerns?  No      Okay to leave a detailed message?: Yes at Home number on file [phone] (home)    Ruth Ann Jarrell MA  [facility]   Primary Care      "

## 2024-04-25 NOTE — TELEPHONE ENCOUNTER
Judi LOCKE from [facility] calling with an update.   States patient has been following [facility] post spinal surgery and has had a lot of med changes recently which includes muscle relaxers and Oxy.      Judi will like to update Dr. Lentz as she notes that Dr. Lentz prescribes Gabapentin.     Judi also reports that there is a lot of issues with patient managing her pain. Judi states she mets regularly with patient and significant other so that everyone is on the same page in regards to her pain.     Fax number provided for Judi to fax over updated medlist. She states she will be sending in about an hour.       Marina Roman RN    [facility]

## 2024-04-25 NOTE — TELEPHONE ENCOUNTER
This writer attempted to contact patient on 04/25/24      Reason for get more information regard Gabapentin request and left message.      If patient calls back:   Registered Nurse called. Please get more information from patient regarding the Gabapentin request. Looks like it is active on medlist, but listed as historical.        Marina Roman RN

## 2024-04-26 NOTE — TELEPHONE ENCOUNTER
This writer attempted to contact patient on 04/26/24      Reason for call medication and left message.      If patient calls back:   Registered Nurse called. Follow Triage Call workflow        Alicia Andres RN

## 2024-04-28 ENCOUNTER — MEDICAL CORRESPONDENCE (OUTPATIENT)
Dept: HEALTH INFORMATION MANAGEMENT | Facility: CLINIC | Age: 69
End: 2024-04-28
Payer: COMMERCIAL

## 2024-04-28 NOTE — TELEPHONE ENCOUNTER
Medication list placed in abstraction.  Pain management post op is per the Surgical team.    Thank you,  Ave Lentz MD MPH

## 2024-04-29 DIAGNOSIS — E11.9 TYPE 2 DIABETES MELLITUS TREATED WITH INSULIN (H): ICD-10-CM

## 2024-04-29 DIAGNOSIS — Z79.4 TYPE 2 DIABETES MELLITUS TREATED WITH INSULIN (H): ICD-10-CM

## 2024-04-29 NOTE — TELEPHONE ENCOUNTER
This RN left a message for the patient to call back 4/29/24.      If patient calls back:     Registered Nurse called. Please get more information from patient regarding the Gabapentin request.     Looks like it is active on medlist, but listed as historical.      Noted that it was prescribed in the past by Liset Willett.  May need an appointment to get it prescribed by Dr. Lentz.    Kristina Kjellberg, MSN, RN

## 2024-05-01 NOTE — TELEPHONE ENCOUNTER
Patient isn't sure who prescribed it to her.  She stated it might have been her diabetes provider.  Asked if patient would be willing to ask her diabetes provider for a refill.  Patient verbalized understanding and stated she would ask them.      Kristina Kjellberg, MSN, RN

## 2024-05-07 RX ORDER — GLIMEPIRIDE 2 MG/1
TABLET ORAL
Qty: 180 TABLET | Refills: 1 | Status: SHIPPED | OUTPATIENT
Start: 2024-05-07

## 2024-05-10 NOTE — TELEPHONE ENCOUNTER
See orders 4/10/24 - [facility] orders (including SN as requested below), signed by provider and faxed to [facility] company, will close encounter      Nuzhat Aly RN, BSN  [facility]

## 2024-06-07 ENCOUNTER — TELEPHONE (OUTPATIENT)
Dept: FAMILY MEDICINE | Facility: CLINIC | Age: 69
End: 2024-06-07

## 2024-06-07 ENCOUNTER — OFFICE VISIT (OUTPATIENT)
Dept: FAMILY MEDICINE | Facility: CLINIC | Age: 69
End: 2024-06-07
Payer: COMMERCIAL

## 2024-06-07 VITALS
SYSTOLIC BLOOD PRESSURE: 147 MMHG | BODY MASS INDEX: 30.3 KG/M2 | RESPIRATION RATE: 20 BRPM | HEART RATE: 93 BPM | DIASTOLIC BLOOD PRESSURE: 80 MMHG | WEIGHT: 171 LBS | HEIGHT: 63 IN | TEMPERATURE: 98.6 F | OXYGEN SATURATION: 98 %

## 2024-06-07 DIAGNOSIS — I10 ESSENTIAL HYPERTENSION WITH GOAL BLOOD PRESSURE LESS THAN 130/80: ICD-10-CM

## 2024-06-07 DIAGNOSIS — M53.2X6 LUMBAR SPINE INSTABILITY: ICD-10-CM

## 2024-06-07 DIAGNOSIS — N18.2 CHRONIC KIDNEY DISEASE, STAGE 2 (MILD): ICD-10-CM

## 2024-06-07 DIAGNOSIS — E66.811 OBESITY, CLASS I, BMI 30-34.9: ICD-10-CM

## 2024-06-07 DIAGNOSIS — E11.29 TYPE 2 DIABETES MELLITUS WITH OTHER DIABETIC KIDNEY COMPLICATION (H): ICD-10-CM

## 2024-06-07 DIAGNOSIS — F11.90 CHRONIC, CONTINUOUS USE OF OPIOIDS: ICD-10-CM

## 2024-06-07 DIAGNOSIS — Z01.818 PREOP GENERAL PHYSICAL EXAM: Primary | ICD-10-CM

## 2024-06-07 DIAGNOSIS — E78.5 HYPERLIPIDEMIA LDL GOAL <100: ICD-10-CM

## 2024-06-07 PROBLEM — F11.220 OPIOID DEPENDENCE WITH UNCOMPLICATED INTOXICATION (H): Status: ACTIVE | Noted: 2024-06-07

## 2024-06-07 PROBLEM — F11.220 OPIOID DEPENDENCE WITH UNCOMPLICATED INTOXICATION (H): Status: RESOLVED | Noted: 2024-06-07 | Resolved: 2024-06-07

## 2024-06-07 LAB — HBA1C MFR BLD: 8 % (ref 0–5.6)

## 2024-06-07 PROCEDURE — 36415 COLL VENOUS BLD VENIPUNCTURE: CPT

## 2024-06-07 PROCEDURE — 99214 OFFICE O/P EST MOD 30 MIN: CPT

## 2024-06-07 PROCEDURE — 83036 HEMOGLOBIN GLYCOSYLATED A1C: CPT

## 2024-06-07 PROCEDURE — 80061 LIPID PANEL: CPT

## 2024-06-07 PROCEDURE — 80048 BASIC METABOLIC PNL TOTAL CA: CPT

## 2024-06-07 RX ORDER — RESPIRATORY SYNCYTIAL VIRUS VACCINE 120MCG/0.5
0.5 KIT INTRAMUSCULAR ONCE
Qty: 1 EACH | Refills: 0 | Status: CANCELLED | OUTPATIENT
Start: 2024-06-07 | End: 2024-06-07

## 2024-06-07 RX ORDER — OXYCODONE HYDROCHLORIDE 5 MG/1
5-10 TABLET ORAL EVERY 6 HOURS PRN
COMMUNITY
Start: 2024-04-26

## 2024-06-07 ASSESSMENT — PATIENT HEALTH QUESTIONNAIRE - PHQ9
SUM OF ALL RESPONSES TO PHQ QUESTIONS 1-9: 6
SUM OF ALL RESPONSES TO PHQ QUESTIONS 1-9: 6
10. IF YOU CHECKED OFF ANY PROBLEMS, HOW DIFFICULT HAVE THESE PROBLEMS MADE IT FOR YOU TO DO YOUR WORK, TAKE CARE OF THINGS AT HOME, OR GET ALONG WITH OTHER PEOPLE: SOMEWHAT DIFFICULT

## 2024-06-07 ASSESSMENT — PAIN SCALES - GENERAL: PAINLEVEL: EXTREME PAIN (8)

## 2024-06-07 NOTE — LETTER
June 17, 2024      Ellen Hill  [address] [place] [address]      Rizwan Hughes,     I have reviewed your results.      Your electrolytes and kidney function are stable. Your A1C is 8, which is slightly above goal, and your cholesterol has increased as well. You can improve your cholesterol with diet and exercise. Try to avoid highly processed or fried foods and reduce saturated fats in your diet, and include more vegetables, whole grains and lean proteins (chicken and fish). Increasing your activity as tolerated as you recover from your surgery can be beneficial as well.     I recommend scheduling a visit to discuss changing or increasing your diabetes medications to obtain better control.       Please let me know if you have any questions or concerns. Have a great day!     Leatha Burgos, APRN CNP       Resulted Orders   Lipid panel reflex to direct LDL Non-fasting   Result Value Ref Range    Cholesterol 300 (H) <200 mg/dL    Triglycerides 177 (H) <150 mg/dL    Direct Measure HDL 44 (L) >=50 mg/dL    LDL Cholesterol Calculated 221 (H) <=100 mg/dL    Non HDL Cholesterol 256 (H) <130 mg/dL    Patient Fasting > 8hrs? Yes     Narrative    Cholesterol  Desirable:  <200 mg/dL    Triglycerides  Normal:  Less than 150 mg/dL  Borderline High:  150-199 mg/dL  High:  200-499 mg/dL  Very High:  Greater than or equal to 500 mg/dL    Direct Measure HDL  Female:  Greater than or equal to 50 mg/dL   Male:  Greater than or equal to 40 mg/dL    LDL Cholesterol  Desirable:  <100mg/dL  Above Desirable:  100-129 mg/dL   Borderline High:  130-159 mg/dL   High:  160-189 mg/dL   Very High:  >= 190 mg/dL    Non HDL Cholesterol  Desirable:  130 mg/dL  Above Desirable:  130-159 mg/dL  Borderline High:  160-189 mg/dL  High:  190-219 mg/dL  Very High:  Greater than or equal to 220 mg/dL   HEMOGLOBIN A1C   Result Value Ref Range    Hemoglobin A1C 8.0 (H) 0.0 - 5.6 %      Comment:      Normal <5.7%   Prediabetes 5.7-6.4%     Diabetes 6.5% or higher     Note: Adopted from ADA consensus guidelines.   Basic metabolic panel  (Ca, Cl, CO2, Creat, Gluc, K, Na, BUN)   Result Value Ref Range    Sodium 137 135 - 145 mmol/L      Comment:      Reference intervals for this test were updated on 09/26/2023 to more accurately reflect our healthy population. There may be differences in the flagging of prior results with similar values performed with this method. Interpretation of those prior results can be made in the context of the updated reference intervals.     Potassium 4.3 3.4 - 5.3 mmol/L    Chloride 101 98 - 107 mmol/L    Carbon Dioxide (CO2) 23 22 - 29 mmol/L    Anion Gap 13 7 - 15 mmol/L    Urea Nitrogen 16.8 8.0 - 23.0 mg/dL    Creatinine 0.82 0.51 - 0.95 mg/dL    GFR Estimate 78 >60 mL/min/1.73m2    Calcium 10.2 8.8 - 10.2 mg/dL    Glucose 210 (H) 70 - 99 mg/dL    Patient Fasting > 8hrs? Yes

## 2024-06-07 NOTE — PROGRESS NOTES
Preoperative Evaluation  [address] [address]  Phone: [phone]  Primary Provider: Ave Lentz MD  Pre-op Performing Provider: YVON Han CNP  Jun 7, 2024 6/7/2024   Surgical Information   What procedure is being done? thoracic or lumbar spinal fusion   Facility or Hospital where procedure/surgery will be performed: [facility]   Who is doing the procedure / surgery? dr sia szymanski   Date of surgery / procedure: june 112024   Time of surgery / procedure: 1215   Where do you plan to recover after surgery? at home with family     Fax number for surgical facility: [phone] and [phone]    Assessment & Plan     The proposed surgical procedure is considered INTERMEDIATE risk.    Preop general physical exam  - Cleared for surgery as planned.   - Basic metabolic panel  (Ca, Cl, CO2, Creat, Gluc, K, Na, BUN)    Lumbar spine instability  - Surgery as planned    Type 2 diabetes mellitus with other diabetic kidney complication (H)  - CGM in place, most AM BGMs under 200.   - Diabetes medication hold times discussed and NPO guidelines.  - Adult Eye  Referral  - Lipid panel reflex to direct LDL Non-fasting  - HEMOGLOBIN A1C    Hyperlipidemia LDL goal <100  - Stable. On statin. Due for labs.     Essential hypertension with goal blood pressure less than 130/80  - Noted. BP slightly elevated today. She will continue to monitor at home.     Chronic kidney disease, stage 2 (mild)  - Noted. Checking creatinine today. Has been stable. Last creatinine 1.03, eGFR >60.    Obesity, Class I, BMI 30-34.9  - Noted as surgical risk factor.    Chronic, continuous use of opioids  - Noted. Patient may require higher doses of pain medication due to tolerance    Risks and Recommendations    The patient has the following additional risks and recommendations for perioperative complications:  Diabetes:  -  Patient is on insulin therapy; diabetic NPO guidelines provided and discussed.  Social and Substance:    - Patient is taking medications for chronic pain    Antiplatelet or Anticoagulation Medication Instructions   - Patient is on no antiplatelet or anticoagulation medications.    Additional Medication Instructions  Take all scheduled medications on the day of surgery EXCEPT for modifications listed below:   - Statins: Continue taking on the day of surgery.    - Calcium channel blocker: Continue taking on the day of surgery  - Diuretic: HOLD day of surgery   - Long acting insulin (e.g. glargine, detemir): Take 80% of the usual evening or morning dose before surgery. Take 40 units the morning of surgery   - sulfonylurea (glipizide): DO NOT TAKE day of surgery   - SGLT2 Inhibitor (jardiance): DO NOT TAKE 3 days before surgery.    - Continuous Glucose Monitor (CGM): Patient was made aware on the day of surgery, they should be prepared to remove the Continuous Glucose Monitor (CGM) prior to the operation in order to avoid damage to the equipment during the procedure. The CGM will not be the source of glucose monitoring during the operation.    - meloxicam (Mobic): DO NOT TAKE 10 days before surgery.    - Sertraline: Continue without modification.     Recommendation  Approval given to proceed with proposed procedure, without further diagnostic evaluation.    Mary Ellen Hughes is a 68 year old, presenting for the following:  Pre-Op Exam          6/7/2024     9:46 AM   Additional Questions   Roomed by parish   Accompanied by self     HPI related to upcoming procedure: Persistent pain following a L4-L5 and L5-S1 spinal fusion 3/19/24. Revision needed, planned L4-Pelvis Hardware Removal, L3-Pelvis Re-instrumentation, and Transforaminal Lumbar Interbody Fusion L3-4 with Right Facetectomy.         6/7/2024   Pre-Op Questionnaire   Have you ever had a heart attack or stroke? No   Have you ever had surgery on your heart or  blood vessels, such as a stent placement, a coronary artery bypass, or surgery on an artery in your head, neck, heart, or legs? No   Do you have chest pain with activity? No   Do you have a history of heart failure? No   Do you currently have a cold, bronchitis or symptoms of other infection? No   Do you have a cough, shortness of breath, or wheezing? No   Do you or anyone in your family have previous history of blood clots? No   Do you or does anyone in your family have a serious bleeding problem such as prolonged bleeding following surgeries or cuts? No   Have you ever had problems with anemia or been told to take iron pills? No   Have you had any abnormal blood loss such as black, tarry or bloody stools, or abnormal vaginal bleeding? No   Have you ever had a blood transfusion? No   Are you willing to have a blood transfusion if it is medically needed before, during, or after your surgery? Yes   Have you or any of your relatives ever had problems with anesthesia? No   Do you have sleep apnea, excessive snoring or daytime drowsiness? No   Do you have any artifical heart valves or other implanted medical devices like a pacemaker, defibrillator, or continuous glucose monitor? YES- continuous glucose monitor, will be removed by patient prior to procedure.   Do you have artificial joints? No   Are you allergic to latex? No     Health Care Directive  Patient does not have a Health Care Directive or Living Will: Discussed advance care planning with patient; however, patient declined at this time.    Preoperative Review of    reviewed - controlled substances reflected in medication list.    Status of Chronic Conditions:  DIABETES - Patient has a longstanding history of DiabetesType Type II . Patient is being treated with oral agents and insulin injections and denies significant side effects. Control has been good. Complicating factors include but are not limited to: hypertension, hyperlipidemia, and chronic kidney  "disease.     HYPERLIPIDEMIA - Patient has a long history of significant Hyperlipidemia requiring medication for treatment with recent good control. Patient reports no problems or side effects with the medication.     HYPERTENSION - Patient has longstanding history of HTN , currently denies any symptoms referable to elevated blood pressure. Specifically denies chest pain, palpitations, dyspnea, orthopnea, PND or peripheral edema. Blood pressure readings slightly elevated in clinic today. Current medication regimen is as listed below. Patient denies any side effects of medication.     Patient Active Problem List    Diagnosis Date Noted    Chronic kidney disease, stage 2 (mild) 10/25/2022     Priority: Medium    Albuminuria 01/16/2019     Priority: Medium    Noncompliance with medication regimen 12/29/2017     Priority: Medium    Uncontrolled type 2 diabetes mellitus with microalbuminuria, with long-term current use of insulin 06/19/2017     Priority: Medium     Type 2 diabetes, HbA1c goal < 7%      Leblanc's esophagus without dysplasia 05/10/2017     Priority: Medium    Obesity, Class I, BMI 30-34.9 05/15/2014     Priority: Medium    Advanced directives, counseling/discussion 03/28/2013     Priority: Medium     Patient does not have an Advance/Health Care Directive (HCD), given \"What is Advance Care Planning?\" flyer.    Nola Lopez  March 28, 2013        History of adenomatous polyp of colon 12/03/2012     Priority: Medium     Repeat colonoscopy in 2017      Vulvar pruritus 03/10/2011     Priority: Medium    Moderate major depression (H) 01/17/2011     Priority: Medium    Essential hypertension with goal blood pressure less than 130/80 12/21/2010     Priority: Medium    Hyperlipidemia LDL goal <100 10/27/2010     Priority: Medium    Post herpetic neuralgia 06/03/2010     Priority: Medium      Past Medical History:   Diagnosis Date    Advanced directives, counseling/discussion 3/28/2013    Patient does not have " "an Advance/Health Care Directive (HCD), given \"What is Advance Care Planning?\" flyer.  Nola John March 28, 2013     Hyperlipidemia LDL goal <100 10/27/2010    Hypertension goal BP (blood pressure) < 130/80 12/21/2010    Microalbuminuria 2/12/2015    Shingles 2009    Right posterior shoulder as per patient    Type 2 diabetes, HbA1c goal < 7% (H) 10/31/2010     Past Surgical History:   Procedure Laterality Date    BACK SURGERY  2020    BIOPSY Right 1989    Breast-Benign lesion as per patient     Current Outpatient Medications   Medication Sig Dispense Refill    amLODIPine (NORVASC) 10 MG tablet Take 1 tablet (10 mg) by mouth daily For blood pressure 90 tablet 0    atorvastatin (LIPITOR) 40 MG tablet Take 1 tablet (40 mg) by mouth daily For cholesterol 90 tablet 3    blood glucose (ONETOUCH ULTRA) test strip Use to test blood sugars twice daily as directed. 100 strip 11    blood glucose monitoring (ONE TOUCH ULTRA 2) meter device kit Use to test blood sugars twice daily as directed. 1 kit 0    Continuous Blood Gluc  (DEXCOM G7 ) WILIAM Use to read blood sugars as per 's instructions. 1 each 0    Continuous Blood Gluc Sensor (DEXCOM G7 SENSOR) MISC Change every 10 days. 3 each 5    cyclobenzaprine (FLEXERIL) 5 MG tablet Take 5 mg by mouth At Bedtime 1-2 tabs at bedtime as needed      empagliflozin (JARDIANCE) 10 MG TABS tablet Take 1 tablet (10 mg) by mouth daily 90 tablet 1    gabapentin (NEURONTIN) 300 MG capsule Take 300 mg in the AM and 600 mg qPM      glimepiride (AMARYL) 2 MG tablet TAKE 1 TABLET(2 MG) BY MOUTH TWICE DAILY BEFORE MEALS 180 tablet 1    insulin glargine (LANTUS SOLOSTAR) 100 UNIT/ML pen Inject 50 Units Subcutaneous every morning 45 mL 3    insulin pen needle (ULTICARE MICRO) 32G X 4 MM miscellaneous Use 2 pen needles daily or as directed. 100 each 11    losartan-hydrochlorothiazide (HYZAAR) 100-25 MG tablet Take 1 tablet by mouth daily 90 tablet 1    meloxicam " (MOBIC) 7.5 MG tablet Take 7.5 mg by mouth daily      OneTouch Delica Lancets 33G MISC 1 lancet 2 times daily Use to test blood sugars twice daily as directed. 100 each 11    sertraline (ZOLOFT) 50 MG tablet TAKE 1 TABLET(50 MG) BY MOUTH DAILY FOR MOOD 90 tablet 0    traMADol (ULTRAM) 50 MG tablet Take 50 mg by mouth every 6 hours as needed         Allergies   Allergen Reactions    Insulin Detemir Hives     Alfredo products cause hives**      Liraglutide Hives     Alfredo Nordisk product    Liraglutide Hives     Alfredo Nordisk product    Enalapril Cough    Lisinopril Cough    Wellbutrin [Bupropion Hcl] Hives     hives        Social History     Tobacco Use    Smoking status: Former     Current packs/day: 0.00     Average packs/day: 0.1 packs/day for 10.0 years (1.0 ttl pk-yrs)     Types: Cigarettes     Start date: 9/10/2003     Quit date: 9/10/2013     Years since quitting: 10.7     Passive exposure: Current    Smokeless tobacco: Never   Substance Use Topics    Alcohol use: Not Currently     Alcohol/week: 1.0 standard drink of alcohol     Types: 1 Cans of beer per week     History   Drug Use No        Review of Systems  CONSTITUTIONAL: NEGATIVE for fever, chills, change in weight  INTEGUMENTARY/SKIN: NEGATIVE for worrisome rashes, moles or lesions  EYES: NEGATIVE for vision changes or irritation  ENT/MOUTH: NEGATIVE for ear, mouth and throat problems  RESP: NEGATIVE for significant cough or SOB  BREAST: NEGATIVE for masses, tenderness or discharge  CV: NEGATIVE for chest pain, palpitations or peripheral edema  GI: NEGATIVE for nausea, abdominal pain, heartburn, or change in bowel habits  : NEGATIVE for frequency, dysuria, or hematuria  MUSCULOSKELETAL: POSITIVE for back pain  NEURO: NEGATIVE for headache or dizziness  ENDOCRINE: NEGATIVE for temperature intolerance, skin/hair changes  HEME: NEGATIVE for bleeding problems  PSYCHIATRIC: NEGATIVE for changes in mood or affect    Objective    BP (!) 147/80   Pulse 93   Temp  "98.6  F (37  C) (Tympanic)   Resp 20   Ht 1.6 m (5' 3\")   Wt 77.6 kg (171 lb)   LMP  (LMP Unknown)   SpO2 98%   BMI 30.29 kg/m     Estimated body mass index is 30.29 kg/m  as calculated from the following:    Height as of this encounter: 1.6 m (5' 3\").    Weight as of this encounter: 77.6 kg (171 lb).    Physical Exam  GENERAL: alert and no distress  EYES: Eyes grossly normal to inspection, PERRL and conjunctivae and sclerae normal  HENT: ear canals and TM's normal, nose and mouth without ulcers or lesions  NECK: no adenopathy, no asymmetry, masses, or scars  RESP: lungs clear to auscultation - no rales, rhonchi or wheezes  CV: regular rate and rhythm, normal S1 S2, no S3 or S4, no murmur, click or rub, no peripheral edema  ABDOMEN: soft, nontender, no hepatosplenomegaly, no masses and bowel sounds normal  MS: no gross musculoskeletal defects noted, no edema  SKIN: no suspicious lesions or rashes  NEURO: Normal strength and tone, mentation intact and speech normal  PSYCH: mentation appears normal, affect normal/bright    Recent Labs   Lab Test 03/12/24  0947 11/17/23  1523   HGB 15.3  --      --     139   POTASSIUM 3.8 4.1   CR 0.79 0.78   A1C 7.7* 7.5*      Diagnostics  Labs pending at this time.  Results will be reviewed when available.   No EKG required, no history of coronary heart disease, significant arrhythmia, peripheral arterial disease or other structural heart disease.    Revised Cardiac Risk Index (RCRI)  The patient has the following serious cardiovascular risks for perioperative complications:   - Diabetes Mellitus (on Insulin) = 1 point     RCRI Interpretation: 1 point: Class II (low risk - 0.9% complication rate)     Signed Electronically by: YVON Han CNP  Copy of this evaluation report is provided to requesting physician.         Answers submitted by the patient for this visit:  Patient Health Questionnaire (Submitted on 6/7/2024)  If you checked off any problems, " how difficult have these problems made it for you to do your work, take care of things at home, or get along with other people?: Somewhat difficult  PHQ9 TOTAL SCORE: 6

## 2024-06-07 NOTE — PATIENT INSTRUCTIONS
At [facility], we strive to deliver an exceptional experience to you, every time we see you. If you receive a survey, please complete it as we do value your feedback.  If you have MyChart, you can expect to receive results automatically within 24 hours of their completion.  Your provider will send a note interpreting your results as well.   If you do not have MyChart, you should receive your results in about a week by mail.    Your care team:                            Family Medicine Internal Medicine   MD Jorge Alberto Ribeiro, MD Mony Calabrese, MD Milvia Ku, PA-C    John Juares, MD Pediatrics   Delta Berman, PA-C  Ave Lentz, MD [place], MD Catherine Hernandez APRN YVON Ruano CNP, MD Isidra Tabaers, MD Leatha Burgos, CNP  Same-Day (No follow up visit)   Jonathan Aggarwal, LADONNA Corona, PAGusC    HAZEL MendozaC     Clinic hours: Monday - Thursday 7 am-6 pm; Fridays 7 am-5 pm.   Urgent care: Monday - Friday 10 am- 8 pm; Saturday and Sunday 9 am-5 pm.    Clinic: [phone]       [facility]: Monday - Thursday 8 am - 7 pm; Friday 8 am - 6 pm  [facility]: [phone]    How to Take Your Medication Before Surgery  Preoperative Medication Instructions   Antiplatelet or Anticoagulation Medication Instructions   - Patient is on no antiplatelet or anticoagulation medications.    Additional Medication Instructions  Take all scheduled medications on the day of surgery EXCEPT for modifications listed below:   - Statins: Continue taking on the day of surgery.    - Take amlodipine, hold losartan/hydrocholorothiazide   - Long acting insulin (e.g. glargine, detemir): Take 80% of the usual evening or morning dose before surgery. Take 40 units the morning of surgery   - sulfonylurea (glipizide): DO NOT TAKE day of surgery   - SGLT2  Inhibitor (jardiance): DO NOT TAKE 3 days before surgery.    - Continuous Glucose Monitor (CGM): Patient was made aware on the day of surgery, they should be prepared to remove the Continuous Glucose Monitor (CGM) prior to the operation in order to avoid damage to the equipment during the procedure. The CGM will not be the source of glucose monitoring during the operation.    - meloxicam (Mobic): DO NOT TAKE 10 days before surgery.    - Sertraline: Continue without modification.      Patient Education   Preparing for Your Surgery  Getting started  A nurse will call you to review your health history and instructions. They will give you an arrival time based on your scheduled surgery time. Please be ready to share:  Your doctor's clinic name and phone number  Your medical, surgical, and anesthesia history  A list of allergies and sensitivities  A list of medicines, including herbal treatments and over-the-counter drugs  Whether the patient has a legal guardian (ask how to send us the papers in advance)  Please tell us if you're pregnant--or if there's any chance you might be pregnant. Some surgeries may injure a fetus (unborn baby), so they require a pregnancy test. Surgeries that are safe for a fetus don't always need a test, and you can choose whether to have one.   If you have a child who's having surgery, please ask for a copy of Preparing for Your Child's Surgery.    Preparing for surgery  Within 10 to 30 days of surgery: Have a pre-op exam (sometimes called an H&P, or History and Physical). This can be done at a clinic or pre-operative center.  If you're having a , you may not need this exam. Talk to your care team.  At your pre-op exam, talk to your care team about all medicines you take. If you need to stop any medicines before surgery, ask when to start taking them again.  We do this for your safety. Many medicines can make you bleed too much during surgery. Some change how well surgery (anesthesia)  drugs work.  Call your insurance company to let them know you're having surgery. (If you don't have insurance, call [phone].)  Call your clinic if there's any change in your health. This includes signs of a cold or flu (sore throat, runny nose, cough, rash, fever). It also includes a scrape or scratch near the surgery site.  If you have questions on the day of surgery, call your hospital or surgery center.  Eating and drinking guidelines  For your safety: Unless your surgeon tells you otherwise, follow the guidelines below.  Eat and drink as usual until 8 hours before you arrive for surgery. After that, no food or milk.  Drink clear liquids until 2 hours before you arrive. These are liquids you can see through, like water, Gatorade, and Propel Water. They also include plain black coffee and tea (no cream or milk), candy, and breath mints. You can spit out gum when you arrive.  If you drink alcohol: Stop drinking it the night before surgery.  If your care team tells you to take medicine on the morning of surgery, it's okay to take it with a sip of water.  Preventing infection  Shower or bathe the night before and morning of your surgery. Follow the instructions your clinic gave you. (If no instructions, use regular soap.)  Don't shave or clip hair near your surgery site. We'll remove the hair if needed.  Don't smoke or vape the morning of surgery. You may chew nicotine gum up to 2 hours before surgery. A nicotine patch is okay.  Note: Some surgeries require you to completely quit smoking and nicotine. Check with your surgeon.  Your care team will make every effort to keep you safe from infection. We will:  Clean our hands often with soap and water (or an alcohol-based hand rub).  Clean the skin at your surgery site with a special soap that kills germs.  Give you a special gown to keep you warm. (Cold raises the risk of infection.)  Wear special hair covers, masks, gowns and gloves during surgery.  Give  antibiotic medicine, if prescribed. Not all surgeries need antibiotics.  What to bring on the day of surgery  Photo ID and insurance card  Copy of your health care directive, if you have one  Glasses and hearing aids (bring cases)  You can't wear contacts during surgery  Inhaler and eye drops, if you use them (tell us about these when you arrive)  CPAP machine or breathing device, if you use them  A few personal items, if spending the night  If you have . . .  A pacemaker, ICD (cardiac defibrillator) or other implant: Bring the ID card.  An implanted stimulator: Bring the remote control.  A legal guardian: Bring a copy of the certified (court-stamped) guardianship papers.  Please remove any jewelry, including body piercings. Leave jewelry and other valuables at home.  If you're going home the day of surgery  You must have a responsible adult drive you home. They should stay with you overnight as well.  If you don't have someone to stay with you, and you aren't safe to go home alone, we may keep you overnight. Insurance often won't pay for this.  After surgery  If it's hard to control your pain or you need more pain medicine, please call your surgeon's office.  Questions?   If you have any questions for your care team, list them here: _________________________________________________________________________________________________________________________________________________________________________ ____________________________________ ____________________________________ ____________________________________  For informational purposes only. Not to replace the advice of your health care provider. Copyright   2003, 2019 [facility]. All rights reserved. Clinically reviewed by Hanna Quintana MD. SMARTworks 398810 - REV 12/22.

## 2024-06-08 LAB
ANION GAP SERPL CALCULATED.3IONS-SCNC: 13 MMOL/L (ref 7–15)
BUN SERPL-MCNC: 16.8 MG/DL (ref 8–23)
CALCIUM SERPL-MCNC: 10.2 MG/DL (ref 8.8–10.2)
CHLORIDE SERPL-SCNC: 101 MMOL/L (ref 98–107)
CHOLEST SERPL-MCNC: 300 MG/DL
CREAT SERPL-MCNC: 0.82 MG/DL (ref 0.51–0.95)
DEPRECATED HCO3 PLAS-SCNC: 23 MMOL/L (ref 22–29)
EGFRCR SERPLBLD CKD-EPI 2021: 78 ML/MIN/1.73M2
FASTING STATUS PATIENT QL REPORTED: YES
FASTING STATUS PATIENT QL REPORTED: YES
GLUCOSE SERPL-MCNC: 210 MG/DL (ref 70–99)
HDLC SERPL-MCNC: 44 MG/DL
LDLC SERPL CALC-MCNC: 221 MG/DL
NONHDLC SERPL-MCNC: 256 MG/DL
POTASSIUM SERPL-SCNC: 4.3 MMOL/L (ref 3.4–5.3)
SODIUM SERPL-SCNC: 137 MMOL/L (ref 135–145)
TRIGL SERPL-MCNC: 177 MG/DL

## 2024-06-10 NOTE — TELEPHONE ENCOUNTER
Pre-op and Labs faxed to [facility] [phone] on 6/10/24 at 7:59am and faxed to [phone] at 8:00am on 6/10/24

## 2024-06-10 NOTE — PROGRESS NOTES
Pre-op and Labs faxed to [facility] [phone] on 6/10/24 at 7:59am and faxed to [phone] at 8:00am on 6/10/24

## 2024-06-17 ENCOUNTER — PATIENT OUTREACH (OUTPATIENT)
Dept: CARE COORDINATION | Facility: CLINIC | Age: 69
End: 2024-06-17
Payer: COMMERCIAL

## 2024-06-17 ENCOUNTER — MEDICAL CORRESPONDENCE (OUTPATIENT)
Dept: HEALTH INFORMATION MANAGEMENT | Facility: CLINIC | Age: 69
End: 2024-06-17

## 2024-06-17 ENCOUNTER — TELEPHONE (OUTPATIENT)
Dept: FAMILY MEDICINE | Facility: CLINIC | Age: 69
End: 2024-06-17
Payer: COMMERCIAL

## 2024-06-17 NOTE — PROGRESS NOTES
Clinical Product Navigator RN reviewed chart; patient on payer product coverage.  Review results:   CPN Initial Information Gathering  Referral Source: Health Plan    Obtaining further information to determine needs and next steps. Patient identified by Health Plan as having a recent hospital discharge. Per chart review, patient was admitted to [facility] [place] 2[facility] 6/11 - 6/14/24 for lumbar radiculopathy. Patient was discharged with home care SN, PT and OT services. Patient has post discharge follow up with PCP 6/25/24. No additional actions indicated at this time.     Anita Manzanares RN  Clinical Product Navigator  Ph: [phone]

## 2024-06-17 NOTE — TELEPHONE ENCOUNTER
Home Care is calling regarding an established patient with  [employer] [facility].        6/17/2024     3:17 PM 4/5/2024     3:03 PM   Home Care Information   Date of Home Care episode start 6/17/2024 4/5/2024    Name/Phone Number Normal [phone] CORNELIA Emmanuel - [phone]   Home Care agency [facility]     Requesting orders from: Ave Lentz  Provider is following patient: No       Orders Requested    Skilled Nursing  Request for initial certification (first set of orders)   Frequency:  1x/wk for 4 wks for hospital prevention, fall prevention, infection prevention, pain management, diabetic education, med management.    Physical Therapy  Request for initial evaluation and treatment (one time) (first set of orders)       Occupational Therapy   Request for initial evaluation and treatment (one time) (first set of orders) within 2-3 weeks  Information was gathered and will be sent to provider to confirm provider will be following patient. Ok to leave detailed message. RN will contact Home Care with information after provider review.    Yolande Farias RN

## 2024-06-18 NOTE — TELEPHONE ENCOUNTER
RN left detailed message approving below requested orders. Encouraged to contact clinic with questions.         Marina Roman RN    [facility]

## 2024-06-21 ENCOUNTER — TELEPHONE (OUTPATIENT)
Dept: FAMILY MEDICINE | Facility: CLINIC | Age: 69
End: 2024-06-21
Payer: COMMERCIAL

## 2024-06-21 NOTE — TELEPHONE ENCOUNTER
Home Care is calling regarding an established patient with [employer].        6/17/2024     3:17 PM 4/5/2024     3:03 PM   Home Care Information   Date of Home Care episode start 6/17/2024 4/5/2024    Name/Phone Number Normal [phone] CORNELIA Emmanuel - [phone]   Home Care agency [facility]     Requesting orders from: Ave Lentz  Provider is following patient: Yes  Is this a 60-day recertification request?  No    Orders Requested    Physical Therapy  Request for resumption in care/ patient had home care and then it was paused for her back surgery      Information was gathered and will be sent to provider for review.  RN will contact Home Care with information after provider review.  Confirmed ok to leave a detailed message with call back.  Contact information confirmed and updated as needed.    Iman Paulson RN

## 2024-06-28 ENCOUNTER — TELEPHONE (OUTPATIENT)
Dept: FAMILY MEDICINE | Facility: CLINIC | Age: 69
End: 2024-06-28
Payer: COMMERCIAL

## 2024-06-28 NOTE — TELEPHONE ENCOUNTER
Forms/Letter Request    Type of form/letter: Home Health Certification and Plan of Care Order # [id number]      Do we have the form/letter: Yes:     Who is the form from? [facility] (if other please explain)    Where did/will the form come from? form was faxed in    When is form/letter needed by: ASAP    How would you like the form/letter returned: Fax : [phone]    Patient Notified form requests are processed in 5-7 business days:Yes    Okay to leave a detailed message?: Yes at Cell number on file:    Telephone Information:   Mobile [phone]

## 2024-07-02 ENCOUNTER — TELEPHONE (OUTPATIENT)
Dept: FAMILY MEDICINE | Facility: CLINIC | Age: 69
End: 2024-07-02
Payer: COMMERCIAL

## 2024-07-02 NOTE — TELEPHONE ENCOUNTER
Home Care is calling regarding an established patient with [employer].        6/17/2024     3:17 PM 4/5/2024     3:03 PM   Home Care Information   Date of Home Care episode start 6/17/2024 4/5/2024    Name/Phone Number Normal [phone] CORNELIA Emmanuel - [phone]   Home Care agency [facility]     Requesting orders from: Ave Lentz  Provider is following patient: No       Orders Requested    Occupational Therapy  Request for initial certification (first set of orders)     As of July 7/15  Frequency:  1x/wk for 3 wks  1x/month for 1 months      Confirmed ok to leave a detailed message with call back.  Contact information confirmed and updated as needed.    Alicia Andres RN

## 2024-07-05 DIAGNOSIS — Z53.9 DIAGNOSIS NOT YET DEFINED: Primary | ICD-10-CM

## 2024-07-05 PROCEDURE — G0180 MD CERTIFICATION HHA PATIENT: HCPCS | Performed by: PREVENTIVE MEDICINE

## 2024-07-05 NOTE — TELEPHONE ENCOUNTER
Received provider signed forms and faxed to [facility], [phone], right fax confirmed at 8:41 am today, 7/5/2024. Sent to abstracting.  Ruth Ann Jarrell MA  [facility]   Primary Care

## 2024-07-09 ENCOUNTER — MEDICAL CORRESPONDENCE (OUTPATIENT)
Dept: HEALTH INFORMATION MANAGEMENT | Facility: CLINIC | Age: 69
End: 2024-07-09
Payer: COMMERCIAL

## 2024-07-15 ENCOUNTER — TELEPHONE (OUTPATIENT)
Dept: FAMILY MEDICINE | Facility: CLINIC | Age: 69
End: 2024-07-15
Payer: COMMERCIAL

## 2024-07-15 NOTE — TELEPHONE ENCOUNTER
General Call      Reason for Call:  patient called today and needs Tor Koroma at  FP/IM/PEDS  to referral for Pain Assessment due to leg pain Right     Also needs a new sensor for Dexcom from provider    Also looking into Gabapentin medication.  New medication.    Please contact patient.  Thank you.    Also can call JOVANNY Austin from [facility] if questions you can call her back at [phone]    What are your questions or concerns:  yes    Date of last appointment with provider: Veto 2024    Okay to leave a detailed message?: Yes at Home number on file [phone] (home)

## 2024-07-16 NOTE — TELEPHONE ENCOUNTER
Patient should schedule a visit to discuss any concerns or new orders.  Thank you,  Ave Lentz MD MPH

## 2024-07-16 NOTE — TELEPHONE ENCOUNTER
Called and LVM informing patient a visit is necessary to discuss referral placement. Advised patient to call back and schedule.    Manju Funes

## 2024-07-17 ENCOUNTER — TRANSFERRED RECORDS (OUTPATIENT)
Dept: HEALTH INFORMATION MANAGEMENT | Facility: CLINIC | Age: 69
End: 2024-07-17
Payer: COMMERCIAL

## 2024-07-17 LAB — RETINOPATHY: POSITIVE

## 2024-07-22 ENCOUNTER — TELEPHONE (OUTPATIENT)
Dept: FAMILY MEDICINE | Facility: CLINIC | Age: 69
End: 2024-07-22
Payer: COMMERCIAL

## 2024-07-22 NOTE — TELEPHONE ENCOUNTER
Home Care is calling regarding an established patient with [employer] [facility].        6/17/2024     3:17 PM 4/5/2024     3:03 PM   Home Care Information   Date of Home Care episode start 6/17/2024 4/5/2024    Name/Phone Number Normal [phone] CORNELIA Emmanuel - [phone]   Home Care agency [facility]     Requesting orders from: Ave Lentz  Provider is following patient: Yes  Is this a 60-day recertification request?  No    Orders Requested    Occupational Therapy  Request for initial certification (first set of orders)   Frequency:  1x/wk for 9 wks    *OT Provider states she had called on Friday 7/19 to request orders, however no phone encounter noted in patient's chart. Routing urgent request to approve orders, as pt has OT salvatore today, 7/22/24.     Information was gathered and will be sent to provider for review.  RN will contact Home Care with information after provider review.  Confirmed ok to leave a detailed message with call back.  Contact information confirmed and updated as needed.    Aga Og RN

## 2024-07-23 DIAGNOSIS — E11.29 TYPE 2 DIABETES MELLITUS WITH OTHER DIABETIC KIDNEY COMPLICATION (H): ICD-10-CM

## 2024-07-23 RX ORDER — ACYCLOVIR 400 MG/1
TABLET ORAL
Qty: 3 EACH | Refills: 5 | Status: SHIPPED | OUTPATIENT
Start: 2024-07-23

## 2024-07-23 NOTE — TELEPHONE ENCOUNTER
RN called home care number listed and left message on confidential VM for staff regarding approval for orders.      Kristina Kjellberg, MSN, RN

## 2024-07-29 ENCOUNTER — TELEPHONE (OUTPATIENT)
Dept: FAMILY MEDICINE | Facility: CLINIC | Age: 69
End: 2024-07-29
Payer: COMMERCIAL

## 2024-07-29 NOTE — TELEPHONE ENCOUNTER
Home Care is calling regarding an established patient with  [email or web address] [facility].        6/17/2024     3:17 PM 4/5/2024     3:03 PM   Home Care Information   Date of Home Care episode start 6/17/2024 4/5/2024    Name/Phone Number Normal [phone] CORNELIA Emmanuel - [phone]   Home Care agency [facility]     Requesting orders from: Ave Lentz  Provider is following patient: Yes  Is this a 60-day recertification request?  No    Orders Requested-Provider Notification Only    Physical Therapy  Request for discontinuation of care   Goals have been met/progressing.     Patient having surgery in August and wants to stop Home Care.  Agency was going to discharge patient next week anyway.        Occupational Therapy  Request for discontinuation of care   Goals have been met/progressing.      Patient having surgery in August and wants to stop Home Care.  Agency was going to discharge patient next week anyway.         Verbal orders given.  Home Care will send orders for provider to sign.  Confirmed ok to leave a detailed message with call back.  Contact information confirmed and updated as needed.    Kristina M Kjellberg, RN

## 2024-08-07 ENCOUNTER — VIRTUAL VISIT (OUTPATIENT)
Dept: FAMILY MEDICINE | Facility: CLINIC | Age: 69
End: 2024-08-07
Payer: COMMERCIAL

## 2024-08-07 DIAGNOSIS — E11.29 TYPE 2 DIABETES MELLITUS WITH OTHER DIABETIC KIDNEY COMPLICATION (H): ICD-10-CM

## 2024-08-07 DIAGNOSIS — Z98.1 S/P LUMBAR FUSION: ICD-10-CM

## 2024-08-07 DIAGNOSIS — F10.20 ALCOHOLIC (H): ICD-10-CM

## 2024-08-07 DIAGNOSIS — I10 ESSENTIAL HYPERTENSION WITH GOAL BLOOD PRESSURE LESS THAN 130/80: Primary | ICD-10-CM

## 2024-08-07 PROCEDURE — 99442 PR PHYSICIAN TELEPHONE EVALUATION 11-20 MIN: CPT | Mod: 93 | Performed by: PREVENTIVE MEDICINE

## 2024-08-07 RX ORDER — GABAPENTIN 300 MG/1
300 CAPSULE ORAL 3 TIMES DAILY
Qty: 90 CAPSULE | Refills: 1 | Status: SHIPPED | OUTPATIENT
Start: 2024-08-07

## 2024-08-07 RX ORDER — AMLODIPINE BESYLATE 10 MG/1
10 TABLET ORAL DAILY
Qty: 90 TABLET | Refills: 1 | Status: SHIPPED | OUTPATIENT
Start: 2024-08-07

## 2024-08-07 RX ORDER — LOSARTAN POTASSIUM AND HYDROCHLOROTHIAZIDE 25; 100 MG/1; MG/1
1 TABLET ORAL DAILY
Qty: 90 TABLET | Refills: 1 | Status: SHIPPED | OUTPATIENT
Start: 2024-08-07

## 2024-08-07 ASSESSMENT — PATIENT HEALTH QUESTIONNAIRE - PHQ9
10. IF YOU CHECKED OFF ANY PROBLEMS, HOW DIFFICULT HAVE THESE PROBLEMS MADE IT FOR YOU TO DO YOUR WORK, TAKE CARE OF THINGS AT HOME, OR GET ALONG WITH OTHER PEOPLE: SOMEWHAT DIFFICULT
SUM OF ALL RESPONSES TO PHQ QUESTIONS 1-9: 1
SUM OF ALL RESPONSES TO PHQ QUESTIONS 1-9: 1

## 2024-08-07 NOTE — PATIENT INSTRUCTIONS
At [facility], we strive to deliver an exceptional experience to you, every time we see you. If you receive a survey, please let us know what we are doing well and/or what we could improve upon, as we do value your feedback.  If you have MyChart, you can expect to receive results automatically within 24 hours of their completion.  Your provider will send a note interpreting your results as well.   If you do not have MyChart, you should receive your results in about a week by mail.    Your care team:                            Family Medicine Internal Medicine   MD Jorge Alberto Ribeiro, MD Mony Calabrese, MD Durga Retana, MD Milvia Gauthier, PAGusC    John Juares, MD Pediatrics   Ave Lentz, MD [place], MD Marina Peterson, APRN CNP Catherine Hernandez APRN CNP   MD Jigna Mooney, MD Leatha Burgos, CNP     Jonathan Aggarwal, CNP Same-Day Provider (No follow-up visits)   YVON Jc, DNP Velia Corona, YVON Luevano, FNP, BC HAZEL MendozaC     Clinic hours: Monday - Thursday 7 am-6 pm; Fridays 7 am-5 pm.   Urgent care: Monday - Friday 10 am- 8 pm; Saturday and Sunday 9 am-5 pm.    Clinic: [phone]       [facility]: Monday - Thursday 8 am - 7 pm; Friday 8 am - 6 pm  [facility]: [phone]

## 2024-08-07 NOTE — PROGRESS NOTES
"  If patient has telephone visit, have they been educated on video visit as preferred visit method and offered to change to video visit? no - pt states        Instructions Relayed to Patient by Virtual Roomer:     Patient is active on Mychart:   Relayed following to patient: \"It looks like you are active on Mychart, are you able to join the visit this way? If not, do you need us to send you a link now or would you like your provider to send a link via text or email when they are ready to initiate the visit?\"      Patient Confirmed they will join visit via: Provider to call patient for telephone visit   Reminded patient to ensure they were logged on to virtual visit by arrival time listed.   Asked if patient has flexibility to initiate visit sooner than arrival time: No to calling early. Ellen is a 68 year old who is being evaluated via a billable telephone visit.    What phone number would you like to be contacted at? [phone]   How would you like to obtain your AVS? Mail a copy  Originating Location (pt. Location): Home    Distant Location (provider location):  On-site    Assessment & Plan     Essential hypertension with goal blood pressure less than 130/80  -Blood pressure is monitored by home care staff, readings have been around goal  -I did send in refills on both blood pressure medications  -At times, blood pressure may become elevated secondary to acute pain  - losartan-hydrochlorothiazide (HYZAAR) 100-25 MG tablet  Dispense: 90 tablet; Refill: 1  - amLODIPine (NORVASC) 10 MG tablet  Dispense: 90 tablet; Refill: 1    Type 2 diabetes mellitus with other diabetic kidney complication (H)  -Used to follow with endocrine, has not been able to, needs to schedule a follow-up    Lab Results   Component Value Date    A1C 8.0 06/07/2024    A1C 7.7 03/12/2024    A1C 7.5 11/17/2023    A1C 8.4 01/11/2023    A1C 9.4 10/25/2022    A1C 6.5 04/27/2021    A1C 6.7 02/01/2021    A1C 7.7 01/08/2021    A1C 10.0 11/04/2020    " "A1C 9.5 08/20/2020         S/P lumbar fusion  -Has had multiple spine surgeries, followed by [facility]  -Is going to be scheduled for another surgery for hardware removal and lumbar fusion  -Patient will be scheduling a preop  -Has been on gabapentin 300 mg 3 times daily.  I have not prescribed this medication in the past.  This was being managed by the pain clinic, with whom the patient has not been able to follow-up secondary to appointments with spine surgeon and recovery from spine surgery.  -[place] PDMP was reviewed.  Patient is also on tramadol.  Reviewed up-to-date drug interactions, category D interaction between tramadol and gabapentin.  Reviewed this in detail with the patient.  She has tolerated the combination without any side effects.  Discussed that I will send over a short-term refill, and our plan will be to taper off in the near future especially as she has a more definitive plan for her back with her neurosurgeon.  - gabapentin (NEURONTIN) 300 MG capsule  Dispense: 90 capsule; Refill: 1    Alcoholic (H)  -No alcohol use in several years            BMI  Estimated body mass index is 30.29 kg/m  as calculated from the following:    Height as of 6/7/24: 1.6 m (5' 3\").    Weight as of 6/7/24: 77.6 kg (171 lb).           Mary Ellen Hughes is a 68 year old, presenting for the following health issues:  Medication Request (Pt has dexa 08/15/2024)        8/7/2024     3:07 PM   Additional Questions   Roomed by Danna   Accompanied by self         8/7/2024     3:07 PM   Patient Reported Additional Medications   Patient reports taking the following new medications none     History of Present Illness       Reason for visit:  Medication    She eats 2-3 servings of fruits and vegetables daily.She consumes 0 sweetened beverage(s) daily.She exercises with enough effort to increase her heart rate 30 to 60 minutes per day.  She exercises with enough effort to increase her heart rate 4 days " per week.   She is taking medications regularly.     Diabetes is managed by endocrine.    History of recent lumbar spinal fusion, followed by neurosurgery outside of [place]. [facility]     Mood:  -has been thinking positive  -No anhedonia  -not taking daily     Home care+ normal readings at home. Today was in pain, 179/100 but otherwise is normal about 137-142/?     Having to wear a brace  Home care had been coming by      Hyperlipidemia Follow-Up     Are you regularly taking any medication or supplement to lower your cholesterol?   Yes- statin   Are you having muscle aches or other side effects that you think could be caused by your cholesterol lowering medication?  No     Hypertension Follow-up     Do you check your blood pressure regularly outside of the clinic? No   Are you following a low salt diet? Yes  Are your blood pressures ever more than 140 on the top number (systolic) OR more       than 90 on the bottom number (diastolic), for example 140/90? No     No missed doses    Medication Followup of gabapentin  Taking Medication as prescribed: yes  Side Effects:  None  Medication Helping Symptoms:  yes  Was being prescribed by Pain clinic, unable to follow         Objective    Vitals - Patient Reported  Pain Score: Severe Pain (7)  Pain Loc: Mid Back        Physical Exam   General: Alert and no distress //Respiratory: No audible wheeze, cough, or shortness of breath // Psychiatric:  Appropriate affect, tone, and pace of words      Phone call duration: 13 minutes  Signed Electronically by: Ave Lentz MD MPH        If pediatric virtual visit, ensured pediatric patient along with parent/guardian will be present for video visit.     Patient offered the website [email or web address] and/or phone number to [employer] Help line: [phone]

## 2024-08-08 ENCOUNTER — TRANSFERRED RECORDS (OUTPATIENT)
Dept: HEALTH INFORMATION MANAGEMENT | Facility: CLINIC | Age: 69
End: 2024-08-08
Payer: COMMERCIAL

## 2024-09-04 RX ORDER — TRAMADOL HYDROCHLORIDE 50 MG/1
50 TABLET ORAL EVERY 6 HOURS PRN
OUTPATIENT
Start: 2024-09-04

## 2024-09-04 NOTE — TELEPHONE ENCOUNTER
Called and informed patient medication refill has been denied due to the fact that Dr. Lentz has not prescribed and does not manage this medication. Patient understands.    Manju Funes

## 2024-09-04 NOTE — TELEPHONE ENCOUNTER
I do not prescribe Tramadol for her.  Per [place] PDMP it is prescribed by Omar CORRAL.  Refills per the prescribing provider.  I do not take over controlled medication that has been started and managed by someone else.     Thank you,  Ave Lentz MD MPH

## 2024-09-04 NOTE — TELEPHONE ENCOUNTER
Patient calling back in about this medication. Patient states she is now out.    Patient would like a call when this is sent.    Manju Funes

## 2024-09-06 ENCOUNTER — TRANSFERRED RECORDS (OUTPATIENT)
Dept: HEALTH INFORMATION MANAGEMENT | Facility: CLINIC | Age: 69
End: 2024-09-06
Payer: COMMERCIAL

## 2024-09-12 ENCOUNTER — OFFICE VISIT (OUTPATIENT)
Dept: FAMILY MEDICINE | Facility: CLINIC | Age: 69
End: 2024-09-12
Payer: COMMERCIAL

## 2024-09-12 VITALS
SYSTOLIC BLOOD PRESSURE: 135 MMHG | HEIGHT: 63 IN | TEMPERATURE: 97.4 F | RESPIRATION RATE: 14 BRPM | BODY MASS INDEX: 29.34 KG/M2 | DIASTOLIC BLOOD PRESSURE: 85 MMHG | WEIGHT: 165.6 LBS | HEART RATE: 87 BPM

## 2024-09-12 DIAGNOSIS — F32.1 MODERATE MAJOR DEPRESSION (H): ICD-10-CM

## 2024-09-12 DIAGNOSIS — Z01.818 PRE-OPERATIVE EXAMINATION: Primary | ICD-10-CM

## 2024-09-12 DIAGNOSIS — F10.20 ALCOHOLIC (H): ICD-10-CM

## 2024-09-12 DIAGNOSIS — R01.1 HEART MURMUR: ICD-10-CM

## 2024-09-12 DIAGNOSIS — E11.29 TYPE 2 DIABETES MELLITUS WITH OTHER DIABETIC KIDNEY COMPLICATION (H): ICD-10-CM

## 2024-09-12 DIAGNOSIS — T84.298A: ICD-10-CM

## 2024-09-12 DIAGNOSIS — I10 ESSENTIAL HYPERTENSION WITH GOAL BLOOD PRESSURE LESS THAN 130/80: ICD-10-CM

## 2024-09-12 LAB
ERYTHROCYTE [DISTWIDTH] IN BLOOD BY AUTOMATED COUNT: 13.8 % (ref 10–15)
HBA1C MFR BLD: 8 % (ref 0–5.6)
HCT VFR BLD AUTO: 44.7 % (ref 35–47)
HGB BLD-MCNC: 14.4 G/DL (ref 11.7–15.7)
MCH RBC QN AUTO: 26.5 PG (ref 26.5–33)
MCHC RBC AUTO-ENTMCNC: 32.2 G/DL (ref 31.5–36.5)
MCV RBC AUTO: 82 FL (ref 78–100)
PLATELET # BLD AUTO: 312 10E3/UL (ref 150–450)
RBC # BLD AUTO: 5.43 10E6/UL (ref 3.8–5.2)
WBC # BLD AUTO: 6.7 10E3/UL (ref 4–11)

## 2024-09-12 PROCEDURE — 85027 COMPLETE CBC AUTOMATED: CPT | Performed by: PREVENTIVE MEDICINE

## 2024-09-12 PROCEDURE — 80053 COMPREHEN METABOLIC PANEL: CPT | Performed by: PREVENTIVE MEDICINE

## 2024-09-12 PROCEDURE — 36415 COLL VENOUS BLD VENIPUNCTURE: CPT | Performed by: PREVENTIVE MEDICINE

## 2024-09-12 PROCEDURE — 83036 HEMOGLOBIN GLYCOSYLATED A1C: CPT | Performed by: PREVENTIVE MEDICINE

## 2024-09-12 PROCEDURE — 99214 OFFICE O/P EST MOD 30 MIN: CPT | Performed by: PREVENTIVE MEDICINE

## 2024-09-12 ASSESSMENT — PAIN SCALES - GENERAL: PAINLEVEL: EXTREME PAIN (8)

## 2024-09-12 NOTE — PROGRESS NOTES
Preoperative Evaluation  [address] [address]  Phone: [phone]  Primary Provider: Ave Lentz MD  Pre-op Performing Provider: Ave Lentz MD  Sep 12, 2024             9/12/2024   Surgical Information   What procedure is being done? HARDWARE REMOVAL L3-PELVIS RE INSTRUMENTAION P57-SYEHUI, POSTERIOR SPINAL FUSION T10-L4 WITH NEURO MONITORING    [facility]   Who is doing the procedure / surgery? jayrafyalfa gonzalezsia   Date of surgery / procedure: sept 17   Time of surgery / procedure: fusion   Where do you plan to recover after surgery? at a TCU (Transitional Care Unit)        Fax number for surgical facility: [phone]    Assessment & Plan     The proposed surgical procedure is considered INTERMEDIATE risk.    Pre-operative examination  -procedure on 9/17/24  - CBC with platelets  - Comprehensive metabolic panel (BMP + Alb, Alk Phos, ALT, AST, Total. Bili, TP)  - Hemoglobin A1c    Other mechanical complication of internal fixation device of other bones, initial encounter (H24)  -persistent pain   -Has had multiple spine surgeries, followed by [facility]     Type 2 diabetes mellitus with other diabetic kidney complication (H)  - CBC with platelets  - Comprehensive metabolic panel (BMP + Alb, Alk Phos, ALT, AST, Total. Bili, TP)    Lab Results   Component Value Date    A1C 8.0 09/12/2024    A1C 8.0 06/07/2024    A1C 7.7 03/12/2024    A1C 7.5 11/17/2023    A1C 8.4 01/11/2023    A1C 6.5 04/27/2021    A1C 6.7 02/01/2021    A1C 7.7 01/08/2021    A1C 10.0 11/04/2020    A1C 9.5 08/20/2020     -overdue for follow up with Endocrine   - Hemoglobin A1c    Essential hypertension with goal blood pressure less than 130/80  -home readings when monitored by Home care staff are at goal     Alcoholic (H)  -states no alcohol use in a few years     Moderate  major depression (H)  -on Sertraline, based on dispense history, likely not taking consistently     Heart murmur  - Echocardiogram Complete      Risks and Recommendations   The patient has the following additional risks and recommendations for perioperative complications:  Diabetes:  - Patient is on insulin therapy; diabetic NPO guidelines provided and discussed.  Social and Substance:    - High tolerance to opioid analgesics due to chronic use of opioids    - Patient is taking medications for chronic pain       Antiplatelet or Anticoagulation Medication Instructions   - Patient is on no antiplatelet or anticoagulation medications.    Additional Medication Instructions  Take all scheduled medications on the day of surgery EXCEPT for modifications listed below:   - ACE/ARB: DO NOT TAKE on day of surgery (minimum 11 hours for general anesthesia).   - Diuretics: DO NOT TAKE on the day of surgery.   - Long acting insulin (e.g. glargine, detemir): Take 80% of the usual evening or morning dose before surgery.     - sulfonylurea (e.g. glyburide, glipizide): DO NOT TAKE day of surgery   - SGLT2 Inhibitor (canagliflozin, dapagliflozin, or empagliflozin): DO NOT TAKE 3 days before surgery.    - Continuous Glucose Monitor (CGM): Patient was made aware on the day of surgery, they should be prepared to remove the Continuous Glucose Monitor (CGM) prior to the operation in order to avoid damage to the equipment during the procedure. The CGM will not be the source of glucose monitoring during the operation.      - pregabalin, gabapentin: Continue without modification.   - meloxicam (Mobic): DO NOT TAKE 10 days before surgery.    - SSRIs, SNRIs, TCAs, Antipsychotics: Continue without modification.     Recommendation  Approval given to proceed with proposed procedure, without further diagnostic evaluation.    Mary Ellen Hughes is a 68 year old, presenting for the following:  Pre-Op Exam          9/12/2024    12:49 PM   Additional  Questions   Roomed by yahaira     Via the Health Maintenance questionnaire, the patient has reported the following services have been completed DEXA: maryjane 2024-08-15, this information has been sent to the abstraction team.      HPI related to upcoming procedure: 68 year old female with comminuted perihardware fractures through the upper L3 vertebral body and bilateral pedicles/posterior elements, with loosening of both L3 pedicle screws, noted on her CT Imaging scan.         9/12/2024   Pre-Op Questionnaire   Have you ever had a heart attack or stroke? No   Have you ever had surgery on your heart or blood vessels, such as a stent placement, a coronary artery bypass, or surgery on an artery in your head, neck, heart, or legs? No   Do you have chest pain with activity? No   Do you have a history of heart failure? No   Do you currently have a cold, bronchitis or symptoms of other infection? No   Do you have a cough, shortness of breath, or wheezing? No   Do you or anyone in your family have previous history of blood clots? No   Do you or does anyone in your family have a serious bleeding problem such as prolonged bleeding following surgeries or cuts? No   Have you ever had problems with anemia or been told to take iron pills? No   Have you had any abnormal blood loss such as black, tarry or bloody stools, or abnormal vaginal bleeding? No   Have you ever had a blood transfusion? No   Are you willing to have a blood transfusion if it is medically needed before, during, or after your surgery? Yes   Have you or any of your relatives ever had problems with anesthesia? No   Do you have sleep apnea, excessive snoring or daytime drowsiness? No   Do you have any artifical heart valves or other implanted medical devices like a pacemaker, defibrillator, or continuous glucose monitor? No   Do you have artificial joints? No   Are you allergic to latex? No      Uses a walker  Limited physical activity  Can shower herself  Can do  "dishes  Hard to walk and stand  No chest pain  No syncope  No dizziness  No leg edema  No orthopnea  No PND  No recent bruising or bleeding     Health Care Directive  Patient does not have a Health Care Directive or Living Will: Discussed advance care planning with patient; however, patient declined at this time.    Preoperative Review of    reviewed - controlled substances reflected in medication list.      Status of Chronic Conditions:  See problem list for active medical problems.  Problems all longstanding and stable, except as noted/documented.  See ROS for pertinent symptoms related to these conditions.    Patient Active Problem List    Diagnosis Date Noted    Chronic kidney disease, stage 2 (mild) 10/25/2022     Priority: Medium    Albuminuria 01/16/2019     Priority: Medium    Noncompliance with medication regimen 12/29/2017     Priority: Medium    Uncontrolled type 2 diabetes mellitus with microalbuminuria, with long-term current use of insulin 06/19/2017     Priority: Medium     Type 2 diabetes, HbA1c goal < 7%      Leblanc's esophagus without dysplasia 05/10/2017     Priority: Medium    Obesity, Class I, BMI 30-34.9 05/15/2014     Priority: Medium    History of adenomatous polyp of colon 12/03/2012     Priority: Medium     Repeat colonoscopy in 2017      Vulvar pruritus 03/10/2011     Priority: Medium    Moderate major depression (H) 01/17/2011     Priority: Medium    Essential hypertension with goal blood pressure less than 130/80 12/21/2010     Priority: Medium    Hyperlipidemia LDL goal <100 10/27/2010     Priority: Medium    Post herpetic neuralgia 06/03/2010     Priority: Medium      Past Medical History:   Diagnosis Date    Advanced directives, counseling/discussion 3/28/2013    Patient does not have an Advance/Health Care Directive (HCD), given \"What is Advance Care Planning?\" flyer.  Nola Lopez March 28, 2013     Hyperlipidemia LDL goal <100 10/27/2010    Hypertension goal BP (blood " pressure) < 130/80 12/21/2010    Microalbuminuria 2/12/2015    Shingles 2009    Right posterior shoulder as per patient    Type 2 diabetes, HbA1c goal < 7% (H) 10/31/2010     Past Surgical History:   Procedure Laterality Date    BACK SURGERY  2020    BIOPSY Right 1989    Breast-Benign lesion as per patient     Current Outpatient Medications   Medication Sig Dispense Refill    amLODIPine (NORVASC) 10 MG tablet Take 1 tablet (10 mg) by mouth daily For blood pressure 90 tablet 1    atorvastatin (LIPITOR) 40 MG tablet Take 1 tablet (40 mg) by mouth daily For cholesterol 90 tablet 3    blood glucose (ONETOUCH ULTRA) test strip Use to test blood sugars twice daily as directed. 100 strip 11    blood glucose monitoring (ONE TOUCH ULTRA 2) meter device kit Use to test blood sugars twice daily as directed. 1 kit 0    Continuous Blood Gluc  (DEXCOM G7 ) WILIAM Use to read blood sugars as per 's instructions. 1 each 0    Continuous Glucose Sensor (DEXCOM G7 SENSOR) MISC Change every 10 days. 3 each 5    cyclobenzaprine (FLEXERIL) 5 MG tablet Take 5 mg by mouth At Bedtime 1-2 tabs at bedtime as needed      empagliflozin (JARDIANCE) 10 MG TABS tablet Take 1 tablet (10 mg) by mouth daily 90 tablet 1    gabapentin (NEURONTIN) 300 MG capsule Take 1 capsule (300 mg) by mouth 3 times daily 90 capsule 1    glimepiride (AMARYL) 2 MG tablet TAKE 1 TABLET(2 MG) BY MOUTH TWICE DAILY BEFORE MEALS 180 tablet 1    insulin glargine (LANTUS SOLOSTAR) 100 UNIT/ML pen Inject 50 Units Subcutaneous every morning 45 mL 3    insulin pen needle (ULTICARE MICRO) 32G X 4 MM miscellaneous Use 2 pen needles daily or as directed. 100 each 11    losartan-hydrochlorothiazide (HYZAAR) 100-25 MG tablet Take 1 tablet by mouth daily 90 tablet 1    meloxicam (MOBIC) 7.5 MG tablet Take 7.5 mg by mouth daily      OneTouch Delica Lancets 33G MISC 1 lancet 2 times daily Use to test blood sugars twice daily as directed. 100 each 11     "oxyCODONE (ROXICODONE) 5 MG tablet Take 5-10 mg by mouth every 6 hours as needed for pain      sertraline (ZOLOFT) 50 MG tablet TAKE 1 TABLET(50 MG) BY MOUTH DAILY FOR MOOD 90 tablet 0    traMADol (ULTRAM) 50 MG tablet Take 50 mg by mouth every 6 hours as needed         Allergies   Allergen Reactions    Insulin Detemir Hives     Alfredo products cause hives**      Liraglutide Hives     Alfredo Nordisk product    Liraglutide Hives     Alfredo Nordisk product    Enalapril Cough    Lisinopril Cough    Wellbutrin [Bupropion Hcl] Hives     hives        Social History     Tobacco Use    Smoking status: Former     Current packs/day: 0.00     Average packs/day: 0.1 packs/day for 10.0 years (1.0 ttl pk-yrs)     Types: Cigarettes     Start date: 9/10/2003     Quit date: 9/10/2013     Years since quittin.0     Passive exposure: Current    Smokeless tobacco: Never   Substance Use Topics    Alcohol use: Not Currently     Alcohol/week: 1.0 standard drink of alcohol     Types: 1 Cans of beer per week     Family History   Problem Relation Age of Onset    Alcohol/Drug Mother     Heart Disease Mother     Alcohol/Drug Father     Diabetes Brother      History   Drug Use No             Review of Systems  Constitutional, HEENT, cardiovascular, pulmonary, gi and gu systems are negative, except as otherwise noted.    Objective    /85   Pulse 87   Temp 97.4  F (36.3  C) (Temporal)   Resp 14   Ht 1.6 m (5' 3\")   Wt 75.1 kg (165 lb 9.6 oz)   LMP  (LMP Unknown)   BMI 29.33 kg/m     Estimated body mass index is 29.33 kg/m  as calculated from the following:    Height as of this encounter: 1.6 m (5' 3\").    Weight as of this encounter: 75.1 kg (165 lb 9.6 oz).  Physical Exam  GENERAL APPEARANCE: ambulating with a walker   EYES: Eyes grossly normal to inspection and conjunctivae and sclerae normal  NECK: no adenopathy and trachea midline and normal to palpation  RESP: lungs clear to auscultation - no rales, rhonchi or wheezes  CV: regular " rates and rhythm, normal S1 S2, Systolic murmur+   ABDOMEN: soft, non-tender and no rebound or guarding   MS: extremities normal- no gross deformities noted   SKIN: no suspicious lesions or rashes  NEURO: Normal strength and tone, mentation intact and speech normal  PSYCH: mentation appears normal      Recent Labs   Lab Test 06/07/24  1119 03/12/24  0947   HGB  --  15.3   PLT  --  273    137   POTASSIUM 4.3 3.8   CR 0.82 0.79   A1C 8.0* 7.7*        Diagnostics  Labs pending at this time.  Results will be reviewed when available.  Recent Results (from the past 24 hour(s))   CBC with platelets    Collection Time: 09/12/24  1:52 PM   Result Value Ref Range    WBC Count 6.7 4.0 - 11.0 10e3/uL    RBC Count 5.43 (H) 3.80 - 5.20 10e6/uL    Hemoglobin 14.4 11.7 - 15.7 g/dL    Hematocrit 44.7 35.0 - 47.0 %    MCV 82 78 - 100 fL    MCH 26.5 26.5 - 33.0 pg    MCHC 32.2 31.5 - 36.5 g/dL    RDW 13.8 10.0 - 15.0 %    Platelet Count 312 150 - 450 10e3/uL   Hemoglobin A1c    Collection Time: 09/12/24  1:52 PM   Result Value Ref Range    Hemoglobin A1C 8.0 (H) 0.0 - 5.6 %      No EKG required, no history of coronary heart disease, significant arrhythmia, peripheral arterial disease or other structural heart disease.    Revised Cardiac Risk Index (RCRI)  The patient has the following serious cardiovascular risks for perioperative complications:   - Diabetes Mellitus (on Insulin) = 1 point     RCRI Interpretation: 1 point: Class II (low risk - 0.9% complication rate)         Signed Electronically by: Ave Lentz MD MPH    A copy of this evaluation report is provided to the requesting physician.

## 2024-09-12 NOTE — PATIENT INSTRUCTIONS
Antiplatelet or Anticoagulation Medication Instructions   - Patient is on no antiplatelet or anticoagulation medications.    Additional Medication Instructions  Take all scheduled medications on the day of surgery EXCEPT for modifications listed below:   - ACE/ARB: DO NOT TAKE on day of surgery (minimum 11 hours for general anesthesia). Zestoretic   - Diuretics: DO NOT TAKE on the day of surgery. Zestoretic    - Long acting insulin (e.g. glargine, detemir): Take 80% of the usual evening or morning dose before surgery.     - sulfonylurea (e.g. glyburide, glipizide): DO NOT TAKE day of surgery   - SGLT2 Inhibitor (canagliflozin, dapagliflozin, or empagliflozin): DO NOT TAKE 3 days before surgery.    - Continuous Glucose Monitor (CGM): Patient was made aware on the day of surgery, they should be prepared to remove the Continuous Glucose Monitor (CGM) prior to the operation in order to avoid damage to the equipment during the procedure. The CGM will not be the source of glucose monitoring during the operation.      - pregabalin, gabapentin: Continue without modification.   - meloxicam (Mobic): DO NOT TAKE 10 days before surgery.    - SSRIs, SNRIs, TCAs, Antipsychotics: Continue without modification.           At [facility], we strive to deliver an exceptional experience to you, every time we see you. If you receive a survey, please let us know what we are doing well and/or what we could improve upon, as we do value your feedback.  If you have QikServe[employer], you can expect to receive results automatically within 24 hours of their completion.  Your provider will send a note interpreting your results as well.   If you do not have MyChart, you should receive your results in about a week by mail.    Your care team:                            Family Medicine Internal Medicine   MD Jorge Alberto Ribeiro MD Shantel Branch-Fleming, MD Srinivasa Vaka, MD Katya  ESCOBAR Gauthier MD Pediatrics   MD Fatoumata Temple MD Kim Thein, APRN CNP MD Jigna See CNP, MD Kathleen Widmer, LADONNA Aggarwal, CNP Same-Day Provider (No follow-up visits)   YVON Jc, DNP ESCOBAR Glasgow APRN, FNP, BC Keyona Pelayo PA-C     Clinic hours: Monday - Thursday 7 am-6 pm; Fridays 7 am-5 pm.   Urgent care: Monday - Friday 10 am- 8 pm; Saturday and Sunday 9 am-5 pm.    Clinic: [phone]       [facility]: Monday - Thursday 8 am - 7 pm; Friday 8 am - 6 pm  [facility]: [phone]

## 2024-09-12 NOTE — PROGRESS NOTES
"Ave Lentz MD  [facility] Pool  Please fax pre op note to the surgeon, thanks Ave Lentz MD MPH      Printed and faxed Pre op visit notes to [facility] Shruthi, [phone] (per pre op notes) right fax confirmed at 2:39 and 2:41 pm \"No answer at fax\" faxed again to [facility] Shruthi, [phone], right fax confirmed at 2:44 pm today, 9/12/2024.  Placed in writer's copy basket.  Ruth Ann Jarrell MA  [facility]   Primary Care    "

## 2024-09-12 NOTE — LETTER
September 13, 2024      Ellen Hill  [address] [place] [address]        Dear ,    We are writing to inform you of your test results.    Pre op labs are stable for you.    Resulted Orders   CBC with platelets   Result Value Ref Range    WBC Count 6.7 4.0 - 11.0 10e3/uL    RBC Count 5.43 (H) 3.80 - 5.20 10e6/uL    Hemoglobin 14.4 11.7 - 15.7 g/dL    Hematocrit 44.7 35.0 - 47.0 %    MCV 82 78 - 100 fL    MCH 26.5 26.5 - 33.0 pg    MCHC 32.2 31.5 - 36.5 g/dL    RDW 13.8 10.0 - 15.0 %    Platelet Count 312 150 - 450 10e3/uL   Comprehensive metabolic panel (BMP + Alb, Alk Phos, ALT, AST, Total. Bili, TP)   Result Value Ref Range    Sodium 139 135 - 145 mmol/L    Potassium 3.9 3.4 - 5.3 mmol/L    Carbon Dioxide (CO2) 24 22 - 29 mmol/L    Anion Gap 13 7 - 15 mmol/L    Urea Nitrogen 13.7 8.0 - 23.0 mg/dL    Creatinine 0.82 0.51 - 0.95 mg/dL    GFR Estimate 78 >60 mL/min/1.73m2      Comment:      eGFR calculated using 2021 CKD-EPI equation.    Calcium 9.7 8.8 - 10.4 mg/dL      Comment:      Reference intervals for this test were updated on 7/16/2024 to reflect our healthy population more accurately. There may be differences in the flagging of prior results with similar values performed with this method. Those prior results can be interpreted in the context of the updated reference intervals.    Chloride 102 98 - 107 mmol/L    Glucose 144 (H) 70 - 99 mg/dL    Alkaline Phosphatase 106 40 - 150 U/L    AST 18 0 - 45 U/L    ALT 13 0 - 50 U/L    Protein Total 7.4 6.4 - 8.3 g/dL    Albumin 4.3 3.5 - 5.2 g/dL    Bilirubin Total 0.4 <=1.2 mg/dL   Hemoglobin A1c   Result Value Ref Range    Hemoglobin A1C 8.0 (H) 0.0 - 5.6 %      Comment:      Normal <5.7%   Prediabetes 5.7-6.4%    Diabetes 6.5% or higher     Note: Adopted from ADA consensus guidelines.       If you have any questions or concerns, please call the clinic at the number listed above.       Sincerely,      Ave Lentz MD

## 2024-09-13 LAB
ALBUMIN SERPL BCG-MCNC: 4.3 G/DL (ref 3.5–5.2)
ALP SERPL-CCNC: 106 U/L (ref 40–150)
ALT SERPL W P-5'-P-CCNC: 13 U/L (ref 0–50)
ANION GAP SERPL CALCULATED.3IONS-SCNC: 13 MMOL/L (ref 7–15)
AST SERPL W P-5'-P-CCNC: 18 U/L (ref 0–45)
BILIRUB SERPL-MCNC: 0.4 MG/DL
BUN SERPL-MCNC: 13.7 MG/DL (ref 8–23)
CALCIUM SERPL-MCNC: 9.7 MG/DL (ref 8.8–10.4)
CHLORIDE SERPL-SCNC: 102 MMOL/L (ref 98–107)
CREAT SERPL-MCNC: 0.82 MG/DL (ref 0.51–0.95)
EGFRCR SERPLBLD CKD-EPI 2021: 78 ML/MIN/1.73M2
GLUCOSE SERPL-MCNC: 144 MG/DL (ref 70–99)
HCO3 SERPL-SCNC: 24 MMOL/L (ref 22–29)
POTASSIUM SERPL-SCNC: 3.9 MMOL/L (ref 3.4–5.3)
PROT SERPL-MCNC: 7.4 G/DL (ref 6.4–8.3)
SODIUM SERPL-SCNC: 139 MMOL/L (ref 135–145)

## 2024-09-13 NOTE — RESULT ENCOUNTER NOTE
Please send a letter:    Dear Ellen Hill,    Pre op labs are stable for you.  Please let me know if you have any questions and thank you for choosing [facility].    Regards,    Ave Lentz MD MPH

## 2024-10-10 ENCOUNTER — TELEPHONE (OUTPATIENT)
Dept: FAMILY MEDICINE | Facility: CLINIC | Age: 69
End: 2024-10-10
Payer: COMMERCIAL

## 2024-10-10 NOTE — TELEPHONE ENCOUNTER
RN spoke with Jocelin and relayed provider message below. No further questions at this time.     Silvia Llanos RN

## 2024-10-10 NOTE — TELEPHONE ENCOUNTER
Home Care is calling regarding an established patient with [facility].        6/17/2024     3:17 PM 4/5/2024     3:03 PM   Home Care Information   Date of Home Care episode start 6/17/2024 4/5/2024    Name/Phone Number Normal [phone] CORNELIA Emmanuel - [phone]   Home Care agency [facility]     Requesting orders from: Ave Lentz  Provider is following patient: No       Orders Requested  No orders requested at this time.  Home Care calling to confirm following provider.   Omar balderas calling to confirm.     Information was gathered and will be sent to provider to confirm provider will be following patient.  RN will contact Home Care with information after provider review.  Confirmed ok to leave a detailed message with call back.  Contact information confirmed and updated as needed.    Keyona GALLARDO,  RN  [facility]

## 2024-10-11 ENCOUNTER — TELEPHONE (OUTPATIENT)
Dept: FAMILY MEDICINE | Facility: CLINIC | Age: 69
End: 2024-10-11
Payer: COMMERCIAL

## 2024-10-11 NOTE — TELEPHONE ENCOUNTER
Home Care is calling regarding an established patient with [facility].        10/10/2024     8:41 AM 6/17/2024     3:17 PM   Home Care Information   Date of Home Care episode start 10/10/2024 6/17/2024    Name/Phone Number  Shruthi [phone]   Home Care agency [facility]     Requesting orders from: Ave Lentz  Provider is following patient: Yes  Is this a 60-day recertification request?  No    Patient had surgery - Spinal Fusion.    Orders Requested    Skilled Nursing  Request for initial certification (first set of orders)   Frequency:  1x/wk for 5 wks  And 3 PRN's - For pain and incisional assessment.        Information was gathered and will be sent to provider for review.  RN will contact Home Care with information after provider review.  Confirmed ok to leave a detailed message with call back.  Contact information confirmed and updated as needed.    Hank Cho RN

## 2024-10-17 ENCOUNTER — TELEPHONE (OUTPATIENT)
Dept: FAMILY MEDICINE | Facility: CLINIC | Age: 69
End: 2024-10-17
Payer: COMMERCIAL

## 2024-10-17 NOTE — TELEPHONE ENCOUNTER
Home Care is calling regarding an established patient with  [facility].        10/10/2024     8:41 AM 6/17/2024     3:17 PM   Home Care Information   Date of Home Care episode start 10/10/2024 6/17/2024    Name/Phone Number  Shruthi [phone]   Home Care agency [facility]     Requesting orders from: Ave Lentz  Provider is following patient: Yes  Is this a 60-day recertification request?  No    Orders Requested    Physical Therapy  Request for initial certification (first set of orders)   Frequency:  2x/wk for 1 wks then 1 x week for 4 weeks       Information was gathered and will be sent to provider for review.  RN will contact Home Care with information after provider review.  Confirmed ok to leave a detailed message with call back.  Contact information confirmed and updated as needed.    Keyona Singh RN

## 2024-10-22 ENCOUNTER — PATIENT OUTREACH (OUTPATIENT)
Dept: CARE COORDINATION | Facility: CLINIC | Age: 69
End: 2024-10-22
Payer: COMMERCIAL

## 2024-10-22 NOTE — PROGRESS NOTES
"Clinical Product Navigator RN reviewed chart; patient on payer product coverage.  Review results:   CPN Initial Information Gathering  Referral Source: Health Plan    Obtaining further information to determine needs and next steps. Patient identified by Health Plan as a potential candidate for Primary Care Coordination due to recent utilization. Per chart review, patient was admitted to [facility] 9/17 - 9/22/24 for Other mechanical complication of internal fixation device of vertebrae, Lumbar radiculopathy. Hardware removed.  Patient was discharged to [facility] 9/22 - 10/10/24.  Patient completed follow up with Neurosurgery 10/10/24. Patient initiated home care through [facility]. Per AVS patient needs PCP follow up.     RN Clinical Product Navigator called and spoke with patient. Ellen states that \"everything is going good\". Patient states that her home care RN and PT were out today. Patient denies any new concerns or complaints. Patient states that she is still sore/stiff from back surgery that took place at [facility] on 10/16/24. Unfortunately, no records available via care everywhere at this time for 10/16 surgery. Reviewed recommendation to follow up with PCP. Patient was agreeable to have CPN assist with scheduling follow up.     CPN assisted patient in scheduling follow up with PCP for 11/7/24 at 1:15pm. Patient denies any ongoing care coordination needs at this time.     Anita Manzanares RN   Clinical Product Navigator   [email or web address]   Office: [phone]    "

## 2024-10-22 NOTE — PROGRESS NOTES
"Clinic Care Coordination Contact  Transitions of Care Outreach  Chief Complaint   Patient presents with    Clinic Care Coordination - Post Hospital     Patient was admitted to [facility] 9/17 - 9/22/24 for Other mechanical complication of internal fixation device of vertebrae, Lumbar radiculopathy. Hardware removed.  Patient was discharged to [facility] 9/22 - 10/10/24.  Patient completed follow up with Neurosurgery 10/10/24.     Transitions of Care Assessment    Discharge Assessment  How are you doing now that you are home?: Per chart review, patient was admitted to [facility] 9/17 - 9/22/24 for Other mechanical complication of internal fixation device of vertebrae, Lumbar radiculopathy. Hardware removed.  Patient was discharged to [facility] 9/22 - 10/10/24.  Patient completed follow up with Neurosurgery 10/10/24. Patient initiated home care through [facility]. Per AVS patient needs PCP follow up.     RN Clinical Product Navigator called and spoke with patient. Ellen states that \"everything is going good\". Patient states that her home care RN and PT were out today. Patient denies any new concerns or complaints. Patient states that she is still sore/stiff from back surgery that took place at [facility] on 10/16/24. Unfortunately, no records available via care everywhere at this time for 10/16 surgery. Reviewed recommendation to follow up with PCP. Patient was agreeable to have CPN assist with scheduling follow up.     CPN assisted patient in scheduling follow up with PCP for 11/7/24 at 1:15pm. Patient denies any ongoing care coordination needs at this time.  How are your symptoms? (Red Flag symptoms escalate to triage hotline per guidelines): Improved  Do you know how to contact your clinic care team if you have future questions or changes to your health status? : Yes  Does the patient have their discharge instructions? : Yes  Does the patient have questions regarding their discharge " instructions? : No  Were you started on any new medications or were there changes to any of your previous medications? : Yes  Does the patient have all of their medications?: Yes  Do you have questions regarding any of your medications? : No  Do you have all of your needed medical supplies or equipment (DME)?  (i.e. oxygen tank, CPAP, cane, etc.): Yes              Care Management       Care Mgmt General Assessment  Referral  Referral Source: Health Plan                      Follow up Plan     Discharge Follow-Up  Discharge follow up appointment scheduled in alignment with recommended follow up timeframe or Transitions of Risk Category? (Low = within 30 days; Moderate= within 14 days; High= within 7 days): No  Patient's follow up appointment not scheduled: Patient accepted scheduling support. Appt scheduled/requested per protocol.    Future Appointments   Date Time Provider Department Center   11/7/2024  1:30 PM Ave Lenzt MD [facility]       Outpatient Plan as outlined on AVS reviewed with patient.    For any urgent concerns, please contact our 24 hour nurse triage line: [phone])       Jacklyn Manzanares RN

## 2024-10-23 ENCOUNTER — TELEPHONE (OUTPATIENT)
Dept: FAMILY MEDICINE | Facility: CLINIC | Age: 69
End: 2024-10-23
Payer: COMMERCIAL

## 2024-10-23 DIAGNOSIS — Z53.9 DIAGNOSIS NOT YET DEFINED: Primary | ICD-10-CM

## 2024-10-23 PROCEDURE — G0180 MD CERTIFICATION HHA PATIENT: HCPCS | Performed by: PREVENTIVE MEDICINE

## 2024-10-23 NOTE — TELEPHONE ENCOUNTER
Home Care is calling regarding an established patient with  [facility].      10/10/2024     8:41 AM 6/17/2024     3:17 PM   Home Care Information   Date of Home Care episode start 10/10/2024 6/17/2024    Name/Phone Number  Shruthi [phone]   Home Care agency [facility]     Requesting orders from: Ave Lentz  Provider is following patient: Yes  Is this a 60-day recertification request?  No    Orders Requested    Occupational Therapy  Request for continuation of care with increase in frequency  Frequency:  1x/wk for 3 wks        Verbal orders given.  Home Care will send orders for provider to sign.  Confirmed ok to leave a detailed message with call back.  Contact information confirmed and updated as needed.    Yolande Kenney RN

## 2024-10-29 ENCOUNTER — MEDICAL CORRESPONDENCE (OUTPATIENT)
Dept: HEALTH INFORMATION MANAGEMENT | Facility: CLINIC | Age: 69
End: 2024-10-29
Payer: COMMERCIAL

## 2024-10-31 ENCOUNTER — MEDICAL CORRESPONDENCE (OUTPATIENT)
Dept: HEALTH INFORMATION MANAGEMENT | Facility: CLINIC | Age: 69
End: 2024-10-31
Payer: COMMERCIAL

## 2024-10-31 ENCOUNTER — TELEPHONE (OUTPATIENT)
Dept: FAMILY MEDICINE | Facility: CLINIC | Age: 69
End: 2024-10-31
Payer: COMMERCIAL

## 2024-10-31 NOTE — TELEPHONE ENCOUNTER
Forms received and faxed to [facility] at [phone].  A copy placed in TC bin and Abstract.

## 2024-11-07 ENCOUNTER — OFFICE VISIT (OUTPATIENT)
Dept: FAMILY MEDICINE | Facility: CLINIC | Age: 69
End: 2024-11-07
Payer: COMMERCIAL

## 2024-11-07 VITALS
OXYGEN SATURATION: 100 % | SYSTOLIC BLOOD PRESSURE: 135 MMHG | DIASTOLIC BLOOD PRESSURE: 73 MMHG | WEIGHT: 155.6 LBS | HEIGHT: 63 IN | BODY MASS INDEX: 27.57 KG/M2 | HEART RATE: 86 BPM | RESPIRATION RATE: 16 BRPM | TEMPERATURE: 97.6 F

## 2024-11-07 DIAGNOSIS — F10.20 ALCOHOLIC (H): ICD-10-CM

## 2024-11-07 DIAGNOSIS — T23.261D PARTIAL THICKNESS BURN OF BACK OF RIGHT HAND, SUBSEQUENT ENCOUNTER: ICD-10-CM

## 2024-11-07 DIAGNOSIS — Z98.890 S/P SPINAL SURGERY: Primary | ICD-10-CM

## 2024-11-07 DIAGNOSIS — E78.5 HYPERLIPIDEMIA LDL GOAL <100: ICD-10-CM

## 2024-11-07 DIAGNOSIS — Z79.4 TYPE 2 DIABETES MELLITUS TREATED WITH INSULIN (H): ICD-10-CM

## 2024-11-07 DIAGNOSIS — E11.9 TYPE 2 DIABETES MELLITUS TREATED WITH INSULIN (H): ICD-10-CM

## 2024-11-07 LAB
ERYTHROCYTE [DISTWIDTH] IN BLOOD BY AUTOMATED COUNT: 14.8 % (ref 10–15)
EST. AVERAGE GLUCOSE BLD GHB EST-MCNC: 169 MG/DL
HBA1C MFR BLD: 7.5 % (ref 0–5.6)
HCT VFR BLD AUTO: 36.6 % (ref 35–47)
HGB BLD-MCNC: 12 G/DL (ref 11.7–15.7)
MCH RBC QN AUTO: 26.6 PG (ref 26.5–33)
MCHC RBC AUTO-ENTMCNC: 32.8 G/DL (ref 31.5–36.5)
MCV RBC AUTO: 81 FL (ref 78–100)
PLATELET # BLD AUTO: 322 10E3/UL (ref 150–450)
RBC # BLD AUTO: 4.51 10E6/UL (ref 3.8–5.2)
WBC # BLD AUTO: 5.9 10E3/UL (ref 4–11)

## 2024-11-07 PROCEDURE — 99214 OFFICE O/P EST MOD 30 MIN: CPT | Performed by: PREVENTIVE MEDICINE

## 2024-11-07 PROCEDURE — 83036 HEMOGLOBIN GLYCOSYLATED A1C: CPT | Performed by: PREVENTIVE MEDICINE

## 2024-11-07 PROCEDURE — 84443 ASSAY THYROID STIM HORMONE: CPT | Performed by: PREVENTIVE MEDICINE

## 2024-11-07 PROCEDURE — 85027 COMPLETE CBC AUTOMATED: CPT | Performed by: PREVENTIVE MEDICINE

## 2024-11-07 PROCEDURE — 36415 COLL VENOUS BLD VENIPUNCTURE: CPT | Performed by: PREVENTIVE MEDICINE

## 2024-11-07 PROCEDURE — 80061 LIPID PANEL: CPT | Performed by: PREVENTIVE MEDICINE

## 2024-11-07 PROCEDURE — 80053 COMPREHEN METABOLIC PANEL: CPT | Performed by: PREVENTIVE MEDICINE

## 2024-11-07 RX ORDER — GLIMEPIRIDE 2 MG/1
2 TABLET ORAL
Qty: 180 TABLET | Refills: 1 | Status: SHIPPED | OUTPATIENT
Start: 2024-11-07

## 2024-11-07 RX ORDER — GLIMEPIRIDE 2 MG/1
TABLET ORAL
Qty: 180 TABLET | Refills: 1 | Status: CANCELLED | OUTPATIENT
Start: 2024-11-07

## 2024-11-07 RX ORDER — ATORVASTATIN CALCIUM 40 MG/1
40 TABLET, FILM COATED ORAL DAILY
Qty: 90 TABLET | Refills: 3 | Status: SHIPPED | OUTPATIENT
Start: 2024-11-07

## 2024-11-07 NOTE — LETTER
November 11, 2024      Ellen Hill  [address] [place] [address]        Dear ,    We are writing to inform you of your test results.    Complete blood count is not showing anemia or infection.   Thyroid function is normal.   Three month glucose number Hemoglobin A1C is at goal at 7.5   LDL Cholesterol is improved from last check but not at goal, since goal is less than 100.   Liver function and kidney function are normal.   Potassium was a little low at 3.2, please increase intake of potassium rich foods such as spinach, avocados, bananas.    Resulted Orders   Comprehensive metabolic panel   Result Value Ref Range    Sodium 139 135 - 145 mmol/L    Potassium 3.2 (L) 3.4 - 5.3 mmol/L    Carbon Dioxide (CO2) 22 22 - 29 mmol/L    Anion Gap 15 7 - 15 mmol/L    Urea Nitrogen 18.3 8.0 - 23.0 mg/dL    Creatinine 0.97 (H) 0.51 - 0.95 mg/dL    GFR Estimate 64 >60 mL/min/1.73m2      Comment:      eGFR calculated using 2021 CKD-EPI equation.    Calcium 10.1 8.8 - 10.4 mg/dL      Comment:      Reference intervals for this test were updated on 7/16/2024 to reflect our healthy population more accurately. There may be differences in the flagging of prior results with similar values performed with this method. Those prior results can be interpreted in the context of the updated reference intervals.    Chloride 102 98 - 107 mmol/L    Glucose 206 (H) 70 - 99 mg/dL    Alkaline Phosphatase 119 40 - 150 U/L    AST 25 0 - 45 U/L    ALT 12 0 - 50 U/L    Protein Total 7.4 6.4 - 8.3 g/dL    Albumin 4.5 3.5 - 5.2 g/dL    Bilirubin Total 0.6 <=1.2 mg/dL    Patient Fasting > 8hrs? No    Hemoglobin A1c   Result Value Ref Range    Estimated Average Glucose 169 (H) <117 mg/dL    Hemoglobin A1C 7.5 (H) 0.0 - 5.6 %      Comment:      Normal <5.7%   Prediabetes 5.7-6.4%    Diabetes 6.5% or higher     Note: Adopted from ADA consensus guidelines.   Lipid panel reflex to direct LDL Non-fasting   Result Value Ref Range     Cholesterol 240 (H) <200 mg/dL    Triglycerides 305 (H) <150 mg/dL    Direct Measure HDL 31 (L) >=50 mg/dL    LDL Cholesterol Calculated 148 (H) <100 mg/dL    Non HDL Cholesterol 209 (H) <130 mg/dL    Patient Fasting > 8hrs? No     Narrative    Cholesterol  Desirable: < 200 mg/dL  Borderline High: 200 - 239 mg/dL  High: >= 240 mg/dL    Triglycerides  Normal: < 150 mg/dL  Borderline High: 150 - 199 mg/dL  High: 200-499 mg/dL  Very High: >= 500 mg/dL    Direct Measure HDL  Female: >= 50 mg/dL   Male: >= 40 mg/dL    LDL Cholesterol  Desirable: < 100 mg/dL  Above Desirable: 100 - 129 mg/dL   Borderline High: 130 - 159 mg/dL   High:  160 - 189 mg/dL   Very High: >= 190 mg/dL    Non HDL Cholesterol  Desirable: < 130 mg/dL  Above Desirable: 130 - 159 mg/dL  Borderline High: 160 - 189 mg/dL  High: 190 - 219 mg/dL  Very High: >= 220 mg/dL   TSH with free T4 reflex   Result Value Ref Range    TSH 1.72 0.30 - 4.20 uIU/mL   CBC with platelets   Result Value Ref Range    WBC Count 5.9 4.0 - 11.0 10e3/uL    RBC Count 4.51 3.80 - 5.20 10e6/uL    Hemoglobin 12.0 11.7 - 15.7 g/dL    Hematocrit 36.6 35.0 - 47.0 %    MCV 81 78 - 100 fL    MCH 26.6 26.5 - 33.0 pg    MCHC 32.8 31.5 - 36.5 g/dL    RDW 14.8 10.0 - 15.0 %    Platelet Count 322 150 - 450 10e3/uL       If you have any questions or concerns, please call the clinic at the number listed above.       Sincerely,      Ave Lentz MD

## 2024-11-07 NOTE — PROGRESS NOTES
Assessment & Plan     S/P spinal surgery  -reviewed hospital summary  -home from TCU  -Continue with Home care (PT, OT and RN)  -follow up with Neurosurgery as scheduled     Hyperlipidemia LDL goal <100  - atorvastatin (LIPITOR) 40 MG tablet  Dispense: 90 tablet; Refill: 3  - Lipid panel reflex to direct LDL Non-fasting    LDL Cholesterol Calculated   Date Value Ref Range Status   06/07/2024 221 (H) <=100 mg/dL Final   04/27/2021 170 (H) <100 mg/dL Final     Comment:     Above desirable:  100-129 mg/dl  Borderline High:  130-159 mg/dL  High:             160-189 mg/dL  Very high:       >189 mg/dl       LDL Cholesterol Direct   Date Value Ref Range Status   01/12/2022 184 (H) <100 mg/dL Final     Comment:     Age 0-19 years:  Desirable: 0-110 mg/dL   Borderline high: 110-129 mg/dL   High: >= 130 mg/dL    Age 20 years and older:  Desirable: <100mg/dL  Above desirable: 100-129 mg/dL   Borderline high: 130-159 mg/dL   High: 160-189 mg/dL   Very high: >= 190 mg/dL         Type 2 diabetes mellitus treated with insulin (H)  -has been seen by Endocrine in the past, has not scheduled with them  -I provided refills to avoid running out of medication   - empagliflozin (JARDIANCE) 10 MG TABS tablet  Dispense: 90 tablet; Refill: 1  - Comprehensive metabolic panel  - Hemoglobin A1c  - Lipid panel reflex to direct LDL Non-fasting  - Albumin Random Urine Quantitative with Creat Ratio  - TSH with free T4 reflex  - CBC with platelets  - glimepiride (AMARYL) 2 MG tablet  Dispense: 180 tablet; Refill: 1    Partial thickness burn of back of right hand, subsequent encounter  -no signs of secondary infection  -dressing replaced in clinic  -Home care is helping at home  -Tdap is overdue, needs to get at the pharmacy   - CBC with platelets    Alcoholic (H)  -per patient, no alcohol use     MED REC REQUIRED  Post Medication Reconciliation Status: discharge medications reconciled, continue medications without change  BMI  Estimated body mass  "index is 27.56 kg/m  as calculated from the following:    Height as of this encounter: 1.6 m (5' 3\").    Weight as of this encounter: 70.6 kg (155 lb 9.6 oz).       Mary Ellen Hughes is a 68 year old, presenting for the following health issues:  Hospital F/U        11/7/2024     1:08 PM   Additional Questions   Roomed by Coley   Accompanied by self         11/7/2024     1:08 PM   Patient Reported Additional Medications   Patient reports taking the following new medications No     Via the Health Maintenance questionnaire, the patient has reported the following services have been completed DEXA: maryjane 2024-03-20, this information has been sent to the abstraction team.  HPI       Wt Readings from Last 2 Encounters:   11/07/24 70.6 kg (155 lb 9.6 oz)   09/12/24 75.1 kg (165 lb 9.6 oz)      Not being able to sleep, cannot get comfortable  Has been home from U   Home Care+ PT, OT and RN  No falls  No chest pain  No shortness of breath  No fever  No diarrhea  Using the brace  No leg edema  No urine symptoms  Second degree burn on right side from pumpkin seeds that were being taken out of the oven.     Hospital Follow-up Visit:    Hospital/Nursing Home/IP Rehab Facility:  [facility]   Date of Admission: 09/17/2024  Date of Discharge: 09/22/2024  Reason(s) for Admission: Back surgery  Was the patient in the ICU or did the patient experience delirium during hospitalization?  No  Do you have any other stressors you would like to discuss with your provider? OTHER: would like something to help with sleeping at night, patient also has a 2nd degree burn on right hand, would like it to be looked at.     Problems taking medications regularly:  None  Medication changes since discharge: None  Problems adhering to non-medication therapy:  None    Summary of hospitalization:  CareEverywhere information obtained and reviewed  Diagnostic Tests/Treatments reviewed.  Follow up needed: Spine clinic/Neurosurgery   Other " "Healthcare Providers Involved in Patient s Care:         Homecare  Update since discharge: improved.         Plan of care communicated with patient             The following is a summary from the hospital admission:    \"HOSPITAL DISCHARGE SUMMARY    Patient Name: Ellen Hill  YOB: 1955 Age: 68 y.o.  Medical Record Number: [id number]  Primary Physician: Ave Lentz MD  Phone: [phone]  Admission Date: 9/17/2024  Discharge Date: 9/22/2024    She will be transferred on 9/22/2024 to transitional care    PRINCIPAL DISCHARGE DIAGNOSIS: Other mechanical complication of internal fixation device of vertebrae, initial encounter ([facility]) [T84.296A]  Principal Problem:  Lumbar radiculopathy      PROCEDURES PERFORMED DURING HOSPITALIZATION: Procedure(s):  HARDWARE REMOVAL L3-PELVIS RE INSTRUMENTAION R35-HPSCBD, POSTERIOR SPINAL FUSION T10-L4 WITH NEURO MONITORING (N/A)   Dr. Loc Stephenson    BRIEF HOSPITAL COURSE: This 68 y.o. female was admitted for was elective spine surgery. The patient underwent the above procedure without complications. Standard prophylactic antibiotics were administered and the patient received DVT prophylaxis per service protocol. The patient's pain was initially controlled on intravenous pain medications and then weaned to oral medications prior to discharge. The patients pain was well controlled and the patient had met all physical therapy goals prior to discharge. Patient was discharged on a Regular diet, TLSO, and opioid medications for pain control. Discharge in stable condition. She will follow up in approximately 2-4 weeks. \"          Objective    /73 (BP Location: Left arm, Patient Position: Sitting, Cuff Size: Adult Regular)   Pulse 86   Temp 97.6  F (36.4  C) (Temporal)   Resp 16   Ht 1.6 m (5' 3\")   Wt 70.6 kg (155 lb 9.6 oz)   LMP  (LMP Unknown)   SpO2 100%   BMI 27.56 kg/m    Body mass index is 27.56 kg/m .  Physical Exam   GENERAL APPEARANCE: alert, " ambulating with a walker, TLSO brace in place   EYES: Eyes grossly normal to inspection and conjunctivae and sclerae normal  RESP: lungs clear to auscultation - no rales, rhonchi or wheezes  CV: regular rates and rhythm, normal S1 S2  MS: extremities normal- no gross deformities noted, no edema, no erythema, no tenderness   NEURO: Normal strength and tone, mentation intact and speech normal  PSYCH: mentation appears normal  Right hand: second degree over thumb and wrist area. No drainage. Neurovascular intact     No results found for this or any previous visit (from the past 24 hours).        Signed Electronically by: Ave Lentz MD MPH

## 2024-11-08 LAB
ALBUMIN SERPL BCG-MCNC: 4.5 G/DL (ref 3.5–5.2)
ALP SERPL-CCNC: 119 U/L (ref 40–150)
ALT SERPL W P-5'-P-CCNC: 12 U/L (ref 0–50)
ANION GAP SERPL CALCULATED.3IONS-SCNC: 15 MMOL/L (ref 7–15)
AST SERPL W P-5'-P-CCNC: 25 U/L (ref 0–45)
BILIRUB SERPL-MCNC: 0.6 MG/DL
BUN SERPL-MCNC: 18.3 MG/DL (ref 8–23)
CALCIUM SERPL-MCNC: 10.1 MG/DL (ref 8.8–10.4)
CHLORIDE SERPL-SCNC: 102 MMOL/L (ref 98–107)
CHOLEST SERPL-MCNC: 240 MG/DL
CREAT SERPL-MCNC: 0.97 MG/DL (ref 0.51–0.95)
EGFRCR SERPLBLD CKD-EPI 2021: 64 ML/MIN/1.73M2
FASTING STATUS PATIENT QL REPORTED: NO
FASTING STATUS PATIENT QL REPORTED: NO
GLUCOSE SERPL-MCNC: 206 MG/DL (ref 70–99)
HCO3 SERPL-SCNC: 22 MMOL/L (ref 22–29)
HDLC SERPL-MCNC: 31 MG/DL
LDLC SERPL CALC-MCNC: 148 MG/DL
NONHDLC SERPL-MCNC: 209 MG/DL
POTASSIUM SERPL-SCNC: 3.2 MMOL/L (ref 3.4–5.3)
PROT SERPL-MCNC: 7.4 G/DL (ref 6.4–8.3)
SODIUM SERPL-SCNC: 139 MMOL/L (ref 135–145)
TRIGL SERPL-MCNC: 305 MG/DL
TSH SERPL DL<=0.005 MIU/L-ACNC: 1.72 UIU/ML (ref 0.3–4.2)

## 2024-11-10 NOTE — RESULT ENCOUNTER NOTE
Please send a letter:    Dear Ellen Hill,    Complete blood count is not showing anemia or infection.  Thyroid function is normal.  Three month glucose number Hemoglobin A1C is at goal at 7.5  LDL Cholesterol is improved from last check but not at goal, since goal is less than 100.  Liver function and kidney function are normal.  Potassium was a little low at 3.2, please increase intake of potassium rich foods such as spinach, avocados, bananas.    Please let me know if you have any questions and thank you for choosing [facility].      Regards,    Ave Lentz MD MPH

## 2024-11-12 ENCOUNTER — TELEPHONE (OUTPATIENT)
Dept: FAMILY MEDICINE | Facility: CLINIC | Age: 69
End: 2024-11-12

## 2024-11-27 ENCOUNTER — MEDICAL CORRESPONDENCE (OUTPATIENT)
Dept: HEALTH INFORMATION MANAGEMENT | Facility: CLINIC | Age: 69
End: 2024-11-27
Payer: COMMERCIAL

## 2024-12-04 DIAGNOSIS — E11.9 TYPE 2 DIABETES MELLITUS TREATED WITH INSULIN (H): ICD-10-CM

## 2024-12-04 DIAGNOSIS — Z79.4 TYPE 2 DIABETES MELLITUS TREATED WITH INSULIN (H): ICD-10-CM

## 2024-12-05 RX ORDER — PEN NEEDLE, DIABETIC 32GX 5/32"
NEEDLE, DISPOSABLE MISCELLANEOUS
Qty: 100 EACH | Refills: 11 | Status: SHIPPED | OUTPATIENT
Start: 2024-12-05

## 2024-12-07 NOTE — TELEPHONE ENCOUNTER
Received [place] [employer] Insulin Treated Diabetes Mellitus form. Placed In Marcella's office for review.  [facility]  2nd Floor  Primary Care     Attending Attestation (For Attendings USE Only)...

## 2025-01-13 ENCOUNTER — TELEPHONE (OUTPATIENT)
Dept: ENDOCRINOLOGY | Facility: CLINIC | Age: 70
End: 2025-01-13
Payer: COMMERCIAL

## 2025-01-13 DIAGNOSIS — E11.29 TYPE 2 DIABETES MELLITUS WITH OTHER DIABETIC KIDNEY COMPLICATION (H): ICD-10-CM

## 2025-01-15 NOTE — TELEPHONE ENCOUNTER
Last office visit with Aga Delvalle PA-C was on 22/1/2024. Patient was to be seen for follow-up in 3 months. No follow-up scheduled. Please reach out to patient to schedule and then route back to clinic for review.       Danielle Ferrari RN  Endocrine Care Coordinator  [facility]

## 2025-01-16 NOTE — TELEPHONE ENCOUNTER
1/16 Called and left voicemail, provided phone number [phone] to schedule a follow up appointment with Aga Delvalle.     [facility]   Orthopedics, Podiatry, Sports Medicine, Ent ,Eye , Audiology, Adult Endocrine & Diabetes, Nutrition & Medication Therapy Management Specialties   [facility]

## 2025-01-20 RX ORDER — ACYCLOVIR 400 MG/1
TABLET ORAL
Qty: 3 EACH | Refills: 5 | OUTPATIENT
Start: 2025-01-20

## 2025-01-20 NOTE — TELEPHONE ENCOUNTER
1/20 Called and spoke to patient, appointment is currently scheduled.     Patient is needing a refill.       [facility]   Orthopedics, Podiatry, Sports Medicine, Ent ,Eye , Audiology, Adult Endocrine & Diabetes, Nutrition & Medication Therapy Management Specialties   [facility]

## 2025-01-23 DIAGNOSIS — Z79.4 TYPE 2 DIABETES MELLITUS TREATED WITH INSULIN (H): ICD-10-CM

## 2025-01-23 DIAGNOSIS — E11.9 TYPE 2 DIABETES MELLITUS TREATED WITH INSULIN (H): ICD-10-CM

## 2025-01-23 RX ORDER — INSULIN GLARGINE 100 [IU]/ML
50 INJECTION, SOLUTION SUBCUTANEOUS EVERY MORNING
Qty: 45 ML | Refills: 3 | Status: SHIPPED | OUTPATIENT
Start: 2025-01-23

## 2025-01-24 ENCOUNTER — TRANSFERRED RECORDS (OUTPATIENT)
Dept: HEALTH INFORMATION MANAGEMENT | Facility: CLINIC | Age: 70
End: 2025-01-24
Payer: COMMERCIAL

## 2025-02-03 RX ORDER — ACYCLOVIR 400 MG/1
TABLET ORAL
Qty: 3 EACH | Refills: 5 | Status: SHIPPED | OUTPATIENT
Start: 2025-02-03

## 2025-02-03 NOTE — TELEPHONE ENCOUNTER
Pt called to follow up on the refill request below, pt is needing her script for the sensors sent to her pharmacy

## 2025-02-12 DIAGNOSIS — I10 ESSENTIAL HYPERTENSION WITH GOAL BLOOD PRESSURE LESS THAN 130/80: ICD-10-CM

## 2025-02-12 RX ORDER — AMLODIPINE BESYLATE 10 MG/1
TABLET ORAL
Qty: 90 TABLET | Refills: 1 | Status: SHIPPED | OUTPATIENT
Start: 2025-02-12

## 2025-02-12 RX ORDER — LOSARTAN POTASSIUM AND HYDROCHLOROTHIAZIDE 25; 100 MG/1; MG/1
1 TABLET ORAL DAILY
Qty: 90 TABLET | Refills: 1 | Status: SHIPPED | OUTPATIENT
Start: 2025-02-12

## 2025-03-28 ENCOUNTER — TRANSFERRED RECORDS (OUTPATIENT)
Dept: HEALTH INFORMATION MANAGEMENT | Facility: CLINIC | Age: 70
End: 2025-03-28
Payer: COMMERCIAL

## 2025-04-15 ENCOUNTER — TELEPHONE (OUTPATIENT)
Dept: FAMILY MEDICINE | Facility: CLINIC | Age: 70
End: 2025-04-15
Payer: COMMERCIAL

## 2025-04-15 NOTE — LETTER
April 15, 2025    Ellen Hill  [address]  [place] [address]    Dear Ellen,    At [facility] we care about your health and are committed to providing quality patient care.     Here is a list of Health Maintenance topics that are due now or due soon:  Health Maintenance Due   Topic Date Due    Pneumococcal Vaccine: 50+ Years (1 of 2 - PCV) Never done    ZOSTER IMMUNIZATION (1 of 2) Never done    LUNG CANCER SCREENING  Never done    RSV VACCINE (1 - Risk 60-74 years 1-dose series) Never done    DTAP/TDAP/TD IMMUNIZATION (2 - Td or Tdap) 10/20/2020    MAMMO SCREENING  04/15/2021    MEDICARE ANNUAL WELLNESS VISIT  11/29/2022    DIABETIC FOOT EXAM  02/10/2024    INFLUENZA VACCINE (1) 09/01/2024    COVID-19 Vaccine (3 - 2024-25 season) 09/01/2024    A1C  02/07/2025    PHQ-9  02/07/2025    MICROALBUMIN  03/12/2025    FALL RISK ASSESSMENT  03/12/2025        We are recommending that you:  Schedule a WELLNESS (Preventative/Physical) APPOINTMENT with your primary care provider. If you go elsewhere for your wellness appointments then please disregard this reminder    To schedule an appointment or discuss this further, you may contact us by phone at the [facility] at [phone] or online through the patient [email or web address] @ [email or web address]    Thank you for trusting [facility] and we appreciate the opportunity to serve you.  We look forward to supporting your healthcare needs in the future.    Your partners in health,      Quality Committee at [facility]

## 2025-04-15 NOTE — TELEPHONE ENCOUNTER
Patient Quality Outreach    Patient is due for the following:   Diabetes -  A1C, Microalbumin, and Foot Exam  Breast Cancer Screening - Mammogram  Physical Annual Wellness Visit      Topic Date Due    Pneumococcal Vaccine (1 of 2 - PCV) Never done    Zoster (Shingles) Vaccine (1 of 2) Never done    Diptheria Tetanus Pertussis (DTAP/TDAP/TD) Vaccine (2 - Td or Tdap) 10/20/2020    Flu Vaccine (1) 09/01/2024    COVID-19 Vaccine (3 - 2024-25 season) 09/01/2024       Action(s) Taken:   Schedule a Annual Wellness Visit    Type of outreach:    Sent letter.    Questions for provider review:    None         Iqra Matthew MA  Chart routed to None.

## 2025-06-11 ENCOUNTER — VIRTUAL VISIT (OUTPATIENT)
Dept: PHARMACY | Facility: CLINIC | Age: 70
End: 2025-06-11
Attending: PHYSICIAN ASSISTANT
Payer: COMMERCIAL

## 2025-06-11 ENCOUNTER — TELEPHONE (OUTPATIENT)
Dept: ENDOCRINOLOGY | Facility: CLINIC | Age: 70
End: 2025-06-11
Payer: COMMERCIAL

## 2025-06-11 DIAGNOSIS — E11.29 TYPE 2 DIABETES MELLITUS WITH OTHER DIABETIC KIDNEY COMPLICATION (H): ICD-10-CM

## 2025-06-11 RX ORDER — ACYCLOVIR 400 MG/1
TABLET ORAL
Qty: 3 EACH | Refills: 5 | Status: SHIPPED | OUTPATIENT
Start: 2025-06-11

## 2025-06-11 NOTE — TELEPHONE ENCOUNTER
Patient believes she would meet income limits for Jardiance PAP. Please reach out to patient to start application process.    Please forward provider portion to [facility] staff and use [facility] fax number for contact - [phone].    Silvina Cortés, ElizabethD  Diabetes & Endocrine MTM Pharmacist

## 2025-06-11 NOTE — PROGRESS NOTES
Medication Therapy Management (MTM) Encounter    ASSESSMENT:                            Medication Adherence/Access: see below for considerations    Diabetes: Patient is not meeting A1c goal of <7. She has done well with current regimen but Jardiance cost has been too much for her. We discussed PAP thru  cares today. Patient believes she would meet income limits for this program. Will have liaison team start application process. Additionally, it would be beneficial to increase her Jardiance dose for maximum benefits. Will continue current insulin dose as patient was taking steroids - it would be best to see what her baseline is without the steroids prior to adjusting the dose.  Also discussed moving her Dexcom rx since she has been having trouble with her current pharmacy due to low supply- will send rx to [facility] as it is available.    Due to time constraints, I was only able to assess the above with the patient today. Will follow-up on other disease states in future encounters  PLAN:                            Our team will reach out to start the Jardiance patient assistance program application  We will increase to Jardiance 25 mg once daily once approved  Continue your current Lantus 52 units     Follow-up: as needed w/ Silvina (upon PAP approval)    SUBJECTIVE/OBJECTIVE:                          Ellen Hill is a 69 year old female called for an initial visit. She was referred to me from Aga Delvalle.      Reason for visit: Jardiance cost.    Allergies/ADRs: Reviewed in chart  Past Medical History: Reviewed in chart  Tobacco: She reports that she quit smoking about 11 years ago. Her smoking use included cigarettes. She started smoking about 21 years ago. She has a 1 pack-year smoking history. She has been exposed to tobacco smoke. She has never used smokeless tobacco.  Alcohol: not currently using    Medication Adherence/Access: Jardiance ~$100/month; insulin $35/box    Diabetes   Patient diagnosed with Type 2  "diabetes    Current Medications:  Jardiance 10 mg daily - reports has about 2 months supply   Lantus 54 units daily   Glimepiride 2 mg twice a day w/ meals      Patient is not experiencing side effects.  Current diabetes symptoms: none  Blood sugar monitoring: Continuous Glucose Monitor - has not been getting this from the pharmacy  -doing fingersticks lately: has been on the \"high side\" - was on steroids and having more snacks - ranging ~220 the last couple weeks        Lab Results   Component Value Date    A1C 8.9 (H) 05/09/2025    A1C 7.5 (H) 11/07/2024    A1C 8.0 (H) 09/12/2024     Estimated body mass index is 29.66 kg/m  as calculated from the following:    Height as of 5/9/25: 5' 3.03\" (1.601 m).    Weight as of 5/9/25: 167 lb 9.6 oz (76 kg).    Wt Readings from Last 3 Encounters:   05/09/25 167 lb 9.6 oz (76 kg)   11/07/24 155 lb 9.6 oz (70.6 kg)   09/12/24 165 lb 9.6 oz (75.1 kg)     Lab Results   Component Value Date    GFRESTIMATED 64 11/07/2024    GFRESTIMATED 78 09/12/2024    GFRESTIMATED 78 06/07/2024          Today's Vitals: LMP  (LMP Unknown)   ----------------    I spent 40 minutes with this patient today. All changes were made via collaborative practice agreement with Aga Delvalle.     A summary of these recommendations was sent via [facility]oapi.    Silvina Cortés, PharmD  Diabetes & Endocrine MTM Pharmacist    Contact information:   To schedule a MTM appointment: [phone]  For questions or concerns, please send a discoapi message or call the clinic at [phone].    For more urgent concerns that do not require 560, please call [phone] after hours/weekends and ask to speak with the Endocrinologist on call.       Telemedicine Visit Details  The patient's medications can be safely assessed via a telemedicine encounter.  Type of service:  Telephone visit  Originating Location (pt. Location): Home    Distant Location (provider location):  Off-site  Start Time: 9:30 AM  End Time: 10:10 AM     Medication " Therapy Recommendations  No medication therapy recommendations to display

## 2025-06-12 ENCOUNTER — TRANSFERRED RECORDS (OUTPATIENT)
Dept: HEALTH INFORMATION MANAGEMENT | Facility: CLINIC | Age: 70
End: 2025-06-12
Payer: COMMERCIAL

## 2025-06-18 NOTE — PATIENT INSTRUCTIONS
"Recommendations from today's MTM visit:                                                      Our team will reach out to start the Jardiance patient assistance program application  We will increase to Jardiance 25 mg once daily once approved  Continue your current Lantus 52 units     Follow-up: as needed w/ Silvina (upon PAP approval)    It was great speaking with you today.  I value your experience and would be very thankful for your time in providing feedback in our clinic survey. In the next few days, you may receive an email or text message from [employer] with a link to a survey related to your  clinical pharmacist.\"     To schedule another MTM appointment, please call the clinic directly or you may call the MTM scheduling line at [phone].    My Clinical Pharmacist's contact information:                                                      Please feel free to contact me with any questions or concerns you have.      Silvina Cortés, PharmD  Diabetes & Endocrine MTM Pharmacist    Contact information:   To schedule a MTM appointment: [phone]  For questions or concerns, please send a [employer] message or call the clinic at [phone].    For more urgent concerns that do not require 126, please call [phone] after hours/weekends and ask to speak with the Endocrinologist on call.      "

## 2025-06-24 ENCOUNTER — TELEPHONE (OUTPATIENT)
Dept: CARDIOLOGY | Facility: CLINIC | Age: 70
End: 2025-06-24
Payer: COMMERCIAL

## 2025-06-24 NOTE — TELEPHONE ENCOUNTER
Good afternoon Silvina,    Patient called for status update on the Dexcom prescription. Patient stated that she hasn't received anything. I saw that you had sent a prescription over to the  mail order/specialty pharmacy on 06/11 so I provided her with the phone number for the pharmacy. She was going to give them a call.     Thank you,  Lisha Chopra [facility]

## 2025-06-25 NOTE — TELEPHONE ENCOUNTER
Dexcom will be delivered 6/26 per ERX. No action required.        Abe Barragan, PharmD, [facility] [place]  Endocrine & Diabetes MTM Pharmacist  [facility]  Diabetes & Endocrinology Clinic  [address] [place] [id number]    Contact information:   To schedule a MTM appointment: [phone]  For questions or concerns, please send a [employer] message (preferred) or call the clinic at [phone].    For more urgent concerns that do not require 189, please call [phone] after hours/weekends and ask to speak with the Endocrinologist on call.

## 2025-07-07 ENCOUNTER — TRANSFERRED RECORDS (OUTPATIENT)
Dept: HEALTH INFORMATION MANAGEMENT | Facility: CLINIC | Age: 70
End: 2025-07-07
Payer: COMMERCIAL

## 2025-07-09 ENCOUNTER — TELEPHONE (OUTPATIENT)
Dept: FAMILY MEDICINE | Facility: CLINIC | Age: 70
End: 2025-07-09
Payer: COMMERCIAL

## 2025-07-09 NOTE — TELEPHONE ENCOUNTER
Attempted to reach Ellen ROMO Sergio in regards to their atorvastatin being overdue for refill. Left voicemail, with call back number, requesting return call. Per our records last filled 11/6/24 for 90 day supply.     Carrie Henao, PharmD, BCACP  Population Health Pharmacist  [phone]

## 2025-07-16 ENCOUNTER — TELEPHONE (OUTPATIENT)
Dept: PHARMACY | Facility: CLINIC | Age: 70
End: 2025-07-16
Payer: COMMERCIAL

## 2025-07-16 NOTE — TELEPHONE ENCOUNTER
Called patient to see if she received messages we left regarding PAP for Jardiance. Patient reports to me she has not been taking the Jardiance that she has at home - has about 2-3 months worth of supply but has not been doing it since we last met.  Reports her blood sugars have been running up into the 300s - unable to pull up her reports for me right now. She is wondering if she needs jardiance at all. Educated patient on benefits of therapy and long term effects. Discussed her uncontrolled blood sugars and long term risks associated with this. Patient states she will start taking this consistenly starting today.     Also discussed in order for her to apply for [facility] for Jardiance assistance, she will have to first apply and get denied for Medicare LIS/extra help program. She would first like to see how she does with taking the Jardiance consistently.    MTM will follow-up with patient.    Silvina Cortés, ElizabethD  Diabetes & Endocrine MTM Pharmacist    Contact information:   To schedule a MTM appointment: [phone]  For questions or concerns, please send a [employer] message or call the clinic at [phone].    For more urgent concerns that do not require 022, please call [phone] after hours/weekends and ask to speak with the Endocrinologist on call.

## 2025-08-27 ENCOUNTER — VIRTUAL VISIT (OUTPATIENT)
Dept: PHARMACY | Facility: CLINIC | Age: 70
End: 2025-08-27
Attending: PHYSICIAN ASSISTANT
Payer: COMMERCIAL

## 2025-08-27 DIAGNOSIS — E11.29 TYPE 2 DIABETES MELLITUS WITH OTHER DIABETIC KIDNEY COMPLICATION (H): Primary | ICD-10-CM
